# Patient Record
Sex: FEMALE | Race: BLACK OR AFRICAN AMERICAN | Employment: FULL TIME | URBAN - METROPOLITAN AREA
[De-identification: names, ages, dates, MRNs, and addresses within clinical notes are randomized per-mention and may not be internally consistent; named-entity substitution may affect disease eponyms.]

---

## 2017-02-02 ENCOUNTER — OFFICE VISIT (OUTPATIENT)
Dept: INTERNAL MEDICINE | Facility: CLINIC | Age: 27
End: 2017-02-02

## 2017-02-02 VITALS
WEIGHT: 265 LBS | SYSTOLIC BLOOD PRESSURE: 138 MMHG | HEART RATE: 72 BPM | OXYGEN SATURATION: 97 % | DIASTOLIC BLOOD PRESSURE: 87 MMHG | TEMPERATURE: 98.3 F | BODY MASS INDEX: 44.1 KG/M2 | RESPIRATION RATE: 16 BRPM

## 2017-02-02 DIAGNOSIS — Z30.432 ENCOUNTER FOR IUD REMOVAL: ICD-10-CM

## 2017-02-02 DIAGNOSIS — Z12.4 SCREENING FOR CERVICAL CANCER: ICD-10-CM

## 2017-02-02 DIAGNOSIS — Z76.89 ESTABLISHING CARE WITH NEW DOCTOR, ENCOUNTER FOR: Primary | ICD-10-CM

## 2017-02-02 DIAGNOSIS — Z76.89 ESTABLISHING CARE WITH NEW DOCTOR, ENCOUNTER FOR: ICD-10-CM

## 2017-02-02 DIAGNOSIS — Z11.3 SCREEN FOR STD (SEXUALLY TRANSMITTED DISEASE): ICD-10-CM

## 2017-02-02 LAB
HIV 1+2 AB+HIV1 P24 AG SERPL QL IA: NORMAL
MICRO REPORT STATUS: NORMAL
SPECIMEN SOURCE: NORMAL
T PALLIDUM IGG+IGM SER QL: NORMAL
WET PREP SPEC: NORMAL

## 2017-02-02 PROCEDURE — 86780 TREPONEMA PALLIDUM: CPT | Performed by: INTERNAL MEDICINE

## 2017-02-02 PROCEDURE — 87389 HIV-1 AG W/HIV-1&-2 AB AG IA: CPT | Performed by: INTERNAL MEDICINE

## 2017-02-02 ASSESSMENT — PAIN SCALES - GENERAL: PAINLEVEL: NO PAIN (0)

## 2017-02-02 NOTE — PATIENT INSTRUCTIONS
[facility] Center Medication Refill Request Information:  * Please contact your pharmacy regarding ANY request for medication refills.  ** [facility] Prescription Fax = [phone]  * Please allow 3 business days for routine medication refills.  * Please allow 5 business days for controlled substance medication refills.     [facility] Care Center Test Result notification information:  *You will be notified with in 7-10 days of your appointment day regarding the results of your test.  If you are on MyChart you will be notified as soon as the provider has reviewed the results and signed off on them.      NEXPLANON     You may have some pain at the site of the Nexplanon insertion. You can help relieve the discomfort with Tylenol (acetaminophen), Aspirin or Advil (ibuprofen). If your discomfort worsens or you notice redness spreading on the skin around the insertion site, please call the clinic.       Irregular bleeding is common with Nexplanon, especially in the first 6-12 months of use. After one year, approximately 20% of women who use Nexplanon will stop having periods completely. Some women have longer, heavier periods. Some women will have increased spotting between periods. You may find that your periods may be hard to predict.       The Nexplanon does not protect against sexually transmitted infections including the AIDS virus (HIV), warts (HPV), gonorrhea, Chlamydia, and herpes. Condoms should be used to decrease the risk sexually transmitted infections. If you think that you have been exposed to a sexually transmitted infection, please call the clinic.       If you had Nexplanon placed for birth control, it is effective immediately if it was inserted within five days after the start of your period. If you have Nexplanon inserted at any other time during your menstrual cycle, use another method of birth control, like condoms for at least 7 days.       The Nexplanon should be removed and/or replaced by a health care  provider after three years.     Preparing for Nexplanon Insertion       Prior to the Appointment::    Check with your insurance to ensure you have coverage for the Nexplanon angel    If you are NOT on birth control, abstain from unprotected intercourse (sex without condoms) for 7 days prior to the appointment.     Failure to do so will result in Nexplanon angel not being placed the day of the appointment.     If you are on birth control, continue your current form of birth control until 7 days after the Nexplanon angel is inserted.     You need to be taking this correctly.     If you are missing pills, you also need to abstain from unprotected intercourse for 7 days prior to the appointment.     Failure to do so will result in nexplanon not being placed the day of the appointment.       Day of the Appointment:     Please arrive to lab 30 minutes prior to the scheduled appointment for a urine pregnancy test.       After the Appointment:     You will need to keep your arm dry and clean for 24 hours.     IUD     1. Uterine cramping is common after IUD placement. You can help relieve the discomfort with heating pads, Tylenol (acetaminophen), Aspirin or Advil (ibuprofen). If your cramping becomes very painful, please call the clinic.     2. Irregular bleeding and spotting is normal for the first few months after the IUD is placed. In some cases, women may experience irregular bleeding or spotting for up to six months after the IUD is placed. This bleeding can be annoying at first but usually will become lighter with the Mirena IUD quickly. Call the clinic if your bleeding is excessive and not getting better.     3. Your period will likely be shorter and lighter with a Mirena IUD. Approximately 40% of women will stop having periods altogether with the Mirena IUD. Your period may be heavier and longer with the Paragard IUD.     4. IUDs do not protect against sexually transmitted infections including the AIDS virus (HIV), warts  (HPV), gonorrhea, Chlamydia, and herpes. Condoms should be used to decrease the risk sexually transmitted infections. If you think that you have been exposed to a sexually transmitted infection, please call the clinic.     5. If you had the IUD placed for birth control, the Paragard IUD is effective immediately. The Mirena IUD is effective immediately if it was inserted within seven days after the start of your period. If you have Mirena inserted at any other time during your menstrual cycle, use another method of birth control, like condoms for at least 7 days.     6. It is possible for the IUD to come out of the uterus. If it does slip out of place, it is most likely to happen in the first few months after being put in. To make sure your IUD is in place, you can feel for the IUD strings between periods. To check for strings, wash your hands. Then, sit or squat down. Place one finger into your vagina until you feel your cervix. It will feel hard and rubbery, like the end of your nose. The string ends should be coming through your cervix. Do not pull on the strings. If the strings feel much longer than before, if you feel the hard plastic part of the IUD, or if you cannot feel the strings at all, the IUD may have moved out of place. Please call the clinic and consider using a back up form of birth control until you are seen.     7. Keep your follow-up appointment for 4-6 weeks after the IUD has been placed.     8. Pregnancy is unlikely after IUD placement, but can happen. If you have early pregnancy symptoms like nausea and vomiting, breast tenderness, frequent urination or abdominal pain, you can take a pregnancy test. Please call the clinic if you have any concerns or if your pregnancy test is positive.     9. The IUD should only be removed by a healthcare provider.     The Mirena IUD should be removed and/or replaced after 5 years.     The Paragard IUD should be removed and/or replaced after 10 years.              Preparing for IUD Insertion       Prior to the Appointment::    Check with your insurance to ensure you have coverage for the IUD    If you are NOT on birth control, abstain from unprotected intercourse (sex without condoms) for 7 days prior to the appointment.     Failure to do so will result in IUD not being placed the day of the appointment.     If you are on birth control, continue your current form of birth control until 7 days after the IUD is inserted.     You need to be taking this correctly.     If you are missing pills, you also need to abstain from unprotected intercourse for 7 days prior to the appointment.     Failure to do so will result in IUD not being placed the day of the appointment.       Day of the Appointment:     Please arrive to lab 30 minutes prior to the scheduled appointment for a urine pregnancy test.     You may take 600 mg of ibuprofen 60 minutes prior to the procedure to help reduce cramping.     If you require medication to relax for the appointment, please let us know when you schedule the appointment. You will need a  for after the procedure.       After the Appointment:     You will not be able to place anything in the vagina for seven days    No intercourse (sexual activity)    No tampons    No douches

## 2017-02-02 NOTE — MR AVS SNAPSHOT
After Visit Summary   2/2/2017    Betty Tamayo    MRN: [id number]           Patient Information     Date Of Birth          1990        Visit Information        Provider Department      2/2/2017 1:00 PM Lorena Reed MD [facility]        Today's Diagnoses     Establishing care with new doctor, encounter for    -  1     Screen for STD (sexually transmitted disease)         Encounter for IUD removal         Screening for cervical cancer           Care Instructions    Primary Care Center Medication Refill Request Information:  * Please contact your pharmacy regarding ANY request for medication refills.  ** [facility] Prescription Fax = [phone]  * Please allow 3 business days for routine medication refills.  * Please allow 5 business days for controlled substance medication refills.     [facility] Center Test Result notification information:  *You will be notified with in 7-10 days of your appointment day regarding the results of your test.  If you are on MyChart you will be notified as soon as the provider has reviewed the results and signed off on them.      NEXPLANON     You may have some pain at the site of the Nexplanon insertion. You can help relieve the discomfort with Tylenol (acetaminophen), Aspirin or Advil (ibuprofen). If your discomfort worsens or you notice redness spreading on the skin around the insertion site, please call the clinic.       Irregular bleeding is common with Nexplanon, especially in the first 6-12 months of use. After one year, approximately 20% of women who use Nexplanon will stop having periods completely. Some women have longer, heavier periods. Some women will have increased spotting between periods. You may find that your periods may be hard to predict.       The Nexplanon does not protect against sexually transmitted infections including the AIDS virus (HIV), warts (HPV), gonorrhea, Chlamydia, and herpes. Condoms should be used  to decrease the risk sexually transmitted infections. If you think that you have been exposed to a sexually transmitted infection, please call the clinic.       If you had Nexplanon placed for birth control, it is effective immediately if it was inserted within five days after the start of your period. If you have Nexplanon inserted at any other time during your menstrual cycle, use another method of birth control, like condoms for at least 7 days.       The Nexplanon should be removed and/or replaced by a health care provider after three years.     Preparing for Nexplanon Insertion       Prior to the Appointment::    Check with your insurance to ensure you have coverage for the Nexplanon angel    If you are NOT on birth control, abstain from unprotected intercourse (sex without condoms) for 7 days prior to the appointment.     Failure to do so will result in Nexplanon angel not being placed the day of the appointment.     If you are on birth control, continue your current form of birth control until 7 days after the Nexplanon angel is inserted.     You need to be taking this correctly.     If you are missing pills, you also need to abstain from unprotected intercourse for 7 days prior to the appointment.     Failure to do so will result in nexplanon not being placed the day of the appointment.       Day of the Appointment:     Please arrive to lab 30 minutes prior to the scheduled appointment for a urine pregnancy test.       After the Appointment:     You will need to keep your arm dry and clean for 24 hours.     IUD     1. Uterine cramping is common after IUD placement. You can help relieve the discomfort with heating pads, Tylenol (acetaminophen), Aspirin or Advil (ibuprofen). If your cramping becomes very painful, please call the clinic.     2. Irregular bleeding and spotting is normal for the first few months after the IUD is placed. In some cases, women may experience irregular bleeding or spotting for up to six  months after the IUD is placed. This bleeding can be annoying at first but usually will become lighter with the Mirena IUD quickly. Call the clinic if your bleeding is excessive and not getting better.     3. Your period will likely be shorter and lighter with a Mirena IUD. Approximately 40% of women will stop having periods altogether with the Mirena IUD. Your period may be heavier and longer with the Paragard IUD.     4. IUDs do not protect against sexually transmitted infections including the AIDS virus (HIV), warts (HPV), gonorrhea, Chlamydia, and herpes. Condoms should be used to decrease the risk sexually transmitted infections. If you think that you have been exposed to a sexually transmitted infection, please call the clinic.     5. If you had the IUD placed for birth control, the Paragard IUD is effective immediately. The Mirena IUD is effective immediately if it was inserted within seven days after the start of your period. If you have Mirena inserted at any other time during your menstrual cycle, use another method of birth control, like condoms for at least 7 days.     6. It is possible for the IUD to come out of the uterus. If it does slip out of place, it is most likely to happen in the first few months after being put in. To make sure your IUD is in place, you can feel for the IUD strings between periods. To check for strings, wash your hands. Then, sit or squat down. Place one finger into your vagina until you feel your cervix. It will feel hard and rubbery, like the end of your nose. The string ends should be coming through your cervix. Do not pull on the strings. If the strings feel much longer than before, if you feel the hard plastic part of the IUD, or if you cannot feel the strings at all, the IUD may have moved out of place. Please call the clinic and consider using a back up form of birth control until you are seen.     7. Keep your follow-up appointment for 4-6 weeks after the IUD has been  placed.     8. Pregnancy is unlikely after IUD placement, but can happen. If you have early pregnancy symptoms like nausea and vomiting, breast tenderness, frequent urination or abdominal pain, you can take a pregnancy test. Please call the clinic if you have any concerns or if your pregnancy test is positive.     9. The IUD should only be removed by a healthcare provider.     The Mirena IUD should be removed and/or replaced after 5 years.     The Paragard IUD should be removed and/or replaced after 10 years.             Preparing for IUD Insertion       Prior to the Appointment::    Check with your insurance to ensure you have coverage for the IUD    If you are NOT on birth control, abstain from unprotected intercourse (sex without condoms) for 7 days prior to the appointment.     Failure to do so will result in IUD not being placed the day of the appointment.     If you are on birth control, continue your current form of birth control until 7 days after the IUD is inserted.     You need to be taking this correctly.     If you are missing pills, you also need to abstain from unprotected intercourse for 7 days prior to the appointment.     Failure to do so will result in IUD not being placed the day of the appointment.       Day of the Appointment:     Please arrive to lab 30 minutes prior to the scheduled appointment for a urine pregnancy test.     You may take 600 mg of ibuprofen 60 minutes prior to the procedure to help reduce cramping.     If you require medication to relax for the appointment, please let us know when you schedule the appointment. You will need a  for after the procedure.       After the Appointment:     You will not be able to place anything in the vagina for seven days    No intercourse (sexual activity)    No tampons    No douches                  Follow-ups after your visit        Your next 10 appointments already scheduled     Feb 02, 2017  1:30 PM   LAB with [facility] ([facility]  [facility])    [address]  [address] [place] [address] [phone]           Patient must bring picture ID.  Patient should be prepared to give a urine specimen  Please do not eat 10-12 hours before your appointment if you are coming in fasting for labs on lipids, cholesterol, or glucose (sugar).  Pregnant women should follow their Care Team instructions. Water with medications is okay. Do not drink coffee or other fluids.   If you have concerns about taking  your medications, please ask at office or if scheduling via [employer], send a message by clicking on Secure Messaging, Message Your Care Team.              Future tests that were ordered for you today     Open Future Orders        Priority Expected Expires Ordered    Wet prep Routine  2/3/2018 2/2/2017    HIV Antigen Antibody Combo Routine  2/2/2018 2/2/2017    Anti Treponema Routine  2/2/2018 2/2/2017            Who to contact     Please call your clinic at [phone] to:    Ask questions about your health    Make or cancel appointments    Discuss your medicines    Learn about your test results    Speak to your doctor   If you have compliments or concerns about an experience at your clinic, or if you wish to file a complaint, please contact [facility] Patient Relations at [phone] or email us at [email or web address]         Additional Information About Your Visit        PixSense Information     PixSense is an electronic gateway that provides easy, online access to your medical records. With PixSense, you can request a clinic appointment, read your test results, renew a prescription or communicate with your care team.     To sign up for PixSense visit the website at [email or web address]   You will be asked to enter the access code listed below, as well as some personal information. Please follow the directions to create your username and password.     Your access code is:  [email or web address]  Expires: 5/3/2017  1:22 PM     Your access code will  in 90 days. If you need help or a new code, please contact your [facility] or call [phone] for assistance.        Care EveryWhere ID     This is your Care EveryWhere ID. This could be used by other organizations to access your [facility] medical records  [id number]        Your Vitals Were     Pulse Temperature Respirations Pulse Oximetry Breastfeeding?       72 98.3  F (36.8  C) (Oral) 16 97% No        Blood Pressure from Last 3 Encounters:   17 138/87   16 127/90   16 128/83    Weight from Last 3 Encounters:   17 120.203 kg (265 lb)   04/28/15 113.399 kg (250 lb)   01/08/15 113.9 kg (251 lb 1.7 oz)              We Performed the Following     Chlamydia trachomatis PCR - Swab collection and order printing in clinic and sent together to lab     Neisseria gonorrhoeae PCR - Swab collection and order printing in clinic and sent together to lab     Pap imaged thin layer screen reflex to HPV if ASCUS - recommend age 25 - 29     Pap smear procedure (exam)     REMOVE INTRAUTERINE DEVICE          Today's Medication Changes          These changes are accurate as of: 17  1:24 PM.  If you have any questions, ask your nurse or doctor.               Stop taking these medicines if you haven't already. Please contact your care team if you have questions.     ALBUTEROL SULFATE IN   Stopped by:  Lorena Reed MD                    Primary Care Provider    Physician No Ref-Primary       No address on file        Thank you!     Thank you for choosing [facility]  for your care. Our goal is always to provide you with excellent care. Hearing back from our patients is one way we can continue to improve our services. Please take a few minutes to complete the written survey that you may receive in the mail after your visit with us. Thank you!             Your Updated  Medication List - Protect others around you: Learn how to safely use, store and throw away your medicines at www.disposemymeds.org.          This list is accurate as of: 2/2/17  1:24 PM.  Always use your most recent med list.                   Brand Name Dispense Instructions for use    levonorgestrel 20 MCG/24HR IUD    MIRENA     1 each by Intrauterine route once

## 2017-02-02 NOTE — NURSING NOTE
Chief Complaint   Patient presents with     IUD     Patient is here to have IUD removed     Denise Olmos CMA 12:55 PM on 2/2/2017.

## 2017-02-02 NOTE — Clinical Note
Patient:  Betty Tamayo  :   1990  MRN:     [id number]        Ms. Betty Tamayo  [address]  [place] [address]        2017    Dear Ms. Tamayo,    Thank you for choosing the [facility] for your healthcare needs.  We appreciate the opportunity to serve you.    The following are your recent test results.     Your test results fall within the expected range(s) or remain unchanged from previous results.  Please continue with current treatment plan.    Results for orders placed or performed in visit on 17   Wet prep   Result Value Ref Range    Specimen Description Vagina     Wet Prep       No Trichomonas seen  No clue cells seen  No yeast seen  No PMNs seen      Micro Report Status FINAL 2017        Please contact your provider if you have any questions or concerns.  We look forward to serving your needs in the future.      Sincerely,    Dr. Alba/ EVELINA

## 2017-02-02 NOTE — PROGRESS NOTES
S: Betty is here to establish care, have a physical, and have her IUD removed. She has had her Mirena in place for almost 8years now. Initially, she had lighter/shorter periods, however, in the last several months she has had heavier periods that are longer and more painful. She is not sure what else she'd like for contraception, and is considering the nexplanon angel or another Mirena. She does not want the pill as she cannot take it consistently.     Past Medical History   Diagnosis Date     Uncomplicated asthma      Right shoulder pain 12/9/14     Past Surgical History   Procedure Laterality Date     Hopkins teeth       Ent surgery       tonsillectomy 1995     Anesthesia out of or mri Right 1/8/2015     Procedure: ANESTHESIA OUT OF OR MRI;  Surgeon: Generic Anesthesia Provider;  Location: [facility] OR     Family History   Problem Relation Age of Onset     Hypertension No family hx of      DIABETES No family hx of      Social History     Social History     Marital Status: Single     Spouse Name: N/A     Number of Children: N/A     Years of Education: N/A     Occupational History     Not on file.     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   Gets some exercise, working on going to the gym more often.      ROS: 10 point ROS neg other than the symptoms noted above in the HPI.    PE:   /87 mmHg  Pulse 72  Temp(Src) 98.3  F (36.8  C) (Oral)  Resp 16  Wt 120.203 kg (265 lb)  SpO2 97%  Breastfeeding? No  General: pleasant female, in NAD  ENT: TMs with cerumen bilaterally, oropharynx clear  Neck: no LAD  Resp: lungs CAB. No wheezing  CV: Heart RRR, no MRG  Abd: Soft, NT, ND, Nl bowel  sounds  Ext: wwp, no edema  Skin: warm, dry, no rash  Gyn: External genitalia normal, cervix normal, no discharge. IUD strings visualized and removed. Pap taken.     A/P:   Betty was seen today for iud.    Diagnoses and all orders for this visit:    Establishing care with new doctor, encounter for  -     Advised increased exercise   Discussed alternative contraception options. Mirena vs ParaGuard vs Nexplanon. Offered to place one of these today but she would like to think about it first. Advised using condoms regularly with sexual activity.     Screen for STD (sexually transmitted disease)  -     Neisseria gonorrhoeae PCR - Swab collection and order printing in clinic and sent together to lab  -     Chlamydia trachomatis PCR - Swab collection and order printing in clinic and sent together to lab  -     HIV Antigen Antibody Combo; Future  -     Anti Treponema; Future  -     Wet prep; Future    Encounter for IUD removal  -     REMOVE INTRAUTERINE DEVICE    Screening for cervical cancer  -     Pap smear procedure (exam)  -     Pap imaged thin layer screen reflex to HPV if ASCUS - recommend age 25 - 29      Lorena Lindsay MD  02/02/2017

## 2017-02-02 NOTE — NURSING NOTE
Labs collected by RN from right antecubital and sent for processing, pt tolerated well. No other appts so no vitals taken.Ayde Espitia

## 2017-02-03 LAB
C TRACH DNA SPEC QL NAA+PROBE: NORMAL
N GONORRHOEA DNA SPEC QL NAA+PROBE: NORMAL
SPECIMEN SOURCE: NORMAL
SPECIMEN SOURCE: NORMAL

## 2017-02-06 LAB
COPATH REPORT: NORMAL
PAP: NORMAL

## 2017-02-07 ENCOUNTER — OFFICE VISIT (OUTPATIENT)
Dept: ENDOCRINOLOGY | Facility: CLINIC | Age: 27
End: 2017-02-07

## 2017-02-07 VITALS
DIASTOLIC BLOOD PRESSURE: 66 MMHG | HEART RATE: 75 BPM | SYSTOLIC BLOOD PRESSURE: 117 MMHG | WEIGHT: 269.5 LBS | BODY MASS INDEX: 44.9 KG/M2 | HEIGHT: 65 IN | OXYGEN SATURATION: 98 % | TEMPERATURE: 98.8 F

## 2017-02-07 DIAGNOSIS — E66.01 MORBID OBESITY DUE TO EXCESS CALORIES (H): Primary | ICD-10-CM

## 2017-02-07 RX ORDER — PHENTERMINE HYDROCHLORIDE 37.5 MG/1
0.5 TABLET ORAL EVERY MORNING
Qty: 15 TABLET | Refills: 2 | Status: SHIPPED | OUTPATIENT
Start: 2017-02-07 | End: 2017-02-07

## 2017-02-07 RX ORDER — PHENTERMINE HYDROCHLORIDE 37.5 MG/1
0.5 TABLET ORAL EVERY MORNING
Qty: 30 TABLET | Refills: 0 | Status: SHIPPED | OUTPATIENT
Start: 2017-02-07 | End: 2017-02-14

## 2017-02-07 ASSESSMENT — ENCOUNTER SYMPTOMS
BACK PAIN: 1
ARTHRALGIAS: 1
STIFFNESS: 0
MYALGIAS: 1

## 2017-02-07 ASSESSMENT — PAIN SCALES - GENERAL: PAINLEVEL: NO PAIN (0)
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
## 2017-02-07 NOTE — NURSING NOTE
"Chief Complaint   Patient presents with     Consult     new pt       Filed Vitals:    02/07/17 1040   BP: 117/66   Pulse: 75   Temp: 98.8  F (37.1  C)   Height: 5' 5\"   Weight: 269 lb 8 oz   SpO2: 98%       Body mass index is 44.85 kg/(m^2).      WOUND EVALUATION:                        Boogie Dos Santos MA    "
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
## 2017-02-07 NOTE — PATIENT INSTRUCTIONS
Check BP/HR in 1 week    Follow up with Dr. Woodson or nurseMaude in 2 months  Follow up with Dr. Woodson in 4 months      MEDICATION STARTED AT THIS APPOINTMENT    We are starting Phentermine. Take one tablet in the morning.  Call the nurse at [phone] if you have any questions or concerns. (Do not stop taking it if you don't think it's working. For some people it works without them knowing it.)    Phentermine is being prescribed because you identified hunger as one of the main causes for your extra weight.      Our patients on Phentermine find that they:    >feel less hunger    >find it easier to push the plate away   >have an easier time eating less    For some of our patients, these feelings are very real and immediate. For other patients, the feelings are less obvious. They don't feel much of a change but find they've lost weight. Like all weight loss medications, Phentermine  works best when you help it work. This means:  1. Having less tempting high calorie (fattening) food around the house or office. (For people with strong cravings this is very important.)   2. Staying away from situations or people that may trigger your cravings .   3. Eating out only one time or less each week.  4. Eating your meals at a table with the TV or computer off.    Side-effects. Phentermine is generally well tolerated. The main side-effects we see are feelings of racing pulse or rapid heart beat. Some people can get an elevated blood pressure. Because of this we may have you come back within a week or so of starting the medication for a blood pressure check.         In order to get refills of this or any medication we prescribe you must be seen in the medical weight mgmt clinic every 2-3 months. Please have your pharmacy fax a refill request to [phone].

## 2017-02-14 ENCOUNTER — CARE COORDINATION (OUTPATIENT)
Dept: ENDOCRINOLOGY | Facility: CLINIC | Age: 27
End: 2017-02-14

## 2017-02-14 DIAGNOSIS — E66.01 MORBID OBESITY DUE TO EXCESS CALORIES (H): ICD-10-CM

## 2017-02-14 RX ORDER — PHENTERMINE HYDROCHLORIDE 37.5 MG/1
0.5 TABLET ORAL EVERY MORNING
Qty: 30 TABLET | Refills: 0 | Status: SHIPPED | OUTPATIENT
Start: 2017-02-14 | End: 2017-09-05
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
## 2017-06-05 ENCOUNTER — HOSPITAL ENCOUNTER (EMERGENCY)
Facility: CLINIC | Age: 27
Discharge: HOME | End: 2017-06-05
Attending: EMERGENCY MEDICINE | Admitting: EMERGENCY MEDICINE
Payer: COMMERCIAL

## 2017-06-05 ENCOUNTER — APPOINTMENT (OUTPATIENT)
Dept: GENERAL RADIOLOGY | Facility: CLINIC | Age: 27
End: 2017-06-05
Attending: EMERGENCY MEDICINE
Payer: COMMERCIAL

## 2017-06-05 VITALS
SYSTOLIC BLOOD PRESSURE: 160 MMHG | HEART RATE: 84 BPM | RESPIRATION RATE: 18 BRPM | OXYGEN SATURATION: 98 % | TEMPERATURE: 98.4 F | DIASTOLIC BLOOD PRESSURE: 96 MMHG

## 2017-06-05 DIAGNOSIS — M25.562 PAIN IN BOTH KNEES, UNSPECIFIED CHRONICITY: ICD-10-CM

## 2017-06-05 DIAGNOSIS — M25.561 PAIN IN BOTH KNEES, UNSPECIFIED CHRONICITY: ICD-10-CM

## 2017-06-05 LAB
ANION GAP SERPL CALCULATED.3IONS-SCNC: 4 MMOL/L (ref 3–14)
BASOPHILS # BLD AUTO: 0 10E9/L (ref 0–0.2)
BASOPHILS NFR BLD AUTO: 0.3 %
BUN SERPL-MCNC: 13 MG/DL (ref 7–30)
CALCIUM SERPL-MCNC: 8.4 MG/DL (ref 8.5–10.1)
CHLORIDE SERPL-SCNC: 107 MMOL/L (ref 94–109)
CO2 SERPL-SCNC: 26 MMOL/L (ref 20–32)
CREAT SERPL-MCNC: 0.64 MG/DL (ref 0.52–1.04)
CRP SERPL-MCNC: 3.6 MG/L (ref 0–8)
DIFFERENTIAL METHOD BLD: ABNORMAL
EOSINOPHIL # BLD AUTO: 0.3 10E9/L (ref 0–0.7)
EOSINOPHIL NFR BLD AUTO: 2.6 %
ERYTHROCYTE [DISTWIDTH] IN BLOOD BY AUTOMATED COUNT: 15.5 % (ref 10–15)
ERYTHROCYTE [SEDIMENTATION RATE] IN BLOOD BY WESTERGREN METHOD: 7 MM/H (ref 0–20)
GFR SERPL CREATININE-BSD FRML MDRD: ABNORMAL ML/MIN/1.7M2
GLUCOSE SERPL-MCNC: 88 MG/DL (ref 70–99)
HCT VFR BLD AUTO: 34.2 % (ref 35–47)
HGB BLD-MCNC: 10.9 G/DL (ref 11.7–15.7)
IMM GRANULOCYTES # BLD: 0 10E9/L (ref 0–0.4)
IMM GRANULOCYTES NFR BLD: 0.2 %
LYMPHOCYTES # BLD AUTO: 3.5 10E9/L (ref 0.8–5.3)
LYMPHOCYTES NFR BLD AUTO: 30.1 %
MCH RBC QN AUTO: 26.2 PG (ref 26.5–33)
MCHC RBC AUTO-ENTMCNC: 31.9 G/DL (ref 31.5–36.5)
MCV RBC AUTO: 82 FL (ref 78–100)
MONOCYTES # BLD AUTO: 1.2 10E9/L (ref 0–1.3)
MONOCYTES NFR BLD AUTO: 10.5 %
NEUTROPHILS # BLD AUTO: 6.6 10E9/L (ref 1.6–8.3)
NEUTROPHILS NFR BLD AUTO: 56.3 %
NRBC # BLD AUTO: 0 10*3/UL
NRBC BLD AUTO-RTO: 0 /100
PLATELET # BLD AUTO: 316 10E9/L (ref 150–450)
POTASSIUM SERPL-SCNC: 4.1 MMOL/L (ref 3.4–5.3)
POTASSIUM SERPL-SCNC: 7 MMOL/L (ref 3.4–5.3)
RBC # BLD AUTO: 4.16 10E12/L (ref 3.8–5.2)
SODIUM SERPL-SCNC: 137 MMOL/L (ref 133–144)
URATE SERPL-MCNC: 4.3 MG/DL (ref 2.6–6)
WBC # BLD AUTO: 11.7 10E9/L (ref 4–11)

## 2017-06-05 PROCEDURE — 84132 ASSAY OF SERUM POTASSIUM: CPT | Performed by: EMERGENCY MEDICINE

## 2017-06-05 PROCEDURE — 85652 RBC SED RATE AUTOMATED: CPT | Performed by: EMERGENCY MEDICINE

## 2017-06-05 PROCEDURE — 86140 C-REACTIVE PROTEIN: CPT | Performed by: EMERGENCY MEDICINE

## 2017-06-05 PROCEDURE — 25000128 H RX IP 250 OP 636: Performed by: EMERGENCY MEDICINE

## 2017-06-05 PROCEDURE — 25000132 ZZH RX MED GY IP 250 OP 250 PS 637: Performed by: EMERGENCY MEDICINE

## 2017-06-05 PROCEDURE — 96372 THER/PROPH/DIAG INJ SC/IM: CPT | Performed by: EMERGENCY MEDICINE

## 2017-06-05 PROCEDURE — 99284 EMERGENCY DEPT VISIT MOD MDM: CPT | Mod: 25 | Performed by: EMERGENCY MEDICINE

## 2017-06-05 PROCEDURE — 84550 ASSAY OF BLOOD/URIC ACID: CPT | Performed by: EMERGENCY MEDICINE

## 2017-06-05 PROCEDURE — 73562 X-RAY EXAM OF KNEE 3: CPT | Mod: 50

## 2017-06-05 PROCEDURE — 80048 BASIC METABOLIC PNL TOTAL CA: CPT | Performed by: EMERGENCY MEDICINE

## 2017-06-05 PROCEDURE — 99284 EMERGENCY DEPT VISIT MOD MDM: CPT | Mod: Z6 | Performed by: EMERGENCY MEDICINE

## 2017-06-05 PROCEDURE — 85025 COMPLETE CBC W/AUTO DIFF WBC: CPT | Performed by: EMERGENCY MEDICINE

## 2017-06-05 RX ORDER — ACETAMINOPHEN 325 MG/1
975 TABLET ORAL ONCE
Status: COMPLETED | OUTPATIENT
Start: 2017-06-05 | End: 2017-06-05

## 2017-06-05 RX ORDER — KETOROLAC TROMETHAMINE 30 MG/ML
30 INJECTION, SOLUTION INTRAMUSCULAR; INTRAVENOUS ONCE
Status: COMPLETED | OUTPATIENT
Start: 2017-06-05 | End: 2017-06-05

## 2017-06-05 RX ADMIN — ACETAMINOPHEN 1000 MG: 500 TABLET, FILM COATED ORAL at 21:13

## 2017-06-05 RX ADMIN — KETOROLAC TROMETHAMINE 30 MG: 30 INJECTION, SOLUTION INTRAMUSCULAR at 21:13

## 2017-06-05 ASSESSMENT — ENCOUNTER SYMPTOMS
BACK PAIN: 0
FEVER: 0
CHILLS: 0
NECK PAIN: 0

## 2017-06-06 NOTE — ED NOTES
Pt presents to ED with bilateral knee pain that started approx 2 weeks a go that has gotten progressively worse. Pt states it feels like there is pressure on her knees and she is now unable to bend them. Pt also states her knees are tender to the touch and she has pain when trying to lay down in bed.

## 2017-06-06 NOTE — ED PROVIDER NOTES
History     Chief Complaint   Patient presents with     Knee Pain     HPI  Betty Tamayo is a 26 year old female with a history of asthma who presents for evaluation of knee pain.    Patient complains of atraumatic bilateral anterior knee pain for the past two weeks that has worsened over the past two days. Her pain worsens when she attempts to bend her knees. She denies a history of similar symptoms. She also reports when she sits for long periods of time and then attempts to get up she will have even worse symptoms. No fever or chills. No vaginal bleeding or vaginal discharge. No recent falls or trauma, no twisting mechanisms. No change in her activity level, no kneeling recently. She denies neck pain, back pain, or hip pain. No focal numbness, tingling or weakness.Other joints unaffected. No radicular symptoms. No history of gout or other inflammatory disease. She is currently on her menstrual period, denies STI risk or symptoms, denies any chance of pregnancy, and is currently denying pregnancy testing.  Her mother has had gout.    No other symptoms or complaints at this time. Please see ROS for further details.    I have reviewed the Medications, Allergies, Past Medical and Surgical History, and Social History in the [employer] system.  Past Medical History:   Diagnosis Date     Right shoulder pain 12/9/14     Uncomplicated asthma        Past Surgical History:   Procedure Laterality Date     ANESTHESIA OUT OF OR MRI Right 1/8/2015    Procedure: ANESTHESIA OUT OF OR MRI;  Surgeon: Generic Anesthesia Provider;  Location: [facility] OR     ENT SURGERY      tonsillectomy 1995     wisdom teeth         Family History   Problem Relation Age of Onset     Hypertension No family hx of      DIABETES No family hx of        Social History   Substance Use Topics     Smoking status: Current Every Day Smoker     Packs/day: 0.50     Types: Cigarettes     Smokeless tobacco: Never Used      Comment: nicorette gum     Alcohol use Yes       Comment: occasionally     Current Facility-Administered Medications   Medication     acetaminophen (TYLENOL) tablet 975 mg     ketorolac (TORADOL) injection 30 mg     Current Outpatient Prescriptions   Medication     phentermine (ADIPEX-P) 37.5 MG tablet     levonorgestrel (MIRENA) 20 MCG/24HR IUD      No Known Allergies    Review of Systems   Constitutional: Negative for chills and fever.   Gastrointestinal: Negative for nausea and vomiting.   Genitourinary: Negative for dysuria, vaginal bleeding and vaginal discharge.   Musculoskeletal: Negative for back pain and neck pain.        Positive for bilateral knee pain   Skin: Negative for color change and rash.   Allergic/Immunologic: Negative for immunocompromised state.   Neurological: Negative for weakness.        See HPI       Physical Exam   BP: (!) 160/92  Pulse: 93  Temp: 98.4  F (36.9  C)  Resp: 16  SpO2: 98 %  Physical Exam  CONSTITUTIONAL: Well-developed and well-nourished. Awake and alert. Non-toxic appearance. No acute distress.   HENT:   - Head: Normocephalic and atraumatic.   - Ears: Hearing and external ear grossly normal.   - Nose: Nose normal. No rhinorrhea. No epistaxis.   - Mouth/Throat: Oropharynx is clear and MMM  EYES: Conjunctivae and lids are normal. No scleral icterus.   NECK: Normal range of motion and phonation normal. Neck supple.  No tracheal deviation, no stridor. No edema or erythema noted.  CARDIOVASCULAR: Normal rate, regular rhythm and no appreciable abnormal heart sounds.  PULMONARY/CHEST: Effort normal. No accessory muscle usage or stridor. No respiratory distress.  No appreciable abnormal breath sounds.  ABDOMEN: Soft, non-distended. No tenderness. No rigidity, rebound or guarding.   MUSCULOSKELETAL: Extremities warm and seemingly well perfused. No edema or calf tenderness. Normal inspection, no effusion.No abnormal warmth or erythema. Patellas in appropriate position. Movement of patellas bilaterally causes some discomfort, but no  apprehension. ROM fully intact. Both knees stable w/o clear laxity. Unable to appreciate any palpable click. Normal ankle/hip exams. Neurovascularly intact throughout.   NEUROLOGIC: Awake, alert. Not disoriented. She displays no atrophy and no tremor.  Normal tone. No seizure activity. Coordination normal. GCS 15  SKIN: Skin is warm and dry. No rash noted. No diaphoresis. No pallor.   PSYCHIATRIC: Normal mood and affect. Speech and behavior normal. Thought processes linear. Cognition and memory are normal.     ED Course     ED Course   Comment By Time   Patient's repeat potassium is WNL. Maude Alvares MD 06/05 8794   .  Patient states for discharge at this point, however the patient has already left prior to being able to have any sure decision making discussions, and discuss outpatient plan, etc.  As we did not have the opportunity to review any of her results are talked about potential plans and she left the ED technically this would be elopement. Maude Alvares MD 06/05 3404     Procedures       8:30 PM  The patient was seen and examined by Dr. Alvares in Room [facility].       Assessments & Plan (with Medical Decision Making)   IMPRESSION: 26-year-old female, PMH notable for uncomplicated asthma, previous shoulder complaints/discomfort, presents for evaluation of a 2 week history of atraumatic bilateral knee discomfort as described further in the HPI/ROS.  Clinically, patient appears nontoxic, NAD.  Vitals WNL. Otherwise on examination, she has bilateral frontal knee pain with some discomfort with patellar movement but the patella seemed to be in appropriate position.  No obvious joint laxity, no abnormal warmth or erythema, ROS appears to be intact though somewhat uncomfortable for her again anteriorly.  DDX includes but not limited to general patella/femoral type pain, arthritis, less likely an acute inflammatory arthritis based on exam the mother has a history of gout.  Also discussed potential for  infectious causes of polyarthropathy such as gonorrhea, etc. but she has no  symptoms, no history for such, and declines testing.    PLAN: Basic labs, inflammatory markers and uric acid with baseline x-rays, conservative symptom management, will probably need further outpatient evaluation, consider nonemergent MR or US, etc.    RESULTS:  See ED Course section above for particular pertinent findings and comments  - Labs: WBC slightly elevated, labs otherwise WNL (recheck of K is normal, uric acid also normal)  - Imaging: Images and written preliminary reports reviewed by myself and revealed no acute findings, mild degenerative changes    INTERVENTIONS:   - PO Tylenol  - IM Toradol    DISCUSSIONS:  - I was unable to review any results or talk about dismissal/outpatient plan as pt had eloped, telling ED RN that had to leave. Did not stay to discuss instructions, strict return instructions, etc.    DISPOSITION/PLANNING:  - FINAL IMPRESSION: Bilateral atraumatic frontal knee pain  - DISPOSITION: D/C to home (Pt eloped before any of this could be discussed)  --- Follow-up: with PCP/Orthopedics  --- Recommendations: Conservative symptom management, strict return instructions      ______________________________________________________________________________    - I have reviewed the available nursing notes.      New Prescriptions    No medications on file       Final diagnoses:   None   IFelisha, am serving as a trained medical scribe to document services personally performed by Maude Alvares MD, based on the provider's statements to me.   Maude CONNOLLY MD, was physically present and have reviewed and verified the accuracy of this note documented by Felisha Machado.      6/5/2017   [facility], [place], EMERGENCY DEPARTMENT     Maude Alvares MD  06/08/17 0429

## 2017-06-08 ASSESSMENT — ENCOUNTER SYMPTOMS
WEAKNESS: 0
NAUSEA: 0
DYSURIA: 0
COLOR CHANGE: 0
VOMITING: 0

## 2017-07-12 ENCOUNTER — OFFICE VISIT (OUTPATIENT)
Dept: ORTHOPEDICS | Facility: CLINIC | Age: 27
End: 2017-07-12
Payer: COMMERCIAL

## 2017-07-12 VITALS
DIASTOLIC BLOOD PRESSURE: 85 MMHG | HEIGHT: 65 IN | BODY MASS INDEX: 44.82 KG/M2 | SYSTOLIC BLOOD PRESSURE: 127 MMHG | WEIGHT: 269 LBS

## 2017-07-12 DIAGNOSIS — M25.561 PATELLOFEMORAL ARTHRALGIA OF RIGHT KNEE: ICD-10-CM

## 2017-07-12 DIAGNOSIS — M25.561 ARTHRALGIA OF RIGHT LOWER LEG: Primary | ICD-10-CM

## 2017-07-12 PROCEDURE — 99203 OFFICE O/P NEW LOW 30 MIN: CPT | Performed by: FAMILY MEDICINE

## 2017-07-12 RX ORDER — HYDROCODONE BITARTRATE AND ACETAMINOPHEN 5; 325 MG/1; MG/1
1-2 TABLET ORAL EVERY 4 HOURS PRN
Qty: 30 TABLET | Refills: 0 | Status: SHIPPED | OUTPATIENT
Start: 2017-07-12 | End: 2017-09-05

## 2017-07-12 NOTE — NURSING NOTE
"Chief Complaint   Patient presents with     Knee Pain     Right anterior knee pain > 4 weeks       Initial /85  Ht 5' 5\" (1.651 m)  Wt 269 lb (122 kg)  BMI 44.76 kg/m2 Estimated body mass index is 44.76 kg/(m^2) as calculated from the following:    Height as of this encounter: 5' 5\" (1.651 m).    Weight as of this encounter: 269 lb (122 kg).  Medication Reconciliation: complete     Ruben Gonzalez ATC  "

## 2017-07-12 NOTE — PROGRESS NOTES
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    Injury: Gradual onset of right anterior knee pain over the last 4 - 6 weeks, worse over last 1 week.  Pain located over right deep anterior knee, nonradiating.  Additional Features:  Positive: swelling, grinding, weakness and antalgic gait.  Symptoms are better with Ibuprofen and Rest.  Symptoms are worse with: walking, going from sit to stand position, bending knee, stairs.  Other evaluation and/or treatments so far consists of: Ibuprofen, Rest and ER visit ([facility]).  Recent imaging completed: X-rays completed 17.  Prior History of related problems: None    Social History: works as clinic coordinator for [employer]    Review of Systems  Musculoskeletal: as above  Remainder of review of systems is negative including constitutional, CV, pulmonary, GI, Skin and Neurologic except as noted in HPI or medical history.    Past Medical History:   Diagnosis Date     Right shoulder pain 14     Uncomplicated asthma      Past Surgical History:   Procedure Laterality Date     ANESTHESIA OUT OF OR MRI Right 2015    Procedure: ANESTHESIA OUT OF OR MRI;  Surgeon: Generic Anesthesia Provider;  Location: [facility] OR     ENT SURGERY      tonsillectomy 1995     wisdom teeth         Objective  /85  Ht 5' 5\" (1.651 m)  Wt 269 lb (122 kg)  BMI 44.76 kg/m2    General: healthy, alert and in no distress    HEENT: no scleral icterus or conjunctival erythema   Skin: no suspicious lesions or rash. No jaundice.   CV: regular rhythm by palpation, 2+ distal pulses, no pedal edema    Resp: normal respiratory effort without conversational dyspnea   Psych: normal mood and affect    Gait: antalgic, appropriate coordination and balance   Neuro: normal light touch sensory exam " of the extremities. Motor strength as noted below     Right Knee exam    ROM:        Flexion 60 degrees       Extension -5 degrees       Range of motion limited by pain    Inspection:       no visible ecchymosis        effusion noted trace    Skin:       no visible deformities       well perfused       capillary refill brisk    Patellar Motion:        Lateral tilt noted in patella       Crepitus noted in the patellofemoral joint       + J Sign    Tender:        medial patellar border       lateral patellar border       lateral joint line       infrapatellar tendon    Non Tender:         remainder of knee area        along MCL        tibial tubercle       pes anserine bursa       either hamstring tendon    Special Tests:        neg (-) Lachman       neg (-) varus at 0 deg and 30 deg for laxity, + for mild pain       neg (-) valgus at 0 deg and 30 deg for laxity, + for mild pain       + pain with forced extension    Evaluation of ipsilateral kinetic chain       normal strength with hip extension and abduction      Radiology  Results for orders placed or performed during the hospital encounter of 06/05/17   XR Knee Bilateral 3 Views    Narrative    Exam: XR KNEE BILATERAL 3 VW, 6/5/2017 9:40 PM    Indication: Bilateral frontal knee pain, include sunrise/patellar view    Comparison: None available    Findings:   Nonweightbearing AP, lateral, and sunrise views of the knees were  obtained. No significant patellar tilt or subluxation. Normal  Insall-Salvati ratios bilaterally. Normal osseous alignment. No acute  fractures. Peaking of the tibial spines, right greater than left.  Marginal osteophytes along the lateral aspects of the patellae and  right lateral femoral condyle. No soft tissue abnormalities noted. No  knee joint effusion.      Impression    Impression:   Mild degenerative changes without acute fracture.     I have personally reviewed the examination and initial interpretation  and I agree with the  findings.    JESSENIA WHITE MD         Assessment:  1. Arthralgia of right lower leg    2. Patellofemoral arthralgia of right knee        Plan:  Discussed the assessment with the patient.  Follow up: will contact with MR results  Some signs of underlying PFS, but her abrupt worsening is worrisome  Limited exam today due to pain  Lower likelihood of fracture or internal derangement, but concern given significant pain  No brace to provide based on habitus, use crutches to avoid pain and protect area  Advised compression sleeve or PF brace if comfortable, ACE wraps provided  Use of short 1-2 wk course of NSAIDs reviewed, dosing and precautions reviewed if utilized  Stronger pain medication provided for breakthrough sx, precautions and appropriate use reviewed  RICE reviewed  XR images independently visualized and reviewed with patient today in clinic  Home handouts provided and supportive care reviewed  All questions were answered today  Contact us with additional questions or concerns  Signs and sx of concern reviewed      Kp Mai DO, COY  Primary Care Sports Medicine  [facility]             Disclaimer: This note consists of symbols derived from keyboarding, dictation and/or voice recognition software. As a result, there may be errors in the script that have gone undetected. Please consider this when interpreting information found in this chart.

## 2017-07-12 NOTE — MR AVS SNAPSHOT
"              After Visit Summary   7/12/2017    Betyt Tamayo    MRN: [id number]           Patient Information     Date Of Birth          1990        Visit Information        Provider Department      7/12/2017 4:20 PM Kp Mai DO [facility] [place]        Today's Diagnoses     Arthralgia of right lower leg    -  1    Patellofemoral arthralgia of right knee           Follow-ups after your visit        Who to contact     If you have questions or need follow up information about today's clinic visit or your schedule please contact [facility] [place] directly at [phone].  Normal or non-critical lab and imaging results will be communicated to you by [employer], letter or phone within 4 business days after the clinic has received the results. If you do not hear from us within 7 days, please contact the clinic through [employer] or phone. If you have a critical or abnormal lab result, we will notify you by phone as soon as possible.  Submit refill requests through [employer] or call your pharmacy and they will forward the refill request to us. Please allow 3 business days for your refill to be completed.          Additional Information About Your Visit        MyChart Information     [employer] gives you secure access to your electronic health record. If you see a primary care provider, you can also send messages to your care team and make appointments. If you have questions, please call your primary care clinic.  If you do not have a primary care provider, please call [phone] and they will assist you.        Care EveryWhere ID     This is your Care EveryWhere ID. This could be used by other organizations to access your [facility] medical records  [id number]        Your Vitals Were     Height BMI (Body Mass Index)                5' 5\" (1.651 m) 44.76 kg/m2           Blood Pressure from Last 3 Encounters:   07/12/17 127/85   06/05/17 (!) 160/96   02/07/17 " 117/66    Weight from Last 3 Encounters:   07/12/17 269 lb (122 kg)   02/07/17 269 lb 8 oz (122.2 kg)   02/02/17 265 lb (120.2 kg)                 Today's Medication Changes          These changes are accurate as of: 7/12/17 11:59 PM.  If you have any questions, ask your nurse or doctor.               Start taking these medicines.        Dose/Directions    HYDROcodone-acetaminophen 5-325 MG per tablet   Commonly known as:  NORCO   Used for:  Arthralgia of right lower leg, Patellofemoral arthralgia of right knee   Started by:  Kp Mai DO        Dose:  1-2 tablet   Take 1-2 tablets by mouth every 4 hours as needed for moderate to severe pain maximum 2-3 tablet(s) per day   Quantity:  30 tablet   Refills:  0       order for DME   Used for:  Patellofemoral arthralgia of right knee, Arthralgia of right lower leg   Started by:  Kp Mai DO        Crutches   Quantity:  1 Device   Refills:  0            Where to get your medicines      Some of these will need a paper prescription and others can be bought over the counter.  Ask your nurse if you have questions.     Bring a paper prescription for each of these medications     HYDROcodone-acetaminophen 5-325 MG per tablet    order for DME                Primary Care Provider Office Phone # Fax #    Lorena Lizy Lindsay [phone]       [address]  [facility] [address]        Equal Access to Services     SANDRA PEARCE AH: Hadii aad ku hadasho Sorobert, waaxda luqadaha, qaybta kaalmada adeegyada, deirdre bui. So [facility] [place] [phone].    ATENCIÓN: Si habla español, tiene a caldera disposición servicios gratuitos de asistencia lingüística. Llame al [phone].    We comply with applicable federal civil rights laws and [place] laws. We do not discriminate on the basis of race, color, national origin, age, disability sex, sexual orientation or gender identity.             Thank you!     Thank you for choosing [facility]  for your care. Our goal is always to provide you with excellent care. Hearing back from our patients is one way we can continue to improve our services. Please take a few minutes to complete the written survey that you may receive in the mail after your visit with us. Thank you!             Your Updated Medication List - Protect others around you: Learn how to safely use, store and throw away your medicines at www.disposemymeds.org.          This list is accurate as of: 7/12/17 11:59 PM.  Always use your most recent med list.                   Brand Name Dispense Instructions for use Diagnosis    HYDROcodone-acetaminophen 5-325 MG per tablet    NORCO    30 tablet    Take 1-2 tablets by mouth every 4 hours as needed for moderate to severe pain maximum 2-3 tablet(s) per day    Arthralgia of right lower leg, Patellofemoral arthralgia of right knee       levonorgestrel 20 MCG/24HR IUD    MIRENA     1 each by Intrauterine route once        order for DME     1 Device    Crutches    Patellofemoral arthralgia of right knee, Arthralgia of right lower leg       phentermine 37.5 MG tablet    ADIPEX-P    30 tablet    Take 0.5 tablets (18.75 mg) by mouth every morning    Morbid obesity due to excess calories (H)

## 2017-07-14 ENCOUNTER — TELEPHONE (OUTPATIENT)
Dept: ORTHOPEDICS | Facility: CLINIC | Age: 27
End: 2017-07-14

## 2017-07-14 NOTE — TELEPHONE ENCOUNTER
Spoke with Betty about her MRI.  Advised continued supportive care,  NSAIDs and RICE, offloading and avoiding deep flexion as needed.  I have sent a message to Dr Basurto about her case in terms of longer term planning for her pathology.  Hope to progress her to PT when she has improved from acute flare, but will follow closely.  I will call her for an update next week, hopefully having heard from ortho surg.

## 2017-07-19 NOTE — TELEPHONE ENCOUNTER
Spoke to patient discussed her current right knee pain.  Patient reports that her anterior knee pain continues to worsen.  She has not been using crutches to offload her knee as previously discussed - recommend that she try this.  She also notes currently taking 2 tabs of Norco daily for severe knee pain with minimal relief.  She would like to know what else she can do to help with pain.  Dr Mai please advise.    Ruben Gonzalez, ATC

## 2017-07-21 ENCOUNTER — OFFICE VISIT (OUTPATIENT)
Dept: ORTHOPEDICS | Facility: CLINIC | Age: 27
End: 2017-07-21
Payer: COMMERCIAL

## 2017-07-21 VITALS
BODY MASS INDEX: 44.82 KG/M2 | SYSTOLIC BLOOD PRESSURE: 125 MMHG | WEIGHT: 269 LBS | HEIGHT: 65 IN | DIASTOLIC BLOOD PRESSURE: 80 MMHG

## 2017-07-21 DIAGNOSIS — M25.561 PATELLOFEMORAL ARTHRALGIA OF RIGHT KNEE: ICD-10-CM

## 2017-07-21 DIAGNOSIS — M25.561 ARTHRALGIA OF RIGHT LOWER LEG: Primary | ICD-10-CM

## 2017-07-21 PROCEDURE — 20611 DRAIN/INJ JOINT/BURSA W/US: CPT | Mod: RT | Performed by: FAMILY MEDICINE

## 2017-07-21 NOTE — LETTER
[facility] [place]  [address]  [place] [address]  Phone: [phone]  Fax: [phone]    July 21, 2017        To Whom It May Concern:    Betty MIKE Tamayo was seen in my office today.  I recommend limited standing and walking at work for the next 2 weeks, with desk only work if needed for increased pain.  She may advance activity as tolerated, however.      Pritesh,               Kp Mai DO, CAQ  Primary Care Sports Medicine  [facility]

## 2017-07-21 NOTE — NURSING NOTE
"Chief Complaint   Patient presents with     Knee Pain     f/u right knee pain - US injection       Initial /80  Ht 5' 5\" (1.651 m)  Wt 269 lb (122 kg)  BMI 44.76 kg/m2 Estimated body mass index is 44.76 kg/(m^2) as calculated from the following:    Height as of this encounter: 5' 5\" (1.651 m).    Weight as of this encounter: 269 lb (122 kg).  Medication Reconciliation: complete     Ruben Gonzalez ATC  "

## 2017-07-21 NOTE — PROGRESS NOTES
Betty Tamayo  :  1990  DOS: 2017  MRN: [id number]    Sports Medicine Clinic Procedure    Ultrasound Guided Right Intra-Articular Knee Aspiration & Injection    Clinical History: Patient reports that she continues to have worsening right knee pain.  Presents today to pursue steroid injection as discussed in telephone encounter yesterday.    Diagnosis:   1. Arthralgia of right lower leg    2. Patellofemoral arthralgia of right knee      Technique: The risks of the procedure were explained to the patient.  A consent was signed for the intra-articular knee procedure.  The patient was evaluated with a [employer] ultrasound machine using a 12 MHz linear probe.     The Right knee was prepped and draped in a sterile manner.  Ultrasound identification of the patella, suprapatellar pouch, quadriceps tendon and femur in both long and short axis. The probe was placed in short axis to the Right femur.  A 2 inch 21 gauge needle was placed under ultrasound guidance into the superior knee joint.  12 ml of clear yellow colored fluid was aspirated.   A mixture of 2 ml's 1% lidocaine, 2ml's 0.5% marcaine, and 1 ml kenalog (40mg/ml) was injected without difficulty. The needle was removed and there was good hemostasis without complications.  There was ultrasound documentation of needle placement and injection.  Pre-procedural pain 8/10.  Post procedural pain 2/10.    Impression:  Successful Right intra-articular knee aspiration and injection.    Plan:  Follow up with phone call in 2 weeks to check on progress  Ongoing need for hip and knee stabilization and wt loss reviewed  KT taping to elevated and support knee laterally performed today and she can continue if helpful  The combination of the anesthetic effect and taping today gave her tremendous initial relief  Expectations and goals of CSI reviewed, do not wish to repeat given her age, discussed in detail  Consider ortho referral for ongoing issues  Often 2-3 days for  steroid effect, and can take up to two weeks for maximum effect  We discussed modified progressive pain-free activity as tolerated  Do not overuse in first two weeks if feeling better due to concern for vulnerability while steroid is working  Supportive care reviewed  All questions were answered today  Contact us with additional questions or concerns  Signs and sx of concern reviewed      Kp Mai DO, CAQ  Primary Care Sports Medicine  [facility]

## 2017-07-24 RX ORDER — TRIAMCINOLONE ACETONIDE 40 MG/ML
40 INJECTION, SUSPENSION INTRA-ARTICULAR; INTRAMUSCULAR ONCE
Qty: 1 ML | Refills: 0 | OUTPATIENT
Start: 2017-07-24 | End: 2017-07-24

## 2017-09-05 ENCOUNTER — OFFICE VISIT (OUTPATIENT)
Dept: ENDOCRINOLOGY | Facility: CLINIC | Age: 27
End: 2017-09-05

## 2017-09-05 VITALS
WEIGHT: 266.3 LBS | DIASTOLIC BLOOD PRESSURE: 73 MMHG | HEIGHT: 66 IN | TEMPERATURE: 99.8 F | HEART RATE: 61 BPM | BODY MASS INDEX: 42.8 KG/M2 | OXYGEN SATURATION: 98 % | SYSTOLIC BLOOD PRESSURE: 135 MMHG

## 2017-09-05 DIAGNOSIS — E66.01 MORBID OBESITY DUE TO EXCESS CALORIES (H): ICD-10-CM

## 2017-09-05 RX ORDER — PHENTERMINE HYDROCHLORIDE 37.5 MG/1
0.5 TABLET ORAL EVERY MORNING
Qty: 30 TABLET | Refills: 1 | Status: SHIPPED | OUTPATIENT
Start: 2017-09-05 | End: 2017-09-05

## 2017-09-05 RX ORDER — PHENTERMINE HYDROCHLORIDE 37.5 MG/1
0.5 TABLET ORAL EVERY MORNING
Qty: 30 TABLET | Refills: 1 | Status: SHIPPED | OUTPATIENT
Start: 2017-09-05 | End: 2018-03-27

## 2017-09-05 RX ORDER — TOPIRAMATE 25 MG/1
TABLET, FILM COATED ORAL
Qty: 180 TABLET | Refills: 2 | Status: SHIPPED | OUTPATIENT
Start: 2017-09-05 | End: 2019-11-26

## 2017-09-05 ASSESSMENT — PAIN SCALES - GENERAL: PAINLEVEL: NO PAIN (0)

## 2017-09-05 NOTE — PATIENT INSTRUCTIONS
Follow up with Tanika Kamara in 3 months    Follow up with Dr. Woodson in 6 months    See dietician: Denise Kessler [phone]    Maude Swartz RN care coordinator [phone]      MEDICATION STARTED AT THIS APPOINTMENT  We are starting topiramate at bedtime.  Start one tab, 25 mg, for a week. Go up to 50 mg (2 tabs) for the next week. At the third week, take   3 tabs (75 mg).  Stay at 3 tabs until you are seen again. Call the nurse at [phone] if you have any questions or concerns. (Do not stop taking it if you don't think it's working. For some people it works even though they do not feel much different.)    Topiramate (Topamax) is a medication that is used most often to treat migraine headaches or for seizures. It has also been found to help with weight loss. Although it's not currently FDA approved for weight loss, it has been used safely for a number of years to help people who are carrying extra weight.     Just how topiramate helps with weight loss has not been exactly determined. However it seems to work on areas of the brain to quiet down signals related to eating.      Topiramate may make you:    >feel less interest in eating in between meals   >think less about food and eating   >find it easier to push the plate away   >find giving up pop easier    >have an easier time eating less    For some of our patients, the pills work right away. They feel and think quite differently about food. Other patients don't feel much of a change but find in fact they have lost weight! Like all weight loss medications, topiramate works best when you help it work.  This means:    1) Have less tempting high calorie (fattening) food around the house or office    2) Have lower calorie food (fruits, vegetables,low fat meats and dairy) for snacks    3) Eat out only one time or less each week.   4) Eat your meals at a table with the TV or computer off.    Side-effects. Topiramate is generally well tolerated. The  main side-effects we see are:   Tingling in hands,feet, or face (usually not very troublesome)   Mental confusion and word finding trouble (about 10% of patients have this.)     Feeling sleepy or a bit dopey- this goes away very soon after starting.    One of the dangers of topiramate is the possibility of birth defects--if you get pregnant when you are on it, there is the risk that your baby will be born with a cleft lip or palate.  If you are on topiramate and of child bearing age, you need to be on a reliable form of birth control or refrain from sexual intercourse.     Please refer to the pharmacy insert for more information on side-effects. Since many pharmacists are not familiar with the use of topiramate in weight loss, calling the clinic will get you the most accurate information on the use of this medication for weight loss.     In order to get refills of this or any medication we prescribe you must be seen in the medical weight mgmt clinic every 2-3 months. Please have your pharmacy fax a refill request to [phone].

## 2017-09-05 NOTE — NURSING NOTE
"Chief Complaint   Patient presents with     RECHECK     f/u weight loss       Vitals:    09/05/17 0854   BP: 135/73   BP Location: Left arm   Patient Position: Chair   Cuff Size: Adult Large   Pulse: 61   Temp: 99.8  F (37.7  C)   SpO2: 98%   Weight: 266 lb 4.8 oz   Height: 5' 5.5\"       Body mass index is 43.64 kg/(m^2).    Boogie Dos Santos MA                          "

## 2017-09-05 NOTE — MR AVS SNAPSHOT
After Visit Summary   9/5/2017    Betty Tamayo    MRN: [id number]           Patient Information     Date Of Birth          1990        Visit Information        Provider Department      9/5/2017 8:45 AM Kandice Rosales MD [facility] Medical Weight Management        Today's Diagnoses     Morbid obesity due to excess calories (H)          Care Instructions    Follow up with Tanika Kamara in 3 months    Follow up with Dr. Woodson in 6 months    See dietician: Denise Kessler [phone]    Maude Swartz, NELSON care coordinator [phone]      MEDICATION STARTED AT THIS APPOINTMENT  We are starting topiramate at bedtime.  Start one tab, 25 mg, for a week. Go up to 50 mg (2 tabs) for the next week. At the third week, take   3 tabs (75 mg).  Stay at 3 tabs until you are seen again. Call the nurse at [phone] if you have any questions or concerns. (Do not stop taking it if you don't think it's working. For some people it works even though they do not feel much different.)    Topiramate (Topamax) is a medication that is used most often to treat migraine headaches or for seizures. It has also been found to help with weight loss. Although it's not currently FDA approved for weight loss, it has been used safely for a number of years to help people who are carrying extra weight.     Just how topiramate helps with weight loss has not been exactly determined. However it seems to work on areas of the brain to quiet down signals related to eating.      Topiramate may make you:    >feel less interest in eating in between meals   >think less about food and eating   >find it easier to push the plate away   >find giving up pop easier    >have an easier time eating less    For some of our patients, the pills work right away. They feel and think quite differently about food. Other patients don't feel much of a change but find in fact they have lost weight! Like all weight loss medications,  topiramate works best when you help it work.  This means:    1) Have less tempting high calorie (fattening) food around the house or office    2) Have lower calorie food (fruits, vegetables,low fat meats and dairy) for snacks    3) Eat out only one time or less each week.   4) Eat your meals at a table with the TV or computer off.    Side-effects. Topiramate is generally well tolerated. The main side-effects we see are:   Tingling in hands,feet, or face (usually not very troublesome)   Mental confusion and word finding trouble (about 10% of patients have this.)     Feeling sleepy or a bit dopey- this goes away very soon after starting.    One of the dangers of topiramate is the possibility of birth defects--if you get pregnant when you are on it, there is the risk that your baby will be born with a cleft lip or palate.  If you are on topiramate and of child bearing age, you need to be on a reliable form of birth control or refrain from sexual intercourse.     Please refer to the pharmacy insert for more information on side-effects. Since many pharmacists are not familiar with the use of topiramate in weight loss, calling the clinic will get you the most accurate information on the use of this medication for weight loss.     In order to get refills of this or any medication we prescribe you must be seen in the medical weight mgmt clinic every 2-3 months. Please have your pharmacy fax a refill request to [phone].                  Follow-ups after your visit        Additional Services     NUTRITION REFERRAL       Your provider has referred you to: [facility]: [facility] (on call location)  - [place] [phone]   [email or web address]    Please be aware that coverage of these services is subject to the terms and limitations of your health insurance plan.  Call member services at your health plan with any benefit or coverage questions.      Please bring the following with you  "to your appointment:    (1) This referral request  (2) Any documents given to you regarding this referral  (3) Any specific questions you have about diet and/or food choices                  Who to contact     Please call your clinic at [phone] to:    Ask questions about your health    Make or cancel appointments    Discuss your medicines    Learn about your test results    Speak to your doctor   If you have compliments or concerns about an experience at your clinic, or if you wish to file a complaint, please contact [facility] Patient Relations at [phone] or email us at [email or web address]         Additional Information About Your Visit        Zando Information     Zando gives you secure access to your electronic health record. If you see a primary care provider, you can also send messages to your care team and make appointments. If you have questions, please call your primary care clinic.  If you do not have a primary care provider, please call [phone] and they will assist you.      Zando is an electronic gateway that provides easy, online access to your medical records. With Zando, you can request a clinic appointment, read your test results, renew a prescription or communicate with your care team.     To access your existing account, please contact your [facility] Clinic or call [phone] for assistance.        Care EveryWhere ID     This is your Care EveryWhere ID. This could be used by other organizations to access your [facility] medical records  [id number]        Your Vitals Were     Pulse Temperature Height Pulse Oximetry BMI (Body Mass Index)       61 99.8  F (37.7  C) 1.664 m (5' 5.5\") 98% 43.64 kg/m2        Blood Pressure from Last 3 Encounters:   09/05/17 135/73   07/21/17 125/80   07/12/17 127/85    Weight from Last 3 Encounters:   09/05/17 120.8 kg (266 lb 4.8 oz)   07/21/17 122 kg (269 lb)   07/12/17 122 kg (269 " lb)              We Performed the Following     NUTRITION REFERRAL          Today's Medication Changes          These changes are accurate as of: 9/5/17 11:47 AM.  If you have any questions, ask your nurse or doctor.               Start taking these medicines.        Dose/Directions    phentermine 37.5 MG tablet   Commonly known as:  ADIPEX-P   Used for:  Morbid obesity due to excess calories (H)   Started by:  Kandice Rosales MD        Dose:  0.5 tablet   Take 0.5 tablets (18.75 mg) by mouth every morning   Quantity:  30 tablet   Refills:  1       topiramate 25 MG tablet   Commonly known as:  TOPAMAX   Used for:  Morbid obesity due to excess calories (H)   Started by:  Kandice Rosales MD        25 mg at bedtime for 1 week, 50 mg at bedtime for 1 week and 75 mg daily at bedtime thereafter   Quantity:  180 tablet   Refills:  2            Where to get your medicines      These medications were sent to [facility] [address] [facility] [address]  [address] [place] [address] [address] [id number]    Hours:  TRANSPLANT PHONE NUMBER [phone] Phone:  [phone]     topiramate 25 MG tablet         Some of these will need a paper prescription and others can be bought over the counter.  Ask your nurse if you have questions.     Bring a paper prescription for each of these medications     phentermine 37.5 MG tablet                Primary Care Provider Office Phone # Fax #    Lorena Lizy Lindsay [phone]       [address] [place] [address] 741  [facility] [address]        Equal Access to Services     ERIC PEARCE AH: Hadii lacy cha hadasho Sorachaelali, waaxda luqadaha, qaybta kaalmada adeegyada, deirdre bui. So [facility] [phone].    ATENCIÓN: Si habla español, tiene a caldera disposición servicios gratuitos de asistencia lingüística. Llame al [phone].    We comply with applicable federal civil rights laws and [place] laws.  We do not discriminate on the basis of race, color, national origin, age, disability sex, sexual orientation or gender identity.            Thank you!     Thank you for choosing [facility] MEDICAL WEIGHT MANAGEMENT  for your care. Our goal is always to provide you with excellent care. Hearing back from our patients is one way we can continue to improve our services. Please take a few minutes to complete the written survey that you may receive in the mail after your visit with us. Thank you!             Your Updated Medication List - Protect others around you: Learn how to safely use, store and throw away your medicines at www.disposemymeds.org.          This list is accurate as of: 9/5/17 11:47 AM.  Always use your most recent med list.                   Brand Name Dispense Instructions for use Diagnosis    phentermine 37.5 MG tablet    ADIPEX-P    30 tablet    Take 0.5 tablets (18.75 mg) by mouth every morning    Morbid obesity due to excess calories (H)       topiramate 25 MG tablet    TOPAMAX    180 tablet    25 mg at bedtime for 1 week, 50 mg at bedtime for 1 week and 75 mg daily at bedtime thereafter    Morbid obesity due to excess calories (H)

## 2017-09-05 NOTE — PROGRESS NOTES
"      Return Medical Weight Management Note     Betty Tamayo  MRN:  [id number]  :  1990  JIGNESH:  2017    Dear Anjali Lindsay, Lorena Medel,    I had the pleasure of seeing your patient Betty Tamayo.  She is a 27 year old female who I am continuing to see for treatment of obesity related to: BAKARI, asthma,  back pain anxiety, and depression      INTERVAL HISTORY:  Last visit 17. I started her on phentermine 18.75 mg in 2017. She took it for a while and ran out so she did not take it for a few months. She stated that she lost about 15 lbs while she was on phentermine but gained it back. She said that phentermine helped with decreasing hunger and increasing energy level. However, she continued to have craving and emotional related eating.    CURRENT WEIGHT:   266 lbs 4.8 oz    Wt Readings from Last 4 Encounters:   17 120.8 kg (266 lb 4.8 oz)   17 122 kg (269 lb)   17 122 kg (269 lb)   17 122.2 kg (269 lb 8 oz)       Height:  5' 5.5\"  Body Mass Index:  Body mass index is 43.64 kg/(m^2).  Vital signs:   /73 (BP Location: Left arm, Patient Position: Chair, Cuff Size: Adult Large)  Pulse 61  Temp 99.8  F (37.7  C)  Ht 1.664 m (5' 5.5\")  Wt 120.8 kg (266 lb 4.8 oz)  SpO2 98%  BMI 43.64 kg/m2  Estimated body mass index is 43.64 kg/(m^2) as calculated from the following:    Height as of this encounter: 1.664 m (5' 5.5\").    Weight as of this encounter: 120.8 kg (266 lb 4.8 oz).    Initial consult weight was 269 on 2017.  Weight change since last seen on 2017 is down 3 pounds.   Total loss is 3 pounds.    Diet and Activity Changes Since Last Visit Reviewed With Patient 2017   I have made the following changes to my diet since my last visit: eatting less calories   With regards to my diet, I am still struggling with: cravings   For breakfast, I typically eat: nothing   For lunch, I typically eat: sandwich chips   I have made the following changes to my " activity/exercise since my last visit: working out more       ROS    MEDICATIONS:   Current Outpatient Prescriptions   Medication Sig Dispense Refill     phentermine (ADIPEX-P) 37.5 MG tablet Take 0.5 tablets (18.75 mg) by mouth every morning 30 tablet 1     Weight Loss Medication History Reviewed With Patient 9/5/2017   Which weight loss medications are you currently taking on a regular basis?  Phentermine   Are you having any side effects from the weight loss medication that we have prescribed you? No       ASSESSMENT:   Betty Tamayo is a 27 year old female who I am continuing to see for treatment of obesity related to: BAKARI, asthma,  back pain anxiety, and depression    She has had emotional/stress eating and craving.  She took phentermine for a few months and lost 15 lbs. However, she gained weight back once she ran out of it.  She still felt craving while she was on phentermine  She had knee injury 2 months ago    PLAN:   - restart phentermine 18.75 mg daily  - add topiramate titration from 25 mg to 75 mg daily  - refer dietitian for meal planning  - encourage low calories diet  - encourage to continue with exercise    FOLLOW-UP:    3 months with Tanika Kamara  6 months with me.    Time: 15 min spent on evaluation, management, counseling, education, & motivational interviewing with greater than 50 % of the total time was spent on counseling and coordinating care    Sincerely,    Kandice Rosales MD

## 2017-09-05 NOTE — LETTER
"2017       RE: Betty Tamayo  [address]   [facility] [address]     Dear Colleague,    Thank you for referring your patient, Betty Tamayo, to the [facility] MEDICAL WEIGHT MANAGEMENT at [facility]. Please see a copy of my visit note below.          Return Medical Weight Management Note     Betty Tamayo  MRN:  [id number]  :  1990  JIGNESH:  2017    Dear Lorena Reed,    I had the pleasure of seeing your patient Betty Tamayo.  She is a 27 year old female who I am continuing to see for treatment of obesity related to: BAKARI, asthma,  back pain anxiety, and depression      INTERVAL HISTORY:  Last visit 17. I started her on phentermine 18.75 mg in 2017. She took it for a while and ran out so she did not take it for a few months. She stated that she lost about 15 lbs while she was on phentermine but gained it back. She said that phentermine helped with decreasing hunger and increasing energy level. However, she continued to have craving and emotional related eating.    CURRENT WEIGHT:   266 lbs 4.8 oz    Wt Readings from Last 4 Encounters:   17 120.8 kg (266 lb 4.8 oz)   17 122 kg (269 lb)   17 122 kg (269 lb)   17 122.2 kg (269 lb 8 oz)       Height:  5' 5.5\"  Body Mass Index:  Body mass index is 43.64 kg/(m^2).  Vital signs:   /73 (BP Location: Left arm, Patient Position: Chair, Cuff Size: Adult Large)  Pulse 61  Temp 99.8  F (37.7  C)  Ht 1.664 m (5' 5.5\")  Wt 120.8 kg (266 lb 4.8 oz)  SpO2 98%  BMI 43.64 kg/m2  Estimated body mass index is 43.64 kg/(m^2) as calculated from the following:    Height as of this encounter: 1.664 m (5' 5.5\").    Weight as of this encounter: 120.8 kg (266 lb 4.8 oz).    Initial consult weight was 269 on 2017.  Weight change since last seen on 2017 is down 3 pounds.   Total loss is 3 pounds.    Diet and Activity Changes Since Last Visit Reviewed With " Patient 9/5/2017   I have made the following changes to my diet since my last visit: eatting less calories   With regards to my diet, I am still struggling with: cravings   For breakfast, I typically eat: nothing   For lunch, I typically eat: sandwich chips   I have made the following changes to my activity/exercise since my last visit: working out more       ROS    MEDICATIONS:   Current Outpatient Prescriptions   Medication Sig Dispense Refill     phentermine (ADIPEX-P) 37.5 MG tablet Take 0.5 tablets (18.75 mg) by mouth every morning 30 tablet 1     Weight Loss Medication History Reviewed With Patient 9/5/2017   Which weight loss medications are you currently taking on a regular basis?  Phentermine   Are you having any side effects from the weight loss medication that we have prescribed you? No       ASSESSMENT:   Betty Tamayo is a 27 year old female who I am continuing to see for treatment of obesity related to: BAKARI, asthma,  back pain anxiety, and depression    She has had emotional/stress eating and craving.  She took phentermine for a few months and lost 15 lbs. However, she gained weight back once she ran out of it.  She still felt craving while she was on phentermine  She had knee injury 2 months ago    PLAN:   - restart phentermine 18.75 mg daily  - add topiramate titration from 25 mg to 75 mg daily  - refer dietitian for meal planning  - encourage low calories diet  - encourage to continue with exercise    FOLLOW-UP:    3 months with Tanika Kamara  6 months with me.    Time: 15 min spent on evaluation, management, counseling, education, & motivational interviewing with greater than 50 % of the total time was spent on counseling and coordinating care    Sincerely,    Kandice Rosales MD

## 2017-10-05 ENCOUNTER — TELEPHONE (OUTPATIENT)
Dept: OBGYN | Facility: CLINIC | Age: 27
End: 2017-10-05

## 2017-10-05 DIAGNOSIS — Z34.91 NORMAL PREGNANCY IN FIRST TRIMESTER: Primary | ICD-10-CM

## 2017-10-05 NOTE — TELEPHONE ENCOUNTER
Patient called and would like to establish prenatal care   Patient LMP 17  Patient   Patient complains of having nausea, vomiting and breat tenderness.  Patient is taking prenatal vitamins.

## 2017-10-12 ENCOUNTER — OFFICE VISIT (OUTPATIENT)
Dept: OBGYN | Facility: CLINIC | Age: 27
End: 2017-10-12
Attending: ADVANCED PRACTICE MIDWIFE
Payer: COMMERCIAL

## 2017-10-12 VITALS
DIASTOLIC BLOOD PRESSURE: 65 MMHG | SYSTOLIC BLOOD PRESSURE: 113 MMHG | HEIGHT: 66 IN | WEIGHT: 260.4 LBS | BODY MASS INDEX: 41.85 KG/M2

## 2017-10-12 DIAGNOSIS — O09.299 HISTORY OF PRE-ECLAMPSIA IN PRIOR PREGNANCY, CURRENTLY PREGNANT: ICD-10-CM

## 2017-10-12 DIAGNOSIS — O09.891 MEDICATION EXPOSURE DURING FIRST TRIMESTER OF PREGNANCY: ICD-10-CM

## 2017-10-12 DIAGNOSIS — O09.899 MATERNAL VARICELLA, NON-IMMUNE: ICD-10-CM

## 2017-10-12 DIAGNOSIS — Z28.39 MATERNAL VARICELLA, NON-IMMUNE: ICD-10-CM

## 2017-10-12 DIAGNOSIS — O09.91 ENCOUNTER FOR SUPERVISION OF HIGH RISK PREGNANCY IN FIRST TRIMESTER, ANTEPARTUM: Primary | ICD-10-CM

## 2017-10-12 DIAGNOSIS — Z36.89 ENCOUNTER FOR FETAL ANATOMIC SURVEY: ICD-10-CM

## 2017-10-12 DIAGNOSIS — O23.41 UTI IN PREGNANCY, FIRST TRIMESTER: ICD-10-CM

## 2017-10-12 DIAGNOSIS — E55.9 VITAMIN D DEFICIENCY: ICD-10-CM

## 2017-10-12 DIAGNOSIS — R11.0 NAUSEA: ICD-10-CM

## 2017-10-12 DIAGNOSIS — Z34.81 ENCOUNTER FOR SUPERVISION OF OTHER NORMAL PREGNANCY IN FIRST TRIMESTER: ICD-10-CM

## 2017-10-12 DIAGNOSIS — Z34.81 ENCOUNTER FOR SUPERVISION OF OTHER NORMAL PREGNANCY IN FIRST TRIMESTER: Primary | ICD-10-CM

## 2017-10-12 LAB
ABO + RH BLD: NORMAL
ABO + RH BLD: NORMAL
ALT SERPL W P-5'-P-CCNC: 49 U/L (ref 0–50)
AST SERPL W P-5'-P-CCNC: 19 U/L (ref 0–45)
BASOPHILS # BLD AUTO: 0 10E9/L (ref 0–0.2)
BASOPHILS NFR BLD AUTO: 0.2 %
BLD GP AB SCN SERPL QL: NORMAL
BLOOD BANK CMNT PATIENT-IMP: NORMAL
CREAT SERPL-MCNC: 0.6 MG/DL (ref 0.52–1.04)
CREAT UR-MCNC: 256 MG/DL
DIFFERENTIAL METHOD BLD: ABNORMAL
EOSINOPHIL # BLD AUTO: 0.2 10E9/L (ref 0–0.7)
EOSINOPHIL NFR BLD AUTO: 1.5 %
ERYTHROCYTE [DISTWIDTH] IN BLOOD BY AUTOMATED COUNT: 15.1 % (ref 10–15)
GFR SERPL CREATININE-BSD FRML MDRD: >90 ML/MIN/1.7M2
HBA1C MFR BLD: 5.7 % (ref 4.3–6)
HCT VFR BLD AUTO: 35.8 % (ref 35–47)
HGB BLD-MCNC: 11.3 G/DL (ref 11.7–15.7)
IMM GRANULOCYTES # BLD: 0 10E9/L (ref 0–0.4)
IMM GRANULOCYTES NFR BLD: 0.2 %
LYMPHOCYTES # BLD AUTO: 2.5 10E9/L (ref 0.8–5.3)
LYMPHOCYTES NFR BLD AUTO: 19.6 %
MCH RBC QN AUTO: 25.9 PG (ref 26.5–33)
MCHC RBC AUTO-ENTMCNC: 31.6 G/DL (ref 31.5–36.5)
MCV RBC AUTO: 82 FL (ref 78–100)
MONOCYTES # BLD AUTO: 1 10E9/L (ref 0–1.3)
MONOCYTES NFR BLD AUTO: 7.9 %
NEUTROPHILS # BLD AUTO: 9 10E9/L (ref 1.6–8.3)
NEUTROPHILS NFR BLD AUTO: 70.6 %
NRBC # BLD AUTO: 0 10*3/UL
NRBC BLD AUTO-RTO: 0 /100
PLATELET # BLD AUTO: 320 10E9/L (ref 150–450)
PROT UR-MCNC: 0.2 G/L
PROT/CREAT 24H UR: 0.08 G/G CR (ref 0–0.2)
RBC # BLD AUTO: 4.36 10E12/L (ref 3.8–5.2)
SPECIMEN EXP DATE BLD: NORMAL
URATE SERPL-MCNC: 2.6 MG/DL (ref 2.6–6)
WBC # BLD AUTO: 12.7 10E9/L (ref 4–11)

## 2017-10-12 PROCEDURE — 86762 RUBELLA ANTIBODY: CPT | Performed by: ADVANCED PRACTICE MIDWIFE

## 2017-10-12 PROCEDURE — 86780 TREPONEMA PALLIDUM: CPT | Performed by: ADVANCED PRACTICE MIDWIFE

## 2017-10-12 PROCEDURE — 86706 HEP B SURFACE ANTIBODY: CPT | Performed by: ADVANCED PRACTICE MIDWIFE

## 2017-10-12 PROCEDURE — 87086 URINE CULTURE/COLONY COUNT: CPT | Performed by: ADVANCED PRACTICE MIDWIFE

## 2017-10-12 PROCEDURE — 84156 ASSAY OF PROTEIN URINE: CPT | Performed by: ADVANCED PRACTICE MIDWIFE

## 2017-10-12 PROCEDURE — 84550 ASSAY OF BLOOD/URIC ACID: CPT | Performed by: ADVANCED PRACTICE MIDWIFE

## 2017-10-12 PROCEDURE — 85025 COMPLETE CBC W/AUTO DIFF WBC: CPT | Performed by: ADVANCED PRACTICE MIDWIFE

## 2017-10-12 PROCEDURE — 86900 BLOOD TYPING SEROLOGIC ABO: CPT | Performed by: ADVANCED PRACTICE MIDWIFE

## 2017-10-12 PROCEDURE — 86901 BLOOD TYPING SEROLOGIC RH(D): CPT | Performed by: ADVANCED PRACTICE MIDWIFE

## 2017-10-12 PROCEDURE — 86787 VARICELLA-ZOSTER ANTIBODY: CPT | Performed by: ADVANCED PRACTICE MIDWIFE

## 2017-10-12 PROCEDURE — 84460 ALANINE AMINO (ALT) (SGPT): CPT | Performed by: ADVANCED PRACTICE MIDWIFE

## 2017-10-12 PROCEDURE — 87389 HIV-1 AG W/HIV-1&-2 AB AG IA: CPT | Performed by: ADVANCED PRACTICE MIDWIFE

## 2017-10-12 PROCEDURE — 82306 VITAMIN D 25 HYDROXY: CPT | Performed by: ADVANCED PRACTICE MIDWIFE

## 2017-10-12 PROCEDURE — 84450 TRANSFERASE (AST) (SGOT): CPT | Performed by: ADVANCED PRACTICE MIDWIFE

## 2017-10-12 PROCEDURE — 76817 TRANSVAGINAL US OBSTETRIC: CPT | Mod: ZF

## 2017-10-12 PROCEDURE — 99212 OFFICE O/P EST SF 10 MIN: CPT | Mod: 25,ZF

## 2017-10-12 PROCEDURE — 82565 ASSAY OF CREATININE: CPT | Performed by: ADVANCED PRACTICE MIDWIFE

## 2017-10-12 PROCEDURE — 87340 HEPATITIS B SURFACE AG IA: CPT | Performed by: ADVANCED PRACTICE MIDWIFE

## 2017-10-12 PROCEDURE — 83036 HEMOGLOBIN GLYCOSYLATED A1C: CPT | Performed by: ADVANCED PRACTICE MIDWIFE

## 2017-10-12 PROCEDURE — 83021 HEMOGLOBIN CHROMOTOGRAPHY: CPT | Performed by: ADVANCED PRACTICE MIDWIFE

## 2017-10-12 PROCEDURE — 36415 COLL VENOUS BLD VENIPUNCTURE: CPT | Performed by: ADVANCED PRACTICE MIDWIFE

## 2017-10-12 PROCEDURE — 86850 RBC ANTIBODY SCREEN: CPT | Performed by: ADVANCED PRACTICE MIDWIFE

## 2017-10-12 RX ORDER — ONDANSETRON 4 MG/1
4-8 TABLET, ORALLY DISINTEGRATING ORAL
COMMUNITY
Start: 2017-09-25 | End: 2017-11-22

## 2017-10-12 RX ORDER — PYRIDOXINE HCL (VITAMIN B6) 25 MG
25 TABLET ORAL DAILY
Qty: 60 TABLET | Refills: 1 | Status: SHIPPED | OUTPATIENT
Start: 2017-10-12 | End: 2017-11-22

## 2017-10-12 RX ORDER — PRENATAL VIT/IRON FUM/FOLIC AC 27MG-0.8MG
1 TABLET ORAL DAILY
COMMUNITY
End: 2019-12-18

## 2017-10-12 ASSESSMENT — PAIN SCALES - GENERAL: PAINLEVEL: NO PAIN (0)

## 2017-10-12 NOTE — PROGRESS NOTES
27 year old female, ., with unsure LMP. Estimated Date of Delivery: Data Unavailable, Unknown  presents for confirmation of dates and assessment of viability. This study was done transvaginally.    Measurements     CRL = 17.9 mm = 8 2/7 weeks  EGA.   FREDERICK = 2018.     Fetal anatomy appears normal for gestational age.     Fetal/Fetal Cardiac Activity: Present.  FHR = 159bpm.     Implantation: Intrauterine.     Cervix = 4.3 cm      Maternal structures appear normal.    Impression: IUP at 8w2d gestation by today's ultrasound.    Recommend comprehensive scan at 18 to 20 weeks.    YING Salazar MD

## 2017-10-12 NOTE — PATIENT INSTRUCTIONS
"  Thank you for choosing [facility] for your obstetrical care.  We are an integrated health clinic with obstetricians, midwives, a psychologist, an acupuncturist, a nutritionist, a pharmacist, internal medicine and family practice all in one.  If you have questions about services offered please ask.      o Please keep all appointments with your provider.  You will help catch, prevent and treat problems early.  o Review your Expectant Family booklet and folder given to you at this intake visit.  It can answer many basic questions including:    Treatment for nausea and vomiting    Medications that are safe in pregnancy    Healthy diet and weight gain    Exercise and activity  o ASK questions!!  Please use \"Questions for my New OB visit\" form to write down any questions or concerns for your next visit.  o Eat a healthy diet.  Visit www.choosemyplate.gov and click on  Pregnancy and Breastfeeding  for information and tips  o Do not smoke.  Avoid other people's smoke, too.  We are happy to help with referrals to stop smoking programs.  o Do not drink alcohol.  o Try to avoid people who have colds or other infections.  Practice good hand washing.  o Consider registering for our Healthy Pregnancy Class here at [facility].  This class is offered every 3rd Wednesday from 2:30-4:30 p.m.  [place] at [phone] or online at [email or web address] or [email or web address]  o Consider registering for prenatal education classes through [place] at [facility].  You can view class schedules and register online at [email or web address] or call [phone]) for questions     For urgent concerns, call [facility] at [phone] to speak with a triage nurse during regular clinic hours (8:00 am - 4:30 pm).  This line is answered by our service 24 hours a day, after hours a provider will return your call.  "

## 2017-10-12 NOTE — LETTER
10/12/2017       RE: Betty Tamayo  [address]  [facility] [address]     Dear Colleague,    Thank you for referring your patient, Betty Tamayo, to the [facility] at [facility]. Please see a copy of my visit note below.      Subjective   27 year old female who presents to clinic for initiation of OB care.   at 8w2d with estimated date of delivery of May 22, 2018 based on today's dating ultrasound. LMP unknown.   Pregnancy is unplanned but coping well. Difference FOB than 1st baby in .     Symptoms since LMP include nausea and vomiting.  Patient has tried these relief measures: Zofran, diet modification, small frequent meals, increased rest and increased fluids. Nauseated every day, throws up once per week. Went to ED at [facility], given zofran. Doesn't like the taste.  Interested in trying vitamin b6 and unisom.      PERSONAL/SOCIAL HISTORY  Current boyfriend and FOB, Say. Thinking about living together.  Currently lives in [place], 12 minute drive to work.  Employment: Full time, works as patient  on [employer].  Additional items: Denies past or present domestic violence, sexual and psychological abuse.    OTHER:    1) History of IOL for pre-eclampsia with 1st pregnancy in .    2) 1st degree relative with diabetes, maternal pre-preg dmi >40    3) UTI diagnosed by [facility] after ed visit for n/v. Given keflex then switched to macrobid. Finished 7 day course. Currently asymptomatic    4) Medication exposure in 1st trimester, see below.    5) hx of asthma- denies complications    Objective  -VS: reviewed and within normal limits   -General appearance: no acute distress, patient is comfortable   NEUROLOGICAL/PSYCHIATRIC   - Oriented x3,   -Mood and affect: : normal   Genetic/Infection questionnaire completed, risks include: No hx of chicken pox.   Was taking topamax and phentermine for weight loss, prescription. Started in February   and then stopped. Restarted after appt on . Stopped taking when she found out she is pregnant.    Reviewed Up to Date information with patient re: meds taken at beginning of pregnancy. Cat D for topamax and cat x for phentermine. Pt not interested in terminating pregnancy. Consulted with Dr. Boogie who recommended no additional testing.   - Pt desires only level 2 ultrasound at this time, m referral placed. To check with her insurance re: 1st trimester screening.     Assessment/Plan   at 8w2d  by dating ultrasound, lmp unknown.  Encounter Diagnoses   Name Primary?     Encounter for supervision of other normal pregnancy in first trimester Yes     Nausea      History of pre-eclampsia in prior pregnancy, currently pregnant      Encounter for fetal anatomic survey      Orders Placed This Encounter   Procedures     25- OH-Vitamin D     CBC with Platelets Differential [OGK286]     Anti Treponema [DBT9775]     Rubella Antibody IgG Quantitative [NVJ9805]     Hepatitis B Surface Antigen [EFZ767]     HIV Antigen Antibody Combo [XVZ7783]     Hemoglobin A1c     HGB Eval Reflex to ELP or RBC Solubility     AST [SCI804]     ALT [FMU365]     Protein  random urine with Creat Ratio     Varicella Zoster Virus Antibody IgG     Hepatitis B Surface Antibody     Uric Acid [WVD244]     Creatinine     Creatinine urine calculation only     [facility] Referral [9046.022]     ABO/Rh Type and Screen [TMJ120]     Orders Placed This Encounter   Medications     Prenatal Vit-Fe Fumarate-FA (PRENATAL MULTIVITAMIN PLUS IRON) 27-0.8 MG TABS per tablet     Sig: Take 1 tablet by mouth daily     ondansetron (ZOFRAN-ODT) 4 MG ODT tab     Sig: Take 4-8 mg by mouth     pyridOXINE (VITAMIN B-6) 25 MG tablet     Sig: Take 1 tablet (25 mg) by mouth daily     Dispense:  60 tablet     Refill:  1     doxylamine (UNISOM) 25 MG TABS tablet     Sig: Take 1 tablet (25 mg) by mouth At Bedtime     Dispense:  40 each     Refill:  1      aspirin 81 MG tablet     Sig: Take 1 tablet (81 mg) by mouth daily     Dispense:  120 tablet     Refill:  0     - Oriented to Practice, types of care, and how to reach a provider. CNMIKE or MD. Pt undecided at this time.   - Patient received 1st trimester new OB education packet complete with aide of [employer] Expectant Family booklet including information on genetic screening test options.  - Patient desires level II ultrasound which was ordered.  - Patient was encouraged to start prenatal vitamins as tolerated.    - Patient instructed to schedule new OB exam with provider in 2 weeks.    - Pregnancy concerns to be addressed by provider at new OB exam include: Needs GC/CT at NOB, Needs early 1 hour glucose at NOB, discuss starting aspirin at 12-14 weeks, check if pt desires 1st trimester screening.    - OBI education reviewed.  - OBI labs ordered.  - Varicella (d/t no history), Hbg electrophoresis added to labs.  - HbgA1c added to labs d/t 1st degree relative with diabetes and maternal obesity.   - Discussed early 1 hour glucose; Pt agreeable to NOB appointment.  - Added baseline pre-e labs d/t history of pre-eclampsia.  - Recommended daily aspirin for pre-e prevention. Pt agrees with plan of care.  - Prescription placed for 81mg aspirin per day. To start at 12-14 weeks.  - Reviewed Up to Date information with patient re: meds taken at beginning of pregnancy. Cat D for topamax and cat x for phentermine. Pt not interested in terminating pregnancy. Consulted with Dr. Boogie who recommended no additional testing.   - Pt desires only level 2 ultrasound at this time, mfm referral placed. To check with her insurance re: 1st trimester screening.   - Needs GC/CT at NOB.  - Pap up to date 2/2/17, next due 2/2020.  - Prescription placed for vitamin B6 and unisom.    Pt to RTO for NOB visit in 2 weeks and prn if questions or concerns      Again, thank you for allowing me to participate in the care of your patient.       Sincerely,    Kiana Lujan CNM

## 2017-10-12 NOTE — MR AVS SNAPSHOT
After Visit Summary   10/12/2017    Betty Tamayo    MRN: [id number]           Patient Information     Date Of Birth          1990        Visit Information        Provider Department      10/12/2017 1:00 PM [facility]TRASOUND [facility]        Today's Diagnoses     Encounter for supervision of other normal pregnancy in first trimester    -  1       Follow-ups after your visit        Your next 10 appointments already scheduled     Oct 25, 2017  3:45 PM CDT   NEW OB with MAT NoM   [facility] ([facility])    [facility]  [address]  [address]  [facility] [id number] [phone]              Future tests that were ordered for you today     Open Future Orders        Priority Expected Expires Ordered    [facility] Comprehensive Single Routine  8/12/2018 10/12/2017            Who to contact     Please call your clinic at [phone] to:    Ask questions about your health    Make or cancel appointments    Discuss your medicines    Learn about your test results    Speak to your doctor   If you have compliments or concerns about an experience at your clinic, or if you wish to file a complaint, please contact [facility] Patient Relations at [phone] or email us at [email or web address]         Additional Information About Your Visit        MyChart Information     [employer] is an electronic gateway that provides easy, online access to your medical records. With [employer], you can request a clinic appointment, read your test results, renew a prescription or communicate with your care team.     To sign up for [email or web address] visit the website at [email or web address]   You will be asked to enter the access code listed below, as well as some personal information. Please follow the directions to create your username and password.     Your access code is: [email or web address]  Expires:  1/10/2018  3:05 PM     Your access code will  in 90 days. If you need help or a new code, please contact your [facility] or call [phone] for assistance.        Care EveryWhere ID     This is your Care EveryWhere ID. This could be used by other organizations to access your [facility] medical records  [id number]        Your Vitals Were     Last Period                   2017 (Approximate)            Blood Pressure from Last 3 Encounters:   10/12/17 113/65   17 135/73   17 125/80    Weight from Last 3 Encounters:   10/12/17 118.1 kg (260 lb 6.4 oz)   17 120.8 kg (266 lb 4.8 oz)   17 122 kg (269 lb)                 Today's Medication Changes          These changes are accurate as of: 10/12/17  3:05 PM.  If you have any questions, ask your nurse or doctor.               Start taking these medicines.        Dose/Directions    aspirin 81 MG tablet   Used for:  History of pre-eclampsia in prior pregnancy, currently pregnant   Started by:  Kiana Lujan CNM        Dose:  81 mg   Take 1 tablet (81 mg) by mouth daily   Quantity:  120 tablet   Refills:  0       doxylamine 25 MG Tabs tablet   Commonly known as:  UNISOM   Used for:  Nausea   Started by:  Kiana Lujan CNM        Dose:  25 mg   Take 1 tablet (25 mg) by mouth At Bedtime   Quantity:  40 each   Refills:  1       pyridOXINE 25 MG tablet   Commonly known as:  vitamin B-6   Used for:  Nausea   Started by:  Kiana Lujan CNM        Dose:  25 mg   Take 1 tablet (25 mg) by mouth daily   Quantity:  60 tablet   Refills:  1            Where to get your medicines      These medications were sent to [facility] [place], [place] - [address] [facility] [address] [place] [address] [address], [facility] [address]    Hours:  TRANSPLANT PHONE NUMBER [phone] Phone:  [phone]     aspirin 81 MG tablet    doxylamine 25 MG  Tabs tablet    pyridOXINE 25 MG tablet                Primary Care Provider Office Phone # Fax #    Lorena Lizy Anjali Lindsay [phone]       [address] [place] [address] [address] [place] [id number]        Equal Access to Services     SANDRA PEARCE : Hadii lacy cha sajio Sorachaelali, waaxda luqadaha, qaybta kaalmada adeegyada, deirdre ktin hayrandin cecy mireyaclau bui. So [facility] [phone].    ATENCIÓN: Si habla español, tiene a caldera disposición servicios gratuitos de asistencia lingüística. Llame al [phone].    We comply with applicable federal civil rights laws and [place] laws. We do not discriminate on the basis of race, color, national origin, age, disability, sex, sexual orientation, or gender identity.            Thank you!     Thank you for choosing [facility]  for your care. Our goal is always to provide you with excellent care. Hearing back from our patients is one way we can continue to improve our services. Please take a few minutes to complete the written survey that you may receive in the mail after your visit with us. Thank you!             Your Updated Medication List - Protect others around you: Learn how to safely use, store and throw away your medicines at www.disposemymeds.org.          This list is accurate as of: 10/12/17  3:05 PM.  Always use your most recent med list.                   Brand Name Dispense Instructions for use Diagnosis    aspirin 81 MG tablet     120 tablet    Take 1 tablet (81 mg) by mouth daily    History of pre-eclampsia in prior pregnancy, currently pregnant       doxylamine 25 MG Tabs tablet    UNISOM    40 each    Take 1 tablet (25 mg) by mouth At Bedtime    Nausea       ondansetron 4 MG ODT tab    ZOFRAN-ODT     Take 4-8 mg by mouth        phentermine 37.5 MG tablet    ADIPEX-P    30 tablet    Take 0.5 tablets (18.75 mg) by mouth every morning    Morbid obesity due to excess calories (H)       prenatal multivitamin plus iron 27-0.8  MG Tabs per tablet      Take 1 tablet by mouth daily        pyridOXINE 25 MG tablet    vitamin B-6    60 tablet    Take 1 tablet (25 mg) by mouth daily    Nausea       topiramate 25 MG tablet    TOPAMAX    180 tablet    25 mg at bedtime for 1 week, 50 mg at bedtime for 1 week and 75 mg daily at bedtime thereafter    Morbid obesity due to excess calories (H)

## 2017-10-12 NOTE — LETTER
10/12/2017       RE: Betty Tamayo  [address]  [facility] [address]     Dear Colleague,    Thank you for referring your patient, Betty Tamayo, [facility] Please see a copy of my visit note below.    27 year old female, ., with unsure LMP. Estimated Date of Delivery: Data Unavailable, Unknown  presents for confirmation of dates and assessment of viability. This study was done transvaginally.    Measurements     CRL = 17.9 mm = 8 2/7 weeks  EGA.   FREDERICK = 2018.     Fetal anatomy appears normal for gestational age.     Fetal/Fetal Cardiac Activity: Present.  FHR = 159bpm.     Implantation: Intrauterine.     Cervix = 4.3 cm      Maternal structures appear normal.    Impression: IUP at 8w2d gestation by today's ultrasound.    Recommend comprehensive scan at 18 to 20 weeks.    YING Salazar MD    Again, thank you for allowing me to participate in the care of your patient.      Sincerely,    [facility]

## 2017-10-12 NOTE — MR AVS SNAPSHOT
"              After Visit Summary   10/12/2017    Betty Tamayo    MRN: [id number]           Patient Information     Date Of Birth          1990        Visit Information        Provider Department      10/12/2017 1:30 PM Kiana Lujan CNM [facility]        Today's Diagnoses     Encounter for supervision of high risk pregnancy in first trimester, antepartum    -  1    Nausea        History of pre-eclampsia in prior pregnancy, currently pregnant        Encounter for fetal anatomic survey        Encounter for supervision of other normal pregnancy in first trimester        UTI in pregnancy, first trimester        Maternal varicella, non-immune        Vitamin D deficiency        Medication exposure during first trimester of pregnancy          Care Instructions      Thank you for choosing [facility] for your obstetrical care.  We are an integrated health clinic with obstetricians, midwives, a psychologist, an acupuncturist, a nutritionist, a pharmacist, internal medicine and family practice all in one.  If you have questions about services offered please ask.      o Please keep all appointments with your provider.  You will help catch, prevent and treat problems early.  o Review your Expectant Family booklet and folder given to you at this intake visit.  It can answer many basic questions including:    Treatment for nausea and vomiting    Medications that are safe in pregnancy    Healthy diet and weight gain    Exercise and activity  o ASK questions!!  Please use \"Questions for my New OB visit\" form to write down any questions or concerns for your next visit.  o Eat a healthy diet.  Visit www.choosemyplate.gov and click on  Pregnancy and Breastfeeding  for information and tips  o Do not smoke.  Avoid other people's smoke, too.  We are happy to help with referrals to stop smoking programs.  o Do not drink alcohol.  o Try to avoid people who have " colds or other infections.  Practice good hand washing.  o Consider registering for our Healthy Pregnancy Class here at [facility].  This class is offered every 3rd Wednesday from 2:30-4:30 p.m.  [place] at [phone] or online at [email or web address]  o Consider registering for prenatal education classes through [facility] at [facility].  You can view class schedules and register online at www.[employer] or call [phone] for questions     For urgent concerns, call [facility] at [phone] to speak with a triage nurse during regular clinic hours (8:00 am - 4:30 pm).  This line is answered by our service 24 hours a day, after hours a provider will return your call.          Follow-ups after your visit        Additional Services     Maternal Fetal Medicine Center Referral [9046.022]       Was taking Topamax and phentermine before pregnancy and into beginning of pregnancy      >> Patient may proceed with recommendations for further testing as directed by the Maternal Fetal Medicine Specialist >>    >> If requesting Fetal Echo: MFM will determine appropriate location for exam due to indication.    >> If requesting Lung Maturity Amnio:  If results indicate fetal lung maturity, induction or C/S is recommended within 36 hours.  Please schedule accordingly.     Dear Patient:   Please be aware that coverage of these services is subject to the terms and limitations of your health insurance plan.  Call member services at your health plan with any benefit or coverage questions.      Please bring the following to your appointment:    >>  Any x-rays, CTs or MRIs which have been performed.  Contact the facility where they were done to arrange for  prior to your scheduled appointment.  Any new CT, MRI or other procedures ordered by your specialist must be performed at a [facility] facility or coordinated by your clinic's referral office.  >>  List of  current medications   >>  This referral request   >>  Any documents/labs given to you for this referral                  Follow-up notes from your care team     Return in about 2 weeks (around 10/26/2017) for NOB.      Your next 10 appointments already scheduled     Oct 25, 2017  3:45 PM CDT   NEW OB with MAT No CNM   [facility] ([facility])    [facility] [address]  [address]  [facility] [id number] [phone]              Who to contact     Please call your clinic at [phone] to:    Ask questions about your health    Make or cancel appointments    Discuss your medicines    Learn about your test results    Speak to your doctor   If you have compliments or concerns about an experience at your clinic, or if you wish to file a complaint, please contact [facility] Patient Relations at [phone] or email us at [email or web address]         Additional Information About Your Visit        ViaView[employer]Novalux Information     [employer] is an electronic gateway that provides easy, online access to your medical records. With [employer], you can request a clinic appointment, read your test results, renew a prescription or communicate with your care team.     To sign up for [employer] visit the website at [email or web address]   You will be asked to enter the access code listed below, as well as some personal information. Please follow the directions to create your username and password.     Your access code is: [email or web address]  Expires: 1/10/2018  3:05 PM     Your access code will  in 90 days. If you need help or a new code, please contact your [facility] Clinic or call [phone] for assistance.        Care EveryWhere ID     This is your Care EveryWhere ID. This could be used by other organizations to access your [place] medical records  [id number]        Your Vitals Were     Height  "Last Period Breastfeeding? BMI (Body Mass Index)          1.664 m (5' 5.51\") 08/20/2017 (Approximate) No 42.66 kg/m2         Blood Pressure from Last 3 Encounters:   10/12/17 113/65   09/05/17 135/73   07/21/17 125/80    Weight from Last 3 Encounters:   10/12/17 118.1 kg (260 lb 6.4 oz)   09/05/17 120.8 kg (266 lb 4.8 oz)   07/21/17 122 kg (269 lb)              We Performed the Following     25- OH-Vitamin D     ABO/Rh Type and Screen [JOD905]     ALT [QHA517]     Anti Treponema [KWQ1672]     AST [HWH158]     CBC with Platelets Differential [DDJ232]     Creatinine urine calculation only     Creatinine     Hemoglobin A1c     Hepatitis B Surface Antibody     Hepatitis B Surface Antigen [PST566]     HGB Eval Reflex to ELP or RBC Solubility     HIV Antigen Antibody Combo [BQZ0112]     Maternal Fetal Medicine Center Referral [9046.022]     Protein  random urine with Creat Ratio     Rubella Antibody IgG Quantitative [XNA5280]     Uric Acid [NLH092]     Urine Culture Aerobic Bacterial [MDN971]     Varicella Zoster Virus Antibody IgG          Today's Medication Changes          These changes are accurate as of: 10/12/17 11:59 PM.  If you have any questions, ask your nurse or doctor.               Start taking these medicines.        Dose/Directions    aspirin 81 MG tablet   Used for:  History of pre-eclampsia in prior pregnancy, currently pregnant, Encounter for supervision of high risk pregnancy in first trimester, antepartum   Started by:  Kiana Lujan CNM        Dose:  81 mg   Take 1 tablet (81 mg) by mouth daily   Quantity:  120 tablet   Refills:  0       doxylamine 25 MG Tabs tablet   Commonly known as:  UNISOM   Used for:  Nausea   Started by:  Kiana Lujan CNM        Dose:  25 mg   Take 1 tablet (25 mg) by mouth At Bedtime   Quantity:  40 each   Refills:  1       pyridOXINE 25 MG tablet   Commonly known as:  vitamin B-6   Used for:  Nausea   Started by:  Kiana Lujan " KELSI Ceron        Dose:  25 mg   Take 1 tablet (25 mg) by mouth daily   Quantity:  60 tablet   Refills:  1            Where to get your medicines      These medications were sent to [place], [place] - [address]  [address], [facility] [address]    Hours:  TRANSPLANT PHONE NUMBER [phone] Phone:  [phone]     aspirin 81 MG tablet    doxylamine 25 MG Tabs tablet    pyridOXINE 25 MG tablet                Primary Care Provider Office Phone # Fax #    Lorena Lizy Lindsay [phone]       [address]  [facility] [address]        Equal Access to Services     SANDRA PEARCE : Hadii lacy cha hadasho Sorachaelali, waaxda luqadaha, qaybta kaalmada adeegyada, waxquique merazin haymela bui. So [facility] [phone].    ATENCIÓN: Si habla español, tiene a caldera disposición servicios gratuitos de asistencia lingüística. Llame al [phone].    We comply with applicable federal civil rights laws and [place] laws. We do not discriminate on the basis of race, color, national origin, age, disability, sex, sexual orientation, or gender identity.            Thank you!     Thank you for choosing [facility]  for your care. Our goal is always to provide you with excellent care. Hearing back from our patients is one way we can continue to improve our services. Please take a few minutes to complete the written survey that you may receive in the mail after your visit with us. Thank you!             Your Updated Medication List - Protect others around you: Learn how to safely use, store and throw away your medicines at www.disposemymeds.org.          This list is accurate as of: 10/12/17 11:59 PM.  Always use your most recent med list.                   Brand Name Dispense Instructions for use Diagnosis    aspirin 81 MG tablet     120 tablet    Take 1 tablet (81 mg) by mouth daily    History  of pre-eclampsia in prior pregnancy, currently pregnant, Encounter for supervision of high risk pregnancy in first trimester, antepartum       doxylamine 25 MG Tabs tablet    UNISOM    40 each    Take 1 tablet (25 mg) by mouth At Bedtime    Nausea       ondansetron 4 MG ODT tab    ZOFRAN-ODT     Take 4-8 mg by mouth        phentermine 37.5 MG tablet    ADIPEX-P    30 tablet    Take 0.5 tablets (18.75 mg) by mouth every morning    Morbid obesity due to excess calories (H)       prenatal multivitamin plus iron 27-0.8 MG Tabs per tablet      Take 1 tablet by mouth daily    Encounter for supervision of high risk pregnancy in first trimester, antepartum       pyridOXINE 25 MG tablet    vitamin B-6    60 tablet    Take 1 tablet (25 mg) by mouth daily    Nausea       topiramate 25 MG tablet    TOPAMAX    180 tablet    25 mg at bedtime for 1 week, 50 mg at bedtime for 1 week and 75 mg daily at bedtime thereafter    Morbid obesity due to excess calories (H)

## 2017-10-12 NOTE — PROGRESS NOTES
Subjective   27 year old female who presents to clinic for initiation of OB care.   at 8w2d with estimated date of delivery of May 22, 2018 based on today's dating ultrasound. LMP unknown.   Pregnancy is unplanned but coping well. Different FOB than 1st baby in .     Symptoms since LMP include nausea and vomiting.  Patient has tried these relief measures: Zofran, diet modification, small frequent meals, increased rest and increased fluids. Nauseated every day, throws up once per week. Went to ED at [facility], given zofran. Doesn't like the taste.  Interested in trying vitamin b6 and unisom.      PERSONAL/SOCIAL HISTORY  Current boyfriend and FOB, Say. Thinking about living together.  Currently lives in [place], 12 [place] [address] to work.  Employment: Full time, works as patient  on [employer].  Additional items: Denies past or present domestic violence, sexual and psychological abuse.    OTHER:    1) History of IOL for pre-eclampsia with 1st pregnancy in .    2) 1st degree relative with diabetes, maternal pre-preg dmi >40    3) UTI diagnosed by [facility] after ed visit for n/v. Given keflex then switched to macrobid. Finished 7 day course. Currently asymptomatic    4) Medication exposure in 1st trimester, see below.    5) hx of asthma- denies complications    Objective  -VS: reviewed and within normal limits   -General appearance: no acute distress, patient is comfortable   NEUROLOGICAL/PSYCHIATRIC   - Oriented x3,   -Mood and affect: : normal   Genetic/Infection questionnaire completed, risks include: No hx of chicken pox.   Was taking topamax and phentermine for weight loss, prescription. Started in 2017 and then stopped. Restarted after appt on . Stopped taking when she found out she is pregnant.    Reviewed Up to Date information with patient re: meds taken at beginning of pregnancy. Cat D for topamax and cat x for phentermine. Pt not interested in terminating pregnancy. Consulted  with Dr. Boogie who recommended no additional testing.   - Pt desires only level 2 ultrasound at this time, mfm referral placed. To check with her insurance re: 1st trimester screening.     Assessment/Plan   at 8w2d  by dating ultrasound, lmp unknown.  Encounter Diagnoses   Name Primary?     Encounter for supervision of other normal pregnancy in first trimester Yes     Nausea      History of pre-eclampsia in prior pregnancy, currently pregnant      Encounter for fetal anatomic survey      Orders Placed This Encounter   Procedures     25- OH-Vitamin D     CBC with Platelets Differential [FRJ180]     Anti Treponema [STH0524]     Rubella Antibody IgG Quantitative [FZH1308]     Hepatitis B Surface Antigen [TIH686]     HIV Antigen Antibody Combo [CLA3690]     Hemoglobin A1c     HGB Eval Reflex to ELP or RBC Solubility     AST [JWR824]     ALT [MKE632]     Protein  random urine with Creat Ratio     Varicella Zoster Virus Antibody IgG     Hepatitis B Surface Antibody     Uric Acid [KDN042]     Creatinine     Creatinine urine calculation only     Maternal Fetal Medicine Center Referral [9046.022]     ABO/Rh Type and Screen [VOK495]     Orders Placed This Encounter   Medications     Prenatal Vit-Fe Fumarate-FA (PRENATAL MULTIVITAMIN PLUS IRON) 27-0.8 MG TABS per tablet     Sig: Take 1 tablet by mouth daily     ondansetron (ZOFRAN-ODT) 4 MG ODT tab     Sig: Take 4-8 mg by mouth     pyridOXINE (VITAMIN B-6) 25 MG tablet     Sig: Take 1 tablet (25 mg) by mouth daily     Dispense:  60 tablet     Refill:  1     doxylamine (UNISOM) 25 MG TABS tablet     Sig: Take 1 tablet (25 mg) by mouth At Bedtime     Dispense:  40 each     Refill:  1     aspirin 81 MG tablet     Sig: Take 1 tablet (81 mg) by mouth daily     Dispense:  120 tablet     Refill:  0     - Oriented to Practice, types of care, and how to reach a provider. CNM or MD. Pt undecided at this time.   - Patient received 1st trimester new OB education packet complete  with aide of The Expectant Family booklet including information on genetic screening test options.  - Patient desires level II ultrasound which was ordered.  - Patient was encouraged to start prenatal vitamins as tolerated.    - Patient instructed to schedule new OB exam with provider in 2 weeks.    - Pregnancy concerns to be addressed by provider at new OB exam include: Needs GC/CT at NOB, Needs early 1 hour glucose at NOB, discuss starting aspirin at 12-14 weeks, check if pt desires 1st trimester screening.    - OBI education reviewed.  - OBI labs ordered.  - Varicella (d/t no history), Hbg electrophoresis added to labs.  - HbgA1c added to labs d/t 1st degree relative with diabetes and maternal obesity.   - Discussed early 1 hour glucose; Pt agreeable to NOB appointment.  - Added baseline pre-e labs d/t history of pre-eclampsia.  - Recommended daily aspirin for pre-e prevention. Pt agrees with plan of care.  - Prescription placed for 81mg aspirin per day. To start at 12-14 weeks.  - Reviewed Up to Date information with patient re: meds taken at beginning of pregnancy. Cat D for topamax and cat x for phentermine. Pt not interested in terminating pregnancy. Consulted with Dr. Boogie who recommended no additional testing.   - Pt desires only level 2 ultrasound at this time, m referral placed. To check with her insurance re: 1st trimester screening.   - Needs GC/CT at NOB.  - Pap up to date 2/2/17, next due 2/2020.  - Prescription placed for vitamin B6 and unisom.    Pt to RTO for NOB visit in 2 weeks and prn if questions or concerns    MAT Goldman CNM

## 2017-10-13 LAB
BACTERIA SPEC CULT: NORMAL
DEPRECATED CALCIDIOL+CALCIFEROL SERPL-MC: 13 UG/L (ref 20–75)
HBV SURFACE AB SERPL IA-ACNC: 103.87 M[IU]/ML
HBV SURFACE AG SERPL QL IA: NONREACTIVE
HIV 1+2 AB+HIV1 P24 AG SERPL QL IA: NONREACTIVE
Lab: NORMAL
RUBV IGG SERPL IA-ACNC: 29 IU/ML
SPECIMEN SOURCE: NORMAL
T PALLIDUM IGG+IGM SER QL: NEGATIVE
VZV IGG SER QL IA: 0.6 AI (ref 0–0.8)

## 2017-10-14 LAB
HGB A1 MFR BLD: 97.3 % (ref 95–97.9)
HGB A2 MFR BLD: 2.5 % (ref 2–3.5)
HGB C MFR BLD: 0 % (ref 0–0)
HGB E MFR BLD: 0 % (ref 0–0)
HGB F MFR BLD: 0.2 % (ref 0–2.1)
HGB FRACT BLD ELPH-IMP: NORMAL
HGB OTHER MFR BLD: 0 % (ref 0–0)
HGB S BLD QL SOLY: NORMAL
HGB S MFR BLD: 0 % (ref 0–0)
PATH INTERP BLD-IMP: NORMAL

## 2017-10-15 PROBLEM — O23.41 UTI IN PREGNANCY, FIRST TRIMESTER: Status: ACTIVE | Noted: 2017-10-15

## 2017-10-15 PROBLEM — E55.9 VITAMIN D DEFICIENCY: Status: ACTIVE | Noted: 2017-10-15

## 2017-10-15 PROBLEM — Z28.39 MATERNAL VARICELLA, NON-IMMUNE: Status: ACTIVE | Noted: 2017-10-15

## 2017-10-15 PROBLEM — O09.891 MEDICATION EXPOSURE DURING FIRST TRIMESTER OF PREGNANCY: Status: ACTIVE | Noted: 2017-10-15

## 2017-10-15 PROBLEM — O09.899 MATERNAL VARICELLA, NON-IMMUNE: Status: ACTIVE | Noted: 2017-10-15

## 2017-10-15 PROBLEM — R11.0 NAUSEA: Status: ACTIVE | Noted: 2017-10-15

## 2017-10-25 ENCOUNTER — OFFICE VISIT (OUTPATIENT)
Dept: OBGYN | Facility: CLINIC | Age: 27
End: 2017-10-25
Attending: ADVANCED PRACTICE MIDWIFE
Payer: COMMERCIAL

## 2017-10-25 VITALS
SYSTOLIC BLOOD PRESSURE: 127 MMHG | WEIGHT: 257.56 LBS | HEIGHT: 66 IN | DIASTOLIC BLOOD PRESSURE: 70 MMHG | BODY MASS INDEX: 41.39 KG/M2

## 2017-10-25 DIAGNOSIS — O09.899 MATERNAL VARICELLA, NON-IMMUNE: ICD-10-CM

## 2017-10-25 DIAGNOSIS — Z28.39 MATERNAL VARICELLA, NON-IMMUNE: ICD-10-CM

## 2017-10-25 DIAGNOSIS — O09.91 ENCOUNTER FOR SUPERVISION OF HIGH RISK PREGNANCY IN FIRST TRIMESTER, ANTEPARTUM: Primary | ICD-10-CM

## 2017-10-25 PROCEDURE — 87491 CHLMYD TRACH DNA AMP PROBE: CPT | Performed by: ADVANCED PRACTICE MIDWIFE

## 2017-10-25 PROCEDURE — 99211 OFF/OP EST MAY X REQ PHY/QHP: CPT | Mod: ZF

## 2017-10-25 PROCEDURE — 87591 N.GONORRHOEAE DNA AMP PROB: CPT | Performed by: ADVANCED PRACTICE MIDWIFE

## 2017-10-25 ASSESSMENT — PAIN SCALES - GENERAL: PAINLEVEL: NO PAIN (0)

## 2017-10-25 NOTE — MR AVS SNAPSHOT
After Visit Summary   10/25/2017    Betty Tamayo    MRN: [id number]           Patient Information     Date Of Birth          1990        Visit Information        Provider Department      10/25/2017 3:45 PM Lenny Allan APRN CNM [facility]        Today's Diagnoses     Encounter for supervision of high risk pregnancy in first trimester, undecided cnm or md    -  1    Maternal varicella, non-immune           Follow-ups after your visit        Follow-up notes from your care team     Return in about 4 weeks (around 11/22/2017).      Your next 10 appointments already scheduled     Nov 22, 2017  3:45 PM CST   RETURN OB with MAT Velasquez CNM   [facility] ([facility])    [facility] [address]  [address]  [facility] [address]   [phone]            Dec 22, 2017  8:00 AM CST   PRATIK US COMP with [id number]   [facility])    [address]  [facility] [id number] [phone]           Wear comfortable clothes and leave your valuables at home.            Dec 22, 2017  8:30 AM CST   Radiology MD with UR PRATIK CORBIN   [facility])    [address]  [facility] [address] [phone]           Please arrive at the time given for your first appointment. This visit is used internally to schedule the physician's time during your ultrasound.              Future tests that were ordered for you today     Open Future Orders        Priority Expected Expires Ordered    Glucose 1 Hour Routine  10/25/2018 10/25/2017            Who to contact     Please call your clinic at [phone] to:    Ask questions about your health    Make or cancel appointments    Discuss your medicines    Learn about your test results    Speak to your doctor  "  If you have compliments or concerns about an experience at your clinic, or if you wish to file a complaint, please contact [facility] Patient Relations at [phone] or email us at [email or web address]         Additional Information About Your Visit        Oyster.comhart Information     [email or web address] is an electronic gateway that provides easy, online access to your medical records. With [employer], you can request a clinic appointment, read your test results, renew a prescription or communicate with your care team.     To sign up for [employer] visit the website at [email or web address]   You will be asked to enter the access code listed below, as well as some personal information. Please follow the directions to create your username and password.     Your access code is: [email or web address]  Expires: 1/10/2018  3:05 PM     Your access code will  in 90 days. If you need help or a new code, please contact your [facility] or call [phone] for assistance.        Care EveryWhere ID     This is your Care EveryWhere ID. This could be used by other organizations to access your [place] medical records  [id number]        Your Vitals Were     Height Last Period BMI (Body Mass Index)             1.664 m (5' 5.51\") 2017 (Approximate) 42.19 kg/m2          Blood Pressure from Last 3 Encounters:   10/25/17 127/70   10/12/17 113/65   17 135/73    Weight from Last 3 Encounters:   10/25/17 116.8 kg (257 lb 9 oz)   10/12/17 118.1 kg (260 lb 6.4 oz)   17 120.8 kg (266 lb 4.8 oz)              We Performed the Following     Chlamydia by PCR     Gonorrhea PCR        Primary Care Provider Office Phone # Fax #    Lorena Lizy Lindsay [phone]       [address] [place] [address] [address] [place] [address]        Equal Access to Services     SANDRA PEARCE AH: tiny Forbes, deirdre chu " rob byerskavita resendiz'aan ah. So [facility] [phone].    ATENCIÓN: Si habla khushboo, tiene a caldera disposición servicios gratuitos de asistencia lingüística. Daren al [phone].    We comply with applicable federal civil rights laws and [place] laws. We do not discriminate on the basis of race, color, national origin, age, disability, sex, sexual orientation, or gender identity.            Thank you!     Thank you for choosing [facility]  for your care. Our goal is always to provide you with excellent care. Hearing back from our patients is one way we can continue to improve our services. Please take a few minutes to complete the written survey that you may receive in the mail after your visit with us. Thank you!             Your Updated Medication List - Protect others around you: Learn how to safely use, store and throw away your medicines at www.disposemymeds.org.          This list is accurate as of: 10/25/17  4:50 PM.  Always use your most recent med list.                   Brand Name Dispense Instructions for use Diagnosis    aspirin 81 MG tablet     120 tablet    Take 1 tablet (81 mg) by mouth daily    History of pre-eclampsia in prior pregnancy, currently pregnant, Encounter for supervision of high risk pregnancy in first trimester, antepartum       doxylamine 25 MG Tabs tablet    UNISOM    40 each    Take 1 tablet (25 mg) by mouth At Bedtime    Nausea       ondansetron 4 MG ODT tab    ZOFRAN-ODT     Take 4-8 mg by mouth        phentermine 37.5 MG tablet    ADIPEX-P    30 tablet    Take 0.5 tablets (18.75 mg) by mouth every morning    Morbid obesity due to excess calories (H)       prenatal multivitamin plus iron 27-0.8 MG Tabs per tablet      Take 1 tablet by mouth daily    Encounter for supervision of high risk pregnancy in first trimester, antepartum       pyridOXINE 25 MG tablet    vitamin B-6    60 tablet    Take 1 tablet (25 mg) by mouth daily    Nausea       topiramate 25  MG tablet    TOPAMAX    180 tablet    25 mg at bedtime for 1 week, 50 mg at bedtime for 1 week and 75 mg daily at bedtime thereafter    Morbid obesity due to excess calories (H)

## 2017-10-25 NOTE — LETTER
10/25/2017       RE: Betty Tamayo  [address]  [facility] [address]     Dear Colleague,    Thank you for referring your patient, Betty Tamayo, to the [facility] at [facility]. Please see a copy of my visit note below.    Subjective:    27 year old, female, , 10w1d,  who presents to the clinic today for a new ob visit.  Feels well. Has started PNV.  Estimated Date of Delivery: May 22, 2018   May 22, 2018 is calculated from Patient's last menstrual period was 2017 (approximate).  She has not had bleeding since her LMP.   She has had mild nausea. Weight loss has occurred, for a total of 8 pounds.   This was not a planned pregnancy.   FOB is involved.     OTHER CONCERNS: bilateral breast tenderness; has noticed improvement in nausea- did not try Vit B6 for relief.  Reviewed intake labs with patient and vit D supplementation.    ===========================================  ROS  PSYCHIATRIC:  Denies mood changes  PHQ9: Last PHQ-9 score on record= No Value exists for the : HP#PHQ9  Social History   Substance Use Topics     Smoking status: Former Smoker     Packs/day: 0.50     Types: Cigarettes     Quit date: 2017     Smokeless tobacco: Never Used     Alcohol use Yes      Comment: stopped in pregnancy     History   Drug Use No     History   Smoking Status     Former Smoker     Packs/day: 0.50     Types: Cigarettes     Quit date: 2017   Smokeless Tobacco     Never Used       Alcohol use Yes   Comment: stopped in pregnancy     Family History   Problem Relation Age of Onset     DIABETES Mother      CEREBROVASCULAR DISEASE Mother      Anxiety Disorder Mother      Depression Mother      CEREBROVASCULAR DISEASE Sister      DIABETES Maternal Grandmother      Hypertension Maternal Grandmother      ============================================  MEDICAL HISTORY   No Known Allergies    Current Outpatient Prescriptions:      Prenatal Vit-Fe  "Fumarate-FA (PRENATAL MULTIVITAMIN PLUS IRON) 27-0.8 MG TABS per tablet, Take 1 tablet by mouth daily, Disp: , Rfl:     Past Medical History:   Diagnosis Date     Knee cartilage, torn, left     both knees    had cortisone injection     Right shoulder pain 14     Uncomplicated asthma      Past Surgical History:   Procedure Laterality Date     ANESTHESIA OUT OF OR MRI Right 2015    Procedure: ANESTHESIA OUT OF OR MRI;  Surgeon: Generic Anesthesia Provider;  Location: [facility] OR     ENT SURGERY      tonsillectomy      wisdom teeth          Obstetric History       T1      L1     SAB0   TAB0   Ectopic0   Multiple0   Live Births1       # Outcome Date GA Lbr Eric/2nd Weight Sex Delivery Anes PTL Lv   2 Current            1 Term 10/17/09 38w1d  3.289 kg (7 lb 4 oz) F    LIVE BIRTH      Name: Gilberto      Complications: Preeclampsia/Hypertension        GYN History- Denies Abnormal Pap Smears; last pap in 2017 NILM                        Cervical procedures: none                        History of STI: none    I personally reviewed the past social/family/medical and surgical history on the date of service.   I reviewed lab work done at Intake visit with patient.    OBJECTIVE:   PHYSICAL EXAM:  /70  Ht 1.664 m (5' 5.51\")  Wt 71.6 kg (157 lb 14.4 oz)  LMP 2017 (Approximate)  BMI 25.87 kg/m2  BMI- Body mass index is 25.87 kg/(m^2)., GENERAL:  Pleasant pregnant female, alert, cooperative and well groomed.  SKIN:  Warm and dry, without lesions or rashes  HEAD: Symmetrical features.  MOUTH:  Buccal mucosa pink, moist without lesions.  Teeth in good repair.    NECK:  Thyroid without enlargement and nodules.  Lymph nodes not palpable.   LUNGS:  Clear to auscultation.  BREAST:    No dominant, fixed or suspicious masses are noted.  No skin or nipple changes or axillary nodes.   Nipples everted.      HEART:  RRR without murmur.  ABDOMEN: Soft without masses , tenderness or organomegaly.  No " CVA tenderness.  Uterus not palpable due to body habitus.  No scars noted. Fetal heart tones present.  MUSCULOSKELETAL:  Full range of motion  EXTREMITIES:  No edema. No significant varicosities.   PELVIC EXAM: Deferred  WET PREP:Not done  GC/CHLAMYDIA CULTURE OBTAINED:YES    ASSESSMENT:  Intrauterine pregnancy 10w1d size consistent with dates  Genetic Screening: Level 2 Ultrasound only  Encounter Diagnosis   Name Primary?     Encounter for supervision of high risk pregnancy in first trimester, undecided cnm or md Yes      PLAN:  Orders Placed This Encounter   Procedures     Glucose 1 Hour     - Reviewed CNM vs. MD care, pt desires CNM care.  - Reviewed use of triage nurse line and contacting the on-call provider after hours for an urgent need such as fever, vagina bleeding, bladder or vaginal infection, rupture of membranes,  or term labor.    - Reviewed best evidence for: weight gain for her weight and height for pregnancy:  RECOMMENDED WEIGHT GAIN: 15-25 lbs.. Healthy diet and foods to avoid; exercise and activity during pregnancy;avoiding exposure to toxoplasmosis; and maintenance of a generally healthy lifestyle.   - Discussed the harms, benefits, side effects and alternative therapies for current prescribed and OTC medications.  - All pt's questions discussed and answered.  Pt verbalized understanding of and agreement to plan of care.  - Discussed early glucose, pt unable to complete today in clinic due to time.  Is open to completing within this week at [facility] lab, future order placed.   - Encouraged to begin baby aspirin in 2-4 weeks  - Continue scheduled prenatal care and prn if questions or concerns, RTC in 4-6 weeks    Again, thank you for allowing me to participate in the care of your patient.      Sincerely,    MAT Bruner CNM

## 2017-10-25 NOTE — NURSING NOTE
Chief Complaint   Patient presents with     Prenatal Care   nob exam  10.1  Weeks  Some nausea was 166 prepregnancy- was 160 at last visit.

## 2017-10-25 NOTE — PROGRESS NOTES
Subjective:    27 year old, female, , 10w1d,  who presents to the clinic today for a new ob visit.  Feels well. Has started PNV.  Estimated Date of Delivery: May 22, 2018   May 22, 2018 is calculated from Patient's last menstrual period was 2017 (approximate).  She has not had bleeding since her LMP.   She has had mild nausea. Weight loss has occurred, for a total of 8 pounds.   This was not a planned pregnancy.   FOB is involved.     OTHER CONCERNS: bilateral breast tenderness; has noticed improvement in nausea- did not try Vit B6 for relief.  Reviewed intake labs with patient and vit D supplementation.    ===========================================  ROS  PSYCHIATRIC:  Denies mood changes  PHQ9: Last PHQ-9 score on record= No Value exists for the : HP#PHQ9  Social History   Substance Use Topics     Smoking status: Former Smoker     Packs/day: 0.50     Types: Cigarettes     Quit date: 2017     Smokeless tobacco: Never Used     Alcohol use Yes      Comment: stopped in pregnancy     History   Drug Use No     History   Smoking Status     Former Smoker     Packs/day: 0.50     Types: Cigarettes     Quit date: 2017   Smokeless Tobacco     Never Used       Alcohol use Yes   Comment: stopped in pregnancy     Family History   Problem Relation Age of Onset     DIABETES Mother      CEREBROVASCULAR DISEASE Mother      Anxiety Disorder Mother      Depression Mother      CEREBROVASCULAR DISEASE Sister      DIABETES Maternal Grandmother      Hypertension Maternal Grandmother      ============================================  MEDICAL HISTORY   No Known Allergies    Current Outpatient Prescriptions:      Prenatal Vit-Fe Fumarate-FA (PRENATAL MULTIVITAMIN PLUS IRON) 27-0.8 MG TABS per tablet, Take 1 tablet by mouth daily, Disp: , Rfl:     Past Medical History:   Diagnosis Date     Knee cartilage, torn, left     both knees    had cortisone injection     Right shoulder pain 14     Uncomplicated asthma   "    Past Surgical History:   Procedure Laterality Date     ANESTHESIA OUT OF OR MRI Right 2015    Procedure: ANESTHESIA OUT OF OR MRI;  Surgeon: Generic Anesthesia Provider;  Location: [facility] OR     ENT SURGERY      tonsillectomy      wisdom teeth          Obstetric History       T1      L1     SAB0   TAB0   Ectopic0   Multiple0   Live Births1       # Outcome Date GA Lbr Eric/2nd Weight Sex Delivery Anes PTL Lv   2 Current            1 Term 10/17/09 38w1d  3.289 kg (7 lb 4 oz) F    LIVE BIRTH      Name: Gilberto      Complications: Preeclampsia/Hypertension        GYN History- Denies Abnormal Pap Smears; last pap in 2017 NILM                        Cervical procedures: none                        History of STI: none    I personally reviewed the past social/family/medical and surgical history on the date of service.   I reviewed lab work done at Intake visit with patient.    OBJECTIVE:   PHYSICAL EXAM:  /70  Ht 1.664 m (5' 5.51\")  Wt 71.6 kg (157 lb 14.4 oz)  LMP 2017 (Approximate)  BMI 25.87 kg/m2  BMI- Body mass index is 25.87 kg/(m^2)., GENERAL:  Pleasant pregnant female, alert, cooperative and well groomed.  SKIN:  Warm and dry, without lesions or rashes  HEAD: Symmetrical features.  MOUTH:  Buccal mucosa pink, moist without lesions.  Teeth in good repair.    NECK:  Thyroid without enlargement and nodules.  Lymph nodes not palpable.   LUNGS:  Clear to auscultation.  BREAST:    No dominant, fixed or suspicious masses are noted.  No skin or nipple changes or axillary nodes.   Nipples everted.      HEART:  RRR without murmur.  ABDOMEN: Soft without masses , tenderness or organomegaly.  No CVA tenderness.  Uterus not palpable due to body habitus.  No scars noted. Fetal heart tones present.  MUSCULOSKELETAL:  Full range of motion  EXTREMITIES:  No edema. No significant varicosities.   PELVIC EXAM: Deferred  WET PREP:Not done  GC/CHLAMYDIA CULTURE " OBTAINED:YES    ASSESSMENT:  Intrauterine pregnancy 10w1d size consistent with dates  Genetic Screening: Level 2 Ultrasound only  Encounter Diagnosis   Name Primary?     Encounter for supervision of high risk pregnancy in first trimester, undecided cnm or md Yes      PLAN:  Orders Placed This Encounter   Procedures     Glucose 1 Hour     - Reviewed CNM vs. MD care, pt desires CNM care.  - Reviewed use of triage nurse line and contacting the on-call provider after hours for an urgent need such as fever, vagina bleeding, bladder or vaginal infection, rupture of membranes,  or term labor.    - Reviewed best evidence for: weight gain for her weight and height for pregnancy:  RECOMMENDED WEIGHT GAIN: 15-25 lbs.. Healthy diet and foods to avoid; exercise and activity during pregnancy;avoiding exposure to toxoplasmosis; and maintenance of a generally healthy lifestyle.   - Discussed the harms, benefits, side effects and alternative therapies for current prescribed and OTC medications.  - All pt's questions discussed and answered.  Pt verbalized understanding of and agreement to plan of care.  - Discussed early glucose, pt unable to complete today in clinic due to time.  Is open to completing within this week at [facility] lab, future order placed.   - Encouraged to begin baby aspirin in 2-4 weeks  - Continue scheduled prenatal care and prn if questions or concerns, RTC in 4-6 weeks

## 2017-10-26 DIAGNOSIS — O09.91 ENCOUNTER FOR SUPERVISION OF HIGH RISK PREGNANCY IN FIRST TRIMESTER, ANTEPARTUM: ICD-10-CM

## 2017-10-26 DIAGNOSIS — Z11.3 SCREEN FOR STD (SEXUALLY TRANSMITTED DISEASE): ICD-10-CM

## 2017-10-26 LAB
C TRACH DNA SPEC QL NAA+PROBE: NEGATIVE
GLUCOSE 1H P 50 G GLC PO SERPL-MCNC: 91 MG/DL (ref 60–129)
GLUCOSE BLDC GLUCOMTR-MCNC: 88 MG/DL (ref 70–99)
HIV 1+2 AB+HIV1 P24 AG SERPL QL IA: NONREACTIVE
N GONORRHOEA DNA SPEC QL NAA+PROBE: NEGATIVE
SPECIMEN SOURCE: NORMAL
SPECIMEN SOURCE: NORMAL
T PALLIDUM IGG+IGM SER QL: NEGATIVE

## 2017-10-27 ASSESSMENT — PATIENT HEALTH QUESTIONNAIRE - PHQ9
5. POOR APPETITE OR OVEREATING: NOT AT ALL
SUM OF ALL RESPONSES TO PHQ QUESTIONS 1-9: 2

## 2017-10-27 ASSESSMENT — ANXIETY QUESTIONNAIRES
1. FEELING NERVOUS, ANXIOUS, OR ON EDGE: NOT AT ALL
6. BECOMING EASILY ANNOYED OR IRRITABLE: NOT AT ALL
GAD7 TOTAL SCORE: 1
7. FEELING AFRAID AS IF SOMETHING AWFUL MIGHT HAPPEN: NOT AT ALL
5. BEING SO RESTLESS THAT IT IS HARD TO SIT STILL: NOT AT ALL
3. WORRYING TOO MUCH ABOUT DIFFERENT THINGS: SEVERAL DAYS
2. NOT BEING ABLE TO STOP OR CONTROL WORRYING: NOT AT ALL

## 2017-10-28 ASSESSMENT — ANXIETY QUESTIONNAIRES: GAD7 TOTAL SCORE: 1

## 2017-11-22 ENCOUNTER — OFFICE VISIT (OUTPATIENT)
Dept: OBGYN | Facility: CLINIC | Age: 27
End: 2017-11-22
Attending: ADVANCED PRACTICE MIDWIFE
Payer: COMMERCIAL

## 2017-11-22 VITALS
SYSTOLIC BLOOD PRESSURE: 122 MMHG | HEIGHT: 66 IN | DIASTOLIC BLOOD PRESSURE: 76 MMHG | BODY MASS INDEX: 40.69 KG/M2 | WEIGHT: 253.2 LBS

## 2017-11-22 DIAGNOSIS — O09.91 HRP (HIGH RISK PREGNANCY), FIRST TRIMESTER: Primary | ICD-10-CM

## 2017-11-22 DIAGNOSIS — Z23 NEED FOR IMMUNIZATION AGAINST INFLUENZA: ICD-10-CM

## 2017-11-22 PROCEDURE — G0008 ADMIN INFLUENZA VIRUS VAC: HCPCS

## 2017-11-22 PROCEDURE — 25000128 H RX IP 250 OP 636: Mod: ZF

## 2017-11-22 PROCEDURE — 90686 IIV4 VACC NO PRSV 0.5 ML IM: CPT | Mod: ZF

## 2017-11-22 ASSESSMENT — PAIN SCALES - GENERAL: PAINLEVEL: SEVERE PAIN (6)

## 2017-11-22 NOTE — LETTER
Date:November 29, 2017      Patient was self referred, no letter generated. Do not send.        [facility] Health Information

## 2017-11-22 NOTE — MR AVS SNAPSHOT
After Visit Summary   11/22/2017    Betty Tamayo    MRN: [id number]           Patient Information     Date Of Birth          1990        Visit Information        Provider Department      11/22/2017 3:45 PM Yasmin Alba APRN CNM [facility]        Today's Diagnoses     HRP (high risk pregnancy), first trimester    -  1    Need for immunization against influenza           Follow-ups after your visit        Follow-up notes from your care team     Return in about 4 weeks (around 12/20/2017) for MALDONADO.      Your next 10 appointments already scheduled     Dec 22, 2017  8:00 AM LISSY SALAS COMP with [id number]   [facility])    [address]  [facility] [address] [phone]           Wear comfortable clothes and leave your valuables at home.            Dec 22, 2017  8:30 AM LISSY   Radiology MD with UR PRATIK CORBIN   [facility])    [address]  [facility] [address] [phone]           Please arrive at the time given for your first appointment. This visit is used internally to schedule the physician's time during your ultrasound.              Who to contact     Please call your clinic at [phone] to:    Ask questions about your health    Make or cancel appointments    Discuss your medicines    Learn about your test results    Speak to your doctor   If you have compliments or concerns about an experience at your clinic, or if you wish to file a complaint, please contact [facility] Patient Relations at [phone] or email us at [email or web address]         Additional Information About Your Visit        MyChart Information     Launchr is an electronic gateway that provides easy, online access to your medical records. With Launchr, you can  "request a clinic appointment, read your test results, renew a prescription or communicate with your care team.     To sign up for [employer] visit the website at [email or web address]   You will be asked to enter the access code listed below, as well as some personal information. Please follow the directions to create your username and password.     Your access code is: [email or web address]  Expires: 1/10/2018  2:05 PM     Your access code will  in 90 days. If you need help or a new code, please contact your [facility] or call [phone] for assistance.        Care EveryWhere ID     This is your Care EveryWhere ID. This could be used by other organizations to access your [place] medical records  [id number]        Your Vitals Were     Height Last Period BMI (Body Mass Index)             1.664 m (5' 5.51\") 2017 (Approximate) 41.48 kg/m2          Blood Pressure from Last 3 Encounters:   17 122/76   10/25/17 127/70   10/12/17 113/65    Weight from Last 3 Encounters:   17 114.9 kg (253 lb 3.2 oz)   10/25/17 116.8 kg (257 lb 9 oz)   10/12/17 118.1 kg (260 lb 6.4 oz)              We Performed the Following     HC FLU VAC PRESRV FREE QUAD SPLIT VIR 3+YRS IM          Today's Medication Changes          These changes are accurate as of: 17  4:04 PM.  If you have any questions, ask your nurse or doctor.               Stop taking these medicines if you haven't already. Please contact your care team if you have questions.     doxylamine 25 MG Tabs tablet   Commonly known as:  UNISOM   Stopped by:  Yasmin Alba APRN CNM           ondansetron 4 MG ODT tab   Commonly known as:  ZOFRAN-ODT   Stopped by:  Yasmin Alba APRN CNM           pyridOXINE 25 MG tablet   Commonly known as:  vitamin B-6   Stopped by:  Yasmni Alba APRN CNM                    Primary Care Provider Office Phone # Fax #    Lorena Lindsay [phone]    "    [address]  [facility] [address]        Equal Access to Services     SANDRA PEARCE : Hadii aad ku hadleonelservando Sorobert, waaxda luqadaha, qaybta kaalmavarsha marion, deirdre coonjethroclau bui. So [facility] [phone].    ATENCIÓN: Si habla español, tiene a caldera disposición servicios gratuitos de asistencia lingüística. Ll[facility] [phone].    We comply with applicable federal civil rights laws and [place] laws. We do not discriminate on the basis of race, color, national origin, age, disability, sex, sexual orientation, or gender identity.            Thank you!     Thank you for choosing [facility]  for your care. Our goal is always to provide you with excellent care. Hearing back from our patients is one way we can continue to improve our services. Please take a few minutes to complete the written survey that you may receive in the mail after your visit with us. Thank you!             Your Updated Medication List - Protect others around you: Learn how to safely use, store and throw away your medicines at www.disposemymeds.org.          This list is accurate as of: 11/22/17  4:04 PM.  Always use your most recent med list.                   Brand Name Dispense Instructions for use Diagnosis    aspirin 81 MG tablet     120 tablet    Take 1 tablet (81 mg) by mouth daily    History of pre-eclampsia in prior pregnancy, currently pregnant, Encounter for supervision of high risk pregnancy in first trimester, antepartum       phentermine 37.5 MG tablet    ADIPEX-P    30 tablet    Take 0.5 tablets (18.75 mg) by mouth every morning    Morbid obesity due to excess calories (H)       prenatal multivitamin plus iron 27-0.8 MG Tabs per tablet      Take 1 tablet by mouth daily    Encounter for supervision of high risk pregnancy in first trimester, antepartum       topiramate 25 MG tablet    TOPAMAX    180 tablet    25 mg at bedtime for 1 week, 50 mg at bedtime for 1 week and 75 mg  daily at bedtime thereafter    Morbid obesity due to excess calories (H)       VITAMIN D (CHOLECALCIFEROL) PO      Take 2,000 Units by mouth daily    HRP (high risk pregnancy), first trimester, Need for immunization against influenza

## 2017-11-22 NOTE — LETTER
"2017       RE: Betty Tamayo  [address]  [facility] [address]     Dear Colleague,    Thank you for referring your patient, Betty Tamayo, to the [facility] at [facility]. Please see a copy of my visit note below.    Subjective:      27 year old  at 14w1d presents for a routine prenatal appointment.       Denies vaginal bleeding or leakage of fluid.  Denies contractions or cramping.    Has not started to feel fetal movement.       No HA, visual changes, RUQ or epigastric pain.   The patient presents with the following concerns: lower abdominal discomfort that she describes as pressure, worse with activity.  Resolves at rest.   Level II US is scheduled.       Objective:  Vitals:    17 1545   BP: 122/76   Weight: 114.9 kg (253 lb 3.2 oz)   Height: 1.664 m (5' 5.51\")     See OB flowsheet    Assessment/Plan     Encounter Diagnosis   Name Primary?     HRP (high risk pregnancy), first trimester Yes       Orders Placed This Encounter   Medications     VITAMIN D, CHOLECALCIFEROL, PO     Sig: Take 2,000 Units by mouth daily     -Discussed abdominal discomfort and common musculoskeletal discomforts.  Warning signs reviewed.  - Reviewed total weight loss, encouraged continued healthy diet and exercise.      - Reviewed why/how to contact provider.    Patient education/orders or handouts today:  PTL signs/symptoms and level 2 u/s scheduled   Return to clinic in 4 weeks and prn if questions or concerns.   MAT Velasquez CNM                Again, thank you for allowing me to participate in the care of your patient.      Sincerely,    MAT Velasquez CNM      "

## 2017-11-22 NOTE — PROGRESS NOTES
"Subjective:      27 year old  at 14w1d presents for a routine prenatal appointment.       Denies vaginal bleeding or leakage of fluid.  Denies contractions or cramping.    Has not started to feel fetal movement.       No HA, visual changes, RUQ or epigastric pain.   The patient presents with the following concerns: lower abdominal discomfort that she describes as pressure, worse with activity.  Resolves at rest.   Level II US is scheduled.       Objective:  Vitals:    17 1545   BP: 122/76   Weight: 114.9 kg (253 lb 3.2 oz)   Height: 1.664 m (5' 5.51\")     See OB flowsheet    Assessment/Plan     Encounter Diagnosis   Name Primary?     HRP (high risk pregnancy), first trimester Yes       Orders Placed This Encounter   Medications     VITAMIN D, CHOLECALCIFEROL, PO     Sig: Take 2,000 Units by mouth daily     -Discussed abdominal discomfort and common musculoskeletal discomforts.  Warning signs reviewed.  - Reviewed total weight loss, encouraged continued healthy diet and exercise.      - Reviewed why/how to contact provider.    Patient education/orders or handouts today:  PTL signs/symptoms and level 2 u/s scheduled   Return to clinic in 4 weeks and prn if questions or concerns.   MAT Velasquez CNM              "

## 2017-12-20 ENCOUNTER — PRE VISIT (OUTPATIENT)
Dept: MATERNAL FETAL MEDICINE | Facility: CLINIC | Age: 27
End: 2017-12-20

## 2018-01-08 ENCOUNTER — TELEPHONE (OUTPATIENT)
Dept: MATERNAL FETAL MEDICINE | Facility: CLINIC | Age: 28
End: 2018-01-08

## 2018-01-08 NOTE — TELEPHONE ENCOUNTER
Patient was scheduled for a L2 w/ MFM on 12/22/17.  Appointment was canceled and when called patient today to see if she wanted to reschedule, patient stated that she had a pregnancy loss.  Referring clinic notified.      Ariana Hoyt

## 2018-03-22 DIAGNOSIS — E66.01 MORBID OBESITY DUE TO EXCESS CALORIES (H): ICD-10-CM

## 2018-03-23 RX ORDER — PHENTERMINE HYDROCHLORIDE 37.5 MG/1
0.5 TABLET ORAL EVERY MORNING
Qty: 30 TABLET | Refills: 1 | OUTPATIENT
Start: 2018-03-23

## 2018-03-25 ENCOUNTER — OFFICE VISIT (OUTPATIENT)
Dept: ORTHOPEDICS | Facility: CLINIC | Age: 28
End: 2018-03-25
Payer: COMMERCIAL

## 2018-03-25 VITALS
DIASTOLIC BLOOD PRESSURE: 82 MMHG | SYSTOLIC BLOOD PRESSURE: 129 MMHG | BODY MASS INDEX: 41.99 KG/M2 | HEIGHT: 65 IN | HEART RATE: 85 BPM | WEIGHT: 252 LBS

## 2018-03-25 DIAGNOSIS — M22.42 CHONDROMALACIA OF LEFT PATELLA: ICD-10-CM

## 2018-03-25 DIAGNOSIS — M25.562 ACUTE PAIN OF LEFT KNEE: Primary | ICD-10-CM

## 2018-03-25 NOTE — MR AVS SNAPSHOT
After Visit Summary   3/25/2018    Betty Tamayo    MRN: [id number]           Patient Information     Date Of Birth          1990        Visit Information        Provider Department      3/25/2018 9:30 AM Cinthya Robertson MD [facility] and Orthopaedic Walk In Clinic        Today's Diagnoses     Acute pain of left knee    -  1    Chondromalacia of left patella           Follow-ups after your visit        Additional Services     PHYSICAL THERAPY REFERRAL (Internal)       Physical Therapy Referral: patella taping, PF training program, down 40 lbs                  Follow-up notes from your care team     Return in about 2 months (around 2018), or if symptoms worsen or fail to improve.      Your next 10 appointments already scheduled     May 25, 2018  2:45 PM CDT   (Arrive by 2:30 PM)   Return Walk In Ortho with Cinthya Robertson MD   [facility] ([facility])    [address] [place] [address] [id number] [phone]              Who to contact     Please call your clinic at [phone] to:    Ask questions about your health    Make or cancel appointments    Discuss your medicines    Learn about your test results    Speak to your doctor            Additional Information About Your Visit        MyChart Information     [employer] is an electronic gateway that provides easy, online access to your medical records. With [employer], you can request a clinic appointment, read your test results, renew a prescription or communicate with your care team.     To sign up for [employer] visit the website at [email or web address]   You will be asked to enter the access code listed below, as well as some personal information. Please follow the directions to create your username and password.     Your access code is: [email or web address]  Expires: 2018 10:23 AM     Your access code will  in 90 days. If you need help or a new code,  "please contact your [facility] or call [phone] for assistance.        Care EveryWhere ID     This is your Care EveryWhere ID. This could be used by other organizations to access your [facility] medical records  [id number]        Your Vitals Were     Pulse Height Last Period BMI (Body Mass Index)          85 1.654 m (5' 5.1\") 08/20/2017 (Approximate) 41.81 kg/m2         Blood Pressure from Last 3 Encounters:   03/25/18 129/82   11/22/17 122/76   10/25/17 127/70    Weight from Last 3 Encounters:   03/25/18 114.3 kg (252 lb)   11/22/17 114.9 kg (253 lb 3.2 oz)   10/25/17 116.8 kg (257 lb 9 oz)              We Performed the Following     PHYSICAL THERAPY REFERRAL (Internal)        Primary Care Provider Office Phone # Fax #    Lorena Lizy Lindsay [phone]       [address] [place] [address] [address] [place] [address]        Equal Access to Services     ERIC PEARCE : Hadii lacy ku hadasho Soomaali, waaxda luqadaha, qaybta kaalmada adeegyada, deirdre moses . So [facility] [phone].    ATENCIÓN: Si habla español, tiene a caldera disposición servicios gratuitos de asistencia lingüística. [facility] [phone].    We comply with applicable federal civil rights laws and [place] laws. We do not discriminate on the basis of race, color, national origin, age, disability, sex, sexual orientation, or gender identity.            Thank you!     Thank you for choosing [facility] SPORTS AND ORTHOPAEDIC WALK IN CLINIC  for your care. Our goal is always to provide you with excellent care. Hearing back from our patients is one way we can continue to improve our services. Please take a few minutes to complete the written survey that you may receive in the mail after your visit with us. Thank you!             Your Updated Medication List - Protect others around you: Learn how to safely use, store and throw away your medicines at www.disposemymeds.org.          This " list is accurate as of 3/25/18 10:23 AM.  Always use your most recent med list.                   Brand Name Dispense Instructions for use Diagnosis    aspirin 81 MG tablet     120 tablet    Take 1 tablet (81 mg) by mouth daily    History of pre-eclampsia in prior pregnancy, currently pregnant, Encounter for supervision of high risk pregnancy in first trimester, antepartum       phentermine 37.5 MG tablet    ADIPEX-P    30 tablet    Take 0.5 tablets (18.75 mg) by mouth every morning    Morbid obesity due to excess calories (H)       prenatal multivitamin plus iron 27-0.8 MG Tabs per tablet      Take 1 tablet by mouth daily    Encounter for supervision of high risk pregnancy in first trimester, antepartum       topiramate 25 MG tablet    TOPAMAX    180 tablet    25 mg at bedtime for 1 week, 50 mg at bedtime for 1 week and 75 mg daily at bedtime thereafter    Morbid obesity due to excess calories (H)       VITAMIN D (CHOLECALCIFEROL) PO      Take 2,000 Units by mouth daily    HRP (high risk pregnancy), first trimester, Need for immunization against influenza

## 2018-03-25 NOTE — PROGRESS NOTES
SUBJECTIVE: Betty Tamayo is a 27 year old female who reports having a lot of anterior knee pain.  Going on for about 1 month.  Last year it was the right knee.  Had it drained.  Knee caps are tilted and bad cartilage.  Now it's the left knee.  Last week it's really bad.  Hard to even bend it.  Squats, stairs.  Ice, ibuprofen.  Walking a lot, can't do much because of the knee.  1 week- knee hurts too much, can't go on incline.  Likes flat surface, uneven surface isn't good.    PAST MEDICAL, SOCIAL, SURGICAL AND FAMILY HISTORY: She  has a past medical history of Knee cartilage, torn, left; Right shoulder pain (12/9/14); and Uncomplicated asthma. She also has no past medical history of PONV (postoperative nausea and vomiting).  She  has a past surgical history that includes wisdom teeth; ENT surgery; and Anesthesia out of OR MRI (Right, 1/8/2015).  Her family history includes Anxiety Disorder in her mother; CEREBROVASCULAR DISEASE in her mother and sister; DIABETES in her maternal grandmother and mother; Depression in her mother; Hypertension in her maternal grandmother.  She reports that she quit smoking about 6 months ago. Her smoking use included Cigarettes. She smoked 0.50 packs per day. She has never used smokeless tobacco. She reports that she drinks alcohol. She reports that she does not use illicit drugs.      ALLERGIES: She has No Known Allergies.    CURRENT MEDICATIONS: She has a current medication list which includes the following prescription(s): cholecalciferol, prenatal multivitamin plus iron, aspirin, phentermine, and topiramate.     REVIEW OF SYSTEMS: 10 point review of systems is negative except as noted above.    EXAM:  /82  Pulse 85  LMP 08/20/2017 (Approximate)  CONSTITUTIONIAL: alert, mild distress, cooperative and obese  HEAD: Normocephalic. No masses, lesions, tenderness or abnormalities  ENT: ENT exam normal, no neck nodes or sinus tenderness  SKIN: no suspicious lesions or  rashes  GAIT: broad based and obese pattern  Stance: normal  NEUROLOGIC: Normal muscle tone and strength, reflexes normal, sensation grossly normal.  PSYCHIATRIC: affect normal/bright and mentation appears normal.    MUSCULOSKELETAL: left knee pain      Inspection:       AP/lateral alignment normal  Tender: lateral patellar facet, infrapatella      Non-tender: MCL, LCL, lateral joint line, medial joint line, IT band, posterior knee   Active Range of Motion: all normal, painful in deep flexion  Strength: quad  5-/5, Hamstrings  5-/5, Gastroc  5/5, Tibialis anterior  5/5, Peroneals  5/5 and core strength  5/5 hip abductors and other core muscles   Special tests: normal Valgus stress test, normal Varus, negative Lachman's test, negative Janelle's, no apprehension with lateral stress of the patella.        ASSESSMENT/PLAN:  Pt is a 26 yo obese AA female with PMHx of asthma presenting with left knee pain  1. Left knee PF- PT for strengthening and trial of taping, brace given  Ice, NSAIDs  2. Obesity- down 40 lbs, continue weight loss journey  RTC 8 weeks  X-RAY INTERPRETATION:   X-Ray of the Left Knee: 3-view, Adams, lateral, sunrise   ordered and interpreted in the office today was positive for Impression:   Mild degenerative changes without acute fracture.

## 2018-03-25 NOTE — PROGRESS NOTES
SPORTS & ORTHOPEDIC WALK-IN VISIT 3/25/2018    Primary Care Physician: Dr. Anjali Heredia  Seen in ED 6/2017 b/l knee pain. Seen  for R knee PFPS. CSI on 7/21/17 R knee  Reason for visit:     What part of your body is injured / painful?  left knee    What caused the injury /pain? No inciting event     How long ago did your injury occur or pain begin? About a year    What are your most bothersome symptoms? Pain    How would you characterize your symptom?  sharp    What makes your symptoms better? Rest, Ice and Ibuprofen    What makes your symptoms worse? Bending and Squatting    Have you been previously seen for this problem? Yes, ED    Medical History:    Any recent changes to your medical history? No    Any new medication prescribed since last visit? No    Have you had surgery on this body part before? No    Social History:    Occupation: [employer]    Handedness: Left    Exercise: Walking    Review of Systems:    Do you have fever, chills, weight loss? No    Do you have any vision problems? No    Do you have any chest pain or edema? No    Do you have any shortness of breath or wheezing?  No    Do you have stomach problems? No    Do you have any numbness or focal weakness? No    Do you have diabetes? No    Do you have problems with bleeding or clotting? No    Do you have an rashes or other skin lesions? No

## 2018-03-25 NOTE — LETTER
3/25/2018       RE: Betty Tamayo  [address]  [facility] [address]     Dear Colleague,    Thank you for referring your patient, Betty Tamayo, to the [facility] SPORTS AND ORTHOPAEDIC WALK IN CLINIC at [facility]. Please see a copy of my visit note below.          SPORTS & ORTHOPEDIC WALK-IN VISIT 3/25/2018    Primary Care Physician: Dr. Anjali Heredia  Seen in ED 6/2017 b/l knee pain. Seen  for R knee PFPS. CSI on 7/21/17 R knee  Reason for visit:     What part of your body is injured / painful?  left knee    What caused the injury /pain? No inciting event     How long ago did your injury occur or pain begin? About a year    What are your most bothersome symptoms? Pain    How would you characterize your symptom?  sharp    What makes your symptoms better? Rest, Ice and Ibuprofen    What makes your symptoms worse? Bending and Squatting    Have you been previously seen for this problem? Yes, ED    Medical History:    Any recent changes to your medical history? No    Any new medication prescribed since last visit? No    Have you had surgery on this body part before? No    Social History:    Occupation: [employer]    Handedness: Left    Exercise: Walking    Review of Systems:    Do you have fever, chills, weight loss? No    Do you have any vision problems? No    Do you have any chest pain or edema? No    Do you have any shortness of breath or wheezing?  No    Do you have stomach problems? No    Do you have any numbness or focal weakness? No    Do you have diabetes? No    Do you have problems with bleeding or clotting? No    Do you have an rashes or other skin lesions? No           SUBJECTIVE: Betty Tamayo is a 27 year old female who reports having a lot of anterior knee pain.  Going on for about 1 month.  Last year it was the right knee.  Had it drained.  Knee caps are tilted and bad cartilage.  Now it's the left knee.  Last week it's really bad.  Hard to  even bend it.  Squats, stairs.  Ice, ibuprofen.  Walking a lot, can't do much because of the knee.  1 week- knee hurts too much, can't go on incline.  Likes flat surface, uneven surface isn't good.    PAST MEDICAL, SOCIAL, SURGICAL AND FAMILY HISTORY: She  has a past medical history of Knee cartilage, torn, left; Right shoulder pain (12/9/14); and Uncomplicated asthma. She also has no past medical history of PONV (postoperative nausea and vomiting).  She  has a past surgical history that includes wisdom teeth; ENT surgery; and Anesthesia out of OR MRI (Right, 1/8/2015).  Her family history includes Anxiety Disorder in her mother; CEREBROVASCULAR DISEASE in her mother and sister; DIABETES in her maternal grandmother and mother; Depression in her mother; Hypertension in her maternal grandmother.  She reports that she quit smoking about 6 months ago. Her smoking use included Cigarettes. She smoked 0.50 packs per day. She has never used smokeless tobacco. She reports that she drinks alcohol. She reports that she does not use illicit drugs.      ALLERGIES: She has No Known Allergies.    CURRENT MEDICATIONS: She has a current medication list which includes the following prescription(s): cholecalciferol, prenatal multivitamin plus iron, aspirin, phentermine, and topiramate.     REVIEW OF SYSTEMS: 10 point review of systems is negative except as noted above.    EXAM:  /82  Pulse 85  LMP 08/20/2017 (Approximate)  CONSTITUTIONIAL: alert, mild distress, cooperative and obese  HEAD: Normocephalic. No masses, lesions, tenderness or abnormalities  ENT: ENT exam normal, no neck nodes or sinus tenderness  SKIN: no suspicious lesions or rashes  GAIT: broad based and obese pattern  Stance: normal  NEUROLOGIC: Normal muscle tone and strength, reflexes normal, sensation grossly normal.  PSYCHIATRIC: affect normal/bright and mentation appears normal.    MUSCULOSKELETAL: left knee pain      Inspection:       AP/lateral alignment  normal  Tender: lateral patellar facet, infrapatella      Non-tender: MCL, LCL, lateral joint line, medial joint line, IT band, posterior knee   Active Range of Motion: all normal, painful in deep flexion  Strength: quad  5-/5, Hamstrings  5-/5, Gastroc  5/5, Tibialis anterior  5/5, Peroneals  5/5 and core strength  5/5 hip abductors and other core muscles   Special tests: normal Valgus stress test, normal Varus, negative Lachman's test, negative Janelle's, no apprehension with lateral stress of the patella.        ASSESSMENT/PLAN:  Pt is a 28 yo obese AA female with PMHx of asthma presenting with left knee pain  1. Left knee PF- PT for strengthening and trial of taping, brace given  Ice, NSAIDs  2. Obesity- down 40 lbs, continue weight loss journey  RTC 8 weeks  X-RAY INTERPRETATION:   X-Ray of the Left Knee: 3-view, Adams, lateral, sunrise   ordered and interpreted in the office today was positive for Impression:   Mild degenerative changes without acute fracture.     Again, thank you for allowing me to participate in the care of your patient.      Sincerely,    Cinthya Robertson MD

## 2018-03-27 DIAGNOSIS — E66.01 MORBID OBESITY DUE TO EXCESS CALORIES (H): ICD-10-CM

## 2018-03-27 RX ORDER — PHENTERMINE HYDROCHLORIDE 37.5 MG/1
0.5 TABLET ORAL EVERY MORNING
Qty: 30 TABLET | Refills: 1 | COMMUNITY
Start: 2018-03-27 | End: 2019-12-18

## 2018-04-09 ENCOUNTER — THERAPY VISIT (OUTPATIENT)
Dept: PHYSICAL THERAPY | Facility: CLINIC | Age: 28
End: 2018-04-09
Payer: COMMERCIAL

## 2018-04-09 DIAGNOSIS — M25.562 LEFT KNEE PAIN: Primary | ICD-10-CM

## 2018-04-09 PROCEDURE — 97161 PT EVAL LOW COMPLEX 20 MIN: CPT | Mod: GP | Performed by: PHYSICAL THERAPIST

## 2018-04-09 PROCEDURE — 97530 THERAPEUTIC ACTIVITIES: CPT | Mod: GP | Performed by: PHYSICAL THERAPIST

## 2018-04-09 PROCEDURE — 97112 NEUROMUSCULAR REEDUCATION: CPT | Mod: GP | Performed by: PHYSICAL THERAPIST

## 2018-04-09 PROCEDURE — 97110 THERAPEUTIC EXERCISES: CPT | Mod: GP | Performed by: PHYSICAL THERAPIST

## 2018-04-09 NOTE — MR AVS SNAPSHOT
After Visit Summary   4/9/2018    Betty Tamayo    MRN: [id number]           Patient Information     Date Of Birth          1990        Visit Information        Provider Department      4/9/2018 2:40 PM Frnacisco Story PT  [facility]        Today's Diagnoses     Left knee pain    -  1       Follow-ups after your visit        Your next 10 appointments already scheduled     Apr 16, 2018  3:10 PM CDT   DINO Extremity with Raghav Morrissey PT   [facility] Physical Therapy [facility] ([facility])    [address] [place] [address] [id number] [phone]            Apr 23, 2018  3:10 PM CDT   DINO Extremity with Raghav Morrissey PT   [facility] ([facility])    [address] [place] [address] [id number] [phone]            May 25, 2018  2:45 PM CDT   (Arrive by 2:30 PM)   Return Walk In Ortho with Cinthya Robertson MD   [facility] Medicine ([facility])    [address] [place] [address] [id number] [phone]            Jun 26, 2018  2:00 PM CDT   (Arrive by 1:45 PM)   Return Visit with Kandice Rosales MD   [facility] Medical Weight Management ([facility])    [address] [place] [address] [id number] [phone]              Who to contact     If you have questions or need follow up information about today's clinic visit or your schedule please contact [facility] PHYSICAL THERAPY [facility] directly at [phone].  Normal or non-critical lab and imaging results will be communicated to you by MyChart, letter or phone within 4 business days after the clinic has received the results. If you do not hear from us within 7 days, please contact the clinic through MyChart or phone. If you have a critical or abnormal lab result, we will notify you by phone as soon as  "possible.  Submit refill requests through [employer]Fire or call your pharmacy and they will forward the refill request to us. Please allow 3 business days for your refill to be completed.          Additional Information About Your Visit        [employer] Information     eduFire lets you send messages to your doctor, view your test results, renew your prescriptions, schedule appointments and more. To sign up, go to [email or web address] . Click on \"Log in\" on the left side of the screen, which will take you to the Welcome page. Then click on \"Sign up Now\" on the right side of the page.     You will be asked to enter the access code listed below, as well as some personal information. Please follow the directions to create your username and password.     Your access code is: [email or web address]  Expires: 2018 10:23 AM     Your access code will  in 90 days. If you need help or a new code, please call your [facility] clinic or [phone].        Care EveryWhere ID     This is your Care EveryWhere ID. This could be used by other organizations to access your [facility] medical records  [id number]        Your Vitals Were     Last Period                   2017 (Approximate)            Blood Pressure from Last 3 Encounters:   18 129/82   17 122/76   10/25/17 127/70    Weight from Last 3 Encounters:   18 114.3 kg (252 lb)   17 114.9 kg (253 lb 3.2 oz)   10/25/17 116.8 kg (257 lb 9 oz)              We Performed the Following     Neuromuscular Re-Education     PT Eval, Low Complexity (40677)     Therapeutic Activities     Therapeutic Exercises        Primary Care Provider Office Phone # Fax #    Lorena Lizy Lindsay [phone]       [address] [place] [address] [address] [place] [address]        Equal Access to Services     SANDRA PEARCE : Frank Schumacher, waaxda luqadaha, qaybta kaalmada doni, deirdre bui. So wa " [phone].    ATENCIÓN: Si maloriela khushboo, tiene a caldera disposición servicios gratuitos de asistencia lingüística. Daren posada [phone].    We comply with applicable federal civil rights laws and [place] laws. We do not discriminate on the basis of race, color, national origin, age, disability, sex, sexual orientation, or gender identity.            Thank you!     Thank you for choosing [facility] PHYSICAL THERAPY [facility]  for your care. Our goal is always to provide you with excellent care. Hearing back from our patients is one way we can continue to improve our services. Please take a few minutes to complete the written survey that you may receive in the mail after your visit with us. Thank you!             Your Updated Medication List - Protect others around you: Learn how to safely use, store and throw away your medicines at www.disposemymeds.org.          This list is accurate as of 4/9/18 11:59 PM.  Always use your most recent med list.                   Brand Name Dispense Instructions for use Diagnosis    aspirin 81 MG tablet     120 tablet    Take 1 tablet (81 mg) by mouth daily    History of pre-eclampsia in prior pregnancy, currently pregnant, Encounter for supervision of high risk pregnancy in first trimester, antepartum       phentermine 37.5 MG tablet    ADIPEX-P    30 tablet    Take 0.5 tablets (18.75 mg) by mouth every morning    Morbid obesity due to excess calories (H)       prenatal multivitamin plus iron 27-0.8 MG Tabs per tablet      Take 1 tablet by mouth daily    Encounter for supervision of high risk pregnancy in first trimester, antepartum       topiramate 25 MG tablet    TOPAMAX    180 tablet    25 mg at bedtime for 1 week, 50 mg at bedtime for 1 week and 75 mg daily at bedtime thereafter    Morbid obesity due to excess calories (H)       VITAMIN D (CHOLECALCIFEROL) PO      Take 2,000 Units by mouth daily    HRP (high risk pregnancy), first trimester, Need for immunization against influenza

## 2018-04-16 ENCOUNTER — THERAPY VISIT (OUTPATIENT)
Dept: PHYSICAL THERAPY | Facility: CLINIC | Age: 28
End: 2018-04-16
Payer: COMMERCIAL

## 2018-04-16 ENCOUNTER — OFFICE VISIT (OUTPATIENT)
Dept: ORTHOPEDICS | Facility: CLINIC | Age: 28
End: 2018-04-16
Payer: COMMERCIAL

## 2018-04-16 VITALS — RESPIRATION RATE: 16 BRPM | WEIGHT: 252 LBS | HEIGHT: 65 IN | BODY MASS INDEX: 41.99 KG/M2

## 2018-04-16 DIAGNOSIS — M25.562 LEFT KNEE PAIN: ICD-10-CM

## 2018-04-16 DIAGNOSIS — M25.562 ACUTE PAIN OF LEFT KNEE: Primary | ICD-10-CM

## 2018-04-16 PROCEDURE — 97112 NEUROMUSCULAR REEDUCATION: CPT | Mod: GP | Performed by: PHYSICAL THERAPIST

## 2018-04-16 PROCEDURE — 97110 THERAPEUTIC EXERCISES: CPT | Mod: GP | Performed by: PHYSICAL THERAPIST

## 2018-04-16 RX ORDER — DICLOFENAC SODIUM 75 MG/1
75 TABLET, DELAYED RELEASE ORAL 2 TIMES DAILY
Qty: 28 TABLET | Refills: 1 | Status: SHIPPED | OUTPATIENT
Start: 2018-04-16 | End: 2019-11-26

## 2018-04-16 NOTE — PROGRESS NOTES
SPORTS & ORTHOPEDIC WALK-IN FOLLOW-UP VISIT 4/16/2018    PT, brace, tape,     Interval History:     Follow up reason: Left knee    Date of injury: Chronic    Date last seen: 3/25/18    Following Therapeutic Plan: Yes  - one PT appointment, has another one today at 3    Pain: Worsening    Function: Worsening    Interval History: Pt states she's gotten a lot worse. Now a 10/10. If she keeps her leg bent for more than 2 minutes it makes a popping noise when straightening, very painful.      Medical History:    Any recent changes to your medical history? No    Any new medication prescribed since last visit? No    Review of Systems:    Do you have fever, chills, weight loss? No    Do you have any vision problems? No    Do you have any chest pain or edema? No    Do you have any shortness of breath or wheezing?  No    Do you have stomach problems? No    Do you have any numbness or focal weakness? No    Do you have diabetes? No    Do you have problems with bleeding or clotting? No    Do you have an rashes or other skin lesions? No

## 2018-04-16 NOTE — PROGRESS NOTES
"[facility] Sports and Orthopedic Walk-in Clinic Note      Patient is a 27 year old female who presents to the office today for: follow up left knee pain.    Previously seen by Dr. Robertson on 3/25/18 for left knee pain. Has previously had similar symptoms in right knee that are improved but not resolved. Since that visit has done PT and tried bracing. Not helping. Pain worsening. Reports that patella feels \"unstable\". Pain ok when standing still but pain with any type of bending at the knee. nsaids help only slightly. No locking or catching.     Past Medical History, Current Medications, and Allergies are reviewed in the electronic medical record as appropriate.     ROS: Pertinent items are noted in HPI.  Constitutional: negative for fevers, chills and malaise  Cardiovascular: negative for dyspnea, fatigue, lower extremity edema  Integument/breast: negative for rash, skin lesion(s) and skin color change  Neurological: negative for paresthesia and weakness      EXAM:Resp 16  Ht 5' 5\" (1.651 m)  Wt 252 lb (114.3 kg)  LMP 08/20/2017 (Approximate)  BMI 41.93 kg/m2    General: Alert, pleasant, no distress  Left knee: warm, well perfused, SILT throughout     Inspection: mild effusion, no soft tissue swelling. No erythema, ecchymosis. Valgus knee bilaterally.      ROM:full PROM but pain with flexion, particularly beyond 90 degress     Strength:5/5 with flexion and extension     Palpation:pain with patellar compression. No medial or lateral joint line tenderness. No ttp of medial or lateral facet,      Special Tests: no pain with varus valgus stress, negative mcmurrays, lachmann, post drawer.       Radiographs: none new      Assessment: Patient is a 27 year old female with left knee pain and complaints of patellar instability. Suspect she has similar problems in her left knee as her right which demonstrated evidence of patellar maltracking and fissuring in the patellofemoral joint.     Recommendations:   Discussed " assessment with the patient in detail  Given her pain and lack of improvement with conservative treatment and continued complaints of instability plan to pursue further imaging with mri.   Consider follow up with Dr. Beach based on results.       Navdeep Valencia MD

## 2018-04-16 NOTE — MR AVS SNAPSHOT
After Visit Summary   4/16/2018    Betty Tamayo    MRN: [id number]           Patient Information     Date Of Birth          1990        Visit Information        Provider Department      4/16/2018 1:30 PM Navdeep Valencia MD [facility] Orthopa[facility]        Today's Diagnoses     Acute pain of left knee    -  1       Follow-ups after your visit        Your next 10 appointments already scheduled     Apr 23, 2018  3:10 PM CDT   DINO Extremity with Raghav Morrissey PT   [facility] Physical Therapy DINO ([facility])    [address] [place] [address] 5th [facility] [id number] [phone]            May 25, 2018  2:45 PM CDT   (Arrive by 2:30 PM)   Return Walk In Ortho with Cinthya Robertson MD   [facility] Sports Medicine ([facility])    [address] [place] [id number] [phone]            Jun 26, 2018  2:00 PM CDT   (Arrive by 1:45 PM)   Return Visit with Kandice Rosales MD   [facility] Medical Weight Management ([facility])    [address] [place] [id number] [phone]              Who to contact     Please call your clinic at [phone] to:    Ask questions about your health    Make or cancel appointments    Discuss your medicines    Learn about your test results    Speak to your doctor            Additional Information About Your Visit        MyChart Information     "[employer]"t is an electronic gateway that provides easy, online access to your medical records. With [facility], you can request a clinic appointment, read your test results, renew a prescription or communicate with your care team.     To sign up for "[employer]"t visit the website at [email or web address]   You will be asked to enter the access code listed below, as well as some personal information. Please follow the directions to create your username  "and password.     Your access code is: [email or web address]  Expires: 2018 10:23 AM     Your access code will  in 90 days. If you need help or a new code, please contact your [facility] or call [phone] for assistance.        Care EveryWhere ID     This is your Care EveryWhere ID. This could be used by other organizations to access your [facility] medical records  [id number]        Your Vitals Were     Respirations Height Last Period BMI (Body Mass Index)          16 5' 5\" (1.651 m) 2017 (Approximate) 41.93 kg/m2         Blood Pressure from Last 3 Encounters:   18 129/82   17 122/76   10/25/17 127/70    Weight from Last 3 Encounters:   18 252 lb (114.3 kg)   18 252 lb (114.3 kg)   17 253 lb 3.2 oz (114.9 kg)                 Today's Medication Changes          These changes are accurate as of 18 11:59 PM.  If you have any questions, ask your nurse or doctor.               Start taking these medicines.        Dose/Directions    diclofenac 75 MG EC tablet   Commonly known as:  VOLTAREN   Used for:  Acute pain of left knee   Started by:  Navdeep Valencia MD        Dose:  75 mg   Take 1 tablet (75 mg) by mouth 2 times daily for 14 days   Quantity:  28 tablet   Refills:  1            Where to get your medicines      These medications were sent to [facility] [address] [facility] [address]  [address] [facility] [address] [place] [address]    Hours:  TRANSPLANT PHONE NUMBER [phone] Phone:  [phone]     diclofenac 75 MG EC tablet                Primary Care Provider Office Phone # Fax #    Lorena Lindsay [phone]       [address] [place] [address] [address] [place] [id number]        Equal Access to Services     SANDRA PEARCE AH: Frank campbell Sorobert, waaxda luqadaha, qaybta kaalkar marion, deirdre bui. So [facility] " [phone].    ATENCIÓN: Si evelyn cuello, tiene a caldera disposición servicios gratuitos de asistencia lingüística. Daren posada [phone].    We comply with applicable federal civil rights laws and [place] laws. We do not discriminate on the basis of race, color, national origin, age, disability, sex, sexual orientation, or gender identity.            Thank you!     Thank you for choosing [facility]  for your care. Our goal is always to provide you with excellent care. Hearing back from our patients is one way we can continue to improve our services. Please take a few minutes to complete the written survey that you may receive in the mail after your visit with us. Thank you!             Your Updated Medication List - Protect others around you: Learn how to safely use, store and throw away your medicines at www.disposemymeds.org.          This list is accurate as of 4/16/18 11:59 PM.  Always use your most recent med list.                   Brand Name Dispense Instructions for use Diagnosis    aspirin 81 MG tablet     120 tablet    Take 1 tablet (81 mg) by mouth daily    History of pre-eclampsia in prior pregnancy, currently pregnant, Encounter for supervision of high risk pregnancy in first trimester, antepartum       diclofenac 75 MG EC tablet    VOLTAREN    28 tablet    Take 1 tablet (75 mg) by mouth 2 times daily for 14 days    Acute pain of left knee       phentermine 37.5 MG tablet    ADIPEX-P    30 tablet    Take 0.5 tablets (18.75 mg) by mouth every morning    Morbid obesity due to excess calories (H)       prenatal multivitamin plus iron 27-0.8 MG Tabs per tablet      Take 1 tablet by mouth daily    Encounter for supervision of high risk pregnancy in first trimester, antepartum       topiramate 25 MG tablet    TOPAMAX    180 tablet    25 mg at bedtime for 1 week, 50 mg at bedtime for 1 week and 75 mg daily at bedtime thereafter    Morbid obesity due to excess calories (H)        VITAMIN D (CHOLECALCIFEROL) PO      Take 2,000 Units by mouth daily    HRP (high risk pregnancy), first trimester, Need for immunization against influenza

## 2018-04-17 ENCOUNTER — RADIANT APPOINTMENT (OUTPATIENT)
Dept: MRI IMAGING | Facility: CLINIC | Age: 28
End: 2018-04-17
Attending: FAMILY MEDICINE
Payer: COMMERCIAL

## 2018-04-17 DIAGNOSIS — M25.562 ACUTE PAIN OF LEFT KNEE: ICD-10-CM

## 2018-04-23 ENCOUNTER — THERAPY VISIT (OUTPATIENT)
Dept: PHYSICAL THERAPY | Facility: CLINIC | Age: 28
End: 2018-04-23
Payer: COMMERCIAL

## 2018-04-23 DIAGNOSIS — M25.562 LEFT KNEE PAIN: ICD-10-CM

## 2018-04-23 PROCEDURE — 97110 THERAPEUTIC EXERCISES: CPT | Mod: GP | Performed by: PHYSICAL THERAPIST

## 2018-04-23 PROCEDURE — 97112 NEUROMUSCULAR REEDUCATION: CPT | Mod: GP | Performed by: PHYSICAL THERAPIST

## 2019-11-21 ENCOUNTER — ANCILLARY PROCEDURE (OUTPATIENT)
Dept: GENERAL RADIOLOGY | Facility: CLINIC | Age: 29
End: 2019-11-21
Attending: SPECIALIST
Payer: COMMERCIAL

## 2019-11-21 ENCOUNTER — OFFICE VISIT (OUTPATIENT)
Dept: ORTHOPEDICS | Facility: CLINIC | Age: 29
End: 2019-11-21
Payer: COMMERCIAL

## 2019-11-21 VITALS
HEIGHT: 65 IN | DIASTOLIC BLOOD PRESSURE: 76 MMHG | SYSTOLIC BLOOD PRESSURE: 134 MMHG | HEART RATE: 62 BPM | BODY MASS INDEX: 44.92 KG/M2 | WEIGHT: 269.6 LBS

## 2019-11-21 DIAGNOSIS — S19.9XXA NECK INJURY, INITIAL ENCOUNTER: ICD-10-CM

## 2019-11-21 DIAGNOSIS — M54.2 NECK PAIN: Primary | ICD-10-CM

## 2019-11-21 DIAGNOSIS — M54.6 PAIN IN THORACIC SPINE: ICD-10-CM

## 2019-11-21 DIAGNOSIS — M54.50 LOW BACK PAIN, UNSPECIFIED BACK PAIN LATERALITY, UNSPECIFIED CHRONICITY, UNSPECIFIED WHETHER SCIATICA PRESENT: ICD-10-CM

## 2019-11-21 DIAGNOSIS — S33.5XXA LUMBAR SPRAIN, INITIAL ENCOUNTER: ICD-10-CM

## 2019-11-21 DIAGNOSIS — S29.9XXA THORACIC INJURY, INITIAL ENCOUNTER: ICD-10-CM

## 2019-11-21 DIAGNOSIS — S13.9XXA CERVICAL SPRAIN, INITIAL ENCOUNTER: ICD-10-CM

## 2019-11-21 DIAGNOSIS — M54.2 NECK PAIN: ICD-10-CM

## 2019-11-21 ASSESSMENT — MIFFLIN-ST. JEOR: SCORE: 1940.84

## 2019-11-21 NOTE — PROGRESS NOTES
" Subjective:   Betty Tamayo is a 29 year old female who presents with midline neck and thoracic back pan. Denies any numbness, tingling, or weakness. Difficulty changing positions and finding comfortable sleep position last night.  No apparent muscle weakness.    Background:   Date of injury: 11/20/2019- slipped and fell into the corner of the wall hitting the back of her neck, mid back and upper lumbar spine.  No prior hx of similar injury  Duration of symptoms: <1 day  Mechanism of Injury: Acute  Intensity: 8.5/10  Aggravating factors: Cervical rotation, holding neck up, walking, sitting  Relieving Factors: Ibuprofen and tyelnol  Prior Evaluation: None    PAST MEDICAL, SOCIAL, SURGICAL AND FAMILY HISTORY:  reviewed  ALLERGIES: She has No Known Allergies.    CURRENT MEDICATIONS: She has a current medication list which includes the following prescription(s): aspirin, diclofenac, phentermine, prenatal multivitamin w/iron, topiramate, and cholecalciferol.        Exam:   /76 (BP Location: Right arm, Patient Position: Sitting, Cuff Size: Adult Large)   Pulse 62   Ht 1.638 m (5' 4.5\")   Wt 122.3 kg (269 lb 9.6 oz)   BMI 45.56 kg/m             CONSTITUTIONAL: healthy, alert and moderate distress  HEAD: Normocephalic. No masses, lesions, tenderness or abnormalities  GAIT: somewhat guarded because of trunk pain  PSYCHIATRIC: mentation appears normal.    MUSCULOSKELETAL:   NECK: flexion to 30 degrees, tentative and limited by pain.  Neck extension 60 degrees with pain at end range, with extension more comfortable than flexion.  Palpable tenderness midline mid cervical region.  THORACIC: Local tenderness to palpation approx T4, T6, and T12.   Also, tender to percussion at these sites.  Mild paravertebral tenderness to palpation.   LUMBAR: Tender to palpation at L2.  Trunk flexion to 60 degrees with thoracic and lumbar pain.  Recovery from flexion is more painful.  Extension to 10 degrees with pain throughout.  " (-) SLR bilaterally  SHOULDERS: ROM of shoulders painful in mid back.  Resisted shoulder flexion, abduction, and ER all painful but strength 4/5.  Resisted IR 5/5 and pain free.  NEURO: Strength intact in all dermatomes of upper and lower extremities.       Assessment/Plan:   1.  Cervical spine sprain, strain, contusion due to direct trauma  2.  Thoracic spine contusion due to direct trauma  3.  Lumbar strain/sprain due to direct trauma    PLAN: Analgesics as necessary for pain.  Gentle ROM.  Ice for pain control.  Monitor sx with f/u as necessary.  Consider PT in near future  Activities as tolerated    X-RAY INTERPRETATION:   2-views of cervical, thoracic, lumbar spine - loss of cervical lordosis, no obvious posterior spinous or lateral spinous fracture.  Vertebra appear normal.  Thoracic kyphosis

## 2019-11-21 NOTE — LETTER
"  11/21/2019    RE: Betty Tamayo  [address]  [facility] [address]        Subjective:   Betty Tamayo is a 29 year old female who presents with midline neck and thoracic back pan. Denies any numbness, tingling, or weakness. Difficulty changing positions and finding comfortable sleep position last night.  No apparent muscle weakness.    Background:   Date of injury: 11/20/2019- slipped and fell into the corner of the wall hitting the back of her neck, mid back and upper lumbar spine.  No prior hx of similar injury  Duration of symptoms: <1 day  Mechanism of Injury: Acute  Intensity: 8.5/10  Aggravating factors: Cervical rotation, holding neck up, walking, sitting  Relieving Factors: Ibuprofen and tyelnol  Prior Evaluation: None    PAST MEDICAL, SOCIAL, SURGICAL AND FAMILY HISTORY:  reviewed  ALLERGIES: She has No Known Allergies.    CURRENT MEDICATIONS: She has a current medication list which includes the following prescription(s): aspirin, diclofenac, phentermine, prenatal multivitamin w/iron, topiramate, and cholecalciferol.        Exam:   /76 (BP Location: Right arm, Patient Position: Sitting, Cuff Size: Adult Large)   Pulse 62   Ht 1.638 m (5' 4.5\")   Wt 122.3 kg (269 lb 9.6 oz)   BMI 45.56 kg/m              CONSTITUTIONAL: healthy, alert and moderate distress  HEAD: Normocephalic. No masses, lesions, tenderness or abnormalities  GAIT: somewhat guarded because of trunk pain  PSYCHIATRIC: mentation appears normal.    MUSCULOSKELETAL:   NECK: flexion to 30 degrees, tentative and limited by pain.  Neck extension 60 degrees with pain at end range, with extension more comfortable than flexion.  Palpable tenderness midline mid cervical region.  THORACIC: Local tenderness to palpation approx T4, T6, and T12.   Also, tender to percussion at these sites.  Mild paravertebral tenderness to palpation.   LUMBAR: Tender to palpation at L2.  Trunk flexion to 60 degrees with thoracic and lumbar " pain.  Recovery from flexion is more painful.  Extension to 10 degrees with pain throughout.  (-) SLR bilaterally  SHOULDERS: ROM of shoulders painful in mid back.  Resisted shoulder flexion, abduction, and ER all painful but strength 4/5.  Resisted IR 5/5 and pain free.  NEURO: Strength intact in all dermatomes of upper and lower extremities.       Assessment/Plan:   1.  Cervical spine sprain, strain, contusion due to direct trauma  2.  Thoracic spine contusion due to direct trauma  3.  Lumbar strain/sprain due to direct trauma    PLAN: Analgesics as necessary for pain.  Gentle ROM.  Ice for pain control.  Monitor sx with f/u as necessary.  Consider PT in near future  Activities as tolerated    X-RAY INTERPRETATION:   2-views of cervical, thoracic, lumbar spine - loss of cervical lordosis, no obvious posterior spinous or lateral spinous fracture.  Vertebra appear normal.  Thoracic kyphosis    Raghav Hoyt MD

## 2019-11-25 ENCOUNTER — HOSPITAL ENCOUNTER (EMERGENCY)
Facility: CLINIC | Age: 29
Discharge: HOME | End: 2019-11-25
Attending: EMERGENCY MEDICINE | Admitting: EMERGENCY MEDICINE
Payer: COMMERCIAL

## 2019-11-25 ENCOUNTER — APPOINTMENT (OUTPATIENT)
Dept: MRI IMAGING | Facility: CLINIC | Age: 29
End: 2019-11-25
Attending: EMERGENCY MEDICINE
Payer: COMMERCIAL

## 2019-11-25 ENCOUNTER — APPOINTMENT (OUTPATIENT)
Dept: CT IMAGING | Facility: CLINIC | Age: 29
End: 2019-11-25
Attending: EMERGENCY MEDICINE
Payer: COMMERCIAL

## 2019-11-25 VITALS
OXYGEN SATURATION: 100 % | DIASTOLIC BLOOD PRESSURE: 92 MMHG | HEART RATE: 76 BPM | SYSTOLIC BLOOD PRESSURE: 151 MMHG | BODY MASS INDEX: 43.09 KG/M2 | TEMPERATURE: 98.8 F | RESPIRATION RATE: 16 BRPM | WEIGHT: 268.1 LBS | HEIGHT: 66 IN

## 2019-11-25 DIAGNOSIS — M54.9 UPPER BACK PAIN: ICD-10-CM

## 2019-11-25 DIAGNOSIS — M54.2 NECK PAIN: ICD-10-CM

## 2019-11-25 LAB
HCG UR QL: NEGATIVE
INTERNAL QC OK POCT: YES

## 2019-11-25 PROCEDURE — 81025 URINE PREGNANCY TEST: CPT | Performed by: EMERGENCY MEDICINE

## 2019-11-25 PROCEDURE — 99285 EMERGENCY DEPT VISIT HI MDM: CPT | Mod: Z6 | Performed by: EMERGENCY MEDICINE

## 2019-11-25 PROCEDURE — 25000132 ZZH RX MED GY IP 250 OP 250 PS 637: Performed by: EMERGENCY MEDICINE

## 2019-11-25 PROCEDURE — 99285 EMERGENCY DEPT VISIT HI MDM: CPT | Mod: 25 | Performed by: EMERGENCY MEDICINE

## 2019-11-25 PROCEDURE — 70450 CT HEAD/BRAIN W/O DYE: CPT

## 2019-11-25 PROCEDURE — 72141 MRI NECK SPINE W/O DYE: CPT

## 2019-11-25 RX ORDER — IBUPROFEN 600 MG/1
600 TABLET, FILM COATED ORAL EVERY 8 HOURS PRN
Qty: 20 TABLET | Refills: 0 | Status: SHIPPED | OUTPATIENT
Start: 2019-11-25 | End: 2019-11-26

## 2019-11-25 RX ORDER — CYCLOBENZAPRINE HCL 10 MG
10 TABLET ORAL 3 TIMES DAILY PRN
Qty: 12 TABLET | Refills: 0 | Status: SHIPPED | OUTPATIENT
Start: 2019-11-25 | End: 2019-12-03

## 2019-11-25 RX ORDER — LORAZEPAM 0.5 MG/1
1 TABLET ORAL ONCE
Status: COMPLETED | OUTPATIENT
Start: 2019-11-25 | End: 2019-11-25

## 2019-11-25 RX ADMIN — LORAZEPAM 1 MG: 0.5 TABLET ORAL at 10:43

## 2019-11-25 ASSESSMENT — ENCOUNTER SYMPTOMS
NECK PAIN: 1
BACK PAIN: 1
FATIGUE: 1

## 2019-11-25 ASSESSMENT — MIFFLIN-ST. JEOR: SCORE: 1957.84

## 2019-11-25 NOTE — ED TRIAGE NOTES
Triage Assessment & Note:    There were no vitals taken for this visit.    Patient presents with: PT c/o increased back and neck pain post fall last week, Friday. Pt denies numbness tingling or weakness.     Home Treatments/Remedies: Ice, ibuprofen    Febrile / Afebrile? Afebrile     Duration of C/o: 4 days   Byron Bernal RN  November 25, 2019

## 2019-11-25 NOTE — ED AVS SNAPSHOT
[facility], [place], Emergency Department  [address] [place] [id number]  Phone:  [phone]                                    Betty Tamayo   MRN: [id number]    Department:  [facility], Emergency Department   Date of Visit:  11/25/2019           After Visit Summary Signature Page    I have received my discharge instructions, and my questions have been answered. I have discussed any challenges I see with this plan with the nurse or doctor.    ..........................................................................................................................................  Patient/Patient Representative Signature      ..........................................................................................................................................  Patient Representative Print Name and Relationship to Patient    ..................................................               ................................................  Date                                   Time    ..........................................................................................................................................  Reviewed by Signature/Title    ...................................................              ..............................................  Date                                               Time          22EPIC Rev 08/18

## 2019-11-25 NOTE — LETTER
November 25, 2019      To Whom It May Concern:      Betty Tamayo was seen in our Emergency Department today, 11/25/19.  I expect her condition to improve over the next *** days.  She may return to work/school when improved.    Sincerely,        Raghav Hdez MD

## 2019-11-25 NOTE — DISCHARGE INSTRUCTIONS
Your imaging studies were not concerning, it is going to take more time for you to heal from the injury.  Use extra strength ibuprofen and Flexeril.  Follow-up with your doctor

## 2019-11-25 NOTE — LETTER
November 25, 2019      To Whom It May Concern:      Betty Tamayo was seen in our Emergency Department today, 11/25/19.    She may return to work 11/26    Sincerely,        Raghav Hdez MD

## 2019-11-25 NOTE — ED PROVIDER NOTES
History     Chief Complaint   Patient presents with     Back Pain     Neck Pain     HPI  Betty Tamayo is a 29 year old female with a history of asthma, who presents to the Emergency Department for evaluation of traumatic neck and back pain. Per chart review, patient initially presented to the Sports Medicine clinic on 11/21/19 for midline neck and thoracic back pain following a mechanical fall the day prior, when she slipped and fell into the corner of a wall, hitting the back of her neck and back. She had an x-ray imaging of the lumbar, thoracic and cervical spines that did not show any evidence of fracture (please see impression below). She was instructed to continue analgesics.     Here, patient complains of worsening pain since her recent evaluation. She states that the neck pain has now progressed into her posterior head as well as her anterior neck which is associated with odynophagia. She continues to have the midline thoracic and lumbar back pain. Additionally, patient reports that over the past few days she has been more lethargic and unable to perform her daily activities. She has not found any relief with ibuprofen, Tylenol and ice therapy.       XR LUMBAR SPINE (11/21/2019) IMPRESSION:  1. No radiographic evidence for acute osseous abnormality.  2. Mild retrolisthesis of L5 on S1.    I have reviewed the Medications, Allergies, Past Medical and Surgical History, and Social History in the [facility].    Past Medical History:   Diagnosis Date     Knee cartilage, torn, left     both knees    had cortisone injection     Right shoulder pain 12/9/14     Uncomplicated asthma        Past Surgical History:   Procedure Laterality Date     ANESTHESIA OUT OF OR MRI Right 1/8/2015    Procedure: ANESTHESIA OUT OF OR MRI;  Surgeon: Generic Anesthesia Provider;  Location: [facility] OR     ENT SURGERY      tonsillectomy 1995     wisdom teeth         Family History   Problem Relation Age of Onset     Diabetes Mother       "Cerebrovascular Disease Mother      Anxiety Disorder Mother      Depression Mother      Cerebrovascular Disease Sister      Diabetes Maternal Grandmother      Hypertension Maternal Grandmother        Social History     Tobacco Use     Smoking status: Former Smoker     Packs/day: 0.50     Types: Cigarettes     Last attempt to quit: 2017     Years since quittin.2     Smokeless tobacco: Never Used   Substance Use Topics     Alcohol use: Yes     Comment: stopped in pregnancy     No current facility-administered medications for this encounter.      Current Outpatient Medications   Medication     cyclobenzaprine (FLEXERIL) 10 MG tablet     aspirin 81 MG tablet     phentermine (ADIPEX-P) 37.5 MG tablet     predniSONE (DELTASONE) 50 MG tablet     Prenatal Vit-Fe Fumarate-FA (PRENATAL MULTIVITAMIN PLUS IRON) 27-0.8 MG TABS per tablet     VITAMIN D, CHOLECALCIFEROL, PO      No Known Allergies    Review of Systems   Constitutional: Positive for fatigue.   Musculoskeletal: Positive for back pain and neck pain (posterior neck into the head and anterior neck).   All other systems reviewed and are negative.      Physical Exam   BP: 125/76  Pulse: 76  Heart Rate: 87  Temp: 99  F (37.2  C)  Resp: 18  Height: 167.6 cm (5' 6\")  Weight: 121.6 kg (268 lb 1.6 oz)  SpO2: 98 %      Physical Exam  Vitals signs and nursing note reviewed.   Constitutional:       General: She is not in acute distress.     Appearance: Normal appearance. She is well-developed.   HENT:      Head: Normocephalic and atraumatic.   Eyes:      General: No scleral icterus.     Conjunctiva/sclera: Conjunctivae normal.      Pupils: Pupils are equal, round, and reactive to light.   Neck:      Musculoskeletal: Normal range of motion and neck supple.   Cardiovascular:      Rate and Rhythm: Normal rate and regular rhythm.      Heart sounds: Normal heart sounds.   Pulmonary:      Effort: Pulmonary effort is normal. No respiratory distress.      Breath sounds: Normal " breath sounds. No wheezing.   Neurological:      Mental Status: She is alert and oriented to person, place, and time.      Cranial Nerves: No cranial nerve deficit.   Psychiatric:         Behavior: Behavior normal.         ED Course        Procedures    Medications   LORazepam (ATIVAN) tablet 1 mg (1 mg Oral Given 11/25/19 1043)     MR Cervical Spine w/o Contrast   Final Result   Impression:    1. No acute injury within the cervical spine is identified.   2. Borderline spinal canal narrowing with superimposed cervical   degenerative disease, most prominent at C5-6 with mild spinal canal   narrowing.      I have personally reviewed the examination and initial interpretation   and I agree with the findings.      JD MARCOS MD      CT Head w/o Contrast   Final Result   Impression: No acute intracranial pathology.      I have personally reviewed the examination and initial interpretation   and I agree with the findings.      JACINDA KIM MD               Labs Ordered and Resulted from Time of ED Arrival Up to the Time of Departure from the ED   HCG QUAL URINE POCT - Normal            Assessments & Plan (with Medical Decision Making)   Patient with minor trauma 2 days ago and normal x-rays of the cervical spine presents complaining of headache and low back pain.  Proceeded to CT of the head that was normal and MRI of the cervical spine that shows borderline spinal canal narrowing at C 5-6.  At this point conservative management with NSAIDs and muscle relaxant and primary clinic follow-up as indicated    I have reviewed the nursing notes.    I have reviewed the findings, diagnosis, plan and need for follow up with the patient.    Discharge Medication List as of 11/25/2019 12:40 PM      START taking these medications    Details   cyclobenzaprine (FLEXERIL) 10 MG tablet Take 1 tablet (10 mg) by mouth 3 times daily as needed for muscle spasms, Disp-12 tablet, R-0, Local Print      ibuprofen (ADVIL/MOTRIN) 600 MG  tablet Take 1 tablet (600 mg) by mouth every 8 hours as needed for moderate pain, Disp-20 tablet, R-0, Local Print             Final diagnoses:   Neck pain   Upper back pain     I, Adelina Nagy, am serving as a trained medical scribe to document services personally performed by Raghav Hdez MD, based on the provider's statements to me.   IRaghav MD, was physically present and have reviewed and verified the accuracy of this note documented by Adelina Nagy.    11/25/2019   [facility] [place], EMERGENCY DEPARTMENT     Raghav Hdez MD  11/27/19 0749

## 2019-11-26 ENCOUNTER — OFFICE VISIT (OUTPATIENT)
Dept: ORTHOPEDICS | Facility: CLINIC | Age: 29
End: 2019-11-26
Payer: COMMERCIAL

## 2019-11-26 VITALS — BODY MASS INDEX: 43.07 KG/M2 | WEIGHT: 268 LBS | HEIGHT: 66 IN

## 2019-11-26 DIAGNOSIS — S16.1XXD ACUTE STRAIN OF NECK MUSCLE, SUBSEQUENT ENCOUNTER: ICD-10-CM

## 2019-11-26 DIAGNOSIS — F07.81 POST CONCUSSIVE SYNDROME: Primary | ICD-10-CM

## 2019-11-26 DIAGNOSIS — S20.229D CONTUSION OF BACK, UNSPECIFIED LATERALITY, SUBSEQUENT ENCOUNTER: ICD-10-CM

## 2019-11-26 RX ORDER — PREDNISONE 50 MG/1
50 TABLET ORAL DAILY
Qty: 5 TABLET | Refills: 0 | Status: SHIPPED | OUTPATIENT
Start: 2019-11-26 | End: 2019-12-03

## 2019-11-26 ASSESSMENT — PAIN SCALES - GENERAL: PAINLEVEL: EXTREME PAIN (8)

## 2019-11-26 ASSESSMENT — MIFFLIN-ST. JEOR: SCORE: 1957.39

## 2019-11-26 NOTE — LETTER
MEDICAL NOTE  2019     Seen today: yes    Patient:  Betty Tamayo  :   1990  MRN:     [id number]  Physician: JALIL WOLFF    Betty Tamayo is under my care for her neck/back contusion and post-concussive syndrome. Please excuse her from work 19 - 19.  She may return to work on Date: Monday Dec 2nd with the limitation to work only a 1/2 shift (4 hours). She will also be attending physical therapy.  She will see me back on Tuesday 12/3/19 to determine whether she is able to return to full duty.      The next clinic appointment is scheduled for (date/time) 12/3/19.    Patient limitations:  See above    Please contact me with any questions or concerns.          Jalil Wolff MD  2019  8:35 AM

## 2019-11-26 NOTE — PROGRESS NOTES
HPI:   Neck pain:   Duration: 6 days  Injury/ Inciting activity? Fell backwards into a corner of a wall. Any cervical movement is aggravating.  Location: Upper back/neck  Radiation: Into base of head- experiencing headaches  Numbness/ Tingling? No  Weakness? No   Imaging? Xrays- 11/21/2019, CT and MRI 11/25/2019  Treatment? Ibuprofen, flexeril, heat, and ice     Neck:   ROM: flexion -full; extension -full; lateral rotation- R - full/ L - full ; side bend - R - full/ L -full.   Tenderness: Bony - Yes/No; Paraspinal: Yes/No; Muscular: Yes/No; Scapula: Yes/No   Sensation: intact in bilateral upper extremities   Strength: Biceps- 5/5; Triceps- 5/5; wrist flexion 5/5; Extension 5/5; finger abduction- 5/5.   Neuro/ Maneuvers: Negative Spurling bilaterally. Negative Diez's bilaterally. DTR's 2+.

## 2019-11-26 NOTE — LETTER
11/26/2019      RE: Betty MCKEON Roni  [address] [place] [address]       HPI:   Neck pain:   Duration: 6 days  Injury/ Inciting activity? Fell backwards into a corner of a wall. Any cervical movement is aggravating.  Location: Upper back/neck  Radiation: Into base of head- experiencing headaches  Numbness/ Tingling? No  Weakness? No   Imaging? Xrays- 11/21/2019, CT and MRI 11/25/2019  Treatment? Ibuprofen, flexeril, heat, and ice     Neck:   ROM: flexion -full; extension -full; lateral rotation- R - full/ L - full ; side bend - R - full/ L -full.   Tenderness: Bony - Yes/No; Paraspinal: Yes/No; Muscular: Yes/No; Scapula: Yes/No   Sensation: intact in bilateral upper extremities   Strength: Biceps- 5/5; Triceps- 5/5; wrist flexion 5/5; Extension 5/5; finger abduction- 5/5.   Neuro/ Maneuvers: Negative Spurling bilaterally. Negative Diez's bilaterally. DTR's 2+.           Pt is a 29 year old female here today for:     Neck pain:   Duration: 6 days  Injury/ Inciting activity? Fell backwards into a corner of a wall at home. Hit the back of her head on the way down and landed flat on her back. Able to get up after a few minutes. No LOC. Went to sports med and saw Dr Hoyt next day. Treated w/ analgesics. Had plain films of spine which were normal. Pain got worse so she went to the ED on 11/25/19. Had Head CT and c-spine MRI done. Treated w/ flexeril and motrin. Any cervical movement is aggravating. No improvement. Only took 1 flexeril and stopped due to sedation. Has been having trouble staying awake -> inc fatigue and somnolent;  No previous injuries. No radiation down arms. No numbness/tingling.   Location: Upper back/neck  Radiation: Into base of head- experiencing headaches  Numbness/ Tingling? No  Weakness? No   Imaging? Xrays- 11/21/2019, CT and MRI 11/25/2019  Treatment? Ibuprofen, flexeril, heat, and ice     Concussion HPI:     SCAT 5 - History   # of Symptoms : 21   Symptom Severity Score: 73    Worst Items: HA, sensitivity to light and noise, drowsiness      CT head - 11/25/19  Findings:    No intracranial hemorrhage, mass effect, or midline shift. The  ventricles are proportionate to the cerebral sulci. The gray to white  matter differentiation of the cerebral hemispheres is preserved. The  basal cisterns are patent.   Near complete opacification of right sphenoid sinus. The remaining  visualized paranasal sinuses are clear. The mastoid air cells are  clear.                                                                 Impression: No acute intracranial pathology.    MRI C-spine  Findings: Images are moderately degraded by the motion artifact,  particularly evaluation of the spinal cord is limited.  The cervical vertebrae are normally aligned. Straightening of the  normal cervical lordosis, likely positional.  Borderline spinal canal  narrowing with superimposed multilevel mild degenerative cervical  disease. There is no disc height narrowing at any level.  There is  normal signal within and normal contour of the cervical spinal cord.   The findings on a level by level basis are as follows:     C2-3: Mild disc bulge. Mild spinal canal narrowing. No significant  neuroforaminal narrowing.   C3-4: Mild disc bulge. Mild spinal canal narrowing. No significant  neuroforaminal narrowing.   C4-5: Mild disc bulge. Mild right neural foraminal narrowing. Left  neural foramen is patent. Mild spinal canal narrowing.   C5-6:  Posterior disc bulge effaces the ventral thecal sac and  slightly flattens the ventral spinal cord. Mild spinal canal  narrowing. No significant neural foraminal stenosis.    C6-7:  No spinal canal or neural foraminal  stenosis.   C7-T1:  No spinal canal or neural foraminal stenosis.   No abnormality of the paraspinous soft tissues.                                                             Impression:   1. No acute injury within the cervical spine is identified.  2. Borderline spinal canal  narrowing with superimposed cervical  degenerative disease, most prominent at C5-6 with mild spinal canal  narrowing.    Past Medical History:   Diagnosis Date     Knee cartilage, torn, left     both knees    had cortisone injection     Right shoulder pain 14     Uncomplicated asthma       Past Surgical History:   Procedure Laterality Date     ANESTHESIA OUT OF OR MRI Right 2015    Procedure: ANESTHESIA OUT OF OR MRI;  Surgeon: Generic Anesthesia Provider;  Location: [facility] OR     ENT SURGERY      tonsillectomy 1995     wisdom teeth        Current Outpatient Medications   Medication Sig Dispense Refill     cyclobenzaprine (FLEXERIL) 10 MG tablet Take 1 tablet (10 mg) by mouth 3 times daily as needed for muscle spasms 12 tablet 0     phentermine (ADIPEX-P) 37.5 MG tablet Take 0.5 tablets (18.75 mg) by mouth every morning 30 tablet 1     Prenatal Vit-Fe Fumarate-FA (PRENATAL MULTIVITAMIN PLUS IRON) 27-0.8 MG TABS per tablet Take 1 tablet by mouth daily       VITAMIN D, CHOLECALCIFEROL, PO Take 2,000 Units by mouth daily       aspirin 81 MG tablet Take 1 tablet (81 mg) by mouth daily (Patient not taking: Reported on 2019) 120 tablet 0     New meds: Lamictal and Prozac      No Known Allergies   Social History     Tobacco Use     Smoking status: Former Smoker     Packs/day: 0.50     Types: Cigarettes     Last attempt to quit: 2017     Years since quittin.2     Smokeless tobacco: Never Used   Substance Use Topics     Alcohol use: Yes     Comment: stopped in pregnancy     Drug use: No      Family History   Problem Relation Age of Onset     Diabetes Mother      Cerebrovascular Disease Mother      Anxiety Disorder Mother      Depression Mother      Cerebrovascular Disease Sister      Diabetes Maternal Grandmother      Hypertension Maternal Grandmother       ROS:   Gen- no fevers/chills   Rheum - no morning stiffness   Derm - no rash/ redness   Neuro - no numbness, no tingling   Remainder of ROS negative.  "    Exam:   Ht 1.676 m (5' 6\")   Wt 121.6 kg (268 lb)   LMP 11/12/2019   BMI 43.26 kg/m        Neck:   ROM: limited in all planes; lateral rotation worse than side bend    Tenderness: Bony - Yes - mid thoracic to cervical; Paraspinal: Yes - bilateral; Muscular: Yes- upper back; Scapula: No   Sensation: intact in bilateral upper extremities   Strength: Biceps- 5/5; Triceps- 5/5; wrist flexion 5/5; Extension 5/5; finger abduction- 5/5.   Neuro/ Maneuvers: Negative Spurling bilaterally. Negative Diez's bilaterally. DTR's 2+.         (F07.81) Post concussive syndrome  (primary encounter diagnosis)  Comment:   Plan: significant symptoms, likely contributing to her other symptoms; will keep out of work til next week and have her return at 1/2 duty; discussed what a concussion is and how she can manage symptoms and focus on brain rest; will f/u in 1 week;     (S16.1XXD) Acute strain of neck muscle, subsequent encounter  Comment: exam consistent w/ contusion/ sprain; explained that this will likely take several weeks to resolve; meds adjusted; schedule PT  Plan: predniSONE (DELTASONE) 50 MG tablet, PHYSICAL         THERAPY REFERRAL (Internal)          (S28.254D) Contusion of back, unspecified laterality, subsequent encounter  Comment: see above  Plan: predniSONE (DELTASONE) 50 MG tablet, PHYSICAL         THERAPY REFERRAL (Internal)            Gavin Montgomery MD  November 26, 2019  8:32 AM          "

## 2019-11-26 NOTE — PROGRESS NOTES
Pt is a 29 year old female here today for:     Neck pain:   Duration: 6 days  Injury/ Inciting activity? Fell backwards into a corner of a wall at home. Hit the back of her head on the way down and landed flat on her back. Able to get up after a few minutes. No LOC. Went to sports med and saw Dr Hoyt next day. Treated w/ analgesics. Had plain films of spine which were normal. Pain got worse so she went to the ED on 11/25/19. Had Head CT and c-spine MRI done. Treated w/ flexeril and motrin. Any cervical movement is aggravating. No improvement. Only took 1 flexeril and stopped due to sedation. Has been having trouble staying awake -> inc fatigue and somnolent;  No previous injuries. No radiation down arms. No numbness/tingling.   Location: Upper back/neck  Radiation: Into base of head- experiencing headaches  Numbness/ Tingling? No  Weakness? No   Imaging? Xrays- 11/21/2019, CT and MRI 11/25/2019  Treatment? Ibuprofen, flexeril, heat, and ice     Concussion HPI:     SCAT 5 - History   # of Symptoms : 21   Symptom Severity Score: 73   Worst Items: HA, sensitivity to light and noise, drowsiness      CT head - 11/25/19  Findings:    No intracranial hemorrhage, mass effect, or midline shift. The  ventricles are proportionate to the cerebral sulci. The gray to white  matter differentiation of the cerebral hemispheres is preserved. The  basal cisterns are patent.   Near complete opacification of right sphenoid sinus. The remaining  visualized paranasal sinuses are clear. The mastoid air cells are  clear.                                                                 Impression: No acute intracranial pathology.    MRI C-spine  Findings: Images are moderately degraded by the motion artifact,  particularly evaluation of the spinal cord is limited.  The cervical vertebrae are normally aligned. Straightening of the  normal cervical lordosis, likely positional.  Borderline spinal canal  narrowing with superimposed multilevel  mild degenerative cervical  disease. There is no disc height narrowing at any level.  There is  normal signal within and normal contour of the cervical spinal cord.   The findings on a level by level basis are as follows:     C2-3: Mild disc bulge. Mild spinal canal narrowing. No significant  neuroforaminal narrowing.   C3-4: Mild disc bulge. Mild spinal canal narrowing. No significant  neuroforaminal narrowing.   C4-5: Mild disc bulge. Mild right neural foraminal narrowing. Left  neural foramen is patent. Mild spinal canal narrowing.   C5-6:  Posterior disc bulge effaces the ventral thecal sac and  slightly flattens the ventral spinal cord. Mild spinal canal  narrowing. No significant neural foraminal stenosis.    C6-7:  No spinal canal or neural foraminal  stenosis.   C7-T1:  No spinal canal or neural foraminal stenosis.   No abnormality of the paraspinous soft tissues.                                                             Impression:   1. No acute injury within the cervical spine is identified.  2. Borderline spinal canal narrowing with superimposed cervical  degenerative disease, most prominent at C5-6 with mild spinal canal  narrowing.    Past Medical History:   Diagnosis Date     Knee cartilage, torn, left     both knees    had cortisone injection     Right shoulder pain 12/9/14     Uncomplicated asthma       Past Surgical History:   Procedure Laterality Date     ANESTHESIA OUT OF OR MRI Right 1/8/2015    Procedure: ANESTHESIA OUT OF OR MRI;  Surgeon: Generic Anesthesia Provider;  Location: [facility] OR     ENT SURGERY      tonsillectomy 1995     wisdom teeth        Current Outpatient Medications   Medication Sig Dispense Refill     cyclobenzaprine (FLEXERIL) 10 MG tablet Take 1 tablet (10 mg) by mouth 3 times daily as needed for muscle spasms 12 tablet 0     phentermine (ADIPEX-P) 37.5 MG tablet Take 0.5 tablets (18.75 mg) by mouth every morning 30 tablet 1     Prenatal Vit-Fe Fumarate-FA (PRENATAL  "MULTIVITAMIN PLUS IRON) 27-0.8 MG TABS per tablet Take 1 tablet by mouth daily       VITAMIN D, CHOLECALCIFEROL, PO Take 2,000 Units by mouth daily       aspirin 81 MG tablet Take 1 tablet (81 mg) by mouth daily (Patient not taking: Reported on 2019) 120 tablet 0     New meds: Lamictal and Prozac      No Known Allergies   Social History     Tobacco Use     Smoking status: Former Smoker     Packs/day: 0.50     Types: Cigarettes     Last attempt to quit: 2017     Years since quittin.2     Smokeless tobacco: Never Used   Substance Use Topics     Alcohol use: Yes     Comment: stopped in pregnancy     Drug use: No      Family History   Problem Relation Age of Onset     Diabetes Mother      Cerebrovascular Disease Mother      Anxiety Disorder Mother      Depression Mother      Cerebrovascular Disease Sister      Diabetes Maternal Grandmother      Hypertension Maternal Grandmother       ROS:   Gen- no fevers/chills   Rheum - no morning stiffness   Derm - no rash/ redness   Neuro - no numbness, no tingling   Remainder of ROS negative.     Exam:   Ht 1.676 m (5' 6\")   Wt 121.6 kg (268 lb)   LMP 2019   BMI 43.26 kg/m       Neck:   ROM: limited in all planes; lateral rotation worse than side bend    Tenderness: Bony - Yes - mid thoracic to cervical; Paraspinal: Yes - bilateral; Muscular: Yes- upper back; Scapula: No   Sensation: intact in bilateral upper extremities   Strength: Biceps- 5/5; Triceps- 5/5; wrist flexion 5/5; Extension 5/5; finger abduction- 5/5.   Neuro/ Maneuvers: Negative Spurling bilaterally. Negative Diez's bilaterally. DTR's 2+.         (F07.81) Post concussive syndrome  (primary encounter diagnosis)  Comment:   Plan: significant symptoms, likely contributing to her other symptoms; will keep out of work til next week and have her return at 1/2 duty; discussed what a concussion is and how she can manage symptoms and focus on brain rest; will f/u in 1 week;     (S16.1XXD) Acute " strain of neck muscle, subsequent encounter  Comment: exam consistent w/ contusion/ sprain; explained that this will likely take several weeks to resolve; meds adjusted; schedule PT  Plan: predniSONE (DELTASONE) 50 MG tablet, PHYSICAL         THERAPY REFERRAL (Internal)          (S20.229D) Contusion of back, unspecified laterality, subsequent encounter  Comment: see above  Plan: predniSONE (DELTASONE) 50 MG tablet, PHYSICAL         THERAPY REFERRAL (Internal)            Gavin Montgomery MD  November 26, 2019  8:32 AM

## 2019-11-26 NOTE — LETTER
11/26/2019      RE: Betty MCKEON [place]  [address] [place] [address]         HPI:   Neck pain:   Duration: 6 days  Injury/ Inciting activity? Fell backwards into a corner of a wall. Any cervical movement is aggravating.  Location: Upper back/neck  Radiation: Into base of head- experiencing headaches  Numbness/ Tingling? No  Weakness? No   Imaging? Xrays- 11/21/2019, CT and MRI 11/25/2019  Treatment? Ibuprofen, flexeril, heat, and ice     Neck:   ROM: flexion -full; extension -full; lateral rotation- R - full/ L - full ; side bend - R - full/ L -full.   Tenderness: Bony - Yes/No; Paraspinal: Yes/No; Muscular: Yes/No; Scapula: Yes/No   Sensation: intact in bilateral upper extremities   Strength: Biceps- 5/5; Triceps- 5/5; wrist flexion 5/5; Extension 5/5; finger abduction- 5/5.   Neuro/ Maneuvers: Negative Spurling bilaterally. Negative Diez's bilaterally. DTR's 2+.           Pt is a 29 year old female here today for:     Neck pain:   Duration: 6 days  Injury/ Inciting activity? Fell backwards into a corner of a wall at home. Hit the back of her head on the way down and landed flat on her back. Able to get up after a few minutes. No LOC. Went to sports med and saw Dr Hoyt next day. Treated w/ analgesics. Had plain films of spine which were normal. Pain got worse so she went to the ED on 11/25/19. Had Head CT and c-spine MRI done. Treated w/ flexeril and motrin. Any cervical movement is aggravating. No improvement. Only took 1 flexeril and stopped due to sedation. Has been having trouble staying awake -> inc fatigue and somnolent;  No previous injuries. No radiation down arms. No numbness/tingling.   Location: Upper back/neck  Radiation: Into base of head- experiencing headaches  Numbness/ Tingling? No  Weakness? No   Imaging? Xrays- 11/21/2019, CT and MRI 11/25/2019  Treatment? Ibuprofen, flexeril, heat, and ice     Concussion HPI:     SCAT 5 - History   # of Symptoms : 21   Symptom Severity Score: 73    Worst Items: HA, sensitivity to light and noise, drowsiness      CT head - 11/25/19  Findings:    No intracranial hemorrhage, mass effect, or midline shift. The  ventricles are proportionate to the cerebral sulci. The gray to white  matter differentiation of the cerebral hemispheres is preserved. The  basal cisterns are patent.   Near complete opacification of right sphenoid sinus. The remaining  visualized paranasal sinuses are clear. The mastoid air cells are  clear.                                                                 Impression: No acute intracranial pathology.    MRI C-spine  Findings: Images are moderately degraded by the motion artifact,  particularly evaluation of the spinal cord is limited.  The cervical vertebrae are normally aligned. Straightening of the  normal cervical lordosis, likely positional.  Borderline spinal canal  narrowing with superimposed multilevel mild degenerative cervical  disease. There is no disc height narrowing at any level.  There is  normal signal within and normal contour of the cervical spinal cord.   The findings on a level by level basis are as follows:     C2-3: Mild disc bulge. Mild spinal canal narrowing. No significant  neuroforaminal narrowing.   C3-4: Mild disc bulge. Mild spinal canal narrowing. No significant  neuroforaminal narrowing.   C4-5: Mild disc bulge. Mild right neural foraminal narrowing. Left  neural foramen is patent. Mild spinal canal narrowing.   C5-6:  Posterior disc bulge effaces the ventral thecal sac and  slightly flattens the ventral spinal cord. Mild spinal canal  narrowing. No significant neural foraminal stenosis.    C6-7:  No spinal canal or neural foraminal  stenosis.   C7-T1:  No spinal canal or neural foraminal stenosis.   No abnormality of the paraspinous soft tissues.                                                             Impression:   1. No acute injury within the cervical spine is identified.  2. Borderline spinal canal  narrowing with superimposed cervical  degenerative disease, most prominent at C5-6 with mild spinal canal  narrowing.    Past Medical History:   Diagnosis Date     Knee cartilage, torn, left     both knees    had cortisone injection     Right shoulder pain 14     Uncomplicated asthma       Past Surgical History:   Procedure Laterality Date     ANESTHESIA OUT OF OR MRI Right 2015    Procedure: ANESTHESIA OUT OF OR MRI;  Surgeon: Generic Anesthesia Provider;  Location: [facility] OR     ENT SURGERY      tonsillectomy 1995     wisdom teeth        Current Outpatient Medications   Medication Sig Dispense Refill     cyclobenzaprine (FLEXERIL) 10 MG tablet Take 1 tablet (10 mg) by mouth 3 times daily as needed for muscle spasms 12 tablet 0     phentermine (ADIPEX-P) 37.5 MG tablet Take 0.5 tablets (18.75 mg) by mouth every morning 30 tablet 1     Prenatal Vit-Fe Fumarate-FA (PRENATAL MULTIVITAMIN PLUS IRON) 27-0.8 MG TABS per tablet Take 1 tablet by mouth daily       VITAMIN D, CHOLECALCIFEROL, PO Take 2,000 Units by mouth daily       aspirin 81 MG tablet Take 1 tablet (81 mg) by mouth daily (Patient not taking: Reported on 2019) 120 tablet 0     New meds: Lamictal and Prozac      No Known Allergies   Social History     Tobacco Use     Smoking status: Former Smoker     Packs/day: 0.50     Types: Cigarettes     Last attempt to quit: 2017     Years since quittin.2     Smokeless tobacco: Never Used   Substance Use Topics     Alcohol use: Yes     Comment: stopped in pregnancy     Drug use: No      Family History   Problem Relation Age of Onset     Diabetes Mother      Cerebrovascular Disease Mother      Anxiety Disorder Mother      Depression Mother      Cerebrovascular Disease Sister      Diabetes Maternal Grandmother      Hypertension Maternal Grandmother       ROS:   Gen- no fevers/chills   Rheum - no morning stiffness   Derm - no rash/ redness   Neuro - no numbness, no tingling   Remainder of ROS negative.  "    Exam:   Ht 1.676 m (5' 6\")   Wt 121.6 kg (268 lb)   LMP 11/12/2019   BMI 43.26 kg/m        Neck:   ROM: limited in all planes; lateral rotation worse than side bend    Tenderness: Bony - Yes - mid thoracic to cervical; Paraspinal: Yes - bilateral; Muscular: Yes- upper back; Scapula: No   Sensation: intact in bilateral upper extremities   Strength: Biceps- 5/5; Triceps- 5/5; wrist flexion 5/5; Extension 5/5; finger abduction- 5/5.   Neuro/ Maneuvers: Negative Spurling bilaterally. Negative Diez's bilaterally. DTR's 2+.         (F07.81) Post concussive syndrome  (primary encounter diagnosis)  Comment:   Plan: significant symptoms, likely contributing to her other symptoms; will keep out of work til next week and have her return at 1/2 duty; discussed what a concussion is and how she can manage symptoms and focus on brain rest; will f/u in 1 week;     (S16.1XXD) Acute strain of neck muscle, subsequent encounter  Comment: exam consistent w/ contusion/ sprain; explained that this will likely take several weeks to resolve; meds adjusted; schedule PT  Plan: predniSONE (DELTASONE) 50 MG tablet, PHYSICAL         THERAPY REFERRAL (Internal)          (S23.244D) Contusion of back, unspecified laterality, subsequent encounter  Comment: see above  Plan: predniSONE (DELTASONE) 50 MG tablet, PHYSICAL         THERAPY REFERRAL (Internal)            Gavin Montgomery MD  November 26, 2019  8:32 AM      Gavin Montgomery MD    "

## 2019-12-02 NOTE — PROGRESS NOTES
"Pt is a 29 year old female last seen on 11/26/19 here for follow up of:     Concussion/ post-concussive syndrome - improving    Neck strain/ upper back contusion  - treated w/ pred and PT  Minimal improvement w/ prednisone  Did not take flexeril as she has been very tired  Needs to schedule PT for neck    Worked a 1/2 day yesterday and found herself nodding off     SCAT 5 - History - TODAY  # of Symptoms : 9  Symptom Severity Score: 29  Worst Items: fatigue, drowsiness - both 5's     SCAT 5 - History -11/26/19  # of Symptoms : 21   Symptom Severity Score: 73   Worst Items: HA, sensitivity to light and noise, drowsiness      Past Medical History:   Diagnosis Date     Knee cartilage, torn, left     both knees    had cortisone injection     Right shoulder pain 12/9/14     Uncomplicated asthma       Current Outpatient Medications   Medication Sig Dispense Refill     naproxen (NAPROSYN) 500 MG tablet Take 1 tablet (500 mg) by mouth 2 times daily as needed for moderate pain or headaches (take with food) 30 tablet 3     phentermine (ADIPEX-P) 37.5 MG tablet Take 0.5 tablets (18.75 mg) by mouth every morning 30 tablet 1     Prenatal Vit-Fe Fumarate-FA (PRENATAL MULTIVITAMIN PLUS IRON) 27-0.8 MG TABS per tablet Take 1 tablet by mouth daily       VITAMIN D, CHOLECALCIFEROL, PO Take 2,000 Units by mouth daily       aspirin 81 MG tablet Take 1 tablet (81 mg) by mouth daily (Patient not taking: Reported on 11/21/2019) 120 tablet 0      No Known Allergies   ROS:   Gen- no fevers/chills   Rheum - no morning stiffness   Derm - no rash/ redness   Neuro - see HPI   Remainder of ROS negative.     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

## 2019-12-03 ENCOUNTER — OFFICE VISIT (OUTPATIENT)
Dept: ORTHOPEDICS | Facility: CLINIC | Age: 29
End: 2019-12-03
Payer: COMMERCIAL

## 2019-12-03 VITALS — BODY MASS INDEX: 43.07 KG/M2 | WEIGHT: 268 LBS | HEIGHT: 66 IN

## 2019-12-03 DIAGNOSIS — F07.81 POST CONCUSSIVE SYNDROME: Primary | ICD-10-CM

## 2019-12-03 DIAGNOSIS — S16.1XXD ACUTE STRAIN OF NECK MUSCLE, SUBSEQUENT ENCOUNTER: ICD-10-CM

## 2019-12-03 RX ORDER — NAPROXEN 500 MG/1
500 TABLET ORAL 2 TIMES DAILY PRN
Qty: 30 TABLET | Refills: 3 | Status: SHIPPED | OUTPATIENT
Start: 2019-12-03 | End: 2019-12-18

## 2019-12-03 ASSESSMENT — MIFFLIN-ST. JEOR: SCORE: 1957.39

## 2019-12-03 NOTE — LETTER
MEDICAL NOTE  December 3, 2019                                                    Seen today: yes     Patient:  Betty Tamayo  :     1990  MRN:     [id number]  Physician: JALIL WOLFF     Betty Tamayo is under my care for her neck/back contusion and post-concussive syndrome. She is improving but still symptomatic. Please allow her to work only a 1/2 shift (4 hours) for one more week. She will also be attending physical therapy.  She will have a follow-up on Tuesday 12/10/19 to determine whether she is able to return to full duty.        The next clinic appointment is scheduled for (date/time) 12/10/19.     Patient limitations:  See above     Please contact me with any questions or concerns.          Jalil Wolff MD  December 3, 2019  8:08 AM

## 2019-12-03 NOTE — LETTER
"  12/3/2019      RE: Betty MCKEON Iola  [address] [place] [address]       Pt is a 29 year old female last seen on 11/26/19 here for follow up of:     Concussion/ post-concussive syndrome - improving    Neck strain/ upper back contusion  - treated w/ pred and PT  Minimal improvement w/ prednisone  Did not take flexeril as she has been very tired  Needs to schedule PT for neck    Worked a 1/2 day yesterday and found herself nodding off     SCAT 5 - History - TODAY  # of Symptoms :  9  Symptom Severity Score:  29  Worst Items:  fatigue, drowsiness - both 5's     SCAT 5 - History -11/26/19  # of Symptoms : 21   Symptom Severity Score: 73   Worst Items: HA, sensitivity to light and noise, drowsiness      Past Medical History:   Diagnosis Date     Knee cartilage, torn, left     both knees    had cortisone injection     Right shoulder pain 12/9/14     Uncomplicated asthma       Current Outpatient Medications   Medication Sig Dispense Refill     naproxen (NAPROSYN) 500 MG tablet Take 1 tablet (500 mg) by mouth 2 times daily as needed for moderate pain or headaches (take with food) 30 tablet 3     phentermine (ADIPEX-P) 37.5 MG tablet Take 0.5 tablets (18.75 mg) by mouth every morning 30 tablet 1     Prenatal Vit-Fe Fumarate-FA (PRENATAL MULTIVITAMIN PLUS IRON) 27-0.8 MG TABS per tablet Take 1 tablet by mouth daily       VITAMIN D, CHOLECALCIFEROL, PO Take 2,000 Units by mouth daily       aspirin 81 MG tablet Take 1 tablet (81 mg) by mouth daily (Patient not taking: Reported on 11/21/2019) 120 tablet 0      No Known Allergies   ROS:   Gen- no fevers/chills   Rheum - no morning stiffness   Derm - no rash/ redness   Neuro - see HPI   Remainder of ROS negative.     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

## 2019-12-10 ENCOUNTER — OFFICE VISIT (OUTPATIENT)
Dept: ORTHOPEDICS | Facility: CLINIC | Age: 29
End: 2019-12-10
Payer: COMMERCIAL

## 2019-12-10 DIAGNOSIS — S16.1XXD ACUTE STRAIN OF NECK MUSCLE, SUBSEQUENT ENCOUNTER: Primary | ICD-10-CM

## 2019-12-10 NOTE — LETTER
12/10/2019    Betty Tamayo  [address] [place] [address]  [phone] (home)     :     1990      To Whom it May Concern:    This patient was seen in followup in clinic today.  She may return to work at this time  without restriction.      Sincerely,        Bridger Cardoso MD

## 2019-12-10 NOTE — LETTER
12/10/2019      RE: Betty Tamayo  [address] [place] [address]       SUBJECTIVE:  Betty Tamayo is a 29 year old female who is seen in follow-up for  evaluation of a possible concussion that occurred 3 weeks ago.   Mechanism of injury: Hit her head after a fall  Immediate Symptoms:  headache, dizziness and neck pain  Symptoms at this visit:   Headache: 0   Nausea: 0   Balance Problems: 0   Dizziness: 0   Fatigue: 3   Sensitivity to Light :2   Sensitivity to noise: 2   Irritability: 0   Feeling Mentally Foggy: 0   Difficulty Concentrating: 3   Sleep: Frequent Napping    Past pertinent history: Migraines: no     Depression: Yes:      Anxiety: Yes:      Learning disability: no     ADHD: no     Past History of concussions: No      Patient's past medical, surgical, social and family histories reviewed:  reviewed on the up to date problem list in the chart      REVIEW OF SYSTEMS:  CONSTITUTIONAL:NEGATIVE for fever, chills, change in weight  INTEGUMENTARY/SKIN: NEGATIVE for worrisome rashes, moles or lesions  MUSCULOSKELETAL:See HPI above  NEURO: NEGATIVE for weakness, dizziness or paresthesias      [facility] 11/12/2019         EXAM:  GENERAL APPEARANCE: healthy, alert and no distress   SKIN: no suspicious lesions or rashes  NEURO: Normal strength and tone, mentation intact and speech normal  PSYCH:  mentation appears normal and affect normal/bright    HEENT:    Tympanic Membranes:Pearly  External Ear Canal:Normal  Oropharynx:Atraumatic  Reflexes: Normal  NECK:  supple, non-tender, FULL ROM    Neurologic:  Cranial Nerves 2-12:  intact  KENRICK:Yes  EOMI:Yes  Nystagmus: No    Painful Eye movements: No  Convergence Testing: Normal (</= 6 cm)  Visual Field Testing: normal  Neuro vestibular testing: Head Still eyes move side to side: no nystagmus, no headache, no dizziness and no nausea  Head still eyes move up and down: no nystagmus, no headache, no dizziness and no nausea  Eyes fixed head moves side to side: no  nystagmus, no headache, no dizziness and no nausea  Eyes fixed, head moves up and down: no nystagmus, no headache, no dizziness and no nausea        ASSESSMENT/PLAN    :  Upper back and neck discomfort status post fall.  She has residual neck and upper back discomfort and is tender broadly over the muscles of the upper back.  She has normal range of the motion of the cervical spine and has normal strength bilaterally in the upper extremities at shoulder elbow forearm and hand.  She feels that overall she is improving and she is asking to return to work at this time without restriction.  She believes she can tolerate her work without issue.  She starts physical therapy this week for her neck and upper back.  A work note was written to return to work without restriction.  Should look for improvement over the next 4 weeks.    Bridger Cardoso MD

## 2019-12-10 NOTE — PROGRESS NOTES
SUBJECTIVE:  Betty Tamayo is a 29 year old female who is seen in follow-up for  evaluation of a possible concussion that occurred 3 weeks ago.   Mechanism of injury: Hit her head after a fall  Immediate Symptoms:  headache, dizziness and neck pain  Symptoms at this visit:   Headache: 0   Nausea: 0   Balance Problems: 0   Dizziness: 0   Fatigue: 3   Sensitivity to Light :2   Sensitivity to noise: 2   Irritability: 0   Feeling Mentally Foggy: 0   Difficulty Concentrating: 3   Sleep: Frequent Napping    Past pertinent history: Migraines: no     Depression: Yes:      Anxiety: Yes:      Learning disability: no     ADHD: no     Past History of concussions: No      Patient's past medical, surgical, social and family histories reviewed:  reviewed on the up to date problem list in the chart      REVIEW OF SYSTEMS:  CONSTITUTIONAL:NEGATIVE for fever, chills, change in weight  INTEGUMENTARY/SKIN: NEGATIVE for worrisome rashes, moles or lesions  MUSCULOSKELETAL:See HPI above  NEURO: NEGATIVE for weakness, dizziness or paresthesias      [facility] 11/12/2019         EXAM:  GENERAL APPEARANCE: healthy, alert and no distress   SKIN: no suspicious lesions or rashes  NEURO: Normal strength and tone, mentation intact and speech normal  PSYCH:  mentation appears normal and affect normal/bright    HEENT:    Tympanic Membranes:Pearly  External Ear Canal:Normal  Oropharynx:Atraumatic  Reflexes: Normal  NECK:  supple, non-tender, FULL ROM    Neurologic:  Cranial Nerves 2-12:  intact  KENRICK:Yes  EOMI:Yes  Nystagmus: No    Painful Eye movements: No  Convergence Testing: Normal (</= 6 cm)  Visual Field Testing: normal  Neuro vestibular testing: Head Still eyes move side to side: no nystagmus, no headache, no dizziness and no nausea  Head still eyes move up and down: no nystagmus, no headache, no dizziness and no nausea  Eyes fixed head moves side to side: no nystagmus, no headache, no dizziness and no nausea  Eyes fixed, head moves up and down:  no nystagmus, no headache, no dizziness and no nausea        ASSESSMENT/PLAN    :  Upper back and neck discomfort status post fall.  She has residual neck and upper back discomfort and is tender broadly over the muscles of the upper back.  She has normal range of the motion of the cervical spine and has normal strength bilaterally in the upper extremities at shoulder elbow forearm and hand.  She feels that overall she is improving and she is asking to return to work at this time without restriction.  She believes she can tolerate her work without issue.  She starts physical therapy this week for her neck and upper back.  A work note was written to return to work without restriction.  Should look for improvement over the next 4 weeks.

## 2019-12-13 ENCOUNTER — THERAPY VISIT (OUTPATIENT)
Dept: PHYSICAL THERAPY | Facility: CLINIC | Age: 29
End: 2019-12-13
Attending: FAMILY MEDICINE
Payer: COMMERCIAL

## 2019-12-13 DIAGNOSIS — S16.1XXD ACUTE STRAIN OF NECK MUSCLE, SUBSEQUENT ENCOUNTER: ICD-10-CM

## 2019-12-13 DIAGNOSIS — S20.229D CONTUSION OF BACK, UNSPECIFIED LATERALITY, SUBSEQUENT ENCOUNTER: ICD-10-CM

## 2019-12-13 PROCEDURE — 97161 PT EVAL LOW COMPLEX 20 MIN: CPT | Mod: GP | Performed by: PHYSICAL THERAPIST

## 2019-12-13 PROCEDURE — 97110 THERAPEUTIC EXERCISES: CPT | Mod: GP | Performed by: PHYSICAL THERAPIST

## 2019-12-13 PROCEDURE — 97140 MANUAL THERAPY 1/> REGIONS: CPT | Mod: GP | Performed by: PHYSICAL THERAPIST

## 2019-12-13 NOTE — PROGRESS NOTES
[facility] Initial Evaluation  Subjective:  [facility]                  [facility]  Physical Therapy Initial Examination/Evaluation  December 13, 2019    Betty Tamayo is a 29 year old  female referred to physical therapy by Dr. Montgomery for treatment of neck pain with Precautions/Restrictions/MD instructions none    KEY PT FINDINGS:  1.  Poor posture  2.  Significant hypertonicity of B UT  3.  Headache    Subjective:  Referring MD visit date: 12/3/19  DOI/onset: one month ago  Mechanism of injury: Fell in kitchen and hit back of neck.  DOS none  Previous treatment: none  Imaging: xray, CT scan, MRI  Chief Complaint:   Headaches every other day.  B neck pain - holding head in one position.  Pain into upper back as well.    Pain: best 5 /10, worst 7/10 B neck, upper back Described as: tight Alleviated by: none Frequency: constant Progression of symptoms since initial onset: staying the same Time of day when pain is worse: night time  Sleeping: difficulty sleeping  Social history:  none    Occupation: Medical records for [employer]  Job duties:  Sitting, computer work    Current HEP/exercise regimen: none  Patient's goals are decrease pain, reduce headaches    Pertinent PMH: none   General Health Reported by Patient: good  Return to MD:  PRN       Objective:  Standing Alignment:    Cervical/Thoracic:  Forward head  Shoulder/UE:  Rounded shoulders                                  Cervical/Thoracic Evaluation    AROM:  AROM Cervical:    Flexion:            75% +  Extension:       100%  Rotation:         Left: 75%     Right: 100%  Side Bend:      Left: 75%     Right:  100%      Headaches: cervical  Cervical Myotomes:          C5 (Deltoid):  Left: 5    Right: 5  C6 (Biceps):  Left: 5    Right: 5  C7 (Triceps):  Left: 5    Right: 5  C8 (Thumb Ext): Left: 5    Right: 5  T1 (Intrinsics): Left: 5    Right: 5        Cervical Palpation:    Tenderness present at  Left:    Upper Trap; Levator; Erector Spinae and Suboccipitals  Tenderness present at Right:    Upper Trap; Levator; Erector Spinae and Suboccipitals      Spinal Segmental Conclusions:    Level:  ERS right at C4 and C5                                                General     ROS    Assessment/Plan:    Patient is a 29 year old female with cervical complaints.    Patient has the following significant findings with corresponding treatment plan.                Diagnosis 1:  Neck pain, headache    Pain -  hot/cold therapy, US, electric stimulation, manual therapy, splint/taping/bracing/orthotics, self management, education and home program  Decreased ROM/flexibility - manual therapy and therapeutic exercise  Decreased joint mobility - manual therapy and therapeutic exercise  Decreased strength - therapeutic exercise and therapeutic activities  Decreased proprioception - neuro re-education and therapeutic activities  Impaired muscle performance - neuro re-education  Decreased function - therapeutic activities  Impaired posture - neuro re-education    Therapy Evaluation Codes:   1) History comprised of:   Personal factors that impact the plan of care:      None.    Comorbidity factors that impact the plan of care are:      None.     Medications impacting care: None.  2) Examination of Body Systems comprised of:   Body structures and functions that impact the plan of care:      Cervical spine.   Activity limitations that impact the plan of care are:      Sitting and Sleeping.  3) Clinical presentation characteristics are:   Stable/Uncomplicated.  4) Decision-Making    Low complexity using standardized patient assessment instrument and/or measureable assessment of functional outcome.  Cumulative Therapy Evaluation is: Low complexity.    Previous and current functional limitations:  (See Goal Flow Sheet for this information)    Short term and Long term goals: (See Goal Flow Sheet for this information)     Communication  ability:  Patient appears to be able to clearly communicate and understand verbal and written communication and follow directions correctly.  Treatment Explanation - The following has been discussed with the patient:   RX ordered/plan of care  Anticipated outcomes  Possible risks and side effects  This patient would benefit from PT intervention to resume normal activities.   Rehab potential is good.    Frequency:  1 X week, once daily  Duration:  for 6 weeks  Discharge Plan:  Achieve all LTG.  Independent in home treatment program.  Reach maximal therapeutic benefit.    Please refer to the daily flowsheet for treatment today, total treatment time and time spent performing 1:1 timed codes.

## 2019-12-16 ENCOUNTER — OFFICE VISIT (OUTPATIENT)
Dept: FAMILY MEDICINE | Facility: CLINIC | Age: 29
End: 2019-12-16
Payer: COMMERCIAL

## 2019-12-16 ENCOUNTER — ANCILLARY PROCEDURE (OUTPATIENT)
Dept: GENERAL RADIOLOGY | Facility: CLINIC | Age: 29
End: 2019-12-16
Attending: FAMILY MEDICINE
Payer: COMMERCIAL

## 2019-12-16 VITALS
WEIGHT: 271.1 LBS | OXYGEN SATURATION: 97 % | BODY MASS INDEX: 43.76 KG/M2 | HEART RATE: 69 BPM | DIASTOLIC BLOOD PRESSURE: 81 MMHG | SYSTOLIC BLOOD PRESSURE: 127 MMHG

## 2019-12-16 DIAGNOSIS — M79.644 PAIN OF FINGER OF RIGHT HAND: ICD-10-CM

## 2019-12-16 DIAGNOSIS — G47.33 OSA (OBSTRUCTIVE SLEEP APNEA): Primary | ICD-10-CM

## 2019-12-16 DIAGNOSIS — R53.82 CHRONIC FATIGUE: ICD-10-CM

## 2019-12-16 LAB
ALBUMIN SERPL-MCNC: 3.4 G/DL (ref 3.4–5)
ALP SERPL-CCNC: 89 U/L (ref 40–150)
ALT SERPL W P-5'-P-CCNC: 29 U/L (ref 0–50)
ANION GAP SERPL CALCULATED.3IONS-SCNC: 4 MMOL/L (ref 3–14)
AST SERPL W P-5'-P-CCNC: 17 U/L (ref 0–45)
BASOPHILS # BLD AUTO: 0.1 10E9/L (ref 0–0.2)
BASOPHILS NFR BLD AUTO: 0.5 %
BILIRUB SERPL-MCNC: 0.3 MG/DL (ref 0.2–1.3)
BUN SERPL-MCNC: 9 MG/DL (ref 7–30)
CALCIUM SERPL-MCNC: 8.5 MG/DL (ref 8.5–10.1)
CHLORIDE SERPL-SCNC: 106 MMOL/L (ref 94–109)
CO2 SERPL-SCNC: 26 MMOL/L (ref 20–32)
CREAT SERPL-MCNC: 0.71 MG/DL (ref 0.52–1.04)
CRP SERPL-MCNC: 4.7 MG/L (ref 0–8)
DIFFERENTIAL METHOD BLD: ABNORMAL
EOSINOPHIL # BLD AUTO: 0.2 10E9/L (ref 0–0.7)
EOSINOPHIL NFR BLD AUTO: 2.4 %
ERYTHROCYTE [DISTWIDTH] IN BLOOD BY AUTOMATED COUNT: 17.2 % (ref 10–15)
GFR SERPL CREATININE-BSD FRML MDRD: >90 ML/MIN/{1.73_M2}
GLUCOSE SERPL-MCNC: 108 MG/DL (ref 70–99)
HCT VFR BLD AUTO: 37.9 % (ref 35–47)
HGB BLD-MCNC: 11.5 G/DL (ref 11.7–15.7)
IMM GRANULOCYTES # BLD: 0 10E9/L (ref 0–0.4)
IMM GRANULOCYTES NFR BLD: 0.4 %
LYMPHOCYTES # BLD AUTO: 2.5 10E9/L (ref 0.8–5.3)
LYMPHOCYTES NFR BLD AUTO: 24.8 %
MCH RBC QN AUTO: 24.7 PG (ref 26.5–33)
MCHC RBC AUTO-ENTMCNC: 30.3 G/DL (ref 31.5–36.5)
MCV RBC AUTO: 81 FL (ref 78–100)
MONOCYTES # BLD AUTO: 0.9 10E9/L (ref 0–1.3)
MONOCYTES NFR BLD AUTO: 9 %
NEUTROPHILS # BLD AUTO: 6.4 10E9/L (ref 1.6–8.3)
NEUTROPHILS NFR BLD AUTO: 62.9 %
NRBC # BLD AUTO: 0 10*3/UL
NRBC BLD AUTO-RTO: 0 /100
PLATELET # BLD AUTO: 409 10E9/L (ref 150–450)
POTASSIUM SERPL-SCNC: 3.7 MMOL/L (ref 3.4–5.3)
PROT SERPL-MCNC: 7.4 G/DL (ref 6.8–8.8)
RBC # BLD AUTO: 4.66 10E12/L (ref 3.8–5.2)
SODIUM SERPL-SCNC: 136 MMOL/L (ref 133–144)
TSH SERPL DL<=0.005 MIU/L-ACNC: 1.24 MU/L (ref 0.4–4)
WBC # BLD AUTO: 10.2 10E9/L (ref 4–11)

## 2019-12-16 ASSESSMENT — PAIN SCALES - GENERAL: PAINLEVEL: SEVERE PAIN (7)

## 2019-12-16 NOTE — PROGRESS NOTES
[facility]  Primary Care Center   Kapil Corbett MD  12/16/2019      Chief Complaint:   Sleep Apnea   Finger Pain    History of Present Illness:   Betty Tamayo is a 29 year old female with a history of uncomplicated asthma and vitamin D deficiency who presents for evaluation of sleep apnea and finger pain. She was previously seeing Dr. Alba for primary care, needs to establish care with new primary.    Sleep apnea:  She reports she has worsening sleep apnea which she has had since she was younger. She has had episodes of choking in her sleep and recently even vomited (a large amount) in her sleep, after which she woke up gasping. She often wakes up gasping and sometimes when she wakes up, she feels like she cannot catch her breath. She endorses significant daytime tiredness and has a very hard time staying awake during the day. She has not had evaluation of this as an adult before, but it was under better control a few years ago after some weight loss. It worsened again with her last pregnancy (baby boy born in February). She denies history of gestational diabetes.    Asthma:  She feels her asthma is well-controlled and only notices issues with it when moving between environments with drastic temperature change. She has not needed to use any inhalers in years.    Finger pain:  She hurt her third finger of her right hand this summer and has had pain since then. In the past week the pain has been worsening and her finger has been swollen. She has never seen anyone about this. She denies any other joint pains. It is now interfering with her daily activities.    Other concerns discussed:  1. She has already received her flu shot this season. She is not sure whether she is up to date on tetanus booster but may have had this with recent pregnancies.     Review of Systems:   Pertinent items are noted in HPI or as in patient entered ROS below, remainder of complete ROS is negative.     Active Medications:       phentermine (ADIPEX-P) 37.5 MG tablet, Take 0.5 tablets (18.75 mg) by mouth every morning, Disp: 30 tablet, Rfl: 1     Prenatal Vit-Fe Fumarate-FA (PRENATAL MULTIVITAMIN PLUS IRON) 27-0.8 MG TABS per tablet, Take 1 tablet by mouth daily, Disp: , Rfl:      VITAMIN D, CHOLECALCIFEROL, PO, Take 2,000 Units by mouth daily, Disp: , Rfl:       Allergies:   Patient has no known allergies.      Past Medical History:  Knee cartilage torn, left  Uncomplicated asthma  Pre-eclampsia in pregnancy  UTI in pregnancy  Vitamin D deficiency  Maternal varicella, non-immune     Past Surgical History:  Anesthesia out out OR MRI, right - 01/08/2015  Tonsillectomy - 1995  [place] teeth extraction    Family History:   Diabetes - mother, maternal grandmother   Cerebrovascular disease - mother, sister  Anxiety - mother  Depression - mother  Hypertension - maternal grandmother       Social History:   Betty is engaged with two children (girl Gilberto 10/2009, boy Paco 02/2019), a former smoker (0.5 ppd, quit 2017), and does consume alcohol. She works here at the [employer] [place].     Physical Exam:   /81 (BP Location: Right arm, Patient Position: Sitting, Cuff Size: Adult Large)   Pulse 69   Wt 123 kg (271 lb 1.6 oz)   LMP 12/02/2019 (Approximate)   SpO2 97%   Breastfeeding No   BMI 43.76 kg/m     GENERAL APPEARANCE: healthy, alert and no distress. Obese, BMI 43.  EYES: Eyes grossly normal to inspection, PERRL and conjunctivae and sclerae normal  HENT: ear canals and TM's normal, mouth without ulcers or lesions, oropharynx clear and oral mucous membranes moist  NECK: no adenopathy, no asymmetry, masses, or scars and thyroid normal to palpation  RESP: lungs clear to auscultation - no rales, rhonchi or wheezes  CV: regular rate and rhythm, normal S1 S2, no S3 or S4, no murmur, click or rub  ABDOMEN: Obese, soft, nontender, no hepatosplenomegaly, no masses and bowel sounds normal  MS: At right 3rd PIP joint, slight deviation of finger and  swelling.  Gait is age appropriate without ataxia  SKIN: no suspicious lesions or rashes  NEURO: Normal strength and tone, sensory exam grossly normal, mentation intact and speech normal  PSYCH: mentation appears normal and appears concerned for obstructive sleep apnea  ACT: 22, she indicates asthma well-controlled.      Assessment and Plan:  Betty Tamayo is a 29 year old female with a history of uncomplicated asthma and vitamin D deficiency who presents for evaluation of sleep apnea and finger pain.     BAKARI (obstructive sleep apnea)  She reports severe sleep apnea. Referred her to sleep apnea. Encouraged her to look at her diet to find ways to reduce her caloric intake and work on weight loss. Also encouraged her to work on getting regular exercise. Advised against taking sleep aids.   - SLEEP EVALUATION & MANAGEMENT REFERRAL - ADULT -[facility] [phone] (Age 15 and up)    Pain of finger of right hand  She reports finger pain for a few months after an injury, worsening in the last week and now interfering with her hand functioning. Possible fracture, will obtain X-ray and refer to orthopedics. Will also check CRP inflammation, but she denies other joint pain.   - XR Hand Right G/E 3 Views  - CRP inflammation  - ORTHOPEDICS ADULT REFERRAL    Chronic fatigue  Uncontrolled sleep apnea likely contributing to her fatigue but will check for other possible causes.  - CBC with platelets differential  - TSH with free T4 reflex  - Comprehensive metabolic panel    Uncomplicated asthma  ACT is 22. She indicates she does not need any inhalers.    Follow-up: one year or PRN       Scribe Disclosure:  Kathleen CONNOLLY, am serving as a scribe to document services personally performed by Kapil Corbett MD at this visit, based upon the provider's statements to me. All documentation has been reviewed by the aforementioned provider prior to being entered into the official medical record.      Portions of this medical record were completed by a scribe. UPON MY REVIEW AND AUTHENTICATION BY ELECTRONIC SIGNATURE, this confirms (a) I performed the applicable clinical services, and (b) the record is accurate.     Kapil Corbett MD

## 2019-12-16 NOTE — PATIENT INSTRUCTIONS
[facility] Medication Refill Request Information:  * Please contact your pharmacy regarding ANY request for medication refills.  ** [facility] Prescription Fax = [phone]  * Please allow 3 business days for routine medication refills.  * Please allow 5 business days for controlled substance medication refills.     [facility] Test Result notification information:  *You will be notified with in 7-10 days of your appointment day regarding the results of your test.  If you are on MyChart you will be notified as soon as the provider has reviewed the results and signed off on them.    [facility]: [phone]

## 2019-12-16 NOTE — NURSING NOTE
Chief Complaint   Patient presents with     Sleep Apnea     Pt would like to discuss her sleep apnea, has never been seen for it as an adult, had it when she was younger. Reports it is getting worse at night recently      Viri Noe EMT at 9:14 AM sign on 12/16/2019

## 2019-12-17 ENCOUNTER — MYC MEDICAL ADVICE (OUTPATIENT)
Dept: FAMILY MEDICINE | Facility: CLINIC | Age: 29
End: 2019-12-17

## 2019-12-17 ASSESSMENT — ASTHMA QUESTIONNAIRES: ACT_TOTALSCORE: 22

## 2019-12-17 NOTE — TELEPHONE ENCOUNTER
Voice message was left at sleep center scheduling [phone] to call RN back about helping patient be re-scheduled for a sooner appt for sleep study.    Sammie Juan RN on 12/17/2019 at 4:53 PM

## 2019-12-18 ENCOUNTER — OFFICE VISIT (OUTPATIENT)
Dept: SLEEP MEDICINE | Facility: CLINIC | Age: 29
End: 2019-12-18
Attending: FAMILY MEDICINE
Payer: COMMERCIAL

## 2019-12-18 VITALS
OXYGEN SATURATION: 96 % | HEART RATE: 77 BPM | DIASTOLIC BLOOD PRESSURE: 81 MMHG | SYSTOLIC BLOOD PRESSURE: 130 MMHG | HEIGHT: 66 IN | BODY MASS INDEX: 43.23 KG/M2 | WEIGHT: 269 LBS | RESPIRATION RATE: 18 BRPM

## 2019-12-18 DIAGNOSIS — G47.50 PARASOMNIA, UNSPECIFIED TYPE: Primary | ICD-10-CM

## 2019-12-18 DIAGNOSIS — R06.83 SNORING: ICD-10-CM

## 2019-12-18 DIAGNOSIS — E66.813 CLASS 3 SEVERE OBESITY DUE TO EXCESS CALORIES WITH BODY MASS INDEX (BMI) OF 40.0 TO 44.9 IN ADULT, UNSPECIFIED WHETHER SERIOUS COMORBIDITY PRESENT (H): ICD-10-CM

## 2019-12-18 DIAGNOSIS — R11.11 VOMITING WITHOUT NAUSEA, INTRACTABILITY OF VOMITING NOT SPECIFIED, UNSPECIFIED VOMITING TYPE: ICD-10-CM

## 2019-12-18 DIAGNOSIS — G47.33 OSA (OBSTRUCTIVE SLEEP APNEA): ICD-10-CM

## 2019-12-18 DIAGNOSIS — E66.01 CLASS 3 SEVERE OBESITY DUE TO EXCESS CALORIES WITH BODY MASS INDEX (BMI) OF 40.0 TO 44.9 IN ADULT, UNSPECIFIED WHETHER SERIOUS COMORBIDITY PRESENT (H): ICD-10-CM

## 2019-12-18 DIAGNOSIS — G47.10 HYPERSOMNIA: ICD-10-CM

## 2019-12-18 PROCEDURE — 99244 OFF/OP CNSLTJ NEW/EST MOD 40: CPT | Mod: GC | Performed by: STUDENT IN AN ORGANIZED HEALTH CARE EDUCATION/TRAINING PROGRAM

## 2019-12-18 RX ORDER — ZOLPIDEM TARTRATE 5 MG/1
TABLET ORAL
Qty: 1 TABLET | Refills: 0 | Status: SHIPPED | OUTPATIENT
Start: 2019-12-18 | End: 2020-03-30

## 2019-12-18 RX ORDER — BUPROPION HYDROCHLORIDE 150 MG/1
TABLET ORAL
Refills: 2 | COMMUNITY
Start: 2019-12-12 | End: 2020-02-18

## 2019-12-18 RX ORDER — LAMOTRIGINE 25 MG/1
TABLET ORAL
Refills: 1 | COMMUNITY
Start: 2019-12-07 | End: 2020-02-18

## 2019-12-18 ASSESSMENT — MIFFLIN-ST. JEOR: SCORE: 1961.93

## 2019-12-18 NOTE — PATIENT INSTRUCTIONS
"MY TREATMENT INFORMATION FOR SLEEP APNEA-  Betty Tamayo    DOCTOR : Ryne Ritter MD  SLEEP CENTER : [facility]     MY CONTACT NUMBER:  [phone] ([place])    Am I having a sleep study at a sleep center?  Make sure you have an appointment for the study before you leave!    Am I having a home sleep study?  Watch this video:  [email or web address]?v=[email or web address]  Please verify your insurance coverage with your insurance carrier    Frequently asked questions:  1. What is Obstructive Sleep Apnea (BAKARI)? BAKARI is the most common type of sleep apnea. Apnea means, \"without breath.\"  Apnea is most often caused by narrowing or collapse of the upper airway as muscles relax during sleep.   Almost everyone has occasional apneas. Most people with sleep apnea have had brief interruptions at night frequently for many years.  The severity of sleep apnea is related to how frequent and severe the events are.   2. What are the consequences of BAKARI? Symptoms include: feeling sleepy during the day, snoring loudly, gasping or stopping of breathing, trouble sleeping, and occasionally morning headaches or heartburn at night.  Sleepiness can be serious and even increase the risk of falling asleep while driving. Other health consequences may include development of high blood pressure and other cardiovascular disease in persons who are susceptible. Untreated BAKARI  can contribute to heart disease, stroke and diabetes.   3. What are the treatment options? In most situations, sleep apnea is a lifelong disease that must be managed with daily therapy. Medications are not effective for sleep apnea and surgery is generally not considered until other therapies have been tried. Your treatment is your choice . Continuous Positive Airway (CPAP) works right away and is the therapy that is effective in nearly everyone. An oral device to hold your jaw forward is usually the next most reliable option. " Other options include postioning devices (to keep you off your back), weight loss, and surgery including a tongue pacing device. There is more detail about some of these options below.    Important tips for using CPAP and similar devices   Know your equipment:  CPAP is continuous positive airway pressure that prevents obstructive sleep apnea by keeping the throat from collapsing while you are sleeping. In most cases, the device is  smart  and can slowly self-adjusts if your throat collapses and keeps a record every day of how well you are treated-this information is available to you and your care team.  BPAP is bilevel positive airway pressure that keeps your throat open and also assists each breath with a pressure boost to maintain adequate breathing.  Special kinds of BPAP are used in patients who have inadequate breathing from lung or heart disease. In most cases, the device is  smart  and can slowly self-adjusts to assist breathing. Like CPAP, the device keeps a record of how well you are treated.  Your mask is your connection to the device. You get to choose what feels most comfortable and the staff will help to make sure if fits. Here: are some examples of the different masks that are available:       Key points to remember on your journey with sleep apnea:  1. Sleep study.  PAP devices often need to be adjusted during a sleep study to show that they are effective and adjusted right.  2. Good tips to remember: Try wearing just the mask during a quiet time during the day so your body adapts to wearing it. A humidifier is recommended for comfort in most cases to prevent drying of your nose and throat. Allergy medication from your provider may help you if you are having nasal congestion.  3. Getting settled-in. It takes more than one night for most of us to get used to wearing a mask. Try wearing just the mask during a quiet time during the day so your body adapts to wearing it. A humidifier is recommended for  comfort in most cases. Our team will work with you carefully on the first day and will be in contact within 4 days and again at 2 and 4 weeks for advice and remote device adjustments. Your therapy is evaluated by the device each day.   4. Use it every night. The more you are able to sleep naturally for 7-8 hours, the more likely you will have good sleep and to prevent health risks or symptoms from sleep apnea. Even if you use it 4 hours it helps. Occasionally all of us are unable to use a medical therapy, in sleep apnea, it is not dangerous to miss one night.   5. Communicate. Call our skilled team on the number provided on the first day if your visit for problems that make it difficult to wear the device. Over 2 out of 3 patients can learn to wear the device long-term with help from our team. Remember to call our team or your sleep providers if you are unable to wear the device as we may have other solutions for those who cannot adapt to mask CPAP therapy. It is recommended that you sleep your sleep provider within the first 3 months and yearly after that if you are not having problems.   6. Use it for your health. We encourage use of CPAP masks during daytime quiet periods to allow your face and brain to adapt to the sensation of CPAP so that it will be a more natural sensation to awaken to at night or during naps. This can be very useful during the first few weeks or months of adapting to CPAP though it does not help medically to wear CPAP during wakefulness and  should not be used as a strategy just to meet guidelines.  7. Take care of your equipment. Make sure you clean your mask and tubing using directions every day and that your filter and mask are replaced as recommended or if they are not working.     BESIDES CPAP, WHAT OTHER THERAPIES ARE THERE?    Positioning Device  Positioning devices are generally used when sleep apnea is mild and only occurs on your back.This example shows a pillow that straps around  the waist. It may be appropriate for those whose sleep study shows milder sleep apnea that occurs primarily when lying flat on one's back. Preliminary studies have shown benefit but effectiveness at home may need to be verified by a home sleep test. These devices are generally not covered by medical insurance.  Examples of devices that maintain sleeping on the back to prevent snoring and mild sleep apnea.    Belt type body positioner  Http://[employer]/    Electronic reminder  Http://nightshifttherapy.com/  Http://www.[employer].au/      Oral Appliance  What is oral appliance therapy?  An oral appliance device fits on your teeth at night like a retainer used after having braces. The device is made by a specialized dentist and requires several visits over 1-2 months before a manufactured device is made to fit your teeth and is adjusted to prevent your sleep apnea. Once an oral device is working properly, snoring should be improved. A home sleep test may be recommended at that time if to determine whether the sleep apnea is adequately treated.       Some things to remember:  -Oral devices are often, but not always, covered by your medical insurance. Be sure to check with your insurance provider.   -If you are referred for oral therapy, you will be given a list of specialized dentists to consider or you may choose to visit the Web site of the American Academy of Dental Sleep Medicine  -Oral devices are less likely to work if you have severe sleep apnea or are extremely overweight.     More detailed information  An oral appliance is a small acrylic device that fits over the upper and lower teeth  (similar to a retainer or a mouth guard). This device slightly moves jaw forward, which moves the base of the tongue forward, opens the airway, improves breathing for effective treat snoring and obstructive sleep apnea in perhaps 7 out of 10 people .  The best working devices are custom-made by a dental device   after a mold is made of the teeth 1, 2, 3.  When is an oral appliance indicated?  Oral appliance therapy is recommended as a first-line treatment for patients with primary snoring, mild sleep apnea, and for patients with moderate sleep apnea who prefer appliance therapy to use of CPAP4, 5. Severity of sleep apnea is determined by sleep testing and is based on the number of respiratory events per hour of sleep.   How successful is oral appliance therapy?  The success rate of oral appliance therapy in patients with mild sleep apnea is 75-80% while in patients with moderate sleep apnea it is 50-70%. The chance of success in patients with severe sleep apnea is 40-50%. The research also shows that oral appliances have a beneficial effect on the cardiovascular health of BAKARI patients at the same magnitude as CPAP therapy7.  Oral appliances should be a second-line treatment in cases of severe sleep apnea, but if not completely successful then a combination therapy utilizing CPAP plus oral appliance therapy may be effective. Oral appliances tend to be effective in a broad range of patients although studies show that the patients who have the highest success are females, younger patients, those with milder disease, and less severe obesity. 3, 6.   Finding a dentist that practices dental sleep medicine  Specific training is available through the American Academy of Dental Sleep Medicine for dentists interested in working in the field of sleep. To find a dentist who is educated in the field of sleep and the use of oral appliances, near you, visit the Web site of the American Academy of Dental Sleep Medicine.  References  1. Meet et al. Objectively measured vs self-reported compliance during oral appliance therapy for sleep-disordered breathing. Chest 2013; 144(5): 6327-8791.  2. Eric et al. Objective measurement of compliance during oral appliance therapy for sleep-disordered breathing. Thorax 2013; 68(1):  91-96.  3. Rosa et al. Mandibular advancement devices in 620 men and women with BAKARI and snoring: tolerability and predictors of treatment success. Chest 2004; 125: 4504-1974.  4. Shavon et al. Oral appliances for snoring and BAKARI: a review. Sleep 2006; 29: 244-262.  5. Arnold et al. Oral appliance treatment for BAKARI: an update. J Clin Sleep Med 2014; 10(2): 215-227.  6. Tay et al. Predictors of OSAH treatment outcome. J Dent Res 2007; 86: 9815-9411.    Weight Loss:    Weight loss is a long-term strategy that may improve sleep apnea in some patients.    Weight management is a personal decision and the decision should be based on your interest and the potential benefits.  If you are interested in exploring weight loss strategies, the following discussion covers the impact on weight loss on sleep apnea and the approaches that may be successful.    Being overweight does not necessarily mean you will have health consequences.  Those who have BMI over 35 or over 27 with existing medical conditions carries greater risk.   Weight loss decreases severity of sleep apnea in most people with obesity. For those with mild obesity who have developed snoring with weight gain, even 15-30 pound weight loss can improve and occasionally eliminate sleep apnea.  Structured and life-long dietary and health habits are necessary to lose weight and keep healthier weight levels.     Though there may be significant health benefits from weight loss, long-term weight loss is very difficult to achieve- studies show success with dietary management in less than 10% of people. In addition, substantial weight loss may require years of dietary control and may be difficult if patients have severe obesity. In these cases, surgical management may be considered.  Finally, older individuals who have tolerated obesity without health complications may be less likely to benefit from weight loss strategies.      Your BMI is Body mass index is  43.42 kg/m .  Weight management is a personal decision.  If you are interested in exploring weight loss strategies, the following discussion covers the approaches that may be successful. Body mass index (BMI) is one way to tell whether you are at a healthy weight, overweight, or obese. It measures your weight in relation to your height.  A BMI of 18.5 to 24.9 is in the healthy range. A person with a BMI of 25 to 29.9 is considered overweight, and someone with a BMI of 30 or greater is considered obese. More than two-thirds of American adults are considered overweight or obese.  Being overweight or obese increases the risk for further weight gain. Excess weight may lead to heart disease and diabetes.  Creating and following plans for healthy eating and physical activity may help you improve your health.  Weight control is part of healthy lifestyle and includes exercise, emotional health, and healthy eating habits. Careful eating habits lifelong are the mainstay of weight control. Though there are significant health benefits from weight loss, long-term weight loss with diet alone may be very difficult to achieve- studies show long-term success with dietary management in less than 10% of people. Attaining a healthy weight may be especially difficult to achieve in those with severe obesity. In some cases, medications, devices and surgical management might be considered.  What can you do?  If you are overweight or obese and are interested in methods for weight loss, you should discuss this with your provider.     Consider reducing daily calorie intake by 500 calories.     Keep a food journal.     Avoiding skipping meals, consider cutting portions instead.    Diet combined with exercise helps maintain muscle while optimizing fat loss. Strength training is particularly important for building and maintaining muscle mass. Exercise helps reduce stress, increase energy, and improves fitness. Increasing exercise without diet  control, however, may not burn enough calories to loose weight.       Start walking three days a week 10-20 minutes at a time    Work towards walking thirty minutes five days a week     Eventually, increase the speed of your walking for 1-2 minutes at time    In addition, we recommend that you review healthy lifestyles and methods for weight loss available through the National Institutes of Health patient information sites:  http://win.niddk.nih.gov/publications/index.htm    And look into health and wellness programs that may be available through your health insurance provider, employer, local community center, or jens club.      Surgery:    Surgery for obstructive sleep apnea is considered generally only when other therapies fail to work. Surgery may be discussed with you if you are having a difficult time tolerating CPAP and or when there is an abnormal structure that requires surgical correction.  Nose and throat surgeries often enlarge the airway to prevent collapse.  Most of these surgeries create pain for 1-2 weeks and up to half of the most common surgeries are not effective throughout life.  You should carefully discuss the benefits and drawbacks to surgery with your sleep provider and surgeon to determine if it is the best solution for you.   More information  Surgery for BAKARI is directed at areas that are responsible for narrowing or complete obstruction of the airway during sleep.  There are a wide range of procedures available to enlarge and/or stabilize the airway to prevent blockage of breathing in the three major areas where it can occur: the palate, tongue, and nasal regions.  Successful surgical treatment depends on the accurate identification of the factors responsible for obstructive sleep apnea in each person.  A personalized approach is required because there is no single treatment that works well for everyone.  Because of anatomic variation, consultation with an examination by a sleep surgeon  is a critical first step in determining what surgical options are best for each patient.  In some cases, examination during sedation may be recommended in order to guide the selection of procedures.  Patients will be counseled about risks and benefits as well as the typical recovery course after surgery. Surgery is typically not a cure for a person s BAKARI.  However, surgery will often significantly improve one s BAKARI severity (termed  success rate ).  Even in the absence of a cure, surgery will decrease the cardiovascular risk associated with OSA7; improve overall quality of life8 (sleepiness, functionality, sleep quality, etc).  Palate Procedures:  Patients with BAKARI often have narrowing of their airway in the region of their tonsils and uvula.  The goals of palate procedures are to widen the airway in this region as well as to help the tissues resist collapse.  Modern palate procedure techniques focus on tissue conservation and soft tissue rearrangement, rather than tissue removal.  Often the uvula is preserved in this procedure. Residual sleep apnea is common in patient after pharyngoplasty with an average reduction in sleep apnea events of 33%2.      Tongue Procedures:  ExamWhile patients are awake, the muscles that surround the throat are active and keep this region open for breathing. These muscles relax during sleep, allowing the tongue and other structures to collapse and block breathing.  There are several different tongue procedures available.  Selection of a tongue base procedure depends on characteristics seen on physical exam.  Generally, procedures are aimed at removing bulky tissues in this area or preventing the back of the tongue from falling back during sleep.  Success rates for tongue surgery range from 50-62%3.    Hypoglossal Nerve Stimulation:  Hypoglossal nerve stimulation has recently received approval from the [place] Food and Drug Administration for the treatment of obstructive sleep  apnea.  This is based on research showing that the system was safe and effective in treating sleep apnea6.  Results showed that the median AHI score decreased 68%, from 29.3 to 9.0. This therapy uses an implant system that senses breathing patterns and delivers mild stimulation to airway muscles, which keeps the airway open during sleep.  The system consists of three fully implanted components: a small generator (similar in size to a pacemaker), a breathing sensor, and a stimulation lead.  Using a small handheld remote, a patient turns the therapy on before bed and off upon awakening.    Candidates for this device must be greater than 22 years of age, have moderate to severe BAKARI (AHI between 20-65), BMI less than 32, have tried CPAP/oral appliance without success, and have appropriate upper airway anatomy (determined by a sleep endoscopy performed by Dr. Cleaning).    Hypoglossal Nerve Stimulation Pathway:    The sleep surgeon s office will work with the patient through the insurance prior-authorization process (including communications and appeals).    Nasal Procedures:  Nasal obstruction can interfere with nasal breathing during the day and night.  Studies have shown that relief of nasal obstruction can improve the ability of some patients to tolerate positive airway pressure therapy for obstructive sleep apnea1.  Treatment options include medications such as nasal saline, topical corticosteroid and antihistamine sprays, and oral medications such as antihistamines or decongestants. Non-surgical treatments can include external nasal dilators for selected patients. If these are not successful by themselves, surgery can improve the nasal airway either alone or in combination with these other options.  Combination Procedures:  Combination of surgical procedures and other treatments may be recommended, particularly if patients have more than one area of narrowing or persistent positional disease.  The success rate of  combination surgery ranges from 66-80%2,3.    References  1. Petros SMITH. The Role of the Nose in Snoring and Obstructive Sleep Apnoea: An Update.  Eur Arch Otorhinolaryngol. 2011; 268: 1365-73.  2.  Xuan SM; Fatuma JA; Millie JR; Pallanch JF; Wing MB; Portia SG; Viv JIANG. Surgical modifications of the upper airway for obstructive sleep apnea in adults: a systematic review and meta-analysis. SLEEP 2010;33(10):5930-7717. Drew JENKINS. Hypopharyngeal surgery in obstructive sleep apnea: an evidence-based medicine review.  Arch Otolaryngol Head Neck Surg. 2006 Feb;132(2):206-13.  3. Obie YH1, Meche Y, Leonid PRINCE. The efficacy of anatomically based multilevel surgery for obstructive sleep apnea. Otolaryngol Head Neck Surg. 2003 Oct;129(4):327-35.  4. Drew JENKINS, Goldberg A. Hypopharyngeal Surgery in Obstructive Sleep Apnea: An Evidence-Based Medicine Review. Arch Otolaryngol Head Neck Surg. 2006 Feb;132(2):206-13.  5. Strollo PJ et al. Upper-Airway Stimulation for Obstructive Sleep Apnea.  N Engl J Med. 2014 Jan 9;370(2):139-49.  6. Genevieve Y et al. Increased Incidence of Cardiovascular Disease in Middle-aged Men with Obstructive Sleep Apnea. Am J Respir Crit Care Med; 2002 166: 159-165  7. Gr EM et al. Studying Life Effects and Effectiveness of Palatopharyngoplasty (SLEEP) study: Subjective Outcomes of Isolated Uvulopalatopharyngoplasty. Otolaryngol Head Neck Surg. 2011; 144: 623-631.      Your BMI is Body mass index is 43.42 kg/m .  Weight management is a personal decision.  If you are interested in exploring weight loss strategies, the following discussion covers the approaches that may be successful. Body mass index (BMI) is one way to tell whether you are at a healthy weight, overweight, or obese. It measures your weight in relation to your height.  A BMI of 18.5 to 24.9 is in the healthy range. A person with a BMI of 25 to 29.9 is considered overweight, and someone with a BMI of 30 or greater is considered  obese. More than two-thirds of American adults are considered overweight or obese.  Being overweight or obese increases the risk for further weight gain. Excess weight may lead to heart disease and diabetes.  Creating and following plans for healthy eating and physical activity may help you improve your health.  Weight control is part of healthy lifestyle and includes exercise, emotional health, and healthy eating habits. Careful eating habits lifelong are the mainstay of weight control. Though there are significant health benefits from weight loss, long-term weight loss with diet alone may be very difficult to achieve- studies show long-term success with dietary management in less than 10% of people. Attaining a healthy weight may be especially difficult to achieve in those with severe obesity. In some cases, medications, devices and surgical management might be considered.  What can you do?  If you are overweight or obese and are interested in methods for weight loss, you should discuss this with your provider.     Consider reducing daily calorie intake by 500 calories.     Keep a food journal.     Avoiding skipping meals, consider cutting portions instead.    Diet combined with exercise helps maintain muscle while optimizing fat loss. Strength training is particularly important for building and maintaining muscle mass. Exercise helps reduce stress, increase energy, and improves fitness. Increasing exercise without diet control, however, may not burn enough calories to loose weight.       Start walking three days a week 10-20 minutes at a time    Work towards walking thirty minutes five days a week     Eventually, increase the speed of your walking for 1-2 minutes at time    In addition, we recommend that you review healthy lifestyles and methods for weight loss available through the National Institutes of Health patient information sites:  http://win.niddk.nih.gov/publications/index.htm    And look into health  and wellness programs that may be available through your health insurance provider, employer, local community center, or jens club.    Weight management plan: Patient was referred to their PCP to discuss a diet and exercise plan.

## 2019-12-18 NOTE — PROGRESS NOTES
Sleep Consultation Note:  Date on this visit: 12/18/2019    Betty Tamayo is sent by Kapil Corbett for a sleep consultation regarding choking/gasping during sleep    Primary Physician: Kapil Corbett     Impression/Plan:    Snoring  Observed sleep apnea  Choking/gasping with sleep  Vomiting during sleep  Obesity  Excessive day time sleepiness    Patient is being evaluated for BAKARI.  STOP BANG score is 4. Patient likely has sleep apnea based on clinical history of snoring, choking/gasping, excessive day time sleepiness, non-restorative sleep, FH of BAKARI, obesity.     Recommend in-lab sleep study as she is intermediate risk for BAKARI, has COLLEEN score of 24, and describes behavior which may be consistent with dream enactment behavior versus NREM parasomnia versus pseudo-RBD from untreated sleep apnea.   If insurance will not cover an in-lab study, home study testing will be performed. Patient has agreed to this plan.  I have recommended an urgent study to be done as soon as possible given her significant day time sleepiness and the distress the gasping/choking is causing her.    Despite BMI>40 low suspicion for hypoventilation given oxygen saturation of 96% and recent bicarbonate of 26 in the setting of premenopausal woman.     Diagnosis and treatment for BAKARI have been discussed. Complications of untreated BAKARI have also been discussed. At this time main method of treatment, at least initially, recommended is CPAP. Discussed the role of weight loss in BAKARI as well.     Chronic insomnia  Her difficulties seem to primarily revolve around difficulty with sleep maintenance in the setting of these choking/gasping/vomiting episodes and they are provoking anxiety regarding being asleep.   Discussed stimulus control and relaxation exercises that can be done when having difficulty getting back to sleep. My hope is that by treating the BAKARI, the night time awakenings will resolve and thus the difficulty with sleep  maintenance will also resolve. If this continues to be a problem, we will discuss insomnia further and consider referral to cognitive behavioral therapy for insomnia.    If sleep apnea is not present, would recommend evaluation by gastroenterology for vomiting at night to assess for gastroesophageal reflux.     Encourage weight loss, healthy diet, and exercise.    Patient was strongly advised to avoid driving, operating any heavy machinery or other hazardous situations while drowsy or sleepy.  Patient was counseled on the importance of driving while alert, to pull over if drowsy, or nap before getting into the vehicle if sleepy.      Plan to follow up results of sleep test via Mychart.     CC: Kapil Corbett    Seen and examined with Dr. Kathy Ritter MD   Sleep medicine fellow    Attending: Patient seen and examined and personally counseled. Chart reviewed. This note reflects our mutual assessment and plan.  Claudia Chavez MD   Medicine  Sleep Medicine    [facility]   Floor 1, [address]   [address] [place] [address]. [place] [address]   Appointments: [phone]  ________________________________________________________    HPI  CC:  Choking during sleep.    Betty Tamayo is a 29 year old with PMH BAKARI, obesity, depression who reports snoring, choking and gasping during sleep. This has been going on for years, but past few months has been much worse regarding choking and gasping.  She has had a previous sleep study when she was around 12.    [facility] at 12 years of age. Had adenotonsillectomy previous to that time. Had a sleep test and was provided CPAP. Used CPAP a few times back then, does not recall much about it, has not used as an adult. Has not had a repeat test since then.     Always been a loud snorer. Wakes up choking/gasping for air, vomiting once per  week while sleeping. She will wake up and have vomited, does not wake up during the vomiting. This has been happening past few months, not before. Denies reflux or heartburn. Does not have issues with choking, food getting stuck in throat, or heart burn during the day. Most days has headaches in the morning. The choking, vomiting, and gasping are very disturbing to her and made her fearful of going to sleep.   Has extreme day time sleepiness.     Has not noted dozing with driving. As a passenger in the car falls asleep easily. At work can doze off in front of the computer. This has been affecting her job, this has been going on for past few months in particular. Will doze off in front of the television, watching a movie.     Betty naps frequently on the weekends, does not usually have time to nap during the week. Falls asleep in front of television, during a movie, etc. Naps usually 45 minutes, does not feel refreshed.    Patient's sleepiness scale result is 19  16-24 Severe Excessive Daytime Sleepiness    COLLEEN score is 24 (moderate 15-21)    She has gained weight, particularly during and after pregnancy (Feb 2019). Estimates has gained about 60 pounds in the last.     Sleep Schedule/Sleep Complaints  She does not report difficulty with falling asleep. Betty goes to bed at 10 PM, and it usually takes 30 minutes to fall asleep. Before bed she puts the kids to sleep, then lies down, will read or be on the phone.    Betty does not report restlessness feelings in the legs.  She does complain of spontaneous leg movements/jerks in the middle of the night.    She does have a regular bed partner.  Patient sleeps on her side and stomach.  She does not use a sleep aid.     She does complain of difficulty with staying asleep.  She wakes up at least 2 times throughout the night, generally from coughing, choking, gasping. Feels short of breath after she wakes up, hard to catch breath. Then will be up for 30 minutes to an hour.  Thinking about the event and nervous that she will stop breathing in her sleep. Particularly over past few months.    Over the night will be up for several hours, primarily due to nervousness of going back to sleep.    She wakes up at 6 AM, with an alarm, which is difficult. Starting to sleep through alarms.    On non-work days, She falls asleep around 11 PM and gets up 8 AM.  Needs 8 hours of sleep.     Patient is neither a morning or night person.    She would naturally to go to sleep at 11:00 PM and wake up at 8:00 AM.    Sleep Behaviors  She denies any cataplexy, sleep paralysis, sleep hallucinations.    She talks in her sleep and may say weird things.    She will sometimes kick and push her boyfriend during sleep, generally just random. Moves around the bed a lot. Seems random, not intentional per what her boyfriend tells her.  She has fallen out of bed while sleeping a few times in her life, last time was a few months ago. Estimates first time was a year ago, hit her head on the ground at that time. Generally happening later in the night. Seems random, not related to sleep deprivation or stress.  No issues with constipation, anosmia. No FH of parkinson    Social History  Lives with boyfriend and 3 kids, 10 months, 10, and 6.  currently works as a clinical .  Work schedule is as follows:  7a-330p.  She does not do shift work currently or in the past.   She does drink caffeine, 3-4 drinks/day. Last caffeine intake is not within 6 hours of bed time.  She rarely drinks alcohol.    Patient is a never smoker.   Denies any secondhand exposure.  Patient does not use drugs.  No future surgeries planned.    Mood  On lamictal for past 2 months, working well to take the edge off make her less irritable. Started wellbutrin a week ago. Seeing a therapist. Feels mood is better now. No SI/HI.    Allergies:    No Known Allergies    Medications:    Current Outpatient Medications   Medication Sig Dispense  Refill     buPROPion (WELLBUTRIN XL) 150 MG 24 hr tablet TK 1 T PO QD  2     lamoTRIgine (LAMICTAL) 25 MG tablet TK 2 TS PO QD  1     zolpidem (AMBIEN) 5 MG tablet Take tablet by mouth 15 minutes prior to sleep, for Sleep Study 1 tablet 0       Problem List:  Patient Active Problem List    Diagnosis Date Noted     Left knee pain 04/15/2018     Priority: Medium     Encounter for supervision of high risk pregnancy, , [facility] CNM 10/15/2017     Priority: Medium     10/12/17: OBI   HbgA1C d/t 1st degree relative with diabetes and obesity _wnl___, Needs early 1 hour glucose at [facility]   Varicella [non-immune], Hbg electrophoresis [normal]   Baseline pre-e labs ordered, aspirin daily at 12-14 weeks   Needs GC/CT at [facility] ___   Pap up to date 17 NIL  10/25/17: Pt unable to complete early GCT at [facility], discussed placing at future order and can complete outside of visit  10/26/17: Early GCT 91       History of pre-eclampsia in prior pregnancy, currently pregnant 10/15/2017     Priority: Medium     10/12/17: Hx of pre-eclampsia   Baseline pre-e labs ordered    Aspirin ordered, to start 81mg daily at 12-14 weeks       Nausea 10/15/2017     Priority: Medium     Has zofran    10/12/17: Prescription placed for unisom and vit b6       UTI in pregnancy, first trimester 10/15/2017     Priority: Medium     Treated by [facility], initially Keflex, changed to Macrobid x 7 days    10/12/17: UC negative       Vitamin D deficiency 10/15/2017     Priority: Medium     10/12/17: vit d= 13, discuss supplementation at next visit       Maternal varicella, non-immune 10/15/2017     Priority: Medium     10/12/17: Non immune  Offer vaccine postpartum  10/25/17: Reviewed with pt       Medication exposure during first trimester of pregnancy 10/15/2017     Priority: Medium     10/12/17:  Was taking topamax and phentermine for weight loss, prescription. Started in 2017 and then stopped. Restarted after appt on . Stopped taking when she  found out she is pregnant.    Reviewed Up to Date information with patient re: meds taken at beginning of pregnancy.   C  at D for topamax and cat x for phentermine.     Pt not interested in terminating pregnancy. Consulted with Dr. Boogie who recommended no additional testing.   - Pt desires only level 2 ultrasound at this time, mfm referral placed. To check with her insurance re: 1st trimester screening.        Uncomplicated asthma      Priority: Medium        Past Medical/Surgical History:  Past Medical History:   Diagnosis Date     Knee cartilage, torn, left     both knees    had cortisone injection     Right shoulder pain 14     Uncomplicated asthma      Past Surgical History:   Procedure Laterality Date     ANESTHESIA OUT OF OR MRI Right 2015    Procedure: ANESTHESIA OUT OF OR MRI;  Surgeon: Generic Anesthesia Provider;  Location: [facility] OR     ENT SURGERY      tonsillectomy 1995     wisdom teeth         Social History:  Social History     Socioeconomic History     Marital status: Single     Spouse name: Say Cardoso     Number of children: Not on file     Years of education: Not on file     Highest education level: Not on file   Occupational History     Occupation: patient conceirge      Comment: [employer]   Social Needs     Financial resource strain: Not on file     Food insecurity:     Worry: Not on file     Inability: Not on file     Transportation needs:     Medical: Not on file     Non-medical: Not on file   Tobacco Use     Smoking status: Former Smoker     Packs/day: 0.50     Types: Cigarettes     Last attempt to quit: 2017     Years since quittin.2     Smokeless tobacco: Never Used   Substance and Sexual Activity     Alcohol use: Yes     Comment: stopped in pregnancy     Drug use: No     Sexual activity: Yes     Partners: Male   Lifestyle     Physical activity:     Days per week: Not on file     Minutes per session: Not on file     Stress: Not on file   Relationships     Social connections:      Talks on phone: Not on file     Gets together: Not on file     Attends Evangelical service: Not on file     Active member of club or organization: Not on file     Attends meetings of clubs or organizations: Not on file     Relationship status: Not on file     Intimate partner violence:     Fear of current or ex partner: Not on file     Emotionally abused: Not on file     Physically abused: Not on file     Forced sexual activity: Not on file   Other Topics Concern     Parent/sibling w/ CABG, MI or angioplasty before 65F 55M? Not Asked   Social History Narrative     Medical records collect for sports medicine  since 2015 Pasquale Beaver,  Son Efren 2/13/19 and daughter Gilberto 10/2009         Family History:  Family History   Problem Relation Age of Onset     Diabetes Mother      Cerebrovascular Disease Mother      Anxiety Disorder Mother      Depression Mother      Cerebrovascular Disease Sister      Diabetes Maternal Grandmother      Hypertension Maternal Grandmother      Sleep Apnea Brother      Parkinsonism No family hx of      Review of Systems:  A complete review of systems reviewed by me is negative with the exeption of what has been mentioned in the history of present illness.  CONSTITUTIONAL: Positive for weight gain. NEGATIVE for fever, chills, sweats or night sweats, drug allergies.  EYES: NEGATIVE for changes in vision, blind spots, double vision.  ENT: Positive for ear pain. NEGATIVE for sore throat, sinus pain, post-nasal drip, runny nose, bloody nose  CARDIAC: NEGATIVE for fast heartbeats or fluttering in chest, chest pain or pressure, breathlessness when lying flat, swollen legs or swollen feet.  NEUROLOGIC: Positive for headaches. NEGATIVE for weakness or numbness in the arms or legs.  DERMATOLOGIC: NEGATIVE for rashes, new moles or change in mole(s)  PULMONARY: Positive for SOB with activity, dry cough, Negative for productive cough, coughing up blood, wheezing or whistling when breathing.   "  GASTROINTESTINAL: Positive for loose watery stools, blood in stools. NEGATIVE for nausea or vomitting, fat or grease in stools, constipation, abdominal pain  GENITOURINARY: NEGATIVE for pain during urination, blood in urine, urinating more frequently than usual  MUSCULOSKELETAL: Positive for muscle pain, bone or joint pain, swollen joints.  ENDOCRINE: NEGATIVE for increased thirst or urination, diabetes.  LYMPHATIC: NEGATIVE for swollen lymph nodes, lumps or bumps in the breasts or nipple discharge.  PSYCHE: Positive for depression, anxiety    Physical Examination:  Vitals: /81   Pulse 77   Resp 18   Ht 1.676 m (5' 6\")   Wt 122 kg (269 lb)   LMP 12/02/2019 (Approximate)   SpO2 96%   BMI 43.42 kg/m    BMI= Body mass index is 43.42 kg/m .    Opp Total Score 12/18/2019   Total score - Opp 19     General: No apparent distress, appropriately groomed  Head: Normocephalic, atraumatic  Eyes: no icterus, PERRL  Nose: Nares patent. No exudate/erythema. No septal deviation noted.  Mouth: op pink and moist, teeth: Normal, tongue: Mildly enlarged  Orophraynx: Opening is normal, uvula: normal   Mallampati Class: II   Tonsillar Stage: 0  surgically removed.  Neck: Supple, Circumference: 38 cm  Cardiac: Regular rate and rhythm  Chest: Symmetric air movement, lungs clear to auscultation bilaterally  GI: Abdomen soft, non-tender, non-distended  Musculoskeletal: No edema noted  Skin: Warm, dry, intact  Psych: Mood pleasant, affect congruent  Neuro:  Mental status: Awake, alert, attentive, oriented.  Cranial nerves: Grossly intact, no focal deficits appreciated.  Motor: Tone within normal limit    Previous Studies: Not available      "

## 2019-12-18 NOTE — TELEPHONE ENCOUNTER
Call was received from sleep center, and they have a cancellation for consultation this morning.  Sleep center staff will call patient to see if she can come in today to start sleep study process.    Sammie Juan RN on 12/18/2019 at 8:06 AM

## 2019-12-20 ENCOUNTER — THERAPY VISIT (OUTPATIENT)
Dept: PHYSICAL THERAPY | Facility: CLINIC | Age: 29
End: 2019-12-20
Payer: COMMERCIAL

## 2019-12-20 DIAGNOSIS — S16.1XXD ACUTE STRAIN OF NECK MUSCLE, SUBSEQUENT ENCOUNTER: Primary | ICD-10-CM

## 2019-12-20 PROBLEM — G47.10 HYPERSOMNIA: Status: ACTIVE | Noted: 2019-12-20

## 2019-12-20 PROBLEM — E66.01 CLASS 3 SEVERE OBESITY DUE TO EXCESS CALORIES WITH BODY MASS INDEX (BMI) OF 40.0 TO 44.9 IN ADULT, UNSPECIFIED WHETHER SERIOUS COMORBIDITY PRESENT (H): Status: ACTIVE | Noted: 2019-12-20

## 2019-12-20 PROBLEM — E66.813 CLASS 3 SEVERE OBESITY DUE TO EXCESS CALORIES WITH BODY MASS INDEX (BMI) OF 40.0 TO 44.9 IN ADULT, UNSPECIFIED WHETHER SERIOUS COMORBIDITY PRESENT (H): Status: ACTIVE | Noted: 2019-12-20

## 2019-12-20 PROBLEM — R06.83 SNORING: Status: ACTIVE | Noted: 2019-12-20

## 2019-12-20 PROCEDURE — 97530 THERAPEUTIC ACTIVITIES: CPT | Mod: GP | Performed by: PHYSICAL THERAPIST

## 2019-12-20 PROCEDURE — 97140 MANUAL THERAPY 1/> REGIONS: CPT | Mod: GP | Performed by: PHYSICAL THERAPIST

## 2019-12-20 PROCEDURE — 97110 THERAPEUTIC EXERCISES: CPT | Mod: GP | Performed by: PHYSICAL THERAPIST

## 2019-12-23 ENCOUNTER — THERAPY VISIT (OUTPATIENT)
Dept: SLEEP MEDICINE | Facility: CLINIC | Age: 29
End: 2019-12-23
Payer: COMMERCIAL

## 2019-12-23 DIAGNOSIS — G47.33 OSA (OBSTRUCTIVE SLEEP APNEA): ICD-10-CM

## 2019-12-23 DIAGNOSIS — G47.50 PARASOMNIA, UNSPECIFIED TYPE: ICD-10-CM

## 2019-12-23 PROCEDURE — 95811 POLYSOM 6/>YRS CPAP 4/> PARM: CPT | Performed by: INTERNAL MEDICINE

## 2019-12-24 NOTE — NURSING NOTE
Completed a split night PSG per provider order.    Preliminary .  A final therapeutic PAP pressure was achieved.    Supine REM was seen on therapeutic pressure.    Patient reports feeling refreshed in AM.

## 2019-12-24 NOTE — PATIENT INSTRUCTIONS
[place] [facility]    1. Your sleep study will be reviewed by a sleep physician within the next few days.     2. Please follow up in the sleep clinic as scheduled, or, make an appointment with your sleep provider to be seen within two weeks to discuss the results of the sleep study.    3. If you have any questions or problems with your treatment plan, please contact your sleep clinic provider at [phone] to further manage your condition.    4. Please review your attached medication list, and, at your follow-up appointment advise your sleep clinic provider about any changes.    5. Go to [email or web address] for more information about your sleep problems.    ABHINAV Mao  December 24, 2019

## 2019-12-26 ENCOUNTER — TELEPHONE (OUTPATIENT)
Dept: SLEEP MEDICINE | Facility: CLINIC | Age: 29
End: 2019-12-26

## 2019-12-26 NOTE — TELEPHONE ENCOUNTER
Pt was called and informed of Urgent Set Up.  Pt states that she has already spoke with Dr. Blackwell and will contact [facility] to schedule.  Pt would like to keep appointment with Dr. Chavez on 1/29 as she is hoping to be set up by then and have follow up after 1 month of usage(which is ok to do).    Per Note from [facility] appointment is on hold due to interp.  Interp was printed out by Dr. Blackwell and faxed to [facility].    ETIENNE Yang

## 2019-12-27 LAB — SLPCOMP: NORMAL

## 2019-12-27 NOTE — PROCEDURES
" SLEEP STUDY INTERPRETATION  SPLIT NIGHT STUDY      Patient: JEISON PEREZ  YOB: 1990  Study Date: 12/23/2019  MRN: [id number]  Referring Provider: Claudia Chavez MD  Ordering Provider: Claudia Chavez MD    Indications for Polysomnography: The patient is a 29 y old female who is 5' 6\" and weighs 269.0 lbs. Her BMI is 43.8, [place] sleepiness scale 19.0 and neck circumference is 38.0 cm. Relevant medical history includes prior sleep apnea and tonsillectomy in childhood with difficulty maintaining sleep and morning headaches recently. A diagnostic polysomnogram was performed to evaluate for current severity of sleep apnea.  After 136.0 minutes of sleep time the patient exhibited sufficient respiratory events qualifying her for a CPAP trial which was then initiated.    Polysomnogram Data: A full night polysomnogram recorded the standard physiologic parameters including EEG, EOG, EMG, ECG, nasal and oral airflow. Respiratory parameters of chest and abdominal movements were recorded with respiratory inductance plethysmography. Oxygen saturation was recorded by pulse oximetry.  Hypopnea scoring rule used: 1B 4%    Diagnostic PSG  Sleep Architecture: Severe sleep disruption attributable to respiratory events with reduction in stages N3 and REM.  The total recording time of the polysomnogram was 150.3 minutes. The total sleep time was 136.0 minutes. Sleep latency was decreased at 8.3 minutes with the use of 5 mg zolpidem. REM latency was 97.0 minutes. Arousal index was increased at 113.8 arousals per hour. Sleep efficiency was normal at 90.5%. Wake after sleep onset was 3.5 minutes. The patient spent 14.0% of total sleep time in Stage N1, 75.4% in Stage N2, 0.4% in Stage N3, and 10.3% in REM. Time in REM supine was 14.0 minutes.    Respiration: Severe obstructive sleep apnea with hypoxemia; cannot exclude hypoventilation.    Events ? The polysomnogram revealed a presence of 12 obstructive, - central, " and 1 mixed apneas resulting in an apnea index of 5.7 events per hour. There were 255 obstructive hypopneas and - central hypopneas resulting in an obstructive hypopnea index of 112.5 and central hypopnea index of - events per hour. The combined apnea/hypopnea index was 118.2 events per hour (central apnea/hypopnea index was - events per hour).  The REM AHI was 107.1 events per hour. The supine AHI was 108.8 events per hour. The RERA index was 4.9 events per hour. The RDI was 123.1 events per hour.    Snoring - was reported as loud.    Respiratory rate and pattern - was notable for tachypnea 20-22/min resolved on CPAP.    Sustained Sleep Associated Hypoventilation - Transcutaneous carbon dioxide monitoring was not used and hypoventilation cannot be excluded.    Sleep Associated Hypoxemia - (Greater than 5 minutes O2 sat at or below 88%) was present. Baseline oxygen saturation was 86.6%. Lowest oxygen saturation was 56.1%. Time spent less than or equal to 88% was 83.3 minutes. Time spent less than or equal to 89% was 89.0 minutes.     Treatment PSG  Sleep Architecture: Incomplete improvement in sleep architecture and continuity on effective CPAP.  At 12:17:06 AM the patient was placed on PAP treatment and was titrated at pressures ranging from CPAP 5 cmH2O up to CPAP 13 cmH2O. The total recording time of the treatment portion of the study was 343.9 minutes. The total sleep time was 307.0 minutes. During the treatment portion of the study the sleep latency was 4.0 minutes. REM latency was 42.5 minutes. Arousal index was improved at 19-34 arousals per hour. Sleep efficiency was normal at 89.3%. Wake after sleep onset was 32.0 minutes. The patient spent 13.0% of total sleep time in Stage N1, 35.5% in Stage N2, 8.8% in Stage N3, and 42.7% in REM. Time in REM supine was 131.0 minutes.     Respiration: Improvement in respiratory events and resolution of tachypnea and hypoxemia at most pressures explored with some  persistent periods of airflow limitation.    The final pressure was CPAP 9 cmH2O with an AHI of 9 events per hour. Time in REM supine on final pressure was 49 minutes.     This titration was considered  good (residual AHI < 10 events per hour or 50% decrease if baseline AHI > 15 events per hour, and including REM?supine sleep at final pressure).    Movement Activity: No abnormal sleep movements.    Periodic Limb Movements  o During the diagnostic portion of the study, there were - PLMs recorded. The PLM index was - movements per hour. The PLM Arousal Index was - per hour.  o During the treatment portion of the study, there were - PLMs recorded. The PLM index was - movements per hour. The PLM Arousal Index was - per hour.    REM EMG Activity - Excessive transient/sustained muscle activity was not present.    Nocturnal Behavior - Abnormal sleep related behaviors were not noted.    Bruxism - None apparent.    Cardiac Summary: Sinus tachycardia during sleep [>90/min] prior to CPAP treatment.  During the diagnostic portion of the study, the average pulse rate was 86.8 bpm. The minimum pulse rate was 45.4 bpm while the maximum pulse rate was 128.0 bpm.    During the treatment portion of the study, the average pulse rate was 78.5 bpm. The minimum pulse rate was 59.3 bpm while the maximum pulse rate was 129.2 bpm.       Assessment:     Severe obstructive sleep apnea with sleep disruption, tachypnea, tachycardia and hypoxemia improved on CPAP.    Correction of hypoxemia makes it likely that there was not persistence of clinically-important hypoventilation.    Recommendations:    After discussion with the patient, orders placed for autotitration CPAP 8-20 with full face mask given oral breathing during test and followup by sleep therapy management for optimization of interface and pressure settings.     Suggest optimizing sleep schedule and avoiding sleep deprivation; patient should not drive if drowsy.    Weight  management.    Pharmacologic therapy should be used for management of restless legs syndrome only if present and clinically indicated and not based on the presence of periodic limb movements alone.    Diagnostic Codes:   Obstructive Sleep Apnea G47.33  Sleep Hypoxemia/Hypoventilation G47.36   Repetitive Intrusions Into Sleep F51.8      12/23/2019 [facility] Sleep Study (269.0 lbs) - .2, .1, Supine .8, REM .1, Low O2% 56.1%, Time Spent ?88% 83.3, Time Spent ?89% 89.0. Treatment was titrated to a pressure of CPAP 13 with an AHI 10.7. Time spent in REM supine at this pressure was 20.5 minutes.     _____________________________________   Electronically Signed By: Saturnino Blackwell MD 12:32 12/26/19         Range(%) Time in range (min)   0.0 - 88.0 83.3   0.0 - 89.0 89.0       Stage Min(mm Hg) Max(mm Hg)   Wake - -   NREM(1+2+3) - -   REM - -         Range(mmHg) Time in range (min)   - -   - -

## 2019-12-27 NOTE — PROGRESS NOTES
Telephone call-    Patient informed of results of sleep study and wishes to initiate CPAP for severe obstructive sleep apnea and hypoxemia.  Orders placed for auto titration CPAP and follow-up oximetry with follow-up with Dr. Chavez within 6 weeks.    Bobby Blackwell MD

## 2019-12-31 ENCOUNTER — OFFICE VISIT (OUTPATIENT)
Dept: ORTHOPEDICS | Facility: CLINIC | Age: 29
End: 2019-12-31
Attending: FAMILY MEDICINE
Payer: COMMERCIAL

## 2019-12-31 VITALS — WEIGHT: 269 LBS | HEIGHT: 66 IN | BODY MASS INDEX: 43.23 KG/M2

## 2019-12-31 DIAGNOSIS — M79.644 PAIN OF FINGER OF RIGHT HAND: ICD-10-CM

## 2019-12-31 ASSESSMENT — PAIN SCALES - GENERAL: PAINLEVEL: NO PAIN (0)

## 2019-12-31 ASSESSMENT — MIFFLIN-ST. JEOR: SCORE: 1961.93

## 2019-12-31 NOTE — PROGRESS NOTES
"Sports Medicine Clinic Visit    PCP: Kapil Corbett MIKE Tamayo is a 29 year old LHD female who is seen  in consultation at the request of Dr. Corbett presenting with right middle finger pain.     Injury: 2019- finger got stuck in a stairs railing     Location of Pain: right middle proximal finger   Duration of Pain: 6 month(s)  Rating of Pain: Achy 5/10  Pain is better with: Nothing  Pain is worse with: Bending  Additional Features: Swelling   Treatment so far consists of: Ice, heat and Ibuprofen  Prior History of related problems: None    Ht 1.676 m (5' 6\")   Wt 122 kg (269 lb)   LMP 2019 (Approximate)   BMI 43.42 kg/m           PMH:  Past Medical History:   Diagnosis Date     Knee cartilage, torn, left     both knees    had cortisone injection     Right shoulder pain 14     Uncomplicated asthma        Active problem list:  Patient Active Problem List   Diagnosis     Encounter for supervision of high risk pregnancy, , S CN     History of pre-eclampsia in prior pregnancy, currently pregnant     Nausea     UTI in pregnancy, first trimester     Vitamin D deficiency     Maternal varicella, non-immune     Medication exposure during first trimester of pregnancy     Uncomplicated asthma     Left knee pain     Snoring     Hypersomnia     Class 3 severe obesity due to excess calories with body mass index (BMI) of 40.0 to 44.9 in adult, unspecified whether serious comorbidity present (H)       FH:  Family History   Problem Relation Age of Onset     Diabetes Mother      Cerebrovascular Disease Mother      Anxiety Disorder Mother      Depression Mother      Cerebrovascular Disease Sister      Diabetes Maternal Grandmother      Hypertension Maternal Grandmother      Sleep Apnea Brother      Parkinsonism No family hx of        SH:  Social History     Socioeconomic History     Marital status: Single     Spouse name: Say Cardoso     Number of children: Not on file     Years of " education: Not on file     Highest education level: Not on file   Occupational History     Occupation: patient conceirge      Comment: [employer]   Social Needs     Financial resource strain: Not on file     Food insecurity:     Worry: Not on file     Inability: Not on file     Transportation needs:     Medical: Not on file     Non-medical: Not on file   Tobacco Use     Smoking status: Former Smoker     Packs/day: 0.50     Types: Cigarettes     Last attempt to quit: 2017     Years since quittin.3     Smokeless tobacco: Never Used   Substance and Sexual Activity     Alcohol use: Yes     Comment: stopped in pregnancy     Drug use: No     Sexual activity: Yes     Partners: Male   Lifestyle     Physical activity:     Days per week: Not on file     Minutes per session: Not on file     Stress: Not on file   Relationships     Social connections:     Talks on phone: Not on file     Gets together: Not on file     Attends Worship service: Not on file     Active member of club or organization: Not on file     Attends meetings of clubs or organizations: Not on file     Relationship status: Not on file     Intimate partner violence:     Fear of current or ex partner: Not on file     Emotionally abused: Not on file     Physically abused: Not on file     Forced sexual activity: Not on file   Other Topics Concern     Parent/sibling w/ CABG, MI or angioplasty before 65F 55M? Not Asked   Social History Narrative     Medical records collect for sports medicine  since  Pasquale Beaver,  Son Efern 19 and daughter Gilberto 10/2009           MEDS:  See EMR, reviewed  ALL:  See EMR, reviewed    REVIEW OF SYSTEMS:  CONSTITUTIONAL:NEGATIVE for fever, chills, change in weight  INTEGUMENTARY/SKIN: NEGATIVE for worrisome rashes, moles or lesions  EYES: NEGATIVE for vision changes or irritation  ENT/MOUTH: NEGATIVE for ear, mouth and throat problems  RESP:NEGATIVE for significant cough or SOB  BREAST: NEGATIVE for masses, tenderness  or discharge  CV: NEGATIVE for chest pain, palpitations or peripheral edema  GI: NEGATIVE for nausea, abdominal pain, heartburn, or change in bowel habits  :NEGATIVE for frequency, dysuria, or hematuria  :NEGATIVE for frequency, dysuria, or hematuria  NEURO: NEGATIVE for weakness, dizziness or paresthesias  ENDOCRINE: NEGATIVE for temperature intolerance, skin/hair changes  HEME/ALLERGY/IMMUNE: NEGATIVE for bleeding problems  PSYCHIATRIC: NEGATIVE for changes in mood or affect      3 views right hand radiographs 12/16/2019 2:46 PM     History: Pain of finger of right hand     Comparison: None available.     Findings:     PA, oblique and lateral view(s) of the right hand were obtained.      No acute osseous abnormality.  No erosion.     No substantial degenerative change.     Soft tissue is unremarkable.                                                                      Impression:  1. No acute osseous abnormality.  2. No substantial degenerative change.     BRANT KIM        Subjective: This 29-year-old female caught her hand in a stair railing 6 months ago and ended up with a swollen  Third finger PIP.  6 months later she still notices some residual swelling.  The function seems to be normal to her.    Objective at her baseline she has a tendency to have hyperlaxity at her joints.  At her baseline her fingers in both hands will have a tendency to hyperextend and also she has a tendency to have ulnar deviation of both of her third fingers.  This is at her baseline.  She does have some slight residual swelling at the PIP joint of the third finger of the right hand.  She is nontender over the volar plate  at the PIP and I cannot find any laxity at the collateral ligaments compared to her nonaffected finger.  She has full strength to flexion and extension independently at the DIP, PIP and MCP with no lag or weakness noted.  She can make a full fist.  There is no boutonniere deformity or mallet finger.  The  overlying skin is normal and capillary refill is normal.  Sensation is intact.    Previous x-ray shows no signs of a healed fracture.  The joint is intact without degenerative change    Assessment sprain with residual swelling of the finger    Plan: We talked about the option of seeing hand therapy for some compressive wraps to help with swelling.  Swelling is minimal.  We talked about hand therapy for some exercises for the finger.  She declines this for now.  She was taught some exercises she can do with a softball, rolled up towel, a rubber band at home.  She will notify me if she wants to proceed with hand therapy.  Follow-up as needed.    This note was created with the use of Dragon software and unintentional spelling or errors may have occurred.

## 2019-12-31 NOTE — LETTER
"  2019      RE: Betty Tamayo  [address] [place] [address]       Sports Medicine Clinic Visit    PCP: Kapil Corbett MIKE Tamayo is a 29 year old LHD female who is seen  in consultation at the request of Dr. Corbett presenting with right middle finger pain.     Injury: 2019- finger got stuck in a stairs railing     Location of Pain: right middle proximal finger   Duration of Pain: 6 month(s)  Rating of Pain: Achy 5/10  Pain is better with: Nothing  Pain is worse with: Bending  Additional Features: Swelling   Treatment so far consists of: Ice, heat and Ibuprofen  Prior History of related problems: None    Ht 1.676 m (5' 6\")   Wt 122 kg (269 lb)   LMP 2019 (Approximate)   BMI 43.42 kg/m            PMH:  Past Medical History:   Diagnosis Date     Knee cartilage, torn, left     both knees    had cortisone injection     Right shoulder pain 14     Uncomplicated asthma        Active problem list:  Patient Active Problem List   Diagnosis     Encounter for supervision of high risk pregnancy, , S CN     History of pre-eclampsia in prior pregnancy, currently pregnant     Nausea     UTI in pregnancy, first trimester     Vitamin D deficiency     Maternal varicella, non-immune     Medication exposure during first trimester of pregnancy     Uncomplicated asthma     Left knee pain     Snoring     Hypersomnia     Class 3 severe obesity due to excess calories with body mass index (BMI) of 40.0 to 44.9 in adult, unspecified whether serious comorbidity present (H)       FH:  Family History   Problem Relation Age of Onset     Diabetes Mother      Cerebrovascular Disease Mother      Anxiety Disorder Mother      Depression Mother      Cerebrovascular Disease Sister      Diabetes Maternal Grandmother      Hypertension Maternal Grandmother      Sleep Apnea Brother      Parkinsonism No family hx of        SH:  Social History     Socioeconomic History     Marital status: Single    "  Spouse name: Say Cardoso     Number of children: Not on file     Years of education: Not on file     Highest education level: Not on file   Occupational History     Occupation: patient conceirge      Comment: [employer]   Social Needs     Financial resource strain: Not on file     Food insecurity:     Worry: Not on file     Inability: Not on file     Transportation needs:     Medical: Not on file     Non-medical: Not on file   Tobacco Use     Smoking status: Former Smoker     Packs/day: 0.50     Types: Cigarettes     Last attempt to quit: 2017     Years since quittin.3     Smokeless tobacco: Never Used   Substance and Sexual Activity     Alcohol use: Yes     Comment: stopped in pregnancy     Drug use: No     Sexual activity: Yes     Partners: Male   Lifestyle     Physical activity:     Days per week: Not on file     Minutes per session: Not on file     Stress: Not on file   Relationships     Social connections:     Talks on phone: Not on file     Gets together: Not on file     Attends Orthodoxy service: Not on file     Active member of club or organization: Not on file     Attends meetings of clubs or organizations: Not on file     Relationship status: Not on file     Intimate partner violence:     Fear of current or ex partner: Not on file     Emotionally abused: Not on file     Physically abused: Not on file     Forced sexual activity: Not on file   Other Topics Concern     Parent/sibling w/ CABG, MI or angioplasty before 65F 55M? Not Asked   Social History Narrative     Medical records collect for sports medicine  since  Pasquale Beaver,  Son Efren 19 and daughter Gilberto 10/2009           MEDS:  See EMR, reviewed  ALL:  See EMR, reviewed    REVIEW OF SYSTEMS:  CONSTITUTIONAL:NEGATIVE for fever, chills, change in weight  INTEGUMENTARY/SKIN: NEGATIVE for worrisome rashes, moles or lesions  EYES: NEGATIVE for vision changes or irritation  ENT/MOUTH: NEGATIVE for ear, mouth and throat  problems  RESP:NEGATIVE for significant cough or SOB  BREAST: NEGATIVE for masses, tenderness or discharge  CV: NEGATIVE for chest pain, palpitations or peripheral edema  GI: NEGATIVE for nausea, abdominal pain, heartburn, or change in bowel habits  :NEGATIVE for frequency, dysuria, or hematuria  :NEGATIVE for frequency, dysuria, or hematuria  NEURO: NEGATIVE for weakness, dizziness or paresthesias  ENDOCRINE: NEGATIVE for temperature intolerance, skin/hair changes  HEME/ALLERGY/IMMUNE: NEGATIVE for bleeding problems  PSYCHIATRIC: NEGATIVE for changes in mood or affect      3 views right hand radiographs 12/16/2019 2:46 PM     History: Pain of finger of right hand     Comparison: None available.     Findings:     PA, oblique and lateral view(s) of the right hand were obtained.      No acute osseous abnormality.  No erosion.     No substantial degenerative change.     Soft tissue is unremarkable.                                                                      Impression:  1. No acute osseous abnormality.  2. No substantial degenerative change.     BRANT KIM        Subjective: This 29-year-old female caught her hand in a stair railing 6 months ago and ended up with a swollen  Third finger PIP.  6 months later she still notices some residual swelling.  The function seems to be normal to her.    Objective at her baseline she has a tendency to have hyperlaxity at her joints.  At her baseline her fingers in both hands will have a tendency to hyperextend and also she has a tendency to have ulnar deviation of both of her third fingers.  This is at her baseline.  She does have some slight residual swelling at the PIP joint of the third finger of the right hand.  She is nontender over the volar plate  at the PIP and I cannot find any laxity at the collateral ligaments compared to her nonaffected finger.  She has full strength to flexion and extension independently at the DIP, PIP and MCP with no lag or  weakness noted.  She can make a full fist.  There is no boutonniere deformity or mallet finger.  The overlying skin is normal and capillary refill is normal.  Sensation is intact.    Previous x-ray shows no signs of a healed fracture.  The joint is intact without degenerative change    Assessment sprain with residual swelling of the finger    Plan: We talked about the option of seeing hand therapy for some compressive wraps to help with swelling.  Swelling is minimal.  We talked about hand therapy for some exercises for the finger.  She declines this for now.  She was taught some exercises she can do with a softball, rolled up towel, a rubber band at home.  She will notify me if she wants to proceed with hand therapy.  Follow-up as needed.    This note was created with the use of Dragon software and unintentional spelling or errors may have occurred.        Bridegr Cardoso MD

## 2020-01-23 ENCOUNTER — ANCILLARY PROCEDURE (OUTPATIENT)
Dept: ULTRASOUND IMAGING | Facility: CLINIC | Age: 30
End: 2020-01-23
Attending: INTERNAL MEDICINE
Payer: COMMERCIAL

## 2020-01-23 ENCOUNTER — OFFICE VISIT (OUTPATIENT)
Dept: INTERNAL MEDICINE | Facility: CLINIC | Age: 30
End: 2020-01-23
Payer: COMMERCIAL

## 2020-01-23 DIAGNOSIS — M79.621 PAIN OF RIGHT UPPER ARM: ICD-10-CM

## 2020-01-23 DIAGNOSIS — M79.621 PAIN OF RIGHT UPPER ARM: Primary | ICD-10-CM

## 2020-01-23 ASSESSMENT — PAIN SCALES - GENERAL: PAINLEVEL: SEVERE PAIN (7)

## 2020-01-23 NOTE — NURSING NOTE
Chief Complaint   Patient presents with     Musculoskeletal Problem     pt. has pain in her right armpit that radiates down into her arm        Mere Romero, EMT

## 2020-01-23 NOTE — PROGRESS NOTES
History of Present Illness:    Ms. Tamayo is a 29 year old female with a history of asthma and BAKARI who presents today with R arm pain. Yesterday around 1200 hrs while sitting at her desk at work, Betty experienced acute onset pain in her R axilla. Her pain gradually started radiating down toward her inner, R elbow and has been constantly present over the last 24 hours. She rates it as a 7/10 in severity. Her pain is exacerbated by touching the area, and she feels her right upper arm is slightly swollen compared to her left. Guarding her R arm or leaving it alone helps the pain. She has been taking four tabs of tylenol or ibuprofen without significant relief. She has never had prior problems with this arm and has not experienced recent trauma, twisting movements, or done any heavy lifting. She had one episode of numbness in her fingers yesterday, which lasted about an hour, and resolved on its own. She is not experiencing neck pain, shoulder pain, shortness of breath, breast changes, numbness, tingling, or weakness. She has been recently healthy without recent fevers, skin changes, injections, changes in medications, or noted abscesses. She relates her pain to having a prior painful and infiltrated IV but has not recently been hospitalized or had an IV in place. She is left handed. She has a Mirena IUD in place and has not recently traveled.     A full 10-pt Review of Systems was performed, verified and is negative except as documented in the HPI.  All health questionnaires were reviewed, verified and relevant information documented above.      Past Medical History:  Past Medical History:   Diagnosis Date     Knee cartilage, torn, left     both knees    had cortisone injection     Right shoulder pain 12/9/14     Uncomplicated asthma        Active Meds:  Current Outpatient Medications   Medication     buPROPion (WELLBUTRIN XL) 150 MG 24 hr tablet     lamoTRIgine (LAMICTAL) 25 MG tablet     zolpidem (AMBIEN) 5 MG  tablet     No current facility-administered medications for this visit.         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   Cardiovascular: Regular rate and rhythm, no murmurs, rubs or gallops, peripheral pulses full/symmetric  Respiratory: Good air movement bilaterally, lungs clear, no wheezes/rales/rhonchi  GI: Abdomen soft, nondistended, nontender  Musculoskeletal: No edema, normal muscle tone, normal gait. Normal ROM of neck and upper extremities. 5/5 strength in bilateral upper extremities. Sensation intact bilaterally. Able to fully flex and extend bilateral upper extremities. Able to internally and externally rotate both arms. Normal shoulder abduction test. Negative empty can test.    Neurologic: Alert and oriented, sensation to light touch intact  Skin: Tenderness to palpation of the R axilla.  No skin changes of the right upper extremity or axilla including redness, wounds, or areas of fluctuance or induration. Tenderness and small area of swelling about 1 inch proximal to the R antecubital fossa. No rashes/lesions  Psychiatric: Normal mentation, affect and mood.    Assessment and Plan:    29 year old female with a history of asthma and BAKARI who presents with acute-onset and gradually worsening R axilla and upper extremity pain for the past 24 hours.     # Pain of R axilla and upper arm  24  hours of acute-onset and gradually worsening pain in her R axilla and upper arm. Based on her tenderness on exam and possible swelling of the R upper extremity, there is concern for a superficial thrombophlebitis that we will evaluate with R upper extremity US. There is no evidence to suggest cervical radiculopathy, rotator cuff involvement, thoracic outlet syndrome, or other musculoskeletal etiology of these symptoms. There are no fevers or other signs/symptoms of abscess or soft tissue infection. She has no significant risk factors for a DVT or other clot. We have discussed that we would like to discuss next steps after she returns from imaging this afternoon.  - R upper extremity US        Return to clinic: after US to discuss results     Marthadenise Sanchez, DARRELL Hernández was seen and evaluated with Dr. Alarcon.    Attending Addendum:  The medical student acted as scribe.  The patient was seen and examined with medical student.  The history and physical were independently verified by myself.  The above documentation represents our joint assessment and plan.  Randi Alarcon MD  Internal Medicine    US was unremarkable. Unclear etiology for pain. Perhaps very minor superficial phlebitis given possible cord near antecubital fossa. Advised ice, rest, ibuprofen. Advised to notify us if symptoms worsen or do not improve in next 48 hours and would consider CT or MR.  Randi Alarcon MD  Internal Medicine

## 2020-01-24 ENCOUNTER — TRANSFERRED RECORDS (OUTPATIENT)
Dept: HEALTH INFORMATION MANAGEMENT | Facility: CLINIC | Age: 30
End: 2020-01-24

## 2020-01-25 ENCOUNTER — TRANSFERRED RECORDS (OUTPATIENT)
Dept: HEALTH INFORMATION MANAGEMENT | Facility: CLINIC | Age: 30
End: 2020-01-25

## 2020-01-30 ENCOUNTER — DOCUMENTATION ONLY (OUTPATIENT)
Dept: SLEEP MEDICINE | Facility: CLINIC | Age: 30
End: 2020-01-30

## 2020-01-30 NOTE — PROGRESS NOTES
30 DAY STM VISIT    Diagnostic AHI: 118.2 PSG    Message left for patient to return call     Assessment: Pt not meeting objective benchmarks for leak and compliance     Action plan: waiting for patient to return call.   Patient has not scheduled a follow up visit with Dr. Chavez    Device type: Auto-CPAP  PAP settings: CPAP min 8 cm  H20     CPAP max 20 cm  H20       Mask type:  Full Face Mask  Objective measures: 14 day rolling measures      Compliance  21 %      Leak  68.1 lpm  last  upload      AHI 3.8   last  upload           Objective measure goal  Compliance   Goal >70%  Leak   Goal < 24 lpm  AHI  Goal < 5  Usage  Goal >240      Total time spent on accessing, reviewing and interpreting remote patient PAP therapy data:   10 minutes      Total time spent with direct patient communication :   0 minutes    Total time spent on remote patient monitoring analysis for last 30 days:   10 min

## 2020-02-18 ENCOUNTER — OFFICE VISIT (OUTPATIENT)
Dept: INTERNAL MEDICINE | Facility: CLINIC | Age: 30
End: 2020-02-18
Payer: COMMERCIAL

## 2020-02-18 VITALS
BODY MASS INDEX: 44.71 KG/M2 | WEIGHT: 277 LBS | HEART RATE: 88 BPM | OXYGEN SATURATION: 99 % | SYSTOLIC BLOOD PRESSURE: 138 MMHG | DIASTOLIC BLOOD PRESSURE: 105 MMHG

## 2020-02-18 DIAGNOSIS — F33.2 SEVERE EPISODE OF RECURRENT MAJOR DEPRESSIVE DISORDER, WITHOUT PSYCHOTIC FEATURES (H): Primary | ICD-10-CM

## 2020-02-18 RX ORDER — BUPROPION HYDROCHLORIDE 150 MG/1
TABLET ORAL
Qty: 30 TABLET | Refills: 2 | Status: SHIPPED | OUTPATIENT
Start: 2020-02-18 | End: 2020-03-30

## 2020-02-18 RX ORDER — LAMOTRIGINE 25 MG/1
TABLET ORAL
Qty: 77 TABLET | Refills: 1 | Status: SHIPPED | OUTPATIENT
Start: 2020-02-18 | End: 2020-03-30 | Stop reason: DRUGHIGH

## 2020-02-18 RX ORDER — LORAZEPAM 0.5 MG/1
TABLET ORAL
COMMUNITY
Start: 2019-11-14 | End: 2020-03-30

## 2020-02-18 ASSESSMENT — ANXIETY QUESTIONNAIRES
5. BEING SO RESTLESS THAT IT IS HARD TO SIT STILL: NEARLY EVERY DAY
IF YOU CHECKED OFF ANY PROBLEMS ON THIS QUESTIONNAIRE, HOW DIFFICULT HAVE THESE PROBLEMS MADE IT FOR YOU TO DO YOUR WORK, TAKE CARE OF THINGS AT HOME, OR GET ALONG WITH OTHER PEOPLE: EXTREMELY DIFFICULT
7. FEELING AFRAID AS IF SOMETHING AWFUL MIGHT HAPPEN: MORE THAN HALF THE DAYS
1. FEELING NERVOUS, ANXIOUS, OR ON EDGE: NEARLY EVERY DAY
6. BECOMING EASILY ANNOYED OR IRRITABLE: NEARLY EVERY DAY
GAD7 TOTAL SCORE: 20
2. NOT BEING ABLE TO STOP OR CONTROL WORRYING: NEARLY EVERY DAY
3. WORRYING TOO MUCH ABOUT DIFFERENT THINGS: NEARLY EVERY DAY

## 2020-02-18 ASSESSMENT — PATIENT HEALTH QUESTIONNAIRE - PHQ9
SUM OF ALL RESPONSES TO PHQ QUESTIONS 1-9: 22
5. POOR APPETITE OR OVEREATING: NEARLY EVERY DAY

## 2020-02-18 ASSESSMENT — PAIN SCALES - GENERAL: PAINLEVEL: NO PAIN (0)

## 2020-02-18 NOTE — NURSING NOTE
Chief Complaint   Patient presents with     Depression     Pt is here to discuss depression      Viri Noe EMT at 9:09 AM sign on 2/18/2020

## 2020-02-18 NOTE — PROGRESS NOTES
"  PRIMARY CARE CENTER         HPI:       Betty Tamayo is a 29 year old female who presents to clinic for: Depression (Pt is here to discuss depression )    Betty reports that she has been extremely depressed over the past week and \"unable to control her body,\" which she describes as uncontrollable crying and body shaking.  She had a change in her health insurance recently and says that because of this her previous therapist at [facility] is no longer covered and she is unable to pay out of pocket to see them.  She was prescribed her Lamictal and Wellbutrin through a physician at this therapy center and thus has not been able to get these medications filled.      Patient describes recent depression symptoms such as altered sleep and appetite, loss of interest in her hobbies, uncontrollable crying, fatigue.  She denies suicidal ideation or homicidal ideation.  She knows that in the past she has had verbal outbursts against her family and friends when she is depressed and she is worried that she will do this again.  She lives at home with her fiancee and 3 school age children.  She feels safe at home.  She is able to confide in her fiancee regarding her mental health issues, however, he does not have much experience with mental health problems and she does not think he understands the extent of what she goes through.      She reports several recent stressors that have contributed to her her current state including the death of her brother in a car accident 3 months ago and a recent psychiatry intake where she was diagnosed with ADHD and paranoid personality disorder.  She notes that she does not have a diagnosis of Bipolar, but was prescribed Lamictal as a mood stabilizer.  She did feel like the Lamictal and Wellbutrin were effective in controlling her symptoms of depression.          PMHx:  Past Medical History:   Diagnosis Date     Knee cartilage, torn, left     both knees    had cortisone injection     " Right shoulder pain 12/9/14     Uncomplicated asthma        PSHx:  Past Surgical History:   Procedure Laterality Date     ANESTHESIA OUT OF OR MRI Right 1/8/2015    Procedure: ANESTHESIA OUT OF OR MRI;  Surgeon: Generic Anesthesia Provider;  Location: [facility] OR     ENT SURGERY      tonsillectomy 1995     wisdom teeth         FamHx:  Family History   Problem Relation Age of Onset     Diabetes Mother      Cerebrovascular Disease Mother      Anxiety Disorder Mother      Depression Mother      Cerebrovascular Disease Sister      Diabetes Maternal Grandmother      Hypertension Maternal Grandmother      Sleep Apnea Brother      Parkinsonism No family hx of        Allergies:   No Known Allergies    Meds:    Current Outpatient Medications:      buPROPion (WELLBUTRIN XL) 150 MG 24 hr tablet, TK 1 T PO QD, Disp: 30 tablet, Rfl: 2     lamoTRIgine (LAMICTAL) 25 MG tablet, Take 1 tablet (25 mg) by mouth daily for 7 days, THEN 2 tablets (50 mg) daily for 7 days, THEN 4 tablets (100 mg) daily for 14 days., Disp: 77 tablet, Rfl: 1     LORazepam (ATIVAN) 0.5 MG tablet, TK 1 T PO BID PRN IN TIMES OF EXTREME ANXIETY, Disp: , Rfl:      zolpidem (AMBIEN) 5 MG tablet, Take tablet by mouth 15 minutes prior to sleep, for Sleep Study (Patient not taking: Reported on 1/23/2020), Disp: 1 tablet, Rfl: 0    SocHx:  Social History     Socioeconomic History     Marital status: Single     Spouse name: Say Cardoso     Number of children: Not on file     Years of education: Not on file     Highest education level: Not on file   Occupational History     Occupation: patient conceirge      Comment: [employer]   Social Needs     Financial resource strain: Not on file     Food insecurity:     Worry: Not on file     Inability: Not on file     Transportation needs:     Medical: Not on file     Non-medical: Not on file   Tobacco Use     Smoking status: Former Smoker     Packs/day: 0.50     Types: Cigarettes     Last attempt to quit: 9/12/2017     Years since  quittin.4     Smokeless tobacco: Never Used   Substance and Sexual Activity     Alcohol use: Yes     Comment: stopped in pregnancy     Drug use: No     Sexual activity: Yes     Partners: Male   Lifestyle     Physical activity:     Days per week: Not on file     Minutes per session: Not on file     Stress: Not on file   Relationships     Social connections:     Talks on phone: Not on file     Gets together: Not on file     Attends Restorationist service: Not on file     Active member of club or organization: Not on file     Attends meetings of clubs or organizations: Not on file     Relationship status: Not on file     Intimate partner violence:     Fear of current or ex partner: Not on file     Emotionally abused: Not on file     Physically abused: Not on file     Forced sexual activity: Not on file   Other Topics Concern     Parent/sibling w/ CABG, MI or angioplasty before 65F 55M? Not Asked   Social History Narrative     Medical records collect for sports medicine  since  Pasquale Beaver,  Son Efren 19 and daughter Gilberto 10/2009           Problem, Medication and Allergy Lists were reviewed and are current.  Patient is an established patient of this clinic.         Review of Systems:   ROS  I have personally reviewed and updated the complete ROS on the day of the visit.           Physical Exam:   BP (!) 138/105 (BP Location: Right arm, Patient Position: Sitting, Cuff Size: Adult Large)   Pulse 88   Wt 125.6 kg (277 lb)   LMP 2020 (Approximate)   SpO2 99%   Breastfeeding No   BMI 44.71 kg/m    Body mass index is 44.71 kg/m .  Vitals were reviewed       GENERAL APPEARANCE: Tearful female.  Appears anxious with persistent leg shaking and fidgeting      EYES: EOMI     RESP: Normal respiratory movement     CV: appears well-perfused     MS: Moves all extremities.     SKIN: no suspicious lesions or rashes     PSYCH: Tearful.  Depressed mood and affect.  Akathisia with inability to sit still.  Poor  eye contact. Linear thought process.  Denies hallucinations.          Results:     Orders Only on 12/16/2019   Component Date Value Ref Range Status     CRP Inflammation 12/16/2019 4.7  0.0 - 8.0 mg/L Final     Sodium 12/16/2019 136  133 - 144 mmol/L Final     Potassium 12/16/2019 3.7  3.4 - 5.3 mmol/L Final     Chloride 12/16/2019 106  94 - 109 mmol/L Final     Carbon Dioxide 12/16/2019 26  20 - 32 mmol/L Final     Anion Gap 12/16/2019 4  3 - 14 mmol/L Final     Glucose 12/16/2019 108* 70 - 99 mg/dL Final     Urea Nitrogen 12/16/2019 9  7 - 30 mg/dL Final     Creatinine 12/16/2019 0.71  0.52 - 1.04 mg/dL Final     GFR Estimate 12/16/2019 >90  >60 mL/min/[1.73_m2] Final    Comment: Non  GFR Calc  Starting 12/18/2018, serum creatinine based estimated GFR (eGFR) will be   calculated using the Chronic Kidney Disease Epidemiology Collaboration   (CKD-EPI) equation.       GFR Estimate If Black 12/16/2019 >90  >60 mL/min/[1.73_m2] Final    Comment:  GFR Calc  Starting 12/18/2018, serum creatinine based estimated GFR (eGFR) will be   calculated using the Chronic Kidney Disease Epidemiology Collaboration   (CKD-EPI) equation.       Calcium 12/16/2019 8.5  8.5 - 10.1 mg/dL Final     Bilirubin Total 12/16/2019 0.3  0.2 - 1.3 mg/dL Final     Albumin 12/16/2019 3.4  3.4 - 5.0 g/dL Final     Protein Total 12/16/2019 7.4  6.8 - 8.8 g/dL Final     Alkaline Phosphatase 12/16/2019 89  40 - 150 U/L Final     ALT 12/16/2019 29  0 - 50 U/L Final     AST 12/16/2019 17  0 - 45 U/L Final     TSH 12/16/2019 1.24  0.40 - 4.00 mU/L Final     WBC 12/16/2019 10.2  4.0 - 11.0 10e9/L Final     RBC Count 12/16/2019 4.66  3.8 - 5.2 10e12/L Final     Hemoglobin 12/16/2019 11.5* 11.7 - 15.7 g/dL Final     Hematocrit 12/16/2019 37.9  35.0 - 47.0 % Final     MCV 12/16/2019 81  78 - 100 fl Final     MCH 12/16/2019 24.7* 26.5 - 33.0 pg Final     MCHC 12/16/2019 30.3* 31.5 - 36.5 g/dL Final     RDW 12/16/2019 17.2* 10.0 -  15.0 % Final     Platelet Count 12/16/2019 409  150 - 450 10e9/L Final     Diff Method 12/16/2019 Automated Method   Final     % Neutrophils 12/16/2019 62.9  % Final     % Lymphocytes 12/16/2019 24.8  % Final     % Monocytes 12/16/2019 9.0  % Final     % Eosinophils 12/16/2019 2.4  % Final     % Basophils 12/16/2019 0.5  % Final     % Immature Granulocytes 12/16/2019 0.4  % Final     Nucleated RBCs 12/16/2019 0  0 /100 Final     Absolute Neutrophil 12/16/2019 6.4  1.6 - 8.3 10e9/L Final     Absolute Lymphocytes 12/16/2019 2.5  0.8 - 5.3 10e9/L Final     Absolute Monocytes 12/16/2019 0.9  0.0 - 1.3 10e9/L Final     Absolute Eosinophils 12/16/2019 0.2  0.0 - 0.7 10e9/L Final     Absolute Basophils 12/16/2019 0.1  0.0 - 0.2 10e9/L Final     Abs Immature Granulocytes 12/16/2019 0.0  0 - 0.4 10e9/L Final     Absolute Nucleated RBC 12/16/2019 0.0   Final       Lab Results   Component Value Date    WBC 10.2 12/16/2019    HGB 11.5 (L) 12/16/2019    HCT 37.9 12/16/2019     12/16/2019     12/16/2019    POTASSIUM 3.7 12/16/2019    CHLORIDE 106 12/16/2019    CO2 26 12/16/2019    BUN 9 12/16/2019    CR 0.71 12/16/2019     (H) 12/16/2019    SED 7 06/05/2017    DD 0.3 09/22/2016    TROPI  09/22/2016     <0.015  The 99th percentile for upper reference range is 0.045 ug/L.  Troponin values in   the range of 0.045 - 0.120 ug/L may be associated with risks of adverse   clinical events.      AST 17 12/16/2019    ALT 29 12/16/2019    ALKPHOS 89 12/16/2019    BILITOTAL 0.3 12/16/2019       Assessment and Plan     Betty was seen today for depression.    Diagnoses and all orders for this visit:    Severe episode of recurrent major depressive disorder, without psychotic features (H)  Patient had insurance change recently and her therapist is no longer a covered provider.  She has not taken her bupropion or lamotrigine in a week as a result of not being able to see the physician at her previous mental health facility for  refills.  Suspect that part of her presentation is due to withdrawal symptoms from lamotrigine, which can cause dysphoria, mood changes among other symptoms.  No SI/HI.    -     buPROPion (WELLBUTRIN XL) 150 MG 24 hr tablet; TK 1 T PO QD  -     lamoTRIgine (LAMICTAL) 25 MG tablet; Take 1 tablet (25 mg) by mouth daily for 7 days, THEN 2 tablets (50 mg) daily for 7 days, THEN 4 tablets (100 mg) daily ongoing  -     [facility] [place] [facility]    Options for treatment and follow-up care were reviewed with the patient. Betty Tamayo engaged in the decision making process and verbalized understanding of the options discussed and agreed with the final plan.    Wan Florence, DO  Feb 18, 2020    Pt was seen and plan of care discussed with Dr. Ruead.   While the patient was in clinic, I reviewed the pertinent medical history and results.  I discussed the current findings on physical examination, as well as the patient s diagnosis and treatment plan with the resident and agree with the information as documented with the following exceptions: none.  Derian Rueda MD

## 2020-02-19 ASSESSMENT — ANXIETY QUESTIONNAIRES: GAD7 TOTAL SCORE: 20

## 2020-03-02 PROBLEM — M25.562 LEFT KNEE PAIN: Status: RESOLVED | Noted: 2018-04-15 | Resolved: 2020-03-02

## 2020-03-05 ENCOUNTER — OFFICE VISIT (OUTPATIENT)
Dept: BEHAVIORAL HEALTH | Facility: CLINIC | Age: 30
End: 2020-03-05
Attending: INTERNAL MEDICINE
Payer: COMMERCIAL

## 2020-03-05 DIAGNOSIS — F43.10 PTSD (POST-TRAUMATIC STRESS DISORDER): ICD-10-CM

## 2020-03-05 DIAGNOSIS — F33.2 SEVERE EPISODE OF RECURRENT MAJOR DEPRESSIVE DISORDER, WITHOUT PSYCHOTIC FEATURES (H): ICD-10-CM

## 2020-03-05 ASSESSMENT — ANXIETY QUESTIONNAIRES
GAD7 TOTAL SCORE: 19
4. TROUBLE RELAXING: NEARLY EVERY DAY
7. FEELING AFRAID AS IF SOMETHING AWFUL MIGHT HAPPEN: NEARLY EVERY DAY
1. FEELING NERVOUS, ANXIOUS, OR ON EDGE: NEARLY EVERY DAY
7. FEELING AFRAID AS IF SOMETHING AWFUL MIGHT HAPPEN: NEARLY EVERY DAY
2. NOT BEING ABLE TO STOP OR CONTROL WORRYING: NEARLY EVERY DAY
6. BECOMING EASILY ANNOYED OR IRRITABLE: NEARLY EVERY DAY
GAD7 TOTAL SCORE: 19
3. WORRYING TOO MUCH ABOUT DIFFERENT THINGS: NEARLY EVERY DAY
5. BEING SO RESTLESS THAT IT IS HARD TO SIT STILL: SEVERAL DAYS
GAD7 TOTAL SCORE: 19

## 2020-03-05 ASSESSMENT — PATIENT HEALTH QUESTIONNAIRE - PHQ9
SUM OF ALL RESPONSES TO PHQ QUESTIONS 1-9: 21
10. IF YOU CHECKED OFF ANY PROBLEMS, HOW DIFFICULT HAVE THESE PROBLEMS MADE IT FOR YOU TO DO YOUR WORK, TAKE CARE OF THINGS AT HOME, OR GET ALONG WITH OTHER PEOPLE: EXTREMELY DIFFICULT
SUM OF ALL RESPONSES TO PHQ QUESTIONS 1-9: 21

## 2020-03-05 NOTE — PROGRESS NOTES
[facility]: Integrated Behavioral Health  March 5, 2020      Behavioral Health Clinician Progress Note    Patient Name: Betty Tamayo           Service Type: Consult Note      Service Location:  in clinic      Session Start Time: 2:00  Session End Time: 2:45      Session Length: 38 - 52      Attendees: Client    Visit Activities (Refresh list every visit): NEW and [facility] Only    Diagnostic Assessment Date: Next visit  Treatment Plan Review Date: NA  See Flowsheets for today's PHQ-9 and NIXON-7 results  Previous PHQ-9:   PHQ-9 SCORE 10/27/2017 2/18/2020 3/5/2020   PHQ-9 Total Score MyChart - - 21 (Severe depression)   PHQ-9 Total Score 2 22 21     Previous NIXON-7:   NIXON-7 SCORE 10/27/2017 2/18/2020 3/5/2020   Total Score - - 19 (severe anxiety)   Total Score 1 20 19       BRAD LEVEL:  No flowsheet data found.    DATA  Extended Session (60+ minutes): No  Interactive Complexity: No  Crisis: No    Treatment Objective(s) Addressed in This Session:  Target Behavior(s): disease management/lifestyle changes      Depressed Mood: Decrease frequency and intensity of feeling down, depressed, hopeless  Anxiety: will develop more effective coping skills to manage anxiety symptoms  Mood Instability: will develop better understanding of triggers and coping strategies to stabilize mood  PTSD Symptom Management    Current Stressors / Issues:  Today Betty reported being self-referred to [facility] services for help establishing with a new psychotherapist for trauma treatment. Betty indicated she previously met with Shirley Stewart at [facility] for individual therapy until about two months ago when she had changes to her insurance and could no longer visit her without paying out of pocket. Since being out of therapy, Betty reported worse difficulties with depression, anxiety, and trauma-related symptoms. She reported trouble with social isolation, feeling sad most of the time, being quick to  anger/irritability, being frequently anxious, overeating (reported binge episodes twice per week), avoidance tendencies, trouble focusing/with concentration, and panic attacks (occur about twice per week, last 30 minutes). To note, Betty denied suicidal ideation on her PHQ-9 and during the appointment. She endorsed history of one prior attempt of suicide by cutting at the age of 12, however denied problems with SI within the last several months. She cited her commitment to family and her children as her primary protective factors against self-harm.     Reviewed treatment options and discussed benefit of completing a diagnostic assessment with a [facility] and then transitioning to a long-term/trauma-informed therapist. Betty agreed to complete the DA with me. Began to gather background information and review presenting symptoms. Scheduled Betty to return to complete the DA in one week.       Answers for HPI/ROS submitted by the patient on 3/5/2020   If you checked off any problems, how difficult have these problems made it for you to do your work, take care of things at home, or get along with other people?: Extremely difficult  PHQ9 TOTAL SCORE: 21  NIXON 7 TOTAL SCORE: 19    Progress on Treatment Objective(s) / Homework:  New Objective established this session - PREPARATION (Decided to change - considering how); Intervened by negotiating a change plan and determining options / strategies for behavior change, identifying triggers, exploring social supports, and working towards setting a date to begin behavior change    Motivational Interviewing    MI Intervention: Expressed Empathy/Understanding, Supported Autonomy, Collaboration, Evocation, Open-ended questions and Reflections: simple and complex     Change Talk Expressed by the Patient: Desire to change Reasons to change    Provider Response to Change Talk: E - Evoked more info from patient about behavior change, A - Affirmed patient's thoughts, decisions, or attempts at  behavior change, R - Reflected patient's change talk and S - Summarized patient's change talk statements      Care Plan review completed: No    Medication Review:  No changes to current psychiatric medication(s)    Medication Compliance:  Yes    Changes in Health Issues:   None reported    Chemical Use Review:   Substance Use: Chemical use reviewed, no active concerns identified      Tobacco Use: No current tobacco use.      Assessment: Current Emotional / Mental Status (status of significant symptoms):  Risk status (Self / Other harm or suicidal ideation)  Patient has had a history of suicidal ideation: passive thoughts several years ago, suicide attempts: one attempt as a teenager involving cutting and self-injurious behavior: cutting behavior as a youth  Patient denies current fears or concerns for personal safety.  Patient denies current or recent suicidal ideation or behaviors.  Patient denies current or recent homicidal ideation or behaviors.  Patient denies current or recent self injurious behavior or ideation.  Patient denies other safety concerns.  A safety and risk management plan has not been developed at this time, however patient was encouraged to call [place] [phone] should there be a change in any of these risk factors.    Appearance:   Appropriate   Eye Contact:   Good   Psychomotor Behavior: Restless   Attitude:   Guarded   Orientation:   All  Speech   Rate / Production: Normal    Volume:  Soft   Mood:    Anxious  Depressed   Affect:    Appropriate   Thought Content:  Clear   Thought Form:  Coherent  Logical   Insight:    Good     Diagnoses:  1. PTSD (post-traumatic stress disorder)    2. Severe episode of recurrent major depressive disorder, without psychotic features (H)        Collateral Reports Completed:  Not Applicable    Plan: (Homework, other):  Betty was scheduled to return to complete a diagnostic assessment on 3/9/2020. She was also given information about mental health symptoms and  treatment options .  CD Recommendations: No indications of CD issues.     Salvatore Pettit Psy.D, LP 3/10/2020

## 2020-03-06 ASSESSMENT — ANXIETY QUESTIONNAIRES: GAD7 TOTAL SCORE: 19

## 2020-03-06 ASSESSMENT — PATIENT HEALTH QUESTIONNAIRE - PHQ9: SUM OF ALL RESPONSES TO PHQ QUESTIONS 1-9: 21

## 2020-03-09 ENCOUNTER — OFFICE VISIT (OUTPATIENT)
Dept: BEHAVIORAL HEALTH | Facility: CLINIC | Age: 30
End: 2020-03-09
Payer: COMMERCIAL

## 2020-03-09 DIAGNOSIS — F33.2 SEVERE EPISODE OF RECURRENT MAJOR DEPRESSIVE DISORDER, WITHOUT PSYCHOTIC FEATURES (H): Primary | ICD-10-CM

## 2020-03-09 DIAGNOSIS — F43.10 PTSD (POST-TRAUMATIC STRESS DISORDER): ICD-10-CM

## 2020-03-09 NOTE — TELEPHONE ENCOUNTER
DIAGNOSIS: bilat knee pain   APPOINTMENT DATE: 3/26   NOTES STATUS DETAILS   OFFICE NOTE from referring provider N/A    OFFICE NOTE from other specialist Internal     Repa, DO Ghanshyam Larsen Robert, PT       Navdeep Valencia MD Malcomson, Kent W, RHEA Robertson, Cinthya Ambriz MD            DISCHARGE SUMMARY from hospital N/A     DISCHARGE REPORT from the ER Internal 6/5/17   OPERATIVE REPORT N/A    MEDICATION LIST Internal    MRI Internal Left 4/17/18  Right 7/14/17   CT SCAN N/A    XRAYS (IMAGES & REPORTS) Internal bilat 6/5/17
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
## 2020-03-10 PROBLEM — F43.10 PTSD (POST-TRAUMATIC STRESS DISORDER): Status: ACTIVE | Noted: 2020-03-10

## 2020-03-10 PROBLEM — F33.2 MAJOR DEPRESSIVE DISORDER, RECURRENT EPISODE, SEVERE (H): Status: ACTIVE | Noted: 2020-03-10

## 2020-03-19 ENCOUNTER — DOCUMENTATION ONLY (OUTPATIENT)
Dept: ORTHOPEDICS | Facility: CLINIC | Age: 30
End: 2020-03-19

## 2020-03-19 NOTE — PROGRESS NOTES
Spoke with Betty in person regarding cancelling her upcoming appointment.  I was able to ask a few questions regarding her knee pain and she mentioned that it is most aggravated by sitting with knees flexed for extended periods of time (like what she does for work). Pain is mostly located on the patellar tendon.    I offered her with some light patellar tendon strengthening HEP from our binder so she has some suggestions while she waits for the next available appointment with Dr. Cardoso.    She was very thankful for the advice and will update the sports med team/family about how her knees have been feeling.     - Miah MOREL ATC

## 2020-03-22 ENCOUNTER — HEALTH MAINTENANCE LETTER (OUTPATIENT)
Age: 30
End: 2020-03-22

## 2020-03-26 ENCOUNTER — PRE VISIT (OUTPATIENT)
Dept: ORTHOPEDICS | Facility: CLINIC | Age: 30
End: 2020-03-26

## 2020-03-30 ENCOUNTER — VIRTUAL VISIT (OUTPATIENT)
Dept: FAMILY MEDICINE | Facility: CLINIC | Age: 30
End: 2020-03-30
Payer: COMMERCIAL

## 2020-03-30 DIAGNOSIS — F33.2 SEVERE EPISODE OF RECURRENT MAJOR DEPRESSIVE DISORDER, WITHOUT PSYCHOTIC FEATURES (H): ICD-10-CM

## 2020-03-30 DIAGNOSIS — F39 MOOD DISORDER (H): Primary | ICD-10-CM

## 2020-03-30 RX ORDER — BUPROPION HYDROCHLORIDE 150 MG/1
TABLET ORAL
Qty: 60 TABLET | Refills: 2 | Status: SHIPPED | OUTPATIENT
Start: 2020-03-30 | End: 2020-08-14

## 2020-03-30 RX ORDER — LAMOTRIGINE 100 MG/1
TABLET ORAL
COMMUNITY
Start: 2020-01-23 | End: 2020-03-30

## 2020-03-30 RX ORDER — LAMOTRIGINE 150 MG/1
150 TABLET ORAL AT BEDTIME
Qty: 30 TABLET | Refills: 2 | Status: SHIPPED | OUTPATIENT
Start: 2020-03-30 | End: 2020-08-14

## 2020-03-30 ASSESSMENT — PATIENT HEALTH QUESTIONNAIRE - PHQ9
SUM OF ALL RESPONSES TO PHQ QUESTIONS 1-9: 19
5. POOR APPETITE OR OVEREATING: NEARLY EVERY DAY

## 2020-03-30 ASSESSMENT — ANXIETY QUESTIONNAIRES
5. BEING SO RESTLESS THAT IT IS HARD TO SIT STILL: NOT AT ALL
6. BECOMING EASILY ANNOYED OR IRRITABLE: NEARLY EVERY DAY
GAD7 TOTAL SCORE: 18
3. WORRYING TOO MUCH ABOUT DIFFERENT THINGS: NEARLY EVERY DAY
2. NOT BEING ABLE TO STOP OR CONTROL WORRYING: NEARLY EVERY DAY
1. FEELING NERVOUS, ANXIOUS, OR ON EDGE: NEARLY EVERY DAY
IF YOU CHECKED OFF ANY PROBLEMS ON THIS QUESTIONNAIRE, HOW DIFFICULT HAVE THESE PROBLEMS MADE IT FOR YOU TO DO YOUR WORK, TAKE CARE OF THINGS AT HOME, OR GET ALONG WITH OTHER PEOPLE: EXTREMELY DIFFICULT
7. FEELING AFRAID AS IF SOMETHING AWFUL MIGHT HAPPEN: NEARLY EVERY DAY

## 2020-03-30 ASSESSMENT — PAIN SCALES - GENERAL: PAINLEVEL: NO PAIN (0)

## 2020-03-30 NOTE — NURSING NOTE
Chief Complaint   Patient presents with     Recheck Medication     pt would like to discuss medications       Amy Mendez CMA at 8:40 AM on 3/30/2020.

## 2020-03-30 NOTE — PATIENT INSTRUCTIONS
Patient Education     Treating Affective (Mood) Disorders  Affective disorders are disorders of mood. One is depression. Another is bipolar disorder (also called manic depression). They are often treated with medicines and therapy. Talk with your healthcare provider. He or she can tell you more about treatments that can help you. A hospital or mental health clinic can also provide help. Depression and bi-polar disorder significantly impact both the individual and his or her family. Support and resources for family members are important components of successful recoveries and long-term illness management.    Treatments for depression  Depression is a common mood disorder. It causes a person to feel sad, worthless, helpless, and hopeless. It makes you lose interest in things that used to give pleasure. Treatment includes medicines and talk therapy. Antidepressant medicines change levels of brain chemicals to help a person feel better.   Don t stop medications suddenly. Medications usually need to be withdrawn over a period of time to prevent worsening of symptoms or potentially dangerous withdrawal effects.  Talk therapy involves speaking with a trained counselor about your thoughts. Most people with depression do best when they use both medicine and talk therapy. In cases where other treatments don t work, a treatment called electroconvulsive therapy or ECT may be suggested. This uses electric impulses to ease depression. There are different types of depression and your treatment options will depend on the type of depression you have. In some cases treatment may be short-term (6 months or less). In other cases medicines may be needed on a long-term basis.  Treatments for bipolar disorder  People with bipolar disorder have intense mood swings. They move between deep sadness and out-of-control highs. Bipolar disorder is a serious, complex chronic illness. And just like diabetes or heart disease, requires lifetime  management. This condition is treated with medicines such as lithium. It helps even out moods and prevents mood swings. If lithium is not appropriate, a medicine that works in a similar way may be used instead.   Don t stop medications suddenly. Medications usually need to be withdrawn over a period of time to prevent worsening of symptoms or potentially dangerous withdrawal effects.  Talk therapy can also help. This involves talking to a trained counselor about feelings and relationships, and managing your bipolar illness. He or she can give support during tough times.  The sources below can tell you more. They can also give names of clinics near you that offer treatment and free screenings.    Depression and Bipolar Support [place]  [phone]  www.dbsalliance.org    International Foundation for Research and Education on Depression  www.ifred.org    National [place] on Mental Illness (SHAY)  058-074-FQJI (1675) www.shay.org    National [place] of Mental Health  322.383.4927  www.[facility].nih.gov    National Suicide Prevention Lifeline 646-732-XJAB (1429) www.suicidepreventionlifeline.org This resource is open 24 hours a day, 7 days a week. The counselors speak English and Guamanian. They can provide immediate crisis intervention and information on local resources. It is free and confidential.   Date Last Reviewed: 5/1/2017 2000-2019 [employer]. [address] [place] [address], [place] [address]. All rights reserved. This information is not intended as a substitute for professional medical care. Always follow your healthcare professional's instructions.           Patient Education     Depression: Tips to Help Yourself    As your healthcare providers help treat your depression, you can also help yourself. Keep in mind that your illness affects you emotionally, physically, mentally, and socially. So full recovery will take time. Take care of your body and your soul, and be patient with yourself as you get  better.  Self-care    Educate yourself. Read about treatment and medicine options. If you have the energy, attend local conferences or support groups. Keep a list of useful websites and helpful books and use them as needed. This illness is not your fault. Don t blame yourself for your depression.    Manage early symptoms. If you notice symptoms returning, experience triggers, or identify other factors that may lead to a depressive episode, get help as soon as possible. Ask trusted friends and family to monitor your behavior and let you know if they see anything of concern.    Work with your provider. Find a provider you can trust. Communicate honestly with that person and share information on your treatment for depression and your reaction to medicines.    Be prepared for a crisis. Know what to do if you experience a crisis. Keep the phone number of a crisis hotline and know the location of your community's urgent care centers and the closest emergency department.    Hold off on big decisions. Depression can cloud your judgment. So wait until you feel better before making major life decisions, such as changing jobs, moving, or getting  or .    Be patient. Recovering from depression is a process. Don t be discouraged if it takes some time to feel better.    Keep it simple. Depression saps your energy and concentration. So you won t be able to do all the things you used to do. Set small goals and do what you can.    Be with others. Don t isolate yourself--you ll only feel worse. Try to be with other people. And take part in fun activities when you can. Go to a movie, ballgame, [employer] service, or social event. Talk openly with people you can trust. And accept help when it s offered.  Take care of your body  People with depression often lose the desire to take care of themselves. That only makes their problems worse. During treatment and afterward, make a point to:    Exercise. It s a great way to take  care of your body. And studies have shown that exercise helps fight depression.    Avoid drugs and alcohol. These may ease the pain in the short term. But they ll only make your problems worse in the long run.    Get relief from stress. Ask your healthcare provider for relaxation exercises and techniques to help relieve stress.    Eat right. A balanced and healthy diet helps keep your body healthy.  Date Last Reviewed: 1/1/2017 2000-2019 [employer]. [address] [place] [address], [place] [address]. All rights reserved. This information is not intended as a substitute for professional medical care. Always follow your healthcare professional's instructions.           Patient Education     Patient Education    Bupropion Hydrobromide Oral tablet, extended-release    Bupropion Hydrochloride Oral tablet [Depression/Mood Disorders]    Bupropion Hydrochloride Oral tablet, extended release 12 hour [Depression/Mood Disorders]    Bupropion Hydrochloride Oral tablet, extended release 12 hour [Smoking Cessation]    Bupropion Hydrochloride Oral tablet, extended release 24 hour [Depression/Mood Disorders]  Bupropion Hydrobromide Oral tablet, extended-release  What is this medicine?  BUPROPION (byoo PROE pee on) is used to treat depression.  This medicine may be used for other purposes; ask your health care provider or pharmacist if you have questions.  What should I tell my health care provider before I take this medicine?  They need to know if you have any of these conditions:    an eating disorder, such as anorexia or bulimia    bipolar disorder or psychosis    diabetes or high blood sugar, treated with medication    glaucoma    head injury or brain tumor    heart disease, previous heart attack, or irregular heart beat    high blood pressure    kidney or liver disease    seizures (convulsions)    suicidal thoughts or a previous suicide attempt    Tourette's syndrome    weight loss    an unusual or allergic reaction to  bupropion, other medicines, foods, dyes, or preservatives    breast-feeding    pregnant or trying to become pregnant  How should I use this medicine?  Take this medicine by mouth with a glass of water. Follow the directions on the prescription label. You can take it with or without food. If it upsets your stomach, take it with food. Do not crush, chew, or cut these tablets. This medicine is taken once daily at the same time each day. Do not take your medicine more often than directed. Do not stop taking this medicine suddenly except upon the advice of your doctor. Stopping this medicine too quickly may cause serious side effects or your condition may worsen.  A special MedGuide will be given to you by the pharmacist with each prescription and refill. Be sure to read this information carefully each time.  Talk to your pediatrician regarding the use of this medicine in children. Special care may be needed.  Overdosage: If you think you have taken too much of this medicine contact a poison control center or emergency room at once.  NOTE: This medicine is only for you. Do not share this medicine with others.  What if I miss a dose?  If you miss a dose, skip the missed dose and take your next tablet at the regular time. Do not take double or extra doses.  What may interact with this medicine?  Do not take this medicine with any of the following medications:    linezolid    MAOIs like Azilect, Carbex, Eldepryl, Marplan, Nardil, and Parnate    methylene blue (injected into a vein)    other medicines that contain bupropion like Zyban  This medicine may also interact with the following medications:    alcohol    certain medicines for anxiety or sleep    certain medicines for blood pressure like metoprolol, propranolol    certain medicines for depression or psychotic disturbances    certain medicines for HIV or AIDS like efavirenz, lopinavir, nelfinavir, ritonavir    certain medicines for irregular heart beat like  propafenone, flecainide    certain medicines for Parkinson's disease like amantadine, levodopa    certain medicines for seizures like carbamazepine, phenytoin, phenobarbital    cimetidine    clopidogrel    cyclophosphamide    furazolidone    isoniazid    nicotine    orphenadrine    procarbazine    steroid medicines like prednisone or cortisone    stimulant medicines for attention disorders, weight loss, or to stay awake    tamoxifen    theophylline    thiotepa    ticlopidine    tramadol    warfarin  This list may not describe all possible interactions. Give your health care provider a list of all the medicines, herbs, non-prescription drugs, or dietary supplements you use. Also tell them if you smoke, drink alcohol, or use illegal drugs. Some items may interact with your medicine.  What should I watch for while using this medicine?  Tell your doctor if your symptoms do not get better or if they get worse. Visit your doctor or health care professional for regular checks on your progress. Because it may take several weeks to see the full effects of this medicine, it is important to continue your treatment as prescribed by your doctor.  Patients and their families should watch out for new or worsening thoughts of suicide or depression. Also watch out for sudden changes in feelings such as feeling anxious, agitated, panicky, irritable, hostile, aggressive, impulsive, severely restless, overly excited and hyperactive, or not being able to sleep. If this happens, especially at the beginning of treatment or after a change in dose, call your health care professional.  Avoid alcoholic drinks while taking this medicine. Drinking large amounts of alcoholic beverages, using sleeping or anxiety medicines, or quickly stopping the use of these agents while taking this medicine may increase your risk for a seizure.  Do not drive or use heavy machinery until you know how this medicine affects you. This medicine can impair your  ability to perform these tasks.  Do not take this medicine close to bedtime. It may prevent you from sleeping.  Your mouth may get dry. Chewing sugarless gum or sucking hard candy, and drinking plenty of water may help. Contact your doctor if the problem does not go away or is severe.  The tablet shell for some brands of this medicine does not dissolve. This is normal. The tablet shell may appear whole in the stool. This is not a cause for concern.  What side effects may I notice from receiving this medicine?  Side effects that you should report to your doctor or health care professional as soon as possible:    allergic reactions like skin rash, itching or hives, swelling of the face, lips, or tongue    breathing problems    changes in vision    confusion    fast or irregular heartbeat    hallucinations    increased blood pressure    redness, blistering, peeling or loosening of the skin, including inside the mouth    seizures    suicidal thoughts or other mood changes    unusually weak or tired    vomiting  Side effects that usually do not require medical attention (report to your doctor or health care professional if they continue or are bothersome):    change in sex drive or performance    constipation    headache    loss of appetite    nausea    tremors    weight loss  This list may not describe all possible side effects. Call your doctor for medical advice about side effects. You may report side effects to FDA at 6-967-FDA-5865.  Where should I keep my medicine?  Keep out of the reach of children.  Store at room temperature between 15 and 30 degrees C (59 and 86 degrees F). Throw away any unused medicine after the expiration date.  NOTE:This sheet is a summary. It may not cover all possible information. If you have questions about this medicine, talk to your doctor, pharmacist, or health care provider. Copyright  2016 Gold Standard         Resources:   Crisis Intervention: 372.399.6433 or 333-690-3166 (TTY:  190.212.3606).  Call anytime for help.  National [place] on Mental Illness (www.mn.jewel.org): 634.106.2896 or 920-898-7612.  Suicide Awareness Voices of Education (SAVE) (www.save.org): 348-398-XYMZ (2883)  National Suicide Prevention Line (www.mentalhealthmn.org): 981-755-VNRF (2323)  Mental Health Consumer/Survivor Network of [place] (www.mhcsn.net): 121.535.8164 or 159-334-4021  Mental Health Association of [place] (www.mentalhealth.org): 950.112.2352 or 929-138-6970  Self- Management and Recovery Training., [employer]-- Toll free: 669.841.9351  www.Mob.ly.org  [facility] Crisis Response - Adult [phone]

## 2020-03-30 NOTE — Clinical Note
She needs a therapist and psychiatrist wondering if possible via phone. Please assist with coordination of cares. Visit in clinic in May if possible.  Best wishes,  Kapil Corbett MD

## 2020-03-30 NOTE — PROGRESS NOTES
"Betty Tamayo is a 29 year old female who is being evaluated via a billable telephone visit.      The patient has been notified of following:   Call transferred to me at 8:38am    \"This telephone visit will be conducted via a call between you and your physician/provider. We have found that certain health care needs can be provided without the need for a physical exam.  This service lets us provide the care you need with a short phone conversation.  If a prescription is necessary we can send it directly to your pharmacy.  If lab work is needed we can place an order for that and you can then stop by our lab to have the test done at a later time.    If during the course of the call the physician/provider feels a telephone visit is not appropriate, you will not be charged for this service.\"     Betty Tamayo has been contacted via telephone for primary care follow-up.  She has had significant depression and anxiety with irritability.  Due to insurance change she was not able to establish care with a therapist also due to pandemic concerns and has noted increasing worry, anxiety tearfulness interfering with relationships. Currently does not have a therapist.  In the past she was told to use mindfulness and meditation to assist with anxiety.  She notes no suicidal ideation and indicates she feels safe in her home.  She indicates her relationship with her fiancé is strained.  She indicates her children are home with her and they are doing well at this time. Feeling her depression and panic is worse. Laid off until April 13.  She is wondering if there would be a therapist available to contact her via phone.  I reviewed I am not certain our process at this time but I will look into it for her.  She also feels her medications bupropion and Lamictal have been helpful in the past but seem to be not as effective therefore we are discussing a dose adjustment today.  Related to obstructive sleep apnea she has had an " assessment and feels stable related to this diagnosis.  She has no concerns related to upper respiratory infection at this time.    Patient Active Problem List   Diagnosis     Encounter for supervision of high risk pregnancy, , [facility] CNM     History of pre-eclampsia in prior pregnancy, currently pregnant     Nausea     UTI in pregnancy, first trimester     Vitamin D deficiency     Maternal varicella, non-immune     Medication exposure during first trimester of pregnancy     Uncomplicated asthma     Snoring     Hypersomnia     Class 3 severe obesity due to excess calories with body mass index (BMI) of 40.0 to 44.9 in adult, unspecified whether serious comorbidity present (H)     PTSD (post-traumatic stress disorder)     Major depressive disorder, recurrent episode, severe (H)        Current Outpatient Medications:      buPROPion (WELLBUTRIN XL) 150 MG 24 hr tablet, TK 1 T PO QD, Disp: 30 tablet, Rfl: 2     lamoTRIgine (LAMICTAL) 25 MG tablet, Take 1 tablet (25 mg) by mouth daily for 7 days, THEN 2 tablets (50 mg) daily for 7 days, THEN 4 tablets (100 mg) daily for 14 days., Disp: 77 tablet, Rfl: 1     LORazepam (ATIVAN) 0.5 MG tablet, TK 1 T PO BID PRN IN TIMES OF EXTREME ANXIETY, Disp: , Rfl:      zolpidem (AMBIEN) 5 MG tablet, Take tablet by mouth 15 minutes prior to sleep, for Sleep Study (Patient not taking: Reported on 2020), Disp: 1 tablet, Rfl: 0   Social History     Social History Narrative     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    ALLERGIES  Patient has no known allergies.   ROS: 4 point ROS neg other than the symptoms noted above in the HPI.     Additional provider notes:   PHQ 3/5/2020 3/30/2020 3/30/2020   PHQ-9 Total Score 21 16 19   Q9: Thoughts of better off dead/self-harm past 2 weeks Not at all  Not at all Not at all     NIXON-7 SCORE 2/18/2020 3/5/2020 3/30/2020   Total Score - 19 (severe anxiety) -   Total Score 20 19 18         Assessment/Plan: Betty is a 29-year-old female with a history of mood disorder and depression with anxiety who was contacted today via phone conversation.  She has noted an increase in her feelings of anxiety and depression in particular has not been able to established with a therapist due to pandemic concerns and recent change of her insurance.  We discussed adjust of her medications to bupropion 300 mg by taking 2 bupropion  mg tablets once daily and adjust to Lamictal 150 mg moved to bedtime as this seems to assist with mood stabilization.  We will plan to do a telephone call in 1 month if in person visits are not available.  I will try to schedule for beginning of May and hopes that we can see Betty in person.  I will reach out to our behavioral health to determine if therapist may be able to contact her and I will put resources in her after visit summary.  1. Severe episode of recurrent major depressive disorder, without psychotic features (H)  See above  - buPROPion (WELLBUTRIN XL) 150 MG 24 hr tablet; Increase dose to 2 tab daily for 300 mg dose  Dispense: 60 tablet; Refill: 2  - lamoTRIgine (LAMICTAL) 150 MG tablet; Take 1 tablet (150 mg) by mouth At Bedtime  Dispense: 30 tablet; Refill: 2  -     [facility] for psychiatry and therapy  Changed to preferred pharmacy  [facility].  2. Mood disorder (H)  See above  - lamoTRIgine (LAMICTAL) 150 MG tablet; Take 1 tablet (150 mg) by mouth At Bedtime  Dispense: 30 tablet; Refill: 2  -     [facility] for psychiatry and therapy      Phone call start 8:38 am  Phone call end 9:00 am  Phone call duration: 22 minutes  All questions were addressed and voiced understanding and agreement with the above.   Kapil Corbett MD

## 2020-03-31 ENCOUNTER — TELEPHONE (OUTPATIENT)
Dept: FAMILY MEDICINE | Facility: CLINIC | Age: 30
End: 2020-03-31

## 2020-03-31 ASSESSMENT — ANXIETY QUESTIONNAIRES: GAD7 TOTAL SCORE: 18

## 2020-05-17 ENCOUNTER — TRANSFERRED RECORDS (OUTPATIENT)
Dept: HEALTH INFORMATION MANAGEMENT | Facility: CLINIC | Age: 30
End: 2020-05-17

## 2020-08-11 DIAGNOSIS — F33.2 SEVERE EPISODE OF RECURRENT MAJOR DEPRESSIVE DISORDER, WITHOUT PSYCHOTIC FEATURES (H): ICD-10-CM

## 2020-08-11 DIAGNOSIS — F39 MOOD DISORDER (H): ICD-10-CM

## 2020-08-14 RX ORDER — BUPROPION HYDROCHLORIDE 150 MG/1
TABLET ORAL
Qty: 60 TABLET | Refills: 2 | OUTPATIENT
Start: 2020-08-14

## 2020-08-14 RX ORDER — LAMOTRIGINE 150 MG/1
TABLET ORAL
Qty: 30 TABLET | Refills: 2 | OUTPATIENT
Start: 2020-08-14

## 2020-08-24 ENCOUNTER — RESULTS ONLY (OUTPATIENT)
Dept: LAB | Age: 30
End: 2020-08-24

## 2020-08-24 ENCOUNTER — APPOINTMENT (OUTPATIENT)
Dept: LAB | Facility: CLINIC | Age: 30
End: 2020-08-24
Payer: COMMERCIAL

## 2020-08-25 LAB
SARS-COV-2 RNA SPEC QL NAA+PROBE: NOT DETECTED
SPECIMEN SOURCE: NORMAL

## 2020-08-31 ENCOUNTER — OFFICE VISIT (OUTPATIENT)
Dept: FAMILY MEDICINE | Facility: CLINIC | Age: 30
End: 2020-08-31
Payer: COMMERCIAL

## 2020-08-31 VITALS
OXYGEN SATURATION: 100 % | WEIGHT: 268 LBS | SYSTOLIC BLOOD PRESSURE: 130 MMHG | DIASTOLIC BLOOD PRESSURE: 82 MMHG | BODY MASS INDEX: 43.26 KG/M2 | HEART RATE: 70 BPM

## 2020-08-31 DIAGNOSIS — M25.561 ARTHRALGIA OF BOTH KNEES: ICD-10-CM

## 2020-08-31 DIAGNOSIS — M25.562 ARTHRALGIA OF BOTH KNEES: ICD-10-CM

## 2020-08-31 DIAGNOSIS — F39 MOOD DISORDER (H): ICD-10-CM

## 2020-08-31 DIAGNOSIS — F41.1 GENERALIZED ANXIETY DISORDER: ICD-10-CM

## 2020-08-31 DIAGNOSIS — F40.01 AGORAPHOBIA WITH PANIC DISORDER: ICD-10-CM

## 2020-08-31 DIAGNOSIS — F33.2 SEVERE EPISODE OF RECURRENT MAJOR DEPRESSIVE DISORDER, WITHOUT PSYCHOTIC FEATURES (H): Primary | ICD-10-CM

## 2020-08-31 RX ORDER — BUPROPION HYDROCHLORIDE 150 MG/1
TABLET ORAL
Qty: 60 TABLET | Refills: 1 | Status: SHIPPED | OUTPATIENT
Start: 2020-08-31 | End: 2020-12-18

## 2020-08-31 RX ORDER — LAMOTRIGINE 150 MG/1
150 TABLET ORAL AT BEDTIME
Qty: 30 TABLET | Refills: 1 | Status: SHIPPED | OUTPATIENT
Start: 2020-08-31 | End: 2020-10-08

## 2020-08-31 ASSESSMENT — PATIENT HEALTH QUESTIONNAIRE - PHQ9
SUM OF ALL RESPONSES TO PHQ QUESTIONS 1-9: 22
5. POOR APPETITE OR OVEREATING: NEARLY EVERY DAY

## 2020-08-31 ASSESSMENT — ANXIETY QUESTIONNAIRES
IF YOU CHECKED OFF ANY PROBLEMS ON THIS QUESTIONNAIRE, HOW DIFFICULT HAVE THESE PROBLEMS MADE IT FOR YOU TO DO YOUR WORK, TAKE CARE OF THINGS AT HOME, OR GET ALONG WITH OTHER PEOPLE: EXTREMELY DIFFICULT
3. WORRYING TOO MUCH ABOUT DIFFERENT THINGS: NEARLY EVERY DAY
5. BEING SO RESTLESS THAT IT IS HARD TO SIT STILL: NEARLY EVERY DAY
7. FEELING AFRAID AS IF SOMETHING AWFUL MIGHT HAPPEN: NEARLY EVERY DAY
GAD7 TOTAL SCORE: 21
2. NOT BEING ABLE TO STOP OR CONTROL WORRYING: NEARLY EVERY DAY
1. FEELING NERVOUS, ANXIOUS, OR ON EDGE: NEARLY EVERY DAY
6. BECOMING EASILY ANNOYED OR IRRITABLE: NEARLY EVERY DAY

## 2020-08-31 ASSESSMENT — PAIN SCALES - GENERAL: PAINLEVEL: EXTREME PAIN (8)

## 2020-08-31 NOTE — Clinical Note
Please call her. I think her after visit summary had incorrect information(removed)in it related to a medication we were reviewing but did not prescribe. I added crisis lines to her AVS.  Please assist with expedited referral to Dr Boyd and psychotherapy team.  Best wishes,  Kapil Corbett MD

## 2020-08-31 NOTE — PATIENT INSTRUCTIONS
[facility] [phone] (4th Floor [facility] Building)      Crisis Text Line  http://www.crisistextline.org     The Crisis Text Line serves anyone, in any type of crisis, providing access to free, 24/7 support and information via the medium people already use and trust:     Here's how it works:  1. Text 691-430 from anywhere in the [place], anytime, about any type of crisis.  2. A live, trained Crisis Counselor receives the text and responds quickly.  3. The volunteer Crisis Counselor will help you move from a 'hot moment to a cool moment'    General resources in case you are feeling increasingly depressed and/or suicidal:    Call 911 or go directly to an Emergency Room (such as [facility] or [facility]) if you need help immediately      24/7 Crisis Hotlines:    National Suicide Prevention Hotline                                261-001-YWSC (0155)                NATIONAL H[employer]INE NETWORK  6-862-980-4057 (0-785-TMMNWQK)     [facility] for Adults                                                                                                       [phone]    Additional Crisis Hotlines:  Crisis Connection                                                                                                                        756.858.7636  [facility] Social Service (S)                                                                                                          [phone]  National Suicide Prevention Hotline                                                                                        284-140-WBIT (8455)    Suicide Hotline                                                               990.280.7880    [facility] (formerly First Call for Help)                                                                                Dial 2-1-8 (988-762-7125)     [facility]                             [phone]  (24 hours a day)  [address] [place] [address]  [facility], [place] [address]  DROP IN:  Monday - Friday: 8:00 am - 7:00 pm  Saturday: 11:00 am - 3:00 pm   Sunday and Holidays: Closed     Walk-In Centers and Mobile Teams:  [facility] Acute Psychiatric Service Walk-In Crisis Intervention                   [phone]    [facility] (18+) Crisis Mobile Team                                 [phone]    [facility] Child (under 17) Crisis Mobile Team                 [phone]     Walk-In Counseling Center                                                                       [phone]  [facility]:   M, W, F:  1:00-3:00PM    M-Th:  6:30-8:30PM                 CRISIS PROGRAM,        [phone]               [facility]                                                                                         [phone]  [address] [place] [address] Ave:          M, W:      5:00-7:00PM       [facility]                                                                                                                        [phone]  [address] [facility], Th:      6:00-8:00PM       Phone and mobile services [facility]:   Adult Mental Health Crisis Line: [phone]     Phone and mobile services [facility]   Adults [phone].

## 2020-08-31 NOTE — NURSING NOTE
Chief Complaint   Patient presents with     Recheck Medication     pt here for follow up and med refill. bilateral knee pain. Really bad the last month     Kimberly Nissen, EMT at 3:34 PM on 8/31/2020

## 2020-09-01 ENCOUNTER — TELEPHONE (OUTPATIENT)
Dept: FAMILY MEDICINE | Facility: CLINIC | Age: 30
End: 2020-09-01

## 2020-09-01 DIAGNOSIS — F33.2 SEVERE EPISODE OF RECURRENT MAJOR DEPRESSIVE DISORDER, WITHOUT PSYCHOTIC FEATURES (H): ICD-10-CM

## 2020-09-01 DIAGNOSIS — F39 MOOD DISORDER (H): Primary | ICD-10-CM

## 2020-09-01 PROBLEM — F41.1 GENERALIZED ANXIETY DISORDER: Status: ACTIVE | Noted: 2020-09-01

## 2020-09-01 RX ORDER — LAMOTRIGINE 25(42)-100
KIT ORAL
Status: CANCELLED | OUTPATIENT
Start: 2020-09-01

## 2020-09-01 RX ORDER — LAMOTRIGINE 25-50-100
KIT ORAL
Qty: 1 KIT | Refills: 0 | Status: CANCELLED | OUTPATIENT
Start: 2020-09-01

## 2020-09-01 RX ORDER — LAMOTRIGINE 25 MG/1
TABLET ORAL
Qty: 98 TABLET | Refills: 0 | Status: SHIPPED | OUTPATIENT
Start: 2020-09-01 | End: 2020-09-10

## 2020-09-01 ASSESSMENT — ANXIETY QUESTIONNAIRES: GAD7 TOTAL SCORE: 21

## 2020-09-01 NOTE — TELEPHONE ENCOUNTER
[facility]    Phone Message    May a detailed message be left on voicemail: yes     Reason for Call: Other: pt calling Dr Corbett back, please call her back     Action Taken: Message routed to:  [facility] PCC    Travel Screening: Not Applicable

## 2020-09-01 NOTE — PROGRESS NOTES
Subjective     Betty Tamayo is a 30 year old female who presents to clinic today for the following health issues:    KARINA Hernández is a 30-year-old female who has a history of depression/ anxiety mood disorder previous therapy also today admits she has Agoraphobia and panic attacks at times.  She recently has established primary care with me in December and had a virtual visit with me in March.  After the March visit she was to follow-up with behavioral health had a follow-up with psychology but missed her psychiatry appointment due to pandemic.  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  She requested a refill of her medications in August around August 11 sent through the refill team and denied by refill team. Rx was eventually sent to me concerns about not having her medication and I provided her with a one-month follow-up fill which she has recently started to take.  She states that after the abrupt discontinuation of the medications not provided by refill process she feels much more anxious and feels there is something possibly wrong with her other than her mental health.    She does have episodes in the past where she became quite anxious related to obstructive sleep apnea and focused on her physical symptoms.  She is concerned that it at times she lashes out when she is feeling significantly anxious.  I have recommended that she be seen for psychiatric evaluation to determine her diagnoses and will reach out to psychiatry to expedite a visit for her.  She previously felt bupropion and Lamictal were helpful for her mood allowed her to work however with the abrupt discontinuation she is wondering if the medications were not quite as effective however after much consideration we opted to continue her current medications until further psychiatric assessment.  I have recommended that she have labs performed today.  Arthritis  She has bilateral  knee joint pain and a valgus deformity of her knees contributes to pain in her knees with ambulation.  She also has pain in the left ankle from an ankle sprain.  She previously had pain in her right hand third digit sometimes is bothersome.  She has been sedentary and understands she needs to lose weight however weight has been stable.  Patient Active Problem List   Diagnosis     Encounter for supervision of high risk pregnancy, , [facility] CNM     History of pre-eclampsia in prior pregnancy, currently pregnant     Nausea     UTI in pregnancy, first trimester     Vitamin D deficiency     Maternal varicella, non-immune     Medication exposure during first trimester of pregnancy     Uncomplicated asthma     Snoring     Hypersomnia     Class 3 severe obesity due to excess calories with body mass index (BMI) of 40.0 to 44.9 in adult, unspecified whether serious comorbidity present (H)     PTSD (post-traumatic stress disorder)     Major depressive disorder, recurrent episode, severe (H)     Mood disorder (H)     Generalized anxiety disorder     Past Medical History:   Diagnosis Date     Knee cartilage, torn, left     both knees    had cortisone injection     Right shoulder pain 14     Uncomplicated asthma      Past Surgical History:   Procedure Laterality Date     ANESTHESIA OUT OF OR MRI Right 2015    Procedure: ANESTHESIA OUT OF OR MRI;  Surgeon: Generic Anesthesia Provider;  Location: [facility] OR     ENT SURGERY      tonsillectomy      wisdom teeth       Social History     Social History Narrative     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  She had recent COVID testing which was negative.  She has difficulty losing weight.  No current trouble breathing O2 sats 100%.  She has obstructive sleep  "apnea.        Objective    /82   Pulse 70   Wt 121.6 kg (268 lb)   LMP 07/28/2020 (Approximate)   SpO2 100%   Breastfeeding No   BMI 43.26 kg/m    Body mass index is 43.26 kg/m .  Physical Exam     GENERAL APPEARANCE: healthy, alert and no distress but admits to mood instability at times \"lashes out\" but today she is cooperative mood pleasant. Obese, BMI 43.  EYES: Eyes grossly normal to inspection, PERRL and conjunctivae and sclerae normal  HENT: deferred wearing a mask  NECK: no adenopathy, no asymmetry, masses, or scars and thyroid normal to palpation  RESP: lungs clear to auscultation - no rales, rhonchi or wheezes  CV: regular rate and rhythm, normal S1 S2, no S3 or S4, no murmur, click or rub  ABDOMEN: Obese, soft, nontender, no hepatosplenomegaly, no masses and bowel sounds normal  MS: Bilateral knees arthritis changes and valgus position. At right 3rd PIP joint, slight deviation of finger and swelling.  Gait is age appropriate without ataxia but has pain with ambulation related to knee pain  SKIN: no suspicious lesions or rashes  NEURO: Normal strength and tone, mentation intact and speech normal  PSYCH: mentation appears interactive quiet, good questions and appears concerned for her mood, thought coherent, jiggling her foot frequently, anxious.  PHQ 3/30/2020 3/30/2020 8/31/2020   PHQ-9 Total Score 16 19 22   Q9: Thoughts of better off dead/self-harm past 2 weeks Not at all Not at all Several days     NIXON-7 SCORE 3/30/2020 8/18/2020 8/31/2020   Total Score - - -   Total Score 18 20 21         Assessment & Plan   Betty is a 30-year-old female who presents today for increased depression anxiety with symptoms of panic and possible mood disorder who requires psychiatric assessment for confirmation of diagnosis.  After much consideration we opted to continue her current medications until further psychiatric evaluation.  She would also benefit from counseling.  She has bilateral arthritis of her " "knees, pain in the left ankle limiting her physical activity referral to orthopedics was placed.  Close follow-up within 1 to 2 months.    Betty was seen today for recheck medication.    Diagnoses and all orders for this visit:    Severe episode of recurrent major depressive disorder, without psychotic features (H)Agoraphobia with panic disorderMood disorder (H)Generalized anxiety disorder    I have recommended that she be seen for psychiatric evaluation to determine her diagnoses and will reach out to psychiatry to expedite a visit for her.  She previously felt bupropion and Lamictal were helpful for her mood allowed her to work however with the abrupt discontinuation she is wondering if the medications were not quite as effective however after much consideration we opted to continue her current medications until further psychiatric assessment.    -     [facility]  -     buPROPion (WELLBUTRIN XL) 150 MG 24 hr tablet; 2 tab daily  -     lamoTRIgine (LAMICTAL) 150 MG tablet; Take 1 tablet (150 mg) by mouth At Bedtime  -     TSH with free T4 reflex; Future  -     Ferritin; Future        Arthralgia of both knees  -     CBC with platelets differential; Future  -     Comprehensive metabolic panel; Future  -     Cancel: Anti Nuclear Lili IgG by IFA with Reflex  -     CRP inflammation; Future  -     Orthopedic & Spine  Referral; Future  -     Anti Nuclear Lili IgG by IFA with Reflex; Future         BMI:   Estimated body mass index is 43.26 kg/m  as calculated from the following:    Height as of 12/31/19: 1.676 m (5' 6\").    Weight as of this encounter: 121.6 kg (268 lb).   Weight management plan: to be addressed after mood stabilization    September 1, 2020  AVS was updated today  Sammie Juan RN Santilli, Jamie D T, MD               Hi Dr. Corbett,   Sent FYI, I called patient and let her know the updated AVS would be sent to her home address.  I verified correct address, and reviewed the " new updated AVS with her.  I let her know to disregard previous AVS about med not prescribed, and pointed out crisis line resources, and that she will receive a call from Behavioral Health about appt with Dr. Boyd.     Thanks,   Sammie          Return in about 6 weeks (around 10/12/2020).    Kapil Corbett MD  [facility] PRIMARY CARE [facility]

## 2020-09-01 NOTE — TELEPHONE ENCOUNTER
Jordan Boyd MD Santilli, MD Darien Dewitt, I reviewed your note and agree that the meds should be restarted, but lamotrigine needs to be re-titrated if more than one day is missed. This is due to elevated SJS risk and patient needs to be notified to contact the clinic if more than 1 day is missed for a re-titration.   Retitration goes:   25mg 2 weeks   50mg 2 weeks   100mg 2 weeks   Then if needed up to 200mg. (or 150mg in her case)      September 1, 2020  [phone] (home)   I tried to call Betty Tamayo. Need to determine if she missed doses of Lamotrigine. If she did I need to send a new prescription for lamotrigine to ease up on the dose.  Best wishes,  Kapil Corbett MD  September 1, 2020   I was able to reach her to review the restart of lamotrigine. I will send 25 mg tabs to her pharmacy.  Betty was seen today for medication question.    Diagnoses and all orders for this visit:    Mood disorder (H)  -     lamoTRIgine (LAMICTAL) 25 MG tablet; 1 daily (25mg) 2 weeks then 2 daily (50 mg) 2 weeks then 4 daily (100 mg) 2 weeks then resume your 150 mg dose    Severe episode of recurrent major depressive disorder, without psychotic features (H)    She voiced understanding. Also reviewed Psychiatry assessment next week. She indicated she was called. I reviewed new AVS available for her.  Kapil Corbett MD

## 2020-09-01 NOTE — TELEPHONE ENCOUNTER
Patient was reached by phone.  She relayed she did miss two weeks of Lamotrigine, and this happened one week before the appt with Dr. Corbett on 8/31/20.  She has only taken one dose since 8/31/20, and she took one-half of the 150 mg dose.    Sammie Juan RN on 9/1/2020 at 3:03 PM

## 2020-09-10 ENCOUNTER — VIRTUAL VISIT (OUTPATIENT)
Dept: BEHAVIORAL HEALTH | Facility: CLINIC | Age: 30
End: 2020-09-10
Payer: COMMERCIAL

## 2020-09-10 DIAGNOSIS — F43.10 PTSD (POST-TRAUMATIC STRESS DISORDER): Primary | ICD-10-CM

## 2020-09-10 RX ORDER — PRAZOSIN HYDROCHLORIDE 1 MG/1
CAPSULE ORAL
Qty: 120 CAPSULE | Refills: 0 | Status: SHIPPED | OUTPATIENT
Start: 2020-09-10 | End: 2020-10-08 | Stop reason: SINTOL

## 2020-09-10 ASSESSMENT — PATIENT HEALTH QUESTIONNAIRE - PHQ9
10. IF YOU CHECKED OFF ANY PROBLEMS, HOW DIFFICULT HAVE THESE PROBLEMS MADE IT FOR YOU TO DO YOUR WORK, TAKE CARE OF THINGS AT HOME, OR GET ALONG WITH OTHER PEOPLE: EXTREMELY DIFFICULT
SUM OF ALL RESPONSES TO PHQ QUESTIONS 1-9: 23
SUM OF ALL RESPONSES TO PHQ QUESTIONS 1-9: 23

## 2020-09-10 ASSESSMENT — ANXIETY QUESTIONNAIRES
GAD7 TOTAL SCORE: 21
GAD7 TOTAL SCORE: 21
2. NOT BEING ABLE TO STOP OR CONTROL WORRYING: NEARLY EVERY DAY
7. FEELING AFRAID AS IF SOMETHING AWFUL MIGHT HAPPEN: NEARLY EVERY DAY
7. FEELING AFRAID AS IF SOMETHING AWFUL MIGHT HAPPEN: NEARLY EVERY DAY
1. FEELING NERVOUS, ANXIOUS, OR ON EDGE: NEARLY EVERY DAY
GAD7 TOTAL SCORE: 21
4. TROUBLE RELAXING: NEARLY EVERY DAY
6. BECOMING EASILY ANNOYED OR IRRITABLE: NEARLY EVERY DAY
5. BEING SO RESTLESS THAT IT IS HARD TO SIT STILL: NEARLY EVERY DAY
3. WORRYING TOO MUCH ABOUT DIFFERENT THINGS: NEARLY EVERY DAY

## 2020-09-10 NOTE — NURSING NOTE
"Chief Complaint   Patient presents with     Consult     MDD     Would like out of today's visit:    Evaluate and get more clarity about diagnosis and to control emotions better.    Karyn Forrester LPN     VIDEO VISIT  Betty Tamayo is a 30 year old patient who is being evaluated via a billable video visit.      The patient has been notified of following:   \"This video visit will be conducted via a call between you and your physician/provider. We have found that certain health care needs can be provided without the need for an in-person physical exam. This service lets us provide the care you need with a video conversation. If a prescription is necessary we can send it directly to your pharmacy. If lab work is needed we can place an order for that and you can then stop by our lab to have the test done at a later time. Insurers are generally covering virtual visits as they would in-office visits so billing should not be different than normal.  If for some reason you do get billed incorrectly, you should contact the billing office to correct it and that number is in the AVS .    Video Conference to be completed via:  Esther    Patient has given verbal consent for video visit?:  Yes    Patient would prefer that any video invitations be sent by: Text to cell phone: [phone]      How would patient like to obtain AVS?:  [employer]    AVS SmartPhrase [PsychAVS] has been placed in 'Patient Instructions':  Yes    "
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
## 2020-09-10 NOTE — PATIENT INSTRUCTIONS
Scheduling: If you have any concerns about today's visit or wish to schedule another appointment please call our office during normal business hours [phone] (8-5:00 M-F)    Contact Us: Please call [phone] during business hours (8-5:00 M-F).  If after clinic hours, or on the weekend, please call  [phone].    Financial Assistance [phone]  [facility] [phone]  [place] [facility]illing [phone]  Medical Records [phone]    MENTAL HEALTH CRISIS NUMBERS:  [facility]:  [facility] - [phone]  Crisis Residence [facility] - [phone]  Walk-In Counseling [facility] [phone]  COPE 24/7 [facility] Team - [[phone]]    [facility]:  [facility] - [phone]  Walk-in counseling [facility] - [phone]  Walk-in counseling [facility] - [phone]  Crisis Residence [facility] - [phone]  Urgent [facility] Adult Mental Health:   --Drop-in, 24/7 crisis line, and [facility] Mobile Team [[phone]]    24/7 CRISIS TEXT LINE: www.crisistextline.org OR text 833-430 anywhere, anytime, any crisis    Poison Control Center - [phone]  [facility] - [phone]; or [facility] - [phone]    If you have a medical emergency please call 911 or go to the nearest ER.

## 2020-09-11 ASSESSMENT — PATIENT HEALTH QUESTIONNAIRE - PHQ9: SUM OF ALL RESPONSES TO PHQ QUESTIONS 1-9: 23

## 2020-09-11 ASSESSMENT — ANXIETY QUESTIONNAIRES: GAD7 TOTAL SCORE: 21

## 2020-10-08 ENCOUNTER — VIRTUAL VISIT (OUTPATIENT)
Dept: BEHAVIORAL HEALTH | Facility: CLINIC | Age: 30
End: 2020-10-08
Payer: COMMERCIAL

## 2020-10-08 DIAGNOSIS — F43.10 PTSD (POST-TRAUMATIC STRESS DISORDER): Primary | ICD-10-CM

## 2020-10-08 DIAGNOSIS — F33.2 SEVERE EPISODE OF RECURRENT MAJOR DEPRESSIVE DISORDER, WITHOUT PSYCHOTIC FEATURES (H): ICD-10-CM

## 2020-10-08 DIAGNOSIS — R45.4 IRRITABILITY AND ANGER: ICD-10-CM

## 2020-10-08 PROCEDURE — 99214 OFFICE O/P EST MOD 30 MIN: CPT | Mod: 95 | Performed by: PSYCHIATRY & NEUROLOGY

## 2020-10-08 RX ORDER — LAMOTRIGINE 200 MG/1
200 TABLET ORAL AT BEDTIME
Qty: 30 TABLET | Refills: 1 | Status: SHIPPED | OUTPATIENT
Start: 2020-10-08 | End: 2020-11-23

## 2020-10-08 RX ORDER — GUANFACINE 1 MG/1
TABLET ORAL
Qty: 60 TABLET | Refills: 0 | Status: SHIPPED | OUTPATIENT
Start: 2020-10-08 | End: 2020-11-12 | Stop reason: ALTCHOICE
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
## 2020-10-08 NOTE — Clinical Note
Tim Chase, You saw this patient a few months back. Would you be willing to see her again for brief treatment while we figure out trauma therapy for her? I'm talking with the psychiatry clinic about this right now, but even if she is a fit for their program, the wait list is a few months.

## 2020-10-08 NOTE — NURSING NOTE
Betty was having difficulty logging into my chart. Since we were running behind Dr. Boyd just sent her a link and rooming was not completed.

## 2020-10-19 ENCOUNTER — OFFICE VISIT (OUTPATIENT)
Dept: FAMILY MEDICINE | Facility: CLINIC | Age: 30
End: 2020-10-19
Payer: COMMERCIAL

## 2020-10-19 VITALS
HEART RATE: 90 BPM | BODY MASS INDEX: 40.84 KG/M2 | DIASTOLIC BLOOD PRESSURE: 82 MMHG | WEIGHT: 253 LBS | SYSTOLIC BLOOD PRESSURE: 136 MMHG | OXYGEN SATURATION: 98 %

## 2020-10-19 DIAGNOSIS — Z23 NEED FOR VACCINATION: ICD-10-CM

## 2020-10-19 DIAGNOSIS — F33.2 SEVERE EPISODE OF RECURRENT MAJOR DEPRESSIVE DISORDER, WITHOUT PSYCHOTIC FEATURES (H): ICD-10-CM

## 2020-10-19 DIAGNOSIS — F41.0 PANIC ATTACK AS REACTION TO STRESS: ICD-10-CM

## 2020-10-19 DIAGNOSIS — F43.0 PANIC ATTACK AS REACTION TO STRESS: ICD-10-CM

## 2020-10-19 DIAGNOSIS — F41.1 GENERALIZED ANXIETY DISORDER: Primary | ICD-10-CM

## 2020-10-19 PROCEDURE — 90471 IMMUNIZATION ADMIN: CPT | Performed by: FAMILY MEDICINE

## 2020-10-19 PROCEDURE — 90686 IIV4 VACC NO PRSV 0.5 ML IM: CPT | Performed by: FAMILY MEDICINE

## 2020-10-19 PROCEDURE — 99214 OFFICE O/P EST MOD 30 MIN: CPT | Mod: 25 | Performed by: FAMILY MEDICINE

## 2020-10-19 ASSESSMENT — ANXIETY QUESTIONNAIRES
3. WORRYING TOO MUCH ABOUT DIFFERENT THINGS: NEARLY EVERY DAY
7. FEELING AFRAID AS IF SOMETHING AWFUL MIGHT HAPPEN: NEARLY EVERY DAY
6. BECOMING EASILY ANNOYED OR IRRITABLE: NEARLY EVERY DAY
GAD7 TOTAL SCORE: 20
5. BEING SO RESTLESS THAT IT IS HARD TO SIT STILL: MORE THAN HALF THE DAYS
1. FEELING NERVOUS, ANXIOUS, OR ON EDGE: NEARLY EVERY DAY
2. NOT BEING ABLE TO STOP OR CONTROL WORRYING: NEARLY EVERY DAY
IF YOU CHECKED OFF ANY PROBLEMS ON THIS QUESTIONNAIRE, HOW DIFFICULT HAVE THESE PROBLEMS MADE IT FOR YOU TO DO YOUR WORK, TAKE CARE OF THINGS AT HOME, OR GET ALONG WITH OTHER PEOPLE: EXTREMELY DIFFICULT

## 2020-10-19 ASSESSMENT — PATIENT HEALTH QUESTIONNAIRE - PHQ9
5. POOR APPETITE OR OVEREATING: NEARLY EVERY DAY
SUM OF ALL RESPONSES TO PHQ QUESTIONS 1-9: 23

## 2020-10-19 NOTE — LETTER
Betty Tamayo  [address] [place] [address]  : 1990  MRN:  [id number]      2020      Due to medical condition Betty Tamayo requires leave from work 2020 through 2020.  Kapil Corbett MD                       NEUROLOGY CONSULTATION NOTE       DATE OF CONSULTATION: 3/9/2017    CONSULTED BY: Princess Ibrahima MD    Reason for Consult  I have been asked to see the patient in neurological consultation to render advice and opinion regarding stroke    Subjective  Denies any vertigo or lightheadedness today    HISTORY OF PRESENT ILLNESS  Pooja Pimentel is a 48 y.o. female who presents to the hospital because of vertigo. The patient states that the dizziness started on Saturday and it can happen multiple times a day and each episode lasts for close to 2 minutes. She describes it as a room spinning sensation. It is exacerbated by movement and she does also have decreased hearing in the right ear along with tinnitus. She did have MRI of the brain in the hospital and it was negative for a stroke. No new focal neurological deficits. She does have a history of stroke in the past.  Risk factors to include hypertension, hyperlipidemia and diabetes. ROS  A ten system review of constitutional, cardiovascular, respiratory, musculoskeletal, endocrine, skin, SHEENT, genitourinary, psychiatric and neurologic systems was obtained and is unremarkable except as stated in HPI     PHYSICAL EXAM  EXAMINATION:   Patient Vitals for the past 24 hrs:   Temp Pulse Resp BP SpO2   03/09/17 0803 97.5 °F (36.4 °C) 67 20 136/84 95 %   03/09/17 0552 98.3 °F (36.8 °C) 61 16 137/66 97 %   03/08/17 2357 98.2 °F (36.8 °C) 66 16 142/63 98 %   03/08/17 2041 98.2 °F (36.8 °C) 67 18 150/70 97 %   03/08/17 1532 98.2 °F (36.8 °C) 74 16 157/72 99 %   03/08/17 1227 98.7 °F (37.1 °C) 73 16 156/66 98 %        General:   General appearance: Pt is in no acute distress   Distal pulses are preserved    Neurological Examination:   Mental Status:  AAO x3. Speech is slow. Follows commands, has normal fund of knowledge, attention, short term recall, comprehension and insight. Cranial Nerves: Visual fields are full. PERRL, Extraocular movements are full. Facial sensation intact V1- V3. Facial movement intact, symmetric. Hearing intact to conversation. Palate elevates symmetrically. Shoulder shrug symmetric. Tongue midline. Motor: Strength is 5/5 on the left side and 0 out of 5 on the right. Normal tone. No atrophy. Sensation: Normal to light touch    Coordination/Cerebellar: Intact to finger-nose-finger on the left. Cannot perform on the right    Gait: Deferred    Skin: No significant bruising or lacerations. LAB DATA REVIEWED:    Results for orders placed or performed during the hospital encounter of 03/06/17   URINALYSIS W/ REFLEX CULTURE   Result Value Ref Range    Color YELLOW/STRAW      Appearance CLOUDY (A) CLEAR      Specific gravity 1.018 1.003 - 1.030      pH (UA) 7.0 5.0 - 8.0      Protein NEGATIVE  NEG mg/dL    Glucose NEGATIVE  NEG mg/dL    Ketone NEGATIVE  NEG mg/dL    Bilirubin NEGATIVE  NEG      Blood NEGATIVE  NEG      Urobilinogen 0.2 0.2 - 1.0 EU/dL    Nitrites NEGATIVE  NEG      Leukocyte Esterase SMALL (A) NEG      WBC 0-4 0 - 4 /hpf    RBC 0-5 0 - 5 /hpf    Epithelial cells FEW FEW /lpf    Bacteria NEGATIVE  NEG /hpf    UA:UC IF INDICATED CULTURE NOT INDICATED BY UA RESULT CNI      Hyaline cast 0-2 0 - 5 /lpf   CBC WITH AUTOMATED DIFF   Result Value Ref Range    WBC 5.8 3.6 - 11.0 K/uL    RBC 4.15 3.80 - 5.20 M/uL    HGB 11.0 (L) 11.5 - 16.0 g/dL    HCT 35.6 35.0 - 47.0 %    MCV 85.8 80.0 - 99.0 FL    MCH 26.5 26.0 - 34.0 PG    MCHC 30.9 30.0 - 36.5 g/dL    RDW 14.0 11.5 - 14.5 %    PLATELET 646 442 - 283 K/uL    NEUTROPHILS 46 32 - 75 %    LYMPHOCYTES 43 12 - 49 %    MONOCYTES 7 5 - 13 %    EOSINOPHILS 4 0 - 7 %    BASOPHILS 0 0 - 1 %    ABS. NEUTROPHILS 2.7 1.8 - 8.0 K/UL    ABS. LYMPHOCYTES 2.5 0.8 - 3.5 K/UL    ABS. MONOCYTES 0.4 0.0 - 1.0 K/UL    ABS. EOSINOPHILS 0.2 0.0 - 0.4 K/UL    ABS.  BASOPHILS 0.0 0.0 - 0.1 K/UL   METABOLIC PANEL, COMPREHENSIVE   Result Value Ref Range    Sodium 144 136 - 145 mmol/L    Potassium 4.0 3.5 - 5.1 mmol/L    Chloride 105 97 - 108 mmol/L    CO2 27 21 - 32 mmol/L    Anion gap 12 5 - 15 mmol/L    Glucose 90 65 - 100 mg/dL    BUN 17 6 - 20 MG/DL    Creatinine 1.01 0.55 - 1.02 MG/DL    BUN/Creatinine ratio 17 12 - 20      GFR est AA >60 >60 ml/min/1.73m2    GFR est non-AA 57 (L) >60 ml/min/1.73m2    Calcium 8.9 8.5 - 10.1 MG/DL    Bilirubin, total 0.3 0.2 - 1.0 MG/DL    ALT (SGPT) 34 12 - 78 U/L    AST (SGOT) 28 15 - 37 U/L    Alk. phosphatase 81 45 - 117 U/L    Protein, total 8.0 6.4 - 8.2 g/dL    Albumin 3.3 (L) 3.5 - 5.0 g/dL    Globulin 4.7 (H) 2.0 - 4.0 g/dL    A-G Ratio 0.7 (L) 1.1 - 2.2     CK W/ REFLX CKMB   Result Value Ref Range    CK 64 26 - 192 U/L   TROPONIN I   Result Value Ref Range    Troponin-I, Qt. <0.04 <0.05 ng/mL   MAGNESIUM   Result Value Ref Range    Magnesium 1.6 1.6 - 2.4 mg/dL   LIPASE   Result Value Ref Range    Lipase 98 73 - 393 U/L   LIPID PANEL   Result Value Ref Range    LIPID PROFILE          Cholesterol, total 183 <200 MG/DL    Triglyceride 474 (H) <150 MG/DL    HDL Cholesterol 39 MG/DL    LDL, calculated Not calculated due to elevated triglyceride level 0 - 100 MG/DL    VLDL, calculated  MG/DL     Calculation not valid with this patient's other Lipid values.     CHOL/HDL Ratio 4.7 0 - 5.0     HEMOGLOBIN A1C WITH EAG   Result Value Ref Range    Hemoglobin A1c 6.5 (H) 4.2 - 6.3 %    Est. average glucose 140 mg/dL   SED RATE (ESR)   Result Value Ref Range    Sed rate, automated 65 (H) 0 - 30 mm/hr   CRP, HIGH SENSITIVITY   Result Value Ref Range    CRP, High sensitivity 4.1 mg/L   T3, FREE   Result Value Ref Range    Free Triiodothyronine 2.1 (L) 2.2 - 4.0 pg/mL   T4, FREE   Result Value Ref Range    T4, Free 1.0 0.8 - 1.5 NG/DL   CBC WITH AUTOMATED DIFF   Result Value Ref Range    WBC 6.0 3.6 - 11.0 K/uL    RBC 3.96 3.80 - 5.20 M/uL    HGB 10.9 (L) 11.5 - 16.0 g/dL    HCT 34.2 (L) 35.0 - 47.0 %    MCV 86.4 80.0 - 99.0 FL    MCH 27.5 26.0 - 34.0 PG    MCHC 31.9 30.0 - 36.5 g/dL RDW 13.9 11.5 - 14.5 %    PLATELET 888 454 - 543 K/uL    NEUTROPHILS 36 32 - 75 %    LYMPHOCYTES 53 (H) 12 - 49 %    MONOCYTES 7 5 - 13 %    EOSINOPHILS 4 0 - 7 %    BASOPHILS 0 0 - 1 %    ABS. NEUTROPHILS 2.2 1.8 - 8.0 K/UL    ABS. LYMPHOCYTES 3.2 0.8 - 3.5 K/UL    ABS. MONOCYTES 0.4 0.0 - 1.0 K/UL    ABS. EOSINOPHILS 0.2 0.0 - 0.4 K/UL    ABS. BASOPHILS 0.0 0.0 - 0.1 K/UL   MAGNESIUM   Result Value Ref Range    Magnesium 1.7 1.6 - 2.4 mg/dL   METABOLIC PANEL, COMPREHENSIVE   Result Value Ref Range    Sodium 141 136 - 145 mmol/L    Potassium 3.7 3.5 - 5.1 mmol/L    Chloride 106 97 - 108 mmol/L    CO2 24 21 - 32 mmol/L    Anion gap 11 5 - 15 mmol/L    Glucose 111 (H) 65 - 100 mg/dL    BUN 12 6 - 20 MG/DL    Creatinine 0.89 0.55 - 1.02 MG/DL    BUN/Creatinine ratio 13 12 - 20      GFR est AA >60 >60 ml/min/1.73m2    GFR est non-AA >60 >60 ml/min/1.73m2    Calcium 8.5 8.5 - 10.1 MG/DL    Bilirubin, total 0.5 0.2 - 1.0 MG/DL    ALT (SGPT) 33 12 - 78 U/L    AST (SGOT) 30 15 - 37 U/L    Alk. phosphatase 76 45 - 117 U/L    Protein, total 7.8 6.4 - 8.2 g/dL    Albumin 3.3 (L) 3.5 - 5.0 g/dL    Globulin 4.5 (H) 2.0 - 4.0 g/dL    A-G Ratio 0.7 (L) 1.1 - 2.2     TSH 3RD GENERATION   Result Value Ref Range    TSH 1.16 0.36 - 3.74 uIU/mL   LDL, DIRECT   Result Value Ref Range    LDL,Direct 90 0 - 100 mg/dl   CBC WITH AUTOMATED DIFF   Result Value Ref Range    WBC 5.5 3.6 - 11.0 K/uL    RBC 4.25 3.80 - 5.20 M/uL    HGB 11.8 11.5 - 16.0 g/dL    HCT 36.5 35.0 - 47.0 %    MCV 85.9 80.0 - 99.0 FL    MCH 27.8 26.0 - 34.0 PG    MCHC 32.3 30.0 - 36.5 g/dL    RDW 13.9 11.5 - 14.5 %    PLATELET 986 486 - 494 K/uL    NEUTROPHILS 37 32 - 75 %    LYMPHOCYTES 51 (H) 12 - 49 %    MONOCYTES 8 5 - 13 %    EOSINOPHILS 4 0 - 7 %    BASOPHILS 0 0 - 1 %    ABS. NEUTROPHILS 2.0 1.8 - 8.0 K/UL    ABS. LYMPHOCYTES 2.8 0.8 - 3.5 K/UL    ABS. MONOCYTES 0.5 0.0 - 1.0 K/UL    ABS. EOSINOPHILS 0.2 0.0 - 0.4 K/UL    ABS.  BASOPHILS 0.0 0.0 - 0.1 K/UL METABOLIC PANEL, BASIC   Result Value Ref Range    Sodium 139 136 - 145 mmol/L    Potassium 3.9 3.5 - 5.1 mmol/L    Chloride 103 97 - 108 mmol/L    CO2 23 21 - 32 mmol/L    Anion gap 13 5 - 15 mmol/L    Glucose 222 (H) 65 - 100 mg/dL    BUN 16 6 - 20 MG/DL    Creatinine 1.01 0.55 - 1.02 MG/DL    BUN/Creatinine ratio 16 12 - 20      GFR est AA >60 >60 ml/min/1.73m2    GFR est non-AA 57 (L) >60 ml/min/1.73m2    Calcium 8.7 8.5 - 10.1 MG/DL   CBC WITH AUTOMATED DIFF   Result Value Ref Range    WBC 6.1 3.6 - 11.0 K/uL    RBC 3.84 3.80 - 5.20 M/uL    HGB 10.3 (L) 11.5 - 16.0 g/dL    HCT 32.2 (L) 35.0 - 47.0 %    MCV 83.9 80.0 - 99.0 FL    MCH 26.8 26.0 - 34.0 PG    MCHC 32.0 30.0 - 36.5 g/dL    RDW 13.7 11.5 - 14.5 %    PLATELET 042 919 - 563 K/uL    NEUTROPHILS 40 32 - 75 %    LYMPHOCYTES 47 12 - 49 %    MONOCYTES 9 5 - 13 %    EOSINOPHILS 4 0 - 7 %    BASOPHILS 0 0 - 1 %    ABS. NEUTROPHILS 2.4 1.8 - 8.0 K/UL    ABS. LYMPHOCYTES 2.8 0.8 - 3.5 K/UL    ABS. MONOCYTES 0.6 0.0 - 1.0 K/UL    ABS. EOSINOPHILS 0.2 0.0 - 0.4 K/UL    ABS.  BASOPHILS 0.0 0.0 - 0.1 K/UL   METABOLIC PANEL, BASIC   Result Value Ref Range    Sodium 139 136 - 145 mmol/L    Potassium 3.8 3.5 - 5.1 mmol/L    Chloride 107 97 - 108 mmol/L    CO2 21 21 - 32 mmol/L    Anion gap 11 5 - 15 mmol/L    Glucose 184 (H) 65 - 100 mg/dL    BUN 13 6 - 20 MG/DL    Creatinine 0.91 0.55 - 1.02 MG/DL    BUN/Creatinine ratio 14 12 - 20      GFR est AA >60 >60 ml/min/1.73m2    GFR est non-AA >60 >60 ml/min/1.73m2    Calcium 8.3 (L) 8.5 - 10.1 MG/DL   GLUCOSE, POC   Result Value Ref Range    Glucose (POC) 71 65 - 100 mg/dL    Performed by Adela Lemons \"Susanna\"*    GLUCOSE, POC   Result Value Ref Range    Glucose (POC) 82 65 - 100 mg/dL    Performed by Adela Lemons \"Susanna\"*    GLUCOSE, POC   Result Value Ref Range    Glucose (POC) 129 (H) 65 - 100 mg/dL    Performed by Pb Salas    GLUCOSE, POC   Result Value Ref Range    Glucose (POC) 203 (H) 65 - 100 mg/dL    Performed by Sunny Flax    GLUCOSE, POC   Result Value Ref Range    Glucose (POC) 187 (H) 65 - 100 mg/dL    Performed by Sunny Flax    GLUCOSE, POC   Result Value Ref Range    Glucose (POC) 249 (H) 65 - 100 mg/dL    Performed by Kera 28, POC   Result Value Ref Range    Glucose (POC) 225 (H) 65 - 100 mg/dL    Performed by Storm Skeeters    GLUCOSE, POC   Result Value Ref Range    Glucose (POC) 177 (H) 65 - 100 mg/dL    Performed by Sunny Flax    GLUCOSE, POC   Result Value Ref Range    Glucose (POC) 177 (H) 65 - 100 mg/dL    Performed by Sunny Flax    GLUCOSE, POC   Result Value Ref Range    Glucose (POC) 224 (H) 65 - 100 mg/dL    Performed by [facility]*    GLUCOSE, POC   Result Value Ref Range    Glucose (POC) 250 (H) 65 - 100 mg/dL    Performed by [facility]*    GLUCOSE, POC   Result Value Ref Range    Glucose (POC) 220 (H) 65 - 100 mg/dL    Performed by [facility]*    EKG, 12 LEAD, INITIAL   Result Value Ref Range    Ventricular Rate 69 BPM    Atrial Rate 69 BPM    P-R Interval 160 ms    QRS Duration 72 ms    Q-T Interval 410 ms    QTC Calculation (Bezet) 439 ms    Calculated P Axis 47 degrees    Calculated R Axis 0 degrees    Calculated T Axis -15 degrees    Diagnosis       Normal sinus rhythm  Normal ECG  Confirmed by Orly Bsoton ([id number]) on 3/7/2017 10:51:52 AM          Imaging review:  See below. Stroke workup  EKG  Normal sinus rhythm   Normal ECG     MRI Brain:   Chronic encephalomalacia in the white matter adjacent to the left  lateral ventricle. No evidence of acute process. Carotids:   1. No evidence of significant arterial occlusive disease in the  internal carotid arteries. 2. No significant stenosis in the external carotid arteries  bilaterally. 3. Antegrade flow in both vertebral arteries. 4. Normal flow in both subclavian arteries. TTE:   Size was normal. Systolic function was normal. Ejection  fraction was estimated to be 55 %. There were no regional wall motion  abnormalities. Wall thickness was normal.    Stroke labs:  HgBA1c    Lab Results   Component Value Date/Time    Hemoglobin A1c 6.5 2017 05:25 AM     LDL   Lab Results   Component Value Date/Time    LDL,Direct 90 2017 05:25 AM    LDL, calculated Not calculated due to elevated triglyceride level 2017 05:25 AM       HOME MEDS  Prior to Admission Medications   Prescriptions Last Dose Informant Patient Reported? Taking? FERROUS SULFATE, DRIED (IRON, DRIED, PO) 3/6/2017 at Unknown time  Yes Yes   Sig: Take 65 mg by mouth daily. FLUoxetine (PROZAC) 20 mg capsule 3/6/2017 at Unknown time  Yes Yes   Sig: Take 20 mg by mouth daily. POTASSIUM CHLORIDE (KLOR-CON M20 PO) 3/6/2017 at Unknown time  Yes Yes   Sig: Take 2 Tabs by mouth two (2) times a day. albuterol (PROAIR HFA) 90 mcg/actuation inhaler Unknown at Unknown time  No No   Sig: Take 2 Puffs by inhalation every four (4) hours as needed for Wheezing. amLODIPine (NORVASC) 5 mg tablet 3/6/2017 at Unknown time  Yes Yes   Sig: Take 5 mg by mouth daily. ascorbic acid (VITAMIN C) 250 mg tablet 3/6/2017 at Unknown time  Yes Yes   Sig: Take  by mouth. aspirin delayed-release 325 mg tablet 3/6/2017 at Unknown time  Yes Yes   Sig: Take 325 mg by mouth every six (6) hours as needed for Pain. cholecalciferol (VITAMIN D3) 1,000 unit cap 3/6/2017 at Unknown time  Yes Yes   Sig: Take 2,000 Units by mouth daily. clonazePAM (KLONOPIN) 1 mg tablet 3/6/2017 at Unknown time  Yes Yes   Sig: Take 1 mg by mouth two (2) times a day. insulin glargine (LANTUS) 100 unit/mL injection 3/5/2017 at Unknown time  Yes Yes   Si Units by SubCUTAneous route nightly. Indications: at bedtime   lisinopril-hydrochlorothiazide (PRINZIDE, ZESTORETIC) 20-12.5 mg per tablet 3/6/2017 at Unknown time  Yes Yes   Sig: Take  by mouth two (2) times a day.      losartan (COZAAR) 100 mg tablet 3/6/2017 at Unknown time  Yes Yes   Sig: Take 100 mg by mouth daily. magnesium oxide (MAG-OX) 400 mg tablet 3/6/2017 at Unknown time  Yes Yes   Sig: Take 400 mg by mouth two (2) times a day. metformin (GLUCOPHAGE) 500 mg tablet 3/6/2017 at Unknown time  Yes Yes   Sig: Take 500 mg by mouth two (2) times daily (with meals). metoprolol succinate (TOPROL-XL) 50 mg XL tablet 3/6/2017 at Unknown time  Yes Yes   Sig: Take 50 mg by mouth daily. rosuvastatin (CRESTOR) 10 mg tablet 3/5/2017 at Unknown time  Yes Yes   Sig: Take 10 mg by mouth nightly.       Facility-Administered Medications: None       CURRENT MEDS  Current Facility-Administered Medications   Medication Dose Route Frequency    diazePAM (VALIUM) tablet 2 mg  2 mg Oral TID    amLODIPine (NORVASC) tablet 5 mg  5 mg Oral DAILY    clonazePAM (KlonoPIN) tablet 1 mg  1 mg Oral BID    FLUoxetine (PROzac) capsule 20 mg  20 mg Oral DAILY    valsartan (DIOVAN) tablet 160 mg  160 mg Oral DAILY    magnesium oxide (MAG-OX) tablet 400 mg  400 mg Oral BID    metoprolol succinate (TOPROL-XL) XL tablet 50 mg  50 mg Oral DAILY    atorvastatin (LIPITOR) tablet 20 mg  20 mg Oral QHS    meclizine (ANTIVERT) tablet 25 mg  25 mg Oral TID    hydrOXYzine HCl (ATARAX) tablet 25 mg  25 mg Oral BID    0.9% sodium chloride infusion  50 mL/hr IntraVENous CONTINUOUS    insulin lispro (HUMALOG) injection   SubCUTAneous AC&HS    sodium chloride (NS) flush 5-10 mL  5-10 mL IntraVENous Q8H    aspirin (ASPIRIN) tablet 325 mg  325 mg Oral DAILY    heparin (porcine) injection 5,000 Units  5,000 Units SubCUTAneous Q8H    lisinopril (PRINIVIL, ZESTRIL) tablet 20 mg  20 mg Oral DAILY    hydroCHLOROthiazide (HYDRODIURIL) tablet 12.5 mg  12.5 mg Oral DAILY       IMPRESSION:  Patient Active Problem List   Diagnosis Code    Hyperlipidemia E78.5    Anemia D64.9    Chronic anticoagulation Z79.01    DM (diabetes mellitus), type 2, uncontrolled (HCC) E11.65    HTN (hypertension) I10    Nausea & vomiting R11.2    Unspecified constipation K59.00    Urinary retention R33.9    Abdominal pain, acute, bilateral lower quadrant R10.31, R10.32    History of CVA (cerebrovascular accident) Z86.73    Coumadin toxicity T45.511A    Ovarian cyst N83.209    Pelvic mass R19.00    Vaginal bleeding N93.9    Abnormal mammogram with microcalcification R92.0    Vertigo R42    Near syncope R55    Stenosis of both internal carotid arteries I65.23       Flower Lowe is a 48 y.o. female who presents with vertigo. It is episodic and in addition to that she does have tinnitus and hearing loss in the right ear. I do suspect a vestibular etiology. I do recommend outpatient consult with ENT for her vestibular symptoms and hearing loss and tinnitus in the right ear. RECOMMENDATIONS:  - Outpatient consultation with ENT for right ear hearing loss and tinnitus  - Tilt table study -outpatient if the symptoms recur  - Meclizine as needed for vertigo  - MRI brain w/o C - No acute findings. - Carotid US - normal  - TTE - normal  - Telemetry  - BP goal is less than 140/90  - Stroke labs (HgbA1c, TSH, lipid panel)  - Continue  mg daily  - Patient takes rosuvastatin 10 mg a day at home. Increase it to 20 mg a day as LDL is more than 70. In the hospital, patient is on atorvastatin 20 mg a day  - ST/OT/PT eval    Signing off.   Call with questions    Elie Connelly MD  Diplomate, American Board of Psychiatry & Neurology (Neurology)  [place] Board of Psychiatry & Neurology (Clinical Neurophysiology)

## 2020-10-19 NOTE — PATIENT INSTRUCTIONS
[facility]  [address]  [address]  [place], [place] [address]  [phone]  COVID-19 Update 08/07/2020: Due to the COVID-19 Virus, [facility] is offering only HIPPA secure Telehealth care at this time. In order to revive care, you ll need to call the clinic at [phone] and provide your email to clinic support staff.    [facility]  [address]   [address]  [place] [address]  [phone] (for appointments)  Fax: [phone]  COVID-19 Update 08/07/2020: ACP at both [facility] locations are taking new patients but doing all visits by telephone or video. See this website for up to date changes: [email or web address]  [facility] - [address]  [place] [address]  Phone:  [phone]  Fax: [phone]  Hours:  Monday - Friday 8:30 - 5:00pm  COVID-19 Update 08/07/2020: ACP at both [facility] locations are taking new patients but doing all visits by telephone or video. See this website for up to date changes: [email or web address]    [facility]  [address]  [place] [address]  [phone]  COVID-19 Update 08/07/2020 : Unable to reach by phone but website has the following update: In response to the COVID-19 going around we are utilizing V-See as a Telehealth alternative to in clinic visits. This does not mean our clinic is closed but that individuals may opt to utilize this service to lower risk factors of estephania COVID-19. A separate consent form needs to be completed to use telehealth. See [email or web address] for details. They also have telehubs set up at some of their locations for patient use. No face to face visits.    [facility]  [address]  [place] [address]  [phone]  COVID-19 Update (3/20/2020): Similar to primary  care behavioral health, moving towards telehealth; appear to still be accepting new patients at this time.     [facility] - offers psychiatry and psychology services across the lifespan  [facility]  [address] [place] [address] [address]  [place], [place]  [phone]  Serving the [facility] and surrounding areas.  Services provided:     Child, Adolescent, and Adult Psychotherapy     Family and Couples Counseling     Ketamine Treatment     Child and Adult Psychiatry     Neuropsychological Evaluation     Psychological Assessment and Testing     Domestic Violence Assessment     Anger Management/Domestic Violence Prevention Program    COVID-19 Update 3-: Unable to reach by phone. No update on website, but they do appear to regularly provide telepsychiatry so this is likely still an option.   [facility]  [address]   [address]  [place] [address]  [phone] (for appointments)  Fax: [phone]  COVID-19 Update 3-: ACP at both [facility] are taking new patients but doing all visits by telephone or video. See this website for up to date changes: [email or web address]  [facility] - [address]  [place] [address]  Phone:  [phone]  Fax: [phone]  Hours:  Monday - Friday 8:30 - 5:00pm    COVID-19 Update 3-: ACP at both [facility] are taking new patients but doing all visits by telephone or video. See this website for up to date changes: [email or web address]    [facility]  [address]  [place] [address]  [phone]  COVID-19 Update 3- : Unable to reach by phone but website has the following update: In response to the COVID-19 going around we are utilizing V-See as a Telehealth alternative to in  clinic visits. This does not mean our clinic is closed but that individuals may opt to utilize this service to lower risk factors of estephania COVID-19. A separate consent form needs to be completed to use telehealth. See [email or web address] for details.    "[facility]  [address]  [address]  [place], [place] [address]  [phone]  COVID-19 Update 03-: Due to the COVID-19 Virus, [facility] is offering only HIPPA secure Telehealth care at this time. In order to revive care, you ll need to call the clinic at [phone] and provide your email to clinic support staff.    [facility]  [address]  [place] [address]  [phone]  COVID-19 Update 3-:  [facility] mental health and Urgent care are both operating but screening for symptoms prior to seeing anyone in person.   Only providing psychiatry services to uninsured patients at this time.    General resources in case you are feeling increasingly depressed and/or suicidal:    Call 911 or go directly to an Emergency Room (such as [facility] or [facility]) if you need help immediately      24/7 Crisis Hotlines:    National Suicide Prevention Hotline                                600-330-BOCA (0655)                N[employer]WORK  8-359-890-4209 (2-955-JDWZABT)     [facility]                                                                                                       [phone]    Additional Crisis Hotlines:  Crisis Connection                                                                                                                        790.138.4342  [facility] Social Service ([facility])                                                                                                          [phone]  National Suicide Prevention Hotline                                                                                         917-624-PQHA (8255)    Suicide Hotline                                                               345.524.8584    [employer] (formerly First Call for Help)                                                                                Dial 2-1-18 (525-120-0572)     [facility]                             [phone]  (24 hours a day)  [address], [place] [address]  DROP IN:  Monday - Friday: 8:00 am - 7:00 pm  Saturday: 11:00 am - 3:00 pm   Sunday and Holidays: Closed     Walk-In Centers and Mobile Teams:  [facility] Walk-In Crisis Intervention                   [phone]    [facility] COPE (18+) Crisis Mobile Team                                 [phone]    [facility] Child (under 17) Crisis Mobile Team                 [phone]     Walk-In Counseling Center                                                                       [phone]  [facility]:   M, W, F:  1:00-3:00PM    M-Th:  6:30-8:30PM                 CRISIS PROGRAM,        [phone]               [facility]                                                                                         [phone]  [address] [place] [address] Ave:          M, W:      5:00-7:00PM       [facility]                                                                                                                        [phone]  [address]        T, Th:      6:00-8:00PM       Phone and mobile services [facility]:   Adult Mental Health Crisis Line: [phone]     Phone and mobile services [facility]   Adults [phone].

## 2020-10-19 NOTE — Clinical Note
She was very distressed today. Discussed with Salvatore. She is interested in day treatment program. May need further medication adjust.  Thank you,  Kapil Corbett MD

## 2020-10-19 NOTE — PROGRESS NOTES
Subjective     Betty Tamayo is a 30 year old female who presents to clinic today for the following health issues:    KARINA Hernández is a 30-year-old female who has a history of depression/ anxiety mood disorder previous therapy, Agoraphobia and panic attacks. 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  She has had difficulty focusing at work due to her depression and anxiety and is concerned she may lose her job.  She recently has established with Dr. Boyd who provided her with prazosin to assist with sleep which was not effective recently changed to Tenex 1 mg at bedtime for 7 days then 1 mg twice daily.  After starting she had difficulty remembering things and is concerned that this may be a side effect but she is continuing to the with the medication as recommended.  She notes significant anxiety of feeling that no one really cares and is frustrated by virtual care.  She is interested in a day program if possible to assist with her mental health but is concerned about her job.  She does have thoughts occasionally that people would be better off without her but does not have a suicidal plan she feels incredibly stressed and panicked.  She previously had counseling which brought up memories of childhood which were distressing and she often relives these thoughts.    She does have episodes in the past where she became quite anxious related to obstructive sleep apnea and focused on her physical symptoms.  She is concerned that it at times she lashes out when she is feeling significantly anxious.  She is currently on bupropion and Lamictal were helpful for her mood.   She has support from her fiance and children are reasons to live.    Patient Active Problem List   Diagnosis     Encounter for supervision of high risk pregnancy, , [facility] CNM     History of pre-eclampsia in prior pregnancy, currently pregnant     Nausea     UTI in pregnancy, first  "trimester     Vitamin D deficiency     Maternal varicella, non-immune     Medication exposure during first trimester of pregnancy     Uncomplicated asthma     Snoring     Hypersomnia     Class 3 severe obesity due to excess calories with body mass index (BMI) of 40.0 to 44.9 in adult, unspecified whether serious comorbidity present (H)     PTSD (post-traumatic stress disorder)     Major depressive disorder, recurrent episode, severe (H)     Mood disorder (H)     Generalized anxiety disorder     Past Medical History:   Diagnosis Date     Knee cartilage, torn, left     both knees    had cortisone injection     Right shoulder pain 12/9/14     Uncomplicated asthma      Past Surgical History:   Procedure Laterality Date     ANESTHESIA OUT OF OR MRI Right 1/8/2015    Procedure: ANESTHESIA OUT OF OR MRI;  Surgeon: Generic Anesthesia Provider;  Location: [facility] OR     ENT SURGERY      tonsillectomy 1995     wisdom teeth       Social History     Social History Narrative     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   She has difficulty losing weight.  No current trouble breathing O2 sats 100%.  She has obstructive sleep apnea.        Objective    /82   Pulse 90   Wt 114.8 kg (253 lb)   SpO2 98%   BMI 40.84 kg/m    Body mass index is 40.84 kg/m .  Physical Exam     GENERAL APPEARANCE: healthy, alert and tearful at times, raised her voice indicating frustration with her mental health situation and the feeling that \"no one cares \".  At times poor eye contact looking straight ahead but opened up and expressed appreciation when we reviewed options to be off work while she addresses  her mental health.  obese, BMI 40.  EYES: Eyes grossly normal to inspection, PERRL and conjunctivae and sclerae normal  HENT: deferred " wearing a mask  NECK: no adenopathy, no asymmetry, masses, or scars and thyroid normal to palpation  RESP: lungs clear to auscultation - no rales, rhonchi or wheezes  CV: regular rate and rhythm, normal S1 S2, no S3 or S4, no murmur, click or rub  ASKIN: no suspicious lesions or rashes  NEURO:  mentation intact and speech normal  PSYCH: mentation appears interactive quiet, at times tearful good questions and appears concerned for her mood, thought coherent, jiggling her foot frequently, anxious.  PHQ 8/31/2020 9/10/2020 10/19/2020   PHQ-9 Total Score 22 23 23   Q9: Thoughts of better off dead/self-harm past 2 weeks Several days Several days Several days   F/U: Thoughts of suicide or self-harm - Yes -   F/U: Self harm-plan - No -   F/U: Self-harm action - No -   F/U: Safety concerns - No -     NIXON-7 SCORE 8/31/2020 9/10/2020 10/19/2020   Total Score - 21 (severe anxiety) -   Total Score 21 21 20         Assessment & Plan   Betty is a 30-year-old female who presents today for increased depression anxiety with panic and possible mood disorder who requires continued psychiatric assessment.  I spoke with Maryse DAMON who contacted behavioral health providers and was able to speak with Salvatore Pettit who Betty already has an appointment with tomorrow to review moving forward for possible day treatment program to address her significant depression and anxiety.  I discussed continuing current medications.  I provided resources in the after visit summary for emergent care if required and also additional resources for further counseling.  I discussed providing a note to keep her off work from 10/19 through November 23 with a follow-up with me in mid November.  I discussed she should contact her human resources to request short-term disability and forward further paperwork to me if required, my card provided.  She agreed to seek emergent care should her condition worsen.  She declined emergency room evaluation today.  She verbally  agreed to  Recommendations.   She would also benefit from counseling.    Close follow-up within 1 month.  .    Diagnoses and all orders for this visit:    Generalized anxiety disorder  Panic attack as reaction to stress  Severe episode of recurrent major depressive disorder, without psychotic features (H)  Keep appt with Salvatore Pettit 10/20 to review options for day treatment programs.  Need for vaccination  -     FLU VAC PRESRV FREE QUAD SPLIT VIR 3+YRS IM  See AVS  Return in about 4 weeks (around 11/16/2020) for follow-up.    Kapil Corbett MD  [facility]

## 2020-10-20 ENCOUNTER — VIRTUAL VISIT (OUTPATIENT)
Dept: BEHAVIORAL HEALTH | Facility: CLINIC | Age: 30
End: 2020-10-20
Payer: COMMERCIAL

## 2020-10-20 DIAGNOSIS — F33.2 SEVERE EPISODE OF RECURRENT MAJOR DEPRESSIVE DISORDER, WITHOUT PSYCHOTIC FEATURES (H): ICD-10-CM

## 2020-10-20 DIAGNOSIS — F43.10 PTSD (POST-TRAUMATIC STRESS DISORDER): Primary | ICD-10-CM

## 2020-10-20 PROCEDURE — 90834 PSYTX W PT 45 MINUTES: CPT | Mod: GT | Performed by: PSYCHOLOGIST

## 2020-10-20 ASSESSMENT — COLUMBIA-SUICIDE SEVERITY RATING SCALE - C-SSRS
REASONS FOR IDEATION LIFETIME: MOSTLY TO GET ATTENTION, REVENGE OR A REACTION FROM OTHERS
LETHALITY/MEDICAL DAMAGE CODE FIRST PROTENTIAL ATTEMPT: BEHAVIOR LIKELY TO RESULT IN INJURY BUT NOT LIKELY TO CAUSE DEATH
LETHALITY/MEDICAL DAMAGE CODE FIRST POTENTIAL ATTEMPT: BEHAVIOR LIKELY TO RESULT IN INJURY BUT NOT LIKELY TO CAUSE DEATH
TOTAL  NUMBER OF ABORTED OR SELF INTERRUPTED ATTEMPTS PAST LIFETIME: YES
LETHALITY/MEDICAL DAMAGE CODE MOST RECENT ACTUAL ATTEMPT: NO PHYSICAL DAMAGE OR VERY MINOR PHYSICAL DAMAGE
REASONS FOR IDEATION PAST MONTH: MOSTLY TO GET ATTENTION, REVENGE OR A REACTION FROM OTHERS
ATTEMPT LIFETIME: YES
TOTAL  NUMBER OF INTERRUPTED ATTEMPTS PAST 3 MONTHS: NO
LETHALITY/MEDICAL DAMAGE CODE MOST LETHAL ACTUAL ATTEMPT: NO PHYSICAL DAMAGE OR VERY MINOR PHYSICAL DAMAGE
TOTAL  NUMBER OF ABORTED OR SELF INTERRUPTED ATTEMPTS LIFETIME: 1
4. HAVE YOU HAD THESE THOUGHTS AND HAD SOME INTENTION OF ACTING ON THEM?: YES
LETHALITY/MEDICAL DAMAGE CODE FIRST ACTUAL ATTEMPT: NO PHYSICAL DAMAGE OR VERY MINOR PHYSICAL DAMAGE
1. IN THE PAST MONTH, HAVE YOU WISHED YOU WERE DEAD OR WISHED YOU COULD GO TO SLEEP AND NOT WAKE UP?: YES
1. IN THE PAST MONTH, HAVE YOU WISHED YOU WERE DEAD OR WISHED YOU COULD GO TO SLEEP AND NOT WAKE UP?: YES
5. HAVE YOU STARTED TO WORK OUT OR WORKED OUT THE DETAILS OF HOW TO KILL YOURSELF? DO YOU INTEND TO CARRY OUT THIS PLAN?: NO
6. HAVE YOU EVER DONE ANYTHING, STARTED TO DO ANYTHING, OR PREPARED TO DO ANYTHING TO END YOUR LIFE?: NO
TOTAL  NUMBER OF ACTUAL ATTEMPTS LIFETIME: 1
ATTEMPT PAST THREE MONTHS: YES
TOTAL  NUMBER OF ACTUAL ATTEMPTS PAST 3 MONTHS: 1
4. HAVE YOU HAD THESE THOUGHTS AND HAD SOME INTENTION OF ACTING ON THEM?: NO
TOTAL  NUMBER OF INTERRUPTED ATTEMPTS LIFETIME: NO
3. HAVE YOU BEEN THINKING ABOUT HOW YOU MIGHT KILL YOURSELF?: YES
TOTAL  NUMBER OF ABORTED OR SELF INTERRUPTED ATTEMPTS PAST 3 MONTHS: YES
LETHALITY/MEDICAL DAMAGE CODE MOST RECENT POTENTIAL ATTEMPT: BEHAVIOR LIKELY TO RESULT IN INJURY BUT NOT LIKELY TO CAUSE DEATH
5. HAVE YOU STARTED TO WORK OUT OR WORKED OUT THE DETAILS OF HOW TO KILL YOURSELF? DO YOU INTEND TO CARRY OUT THIS PLAN?: NO
2. HAVE YOU ACTUALLY HAD ANY THOUGHTS OF KILLING YOURSELF LIFETIME?: YES
2. HAVE YOU ACTUALLY HAD ANY THOUGHTS OF KILLING YOURSELF?: NO
6. HAVE YOU EVER DONE ANYTHING, STARTED TO DO ANYTHING, OR PREPARED TO DO ANYTHING TO END YOUR LIFE?: NO

## 2020-10-20 ASSESSMENT — ANXIETY QUESTIONNAIRES: GAD7 TOTAL SCORE: 20

## 2020-10-20 NOTE — PROGRESS NOTES
[facility]: Integrated Behavioral Health  October 20, 2020      Behavioral Health Clinician Progress Note    Patient Name: Betty Tamayo           Service Type: Virtual visit over [employer]      Service Location:  Virtual appointment      Session Start Time: 8:30  Session End Time: 9:20      Session Length: 38 - 52      Attendees: Client    Visit Activities (Refresh list every visit): [facility] Only    Telemedicine Visit: The patient's condition can be safely assessed and treated via synchronous audio and visual telemedicine encounter.      Reason for Telemedicine Visit: COVID-19    Originating Site (Patient Location): Patient's home    Distant Site (Provider Location): Provider Remote Setting    Consent:  The patient/guardian has verbally consented to: the potential risks and benefits of telemedicine (video visit) versus in person care; bill my insurance or make self-payment for services provided; and responsibility for payment of non-covered services.     Mode of Communication:  Video Conference via [employer]    As the provider I attest to compliance with applicable laws and regulations related to telemedicine.      Diagnostic Assessment Date: 3/9/2020  Treatment Plan Review Date: 1/20/2021  See Flowsheets for today's PHQ-9 and NIXON-7 results  Previous PHQ-9:   PHQ-9 SCORE 8/31/2020 9/10/2020 10/19/2020   PHQ-9 Total Score MyChart - 23 (Severe depression) -   PHQ-9 Total Score 22 23 23     Previous NIXON-7:   NIXON-7 SCORE 8/31/2020 9/10/2020 10/19/2020   Total Score - 21 (severe anxiety) -   Total Score 21 21 20       BRAD LEVEL:  No flowsheet data found.    DATA  Extended Session (60+ minutes): No  Interactive Complexity: No  Crisis: No    Treatment Objective(s) Addressed in This Session:  Target Behavior(s): disease management/lifestyle changes      Depressed Mood: Decrease frequency and intensity of feeling down, depressed, hopeless  Anxiety: will develop more effective coping skills  "to manage anxiety symptoms  Mood Instability: will develop better understanding of triggers and coping strategies to stabilize mood  PTSD Symptom Management    Current Stressors / Issues:  [facility] met with Betty today for a follow-up. Goals of the current session included reviewing presenting concerns, behavioral treatment options, and conduct a risk assessment per concern of SI on 10/19/2020 identified during a PC visit. Betty reports she has felt more hopeless lately and is feeling more acutely distressed/out of control with her emotions. She noted her brother  in 2020 from an MI that was quite unexpected. Since this time, her family has tried to come together more, which is difficult for her because of her antagonistic relationship with her mother. Betty described struggling with emotional lability, anger/irritability, and notices her reactions to certain interpersonal events tends to be disproportionate to the situation, like her getting quite upset if her  does something without her. Betty notes she continues to have concerns she cannot control her emotions and this makes her feel like a burden to others and hopeless about her future. We talked some about her rejection and abandonment-related fears that make these situations difficult for her.     Due to concern of SI identified on 10/19, this writer conducted a suicide risk assessment. Betty indicated she has struggled with passive thoughts of suicide, such as \"my family would be better off without me\" several days per week. She denies experiencing any intent to hurt herself or making a plan to act on her suicidal thoughts. She denied having thoughts involving a method of suicide in addition. We talked about the nature of her thoughts and Betty agreed with this writer's observation that her SI seems triggered by relationship factors as a means to obtain attention or a reaction from others, as Betty notes she often does not feel heard or " understood by others.     Betty did report a moment of being quite upset a few months ago when she grabbed a knife and reportedly was threatening to kill herself. She notes not recalling various aspects of this event, which is quite scary for her. She notes that she was eventually able to put the knife down/away without self-harm after her fiance was able to calm her down. Betty believes she did not have intent to die during this moment, but was quite upset by something a family member said. She denied recent episodes like this and indicated she only experiences passive thoughts at this time.     Betty and this writer identified the following protective factors against self harm: commitment to her family and children, history of seeking help when needed, testament to manage her own personal safety at home, and future-orientation. Due to these protective factors, C-SSRS completed today, and this writer's observations, the estimated risk of suicide is low. Her history with impulsivity and emotional lability, self-harm behaviors, and one prior attempt remain risk factors worth considering.     We reviewed behavioral health treatment options to help Betty address her concerns. Provided her the recommendation to complete Adult Day Treatment and Betty agreed with this suggestion. We also talked about continuing with this writer for [facility] appointments until she is established with day treatment for continuity of care purposes.     Began talking about identified triggers for her trauma-related reactions. Explored briefly interpersonal and relationship factors that might be factors for her. We discussed the benefits of labeling fears and reactions to improve awareness of triggers as they occur.     Progress on Treatment Objective(s) / Homework:  New Objective established this session - PREPARATION (Decided to change - considering how); Intervened by negotiating a change plan and determining options / strategies for behavior  change, identifying triggers, exploring social supports, and working towards setting a date to begin behavior change    Motivational Interviewing    MI Intervention: Expressed Empathy/Understanding, Supported Autonomy, Collaboration, Evocation, Open-ended questions and Reflections: simple and complex     Change Talk Expressed by the Patient: Desire to change Reasons to change    Provider Response to Change Talk: E - Evoked more info from patient about behavior change, A - Affirmed patient's thoughts, decisions, or attempts at behavior change, R - Reflected patient's change talk and S - Summarized patient's change talk statements    Reviewed behavioral health treatment options - day treatment and [facility]    Supportive counseling    Insight oriented counseling    HW: journal and document triggers to identify/label fears    Care Plan review completed: Yes    Medication Review:  No changes to current psychiatric medication(s)    Medication Compliance:  Yes    Changes in Health Issues:   None reported    Chemical Use Review:   Substance Use: Chemical use reviewed, no active concerns identified      Tobacco Use: No current tobacco use.      Assessment: Current Emotional / Mental Status (status of significant symptoms):  Risk status (Self / Other harm or suicidal ideation)  Patient has had a history of suicidal ideation: passive thoughts recurrent, suicide attempts: 1 and self-injurious behavior: cutting behavior as a youth     Patient denies current fears or concerns for personal safety.  Patient denies current or recent suicidal ideation or behaviors.  Patient denies current or recent homicidal ideation or behaviors.  Patient denies current or recent self injurious behavior or ideation.  Patient denies other safety concerns.     A safety and risk management plan has not been developed at this time, however patient was encouraged to call [facility] should there be a change in any of these risk factors.    [place]  Suicide Severity Rating Scale (Lifetime/Recent)  [place] Suicide Severity Rating (Lifetime/Recent) 10/20/2020   1. Wish to be Dead (Lifetime) Yes   1. Wish to be Dead (Recent) Yes   2. Non-Specific Active Suicidal Thoughts (Lifetime) Yes   2. Non-Specific Active Suicidal Thoughts (Recent) No   3. Active Suicidal Ideation with any Methods (Not Plan) Without Intent to Act (Lifetime) Yes   3. Active Sucidal Ideation with any Methods (Not Plan) Without Intent to Act (Recent) No   4. Active Suicidal Ideation with Some Intent to Act, Without Specific Plan (Lifetime) Yes   4. Active Suicidal Ideation with Some Intent to Act, Without Specific Plan (Recent) No   5. Active Suicidal Ideation with Specific Plan and Intent (Lifetime) No   5. Active Suicidal Ideation with Specific Plan and Intent (Recent) No   Most Severe Ideation Rating (Lifetime) 3   Frequency (Lifetime) 3   Duration (Lifetime) 2   Controllability (Lifetime) 3   Protective Factors  (Lifetime) 2   Reasons for Ideation (Lifetime) 2   Most Severe Ideation Rating (Past Month) 3   Frequency (Past Month) 3   Duration (Past Month) 2   Controllability (Past Month) 3   Protective Factors (Past Month) 2   Reasons for Ideation (Past Month) 2   Actual Attempt (Lifetime) Yes   Actual Attempt Description (Lifetime) Had grabbed a knife and was threatening to kill herself   Total Number of Actual Attempts (Lifetime) 1   Actual Attempt (Past 3 Months) Yes   Actual Attempt Description (Past 3 Months) same as above   Total Number of Actual Attempts (Past 3 Months) 1   Has subject engaged in non-suicidal self-injurious behavior? (Lifetime) Yes   Has subject engaged in non-suicidal self-injurious behavior? (Past 3 Months) No   Interrupted Attempts (Lifetime) No   Interrupted Attempts (Past 3 Months) No   Aborted or Self-Interrupted Attempt (Lifetime) Yes   Aborted or Self Interrupted Attempt Description (Lifetime) put the knife away   Total Number Aborted or Self Interrupted  Attempts (Lifetime) 1   Aborted or Self-Interrupted Attempt (Past 3 Months) Yes   Preparatory Acts or Behavior (Lifetime) No   Preparatory Acts or Behavior (Past 3 Months) No   Most Recent Attempt Actual Lethality Code 0   Most Recent Attempt Potential Lethality Code 1   Most Lethal Attempt Actual Lethality Code 0   Most Lethal Attemplt Potential Lethality Code 1   Initial/First Attempt Actual Lethality Code 0   Initial/First Attempt Potential Lethality Code 1       Appearance:   Appropriate   Eye Contact:   Good   Psychomotor Behavior: Restless   Attitude:   Guarded   Orientation:   All  Speech   Rate / Production: Normal    Volume:  Soft   Mood:    Anxious  Depressed   Affect:    Appropriate   Thought Content:  Clear   Thought Form:  Coherent  Logical   Insight:    Good     Diagnoses:  1. PTSD (post-traumatic stress disorder)    2. Severe episode of recurrent major depressive disorder, without psychotic features (H)        Collateral Reports Completed:  Not Applicable    Plan: (Homework, other):  Betty was scheduled to return to [facility] counseling in two weeks. Placed referral to day treatment. She was also given information about mental health symptoms and treatment options  and Cognitive Behavioral Therapy skills to practice when experiencing anxiety and depression.  CD Recommendations: No indications of CD issues.     Salvatore Pettit Psy.D, LP 10/20/2020    ______________________________________________________________________    [facility] Integrated Behavioral Health Treatment Plan    Client's Name: Betty Tamayo  YOB: 1990    Date: 10/20/2020    DSM-V Diagnoses: 309.81 (F43.10) Posttraumatic Stress Disorder (includes Posttraumatic Stress Disorder for Children 6 Years and Younger)  Without dissociative symptoms; 296.33 (F33.2) Major Depressive Disorder, Recurrent Episode, Severe _  Psychosocial / Contextual Factors: NA    Clinical Global Impressions  First:  Considering your total clinical experience  with this particular patient population, how severe are the patient's symptoms at this time?: 6 (10/20/2020  9:57 AM)      Most recent:  No data recorded      Referral / Collaboration:  Will collaborate with care team as indicated during treatment.    Anticipated number of session or this episode of care: 4-5 until established with day treatment      MeasurableTreatment Goal(s) related to diagnosis / functional impairment(s)  Goal 1:  Patient will experience a reduction in depressive and anxious symptoms, along with a corresponding increase in positive emotion and life satisfaction.    Objective #A: Patient will experience a reduction in depressed mood, will develop more effective coping skills to manage depressive symptoms, will develop healthy cognitive patterns and beliefs, will increase ability to function adaptively and will continue to take medications as prescribed / participate in supportive activities and services    Status: Continued - Date(s): 10/20/2020    Objective #B: Patient will experience a reduction in anxiety, will develop more effective coping skills to manage anxiety symptoms, will develop healthy cognitive patterns and beliefs and will increase ability to function adaptively  Status: Continued - Date(s):10/20/2020    Objective #C: Patient will develop better understanding of triggers and coping strategies to stabilize mood   Status: Continued - Date(s): 10/20/2020    Goal 2:  Patient will identify and increase engagement in valued activity, i.e. improving social connections/relationships, pursuing occupational goals or personally meaningful pursuits, exploration of meaning in life.     Objective #A: Patient will identify meaningful activity in social, occupational and  personal goals, and increase behavioral activation around these goals   Status: Continued - Date(s): 10/20/2020    Objective #B: Patient will address relationship difficulties in a more adaptive manner  Status: Continued -  Date(s): 10/20/2020    Objective #C: Patient will develop coping/problem-solving skills to facilitate more adaptive adjustment and will effectively address problems that interfere with adaptive functioning  Status: Continued - Date(s):10/20/2020    Goal 3:  Patient will increase understanding of post-traumatic stress    Objective #A: Patient will develop strategies for more effective management of risk issues   Status: Continued - Date(s): 10/20/2020    Objective #B: Patient Will better understand triggers/factors for risk concerns  Status: Continued - Date(s): 10/20/2020    Objective #C: Patient will address relationship difficulties in a more adaptive manner  Status: Continued - Date(s): 10/20/2020    Possible Therapeutic Intervention(s)  Psycho-education regarding mental health diagnoses and treatment options    Supportive Counseling    Skills training    Explore skills useful to client in current situation.    Skills include assertiveness, communication, conflict management, problem-solving, relaxation, etc.    Solution-Focused Therapy    Explore patterns in patient's relationships and discuss options for new behaviors.    Explore patterns in patient's actions and choices and discuss options for new behaviors.    Cognitive-behavioral Therapy    Discuss common cognitive distortions, identify them in patient's life.    Explore ways to challenge, replace, and act against these cognitions.    Acceptance and Commitment Therapy    Explore and identify important values in patient's life.    Discuss ways to commit to behavioral activation around these values.    Psychodynamic psychotherapy    Discuss patient's emotional dynamics and issues and how they impact behaviors.    Explore patient's history of relationships and how they impact present behaviors.    Explore how to work with and make changes in these schemas and patterns.    Narrative Therapy    Explore the patient's story of his/her life from his/her  perspective.    Explore alternate ways of understanding their experience, identifying exceptions, developing new themes.    Interpersonal Psychotherapy    Explore patterns in relationships that are effective or ineffective at helping patient reach their goals, find satisfying experience.    Discuss new patterns or behaviors to engage in for improved social functioning.    Behavioral Activation    Discuss steps patient can take to become more involved in meaningful activity.    Identify barriers to these activities and explore possible solutions.    Mindfulness-Based Strategies    Discuss skills based on development and application of mindfulness.    Skills drawn from compassion-focused therapy, dialectical behavior therapy, mindfulness-based stress reduction, mindfulness-based cognitive therapy, etc.      We have developed these goals together during our work to this point. Patient has assisted in the development of these goals and has agreed to this treatment plan.       Salvatore Pettit, AKILA  October 20, 2020

## 2020-10-21 ENCOUNTER — TELEPHONE (OUTPATIENT)
Dept: BEHAVIORAL HEALTH | Facility: CLINIC | Age: 30
End: 2020-10-21

## 2020-10-21 NOTE — TELEPHONE ENCOUNTER
10/21/20 W[facility] referral request received from Dr. Salvatore Pettit referring Pt for a DA for MH OP. Left  for Pt to call Intake to schedule. MECCA

## 2020-10-22 ENCOUNTER — TELEPHONE (OUTPATIENT)
Dept: BEHAVIORAL HEALTH | Facility: CLINIC | Age: 30
End: 2020-10-22

## 2020-10-22 NOTE — TELEPHONE ENCOUNTER
ROBYN, Salvatore Pettit referring patient for OP MH programming- nothing specific listed.    Patient scheduled for MH eval 9/23/20 at 8am with Edelmira Sanchez via telephone call. Number listed in appt.

## 2020-10-23 ENCOUNTER — HOSPITAL ENCOUNTER (OUTPATIENT)
Dept: BEHAVIORAL HEALTH | Facility: CLINIC | Age: 30
Discharge: HOME | End: 2020-10-23
Attending: FAMILY MEDICINE | Admitting: FAMILY MEDICINE
Payer: COMMERCIAL

## 2020-10-23 ENCOUNTER — BEH TREATMENT PLAN (OUTPATIENT)
Dept: BEHAVIORAL HEALTH | Facility: CLINIC | Age: 30
End: 2020-10-23
Attending: PSYCHIATRY & NEUROLOGY

## 2020-10-23 ENCOUNTER — TELEPHONE (OUTPATIENT)
Dept: BEHAVIORAL HEALTH | Facility: CLINIC | Age: 30
End: 2020-10-23

## 2020-10-23 DIAGNOSIS — F33.1 MAJOR DEPRESSIVE DISORDER, RECURRENT EPISODE, MODERATE WITH ANXIOUS DISTRESS (H): ICD-10-CM

## 2020-10-23 PROCEDURE — 90791 PSYCH DIAGNOSTIC EVALUATION: CPT | Mod: 95 | Performed by: PSYCHOLOGIST

## 2020-10-23 ASSESSMENT — PATIENT HEALTH QUESTIONNAIRE - PHQ9
SUM OF ALL RESPONSES TO PHQ QUESTIONS 1-9: 19
5. POOR APPETITE OR OVEREATING: NEARLY EVERY DAY

## 2020-10-23 ASSESSMENT — ANXIETY QUESTIONNAIRES
5. BEING SO RESTLESS THAT IT IS HARD TO SIT STILL: NOT AT ALL
2. NOT BEING ABLE TO STOP OR CONTROL WORRYING: NEARLY EVERY DAY
7. FEELING AFRAID AS IF SOMETHING AWFUL MIGHT HAPPEN: NEARLY EVERY DAY
GAD7 TOTAL SCORE: 18
3. WORRYING TOO MUCH ABOUT DIFFERENT THINGS: NEARLY EVERY DAY
1. FEELING NERVOUS, ANXIOUS, OR ON EDGE: NEARLY EVERY DAY
6. BECOMING EASILY ANNOYED OR IRRITABLE: NEARLY EVERY DAY
IF YOU CHECKED OFF ANY PROBLEMS ON THIS QUESTIONNAIRE, HOW DIFFICULT HAVE THESE PROBLEMS MADE IT FOR YOU TO DO YOUR WORK, TAKE CARE OF THINGS AT HOME, OR GET ALONG WITH OTHER PEOPLE: EXTREMELY DIFFICULT
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
## 2020-10-23 NOTE — TELEPHONE ENCOUNTER
----- Message from Laura Hickey LP sent at 10/23/2020 10:45 AM CDT -----  Regarding: new day tx start 10/26 Monday  Location of programming:   Start Date: 10/26/2020   Group: Track 5A  [facility]   Provider: Sd Lopez   Number of visits to be scheduled: 36   Length/Duration of Appointment in minutes: 180 minutes   Visit Type (VIDEO/TELEPHONE/IN-PERSON): Video   Additional notes:

## 2020-10-23 NOTE — PROGRESS NOTES
"   Outpatient Mental Health Services - Adult     MY COPING PLAN FOR SAFETY     PATIENT'S NAME:    Betty Tamayo  MRN:                           [id number]     SAFETY PLAN:     Step 1: Warning signs / cues (Thoughts, images, mood, situation, behavior) that a crisis may be developing:     ? Thoughts: \"People would be better off without me\"  ? Images: none identified  ? Thinking Processes: highly critical and negative thoughts: feels like a burden to others, does not want children to witness her being upset  ? Mood: mood swings, irritability  ? Behaviors: needs to identify  ? Situations: needs to identify   ?    Step 2: Coping strategies - Things I can do to take my mind off of my problems without contacting another person (relaxation technique, physical activity):     ? Distress Tolerance Strategies:  needs to identify  ? Physical Activities: needs to identify  ? Focus on helpful thoughts:  needs to identify     Step 3: People and social settings that provide distraction:                 Name: Say lewis                       go for walks          Step 4: Remind myself of people and things that are important to me and worth living for:  children     Step 5: When I am in crisis, I can ask these people to help me use my safety plan:                 Name: Say              Step 6: Making the environment safe:      ? be around others     Step 7: Professionals or agencies I can contact during a crisis:     ? Suicide Prevention Lifeline: 9-911-723-DCTC (8157)  ? Crisis Text Line Service (available 24 hours a day, 7 days a week): Text [place] to 602232  ? Call  **CRISIS (257332) from a cell phone to talk to a team of professionals who can help you.          Crisis Services By [facility]: Phone Number:   [place]     [phone]   [place]    [phone]   [place]    [phone]   [place]    [phone]   [place]    [phone]   [place] [phone]   [place]     [phone]      ? Call 061 or go to my nearest " emergency department.             I helped develop this safety plan and agree to use it when needed.  I have been given a copy of this plan.          Today s date:  10/23/2020  Adapted from Safety Plan Template 2008 Anila Santoyo and Dakota German is reprinted with the express permission of the authors.  No portion of the Safety Plan Template may be reproduced without the express, written permission.  You can contact the authors at [email or web address] or [email or web address]

## 2020-10-23 NOTE — PROGRESS NOTES
Admission Date: 10/26/2020    Identify any current concerns with potential impact to admission:     medication/medical concerns: has sleep apnea and asthma     immediate safety concerns:none    Does patient have safety plan? yes  Note: Please copy safety plan copied into BEH Encounter     Other (insurance/childcare/transportation/housing/planned absences/etc): none    Patient's insurance is: preferredone . Does patient need appointment with provider? no    If patient has Medical Assistance (MA) is LOCUS and Functional Assessment completed? In DA    If patient is in Partial Hospitalization Program is LOCUS completed? na    Text: [phone]      Completed by: Nely Perera RN on 10/26/2020 at 12:14 PM

## 2020-10-23 NOTE — TELEPHONE ENCOUNTER
Writer called to conduct admission for starting Monday. No safety concerns. See related documentation.     Video test successful, except some glitches with sound quality.     Send text invite.    Nely Perera RN on 10/23/2020 at 3:41 PM

## 2020-10-23 NOTE — PROGRESS NOTES
Admission Date: 10/26/2020    Identify any current concerns with potential impact to admission:     medication/medical concerns: sleep apnea, asthma     immediate safety concerns: none    Does patient have safety plan? yes  Note: Please copy safety plan copied into BEH Encounter     Other (insurance/childcare/transportation/housing/planned absences/etc): none    Patient's insurance is: preferred one . Does patient need appointment with provider? no    If patient has Medical Assistance (MA) is LOCUS and Functional Assessment completed? na    If patient is in Partial Hospitalization Program is LOCUS completed? na      Completed by: Nely Perera RN on 10/23/2020 at 3:43 PM

## 2020-10-23 NOTE — PROGRESS NOTES
"RN Review of Medical History / Physical Health Screen  Outpatient Mental Health Programs - Adult    Adult Mental Health Day Treatment    PATIENT'S NAME: Betty Tamayo  MRN:   [id number]  :   1990  ACCT. NUMBER: [id number]  CURRENT AGE:  30 year old    DATE OF DIAGNOSTIC ASSESSMENT: 10/23/20  DATE OF ADMISSION: 10/26/20     Please see Diagnostic Assessment for additional Medical History.     General Health:   Have you had any exposure to any communicable disease in the past 2-3 weeks? no     Are you aware of safe sex practices? yes       Nutrition:    Are you on a special diet? If yes, please explain:  no   Do you have any concerns regarding your nutritional status? If yes, please explain:  no   Have you had any appetite changes in the last 3 months?  Yes, increased appetite likely r/t stress; see below     Have you had any weight loss or weight gain in the last 3 months?  Yes, how much? Lost 20 pounds in last 3 weeks d/t medication, has alfredone stopped this med     Do you have a history of an eating disorder? no   Do you have a history of being in an eating disorder program? no     Patient height and weight recorded by RN in epic flowsheet: no No; Unable to measure  Because of temporary in-person programmatic suspension due to COVID-19 pandemic, all pt weights and heights will be collected through patient self-report an recorded in physical health screening progress note upon admission to the program.                            Height/Weight Review:  Patient reported height:  5'6\"      Patient reports weight:  Date last checked:  256 pounds; today   Any referrals/needs identified?  none identified              BMI Review:  Was the patient informed of BMI? no      Findings See above       Fall Risk:   Have you had any falls in the past 3 months? no     Do you currently use any assistive devices for mobility?   no      Additional Comments/Assessment: Pt denies seizures (current/historical), dizziness, mobility " concerns. No fall risk assessed; No safety concerns r/t falls.      Per completion of the Medical History / Physical Health Screen, is there a recommendation to see / follow up with a primary care physician/clinic or dentist?    No.      Nely Perera RN  10/23/2020

## 2020-10-24 ASSESSMENT — ANXIETY QUESTIONNAIRES: GAD7 TOTAL SCORE: 18

## 2020-10-24 NOTE — DISCHARGE SUMMARY
Adult Mental Health Intensive Outpatient Discharge Summary/Instructions      Patient: Betty Tamayo MRN: [id number]   : 1990 Age: 30 year old Sex: female     Admission Date: 10/26/20  Discharge Date: 21  Diagnosis: 296.32 (F33.1) Major Depressive Disorder, Recurrent Episode, Moderate _ and With anxious distress  300.01 (F41.0) Panic Disorder  309.81 (F43.10) Posttraumatic Stress Disorder (includes Posttraumatic Stress Disorder for Children 6 Years and Younger)  Without dissociative symptoms.       Focus of Treatment / Progress    Personal Safety: Betty denied suicidal ideation at discharge.      * Follow your safety plan     * Call crisis lines as needed:    [facility] [phone] [facility] [phone]  [facility] [phone] Crisis Connection [phone]  [facility] [phone] [facility] [phone]  [facility] [phone] National Suicide Prevention [phone]  [facility] [phone] Suicide Prevention [phone]  [facility] [phone]    Managing symptoms of:  Betty worked on skills to manage strong emotions, depressive symptoms, anxiety symptoms, and trauma symptoms.   She also worked on setting boundaries.    Community support/health:  [place] [address] ([phone]) [email or web address], [place] Crisis text line( Text [place] to 536984),  [facility]  [place], [place] ([phone])  [facility] [place], [place] ( [phone])  [facility] [address] [facility], [place], [place], [address] [phone]      Managing Symptoms and Preventing Relapse    * Go to all of your appointments    * Take all medications as directed.      * Carry a current list if medication with you    * Do not use illicit (street) drugs.  Avoid alcohol    * Report these symptoms to your care team. These are early signs of relapse:   Thoughts of suicide   Losing more sleep   Increased confusion   Mood getting  "worse   Feeling more aggressive   Other:  Increased isolation, increased irritable mood    *Use these skills daily:  Talk to someone you trust at least one time weekly, set boundaries and say \"no\", be assertive, act opposite of negative feelings, accept challenges with a positive attitude, exercise at least three times per week for 30 minutes,  get enough sleep, eat healthy foods, get into a good routine    Copy of summary sent to: In Epic    Follow up with psychiatrist / main caregiver: Dr. Boyd    Next visit: 1/19/21    Follow up with your therapist: An intake was scheduled with Lyndsey Soto at [facility] for 1/7/21  Next visit: As scheduled.      Go to group therapy and / or support groups at: [facility] and [facility] [place](DBSA) support groups    See your medical doctor about:  For an annual physical exam or any general wellness or illness as needed.    Other:  Betty returned to work..       Your treatment team appreciates having the opportunity to work with you and wishes you the best.    Client Signature: Unable to sign due to COVID 19 Date / Time: 1/13/21 0930  Staff Signature: BIANKA [place], [facility]  Date / Time: 1/13/21 0930      "

## 2020-10-24 NOTE — PROGRESS NOTES
Adult Day Treatment Program:  Individualized Treatment Plan     Date of Plan: 20    Name: Betty Tamayo MRN: [id number]    : 1990    Programs:  Adult Day Treatment (ADT)     Clinical Track (if applicable):  5A    DSM5 Diagnosis  296.32 (F33.1) Major Depressive Disorder, Recurrent Episode, Moderate _ and With anxious distress  300.01 (F41.0) Panic Disorder  309.81 (F43.10) Posttraumatic Stress Disorder (includes Posttraumatic Stress Disorder for Children 6 Years and Younger)  Without dissociative symptoms.    Adult Day Treatment Program Multidisciplinary Team Members: Sd Lopez MD and/or Dr. Victoria Ngo; Poonam Archer, [facility], Eirc Lui, OTR/L, Poonam Ruffin, [facility], BC-DMT, Nely Perera RN, BSN    Betty Tamayo will participate in the Adult Day Treatment Program  3 days per week, 3 hours per day. Anticipated duration/discharge: 12 weeks    Due to COVID-19, services will be delivered via telemedicine until further notice.     Program Start Date: 10/26/20  Anticipated Discharge Date: 21 (pending authorization/clinical changes)    NOTE: Complete CGI     Review Date: Does Betty Tamayo continue to meet criteria to participate in the Adult Day Treatment Program, 3 days per week; 3 hours per day?   20                        Client Strengths:  needs to identify     Client Participation in Plan:  Contributed to goals and plan     Areas of Vulnerability:  Suicidal Ideation   Anxiety  Depressive symptoms   Trauma/Abuse/Neglect    Long-Term Goals:  Knowledge about illness and management of symptoms   Maintenance of personal safety     Abuse Prevention Plan:  Safe, therapeutic environment   Safety coping plan as needed   Education regarding illness and skill development   Coordination with care providers     Discharge Criteria:  Satisfactory progress toward treatment goals   Improvement re: identified problems and symptoms   Ability to continue recovery at next level of service    Has a discharge plan in place   Has safety/coping plan in place      Areas of Treatment Focus        Area of Treatment Focus:   Personal Safety  Start Date:    11/2/20    Goal:  Target Date: 12/28/20, 1/21/21 Status: Completed  Betty will notify staff when needing assistance to develop or implement a coping plan to manage suicidal or self injurious urges.Use coping plan for safety, as needed.      Progress:   12/28/21:   Betty denied suicidal ideation.  1/7/21:   Betty denied suicidal ideation at discharge.          Treatment Strategies:   Assess / reassess level of potential for harm to self or others  Engage in safety planning when indicated  Facilitate increased self awareness          Area of Treatment Focus:   Symptom Stabilization and Management  Start Date:    11/2/20    Goal:  Target Date: 12/28/20, 1/21/21 Status: Completed  When in life skills Betty  will provide an update related to perceived progress with mental health recovery weekly.      Progress:   12/28/21:  Betty is making progress.   Continue.  1/7/21:    Betty worked on skills to manage mental health.          Treatment Strategies:   Facilitate increased self awareness  Provide education regarding strategies and coping skills to assist with mental health recovery          Area of Treatment Focus:   Wellness and Mental Health  Start Date:    11/2/20    Goal:  Target Date: 12/28/20, 1/21/21 Status: Completed  Client will improve wellness related behaviors by getting enough sleep,exercise, balanced nutrition and take medications (if prescribed) to maintain good mental health.        Progress:   12/28/20:  Betty worked on stabilizing sleep and self-care behaviors.  1/7/21:  Goal met.        Treatment Strategies:   Facilitate increased self awareness  Provide education regarding wellness habits and routines which can assist with mental health recovery          Area of Treatment Focus:   Community Resources / Support and Discharge Planning  Start  "Date:    11/2/20    Goal:  Target Date: 12/28/20, 1/21/21 Status: Completed  Betty will establish an aftercare plan to include medical providers and social supports by discharge.  Betty works with Dr.Vincent Boyd for psychiatry.  Betty would like an individual therapist,    Progress:   12/28/21:   Betty has an upcoming appointment with Dr. Boyd.  She has scheduled an intake for [facility]/7/21 at [facility] with Lyndsey Dobson.  1/7/21:  Lyndsey has discharge appointments in place.  Goal met.        Treatment Strategies:   Facilitate increased self awareness  Provide education regarding relapse prevention,discharge planning and mental health support          Area of Treatment Focus:   Symptom Stabilization and Management  Start Date:    11/2/20    Goal:  Target Date: 12/28/20, 1/21/21 Status: Completed  Betty will learn and practice skills to manage intense emotions.  She will also learn and practice skills to reduce the frequency of angry outbursts.  She would like to learn triggers to these outbursts to help reduce how often they occur.        Progress:   12/28/21:  Betty worked on skills to reduce anger outbursts by setting boundaries and practicing assertive communication.  She practiced with her Mom.  She also worked on skills to manage intense emotions including being in the present moment, distraction, self-soothing, and challenging negative self-talk.  1/7/21:  Goal completed.          Treatment Strategies:   Teach adaptive coping skills and communication skills       Jose Lui, OTR/L      NOTE: Required signatures are completed manually and scanned into the electronic medical record. See \"Media\" tab in epic.    The Individualized Treatment Plan Signature Page has been routed to the provider for co-sign.      "

## 2020-10-26 ENCOUNTER — HOSPITAL ENCOUNTER (OUTPATIENT)
Dept: BEHAVIORAL HEALTH | Facility: CLINIC | Age: 30
End: 2020-10-26
Attending: PSYCHIATRY & NEUROLOGY
Payer: COMMERCIAL

## 2020-10-26 PROBLEM — F33.1 MAJOR DEPRESSIVE DISORDER, RECURRENT EPISODE, MODERATE WITH ANXIOUS DISTRESS (H): Status: ACTIVE | Noted: 2020-10-26

## 2020-10-26 PROCEDURE — 90853 GROUP PSYCHOTHERAPY: CPT | Mod: GT | Performed by: SOCIAL WORKER

## 2020-10-26 PROCEDURE — G0177 OPPS/PHP; TRAIN & EDUC SERV: HCPCS | Mod: 95

## 2020-10-26 NOTE — GROUP NOTE
Psychoeducation Group Note    PATIENT'S NAME: Betty Tamayo  MRN:   [id number]  :   1990  ACCT. NUMBER: [id number]  DATE OF SERVICE: 10/26/20  START TIME: 11:00 AM  END TIME: 11:50 AM  FACILITATOR: Nely Perera RN  TOPIC: FREDRICK RN Group: Medication Education and Management  Telemedicine Visit: The patient's condition can be safely assessed and treated via synchronous audio and visual telemedicine encounter.      Reason for Telemedicine Visit:  covid19    Originating Site (Patient Location): Patient's home    Distant Site (Provider Location): Provider Remote Setting    Consent:  The patient/guardian has verbally consented to: the potential risks and benefits of telemedicine (video visit) versus in person care; bill my insurance or make self-payment for services provided; and responsibility for payment of non-covered services.     Mode of Communication:  Video Conference via [employer]    As the provider I attest to compliance with applicable laws and regulations related to telemedicine.      Adult Mental Health Day Treatment  TRACK: 5A    NUMBER OF PARTICIPANTS: 6    Summary of Group / Topics Discussed:  Medication Educations and Management:  Medication Categories: Patient were provided with a brief overview of four major psychotropic medication categories. Expected effects, potential side effects, adverse reactions, and contraindications were discussed. Patients learned about how their medications work to treat their illness and reviewed safe medication management, handling, and disposal of medications.     Patient Session Goals / Objectives:  ? Listed the four major categories of psychotropic medications (antidepressants, antipsychotics, mood stabilizers, anti-anxiety)  ? Identified medications in each category and important adverse reactions/contraindications for use  ? Explained how the medications they take work to treat their illness       Patient Participation / Response:  Moderately  participated, sharing some personal reflections / insights and adequately adequately received / provided feedback with other participants.     Verbalized understanding of medication education and management topic    Treatment Plan:  Patient has an initial individualized treatment plan that was created as part of their diagnostic assessment / admission process.  A master individualized treatment plan is in the process of being developed with the patient and multi-disciplinary care team.    Nely Perera RN

## 2020-10-26 NOTE — GROUP NOTE
Psychotherapy Group Note    PATIENT'S NAME: Betty Tamayo  MRN:   [id number]  :   1990  ACCT. NUMBER: [id number]  DATE OF SERVICE: 10/26/20  START TIME: 10:00 AM  END TIME: 10:50 AM  FACILITATOR: Poonam Archer LICSW  TOPIC: MH EBP Group: Coping Skills  Adult Mental Health Day Treatment  TRACK: 5A    NUMBER OF PARTICIPANTS: 6    Summary of Group / Topics Discussed:  Coping Skills: Self-Soothe: Patients learned to apply self-soothe as a way to decrease heightened stress in the moment.  Patients identified situations that necessitate self-soothe strategies.  They focused on ways to manage physical symptoms of distress using the senses. They discussed how to distinguish when this can be useful in their lives when other strategies are more relevant or helpful.    Patient Session Goals / Objectives:    Understand the purpose of using the senses to decrease distress    Process what happens in the body when using self-soothe strategies    Demonstrate understanding of when to use self-soothe strategies    Identify and problem solve barriers to applying self-soothe strategies.    Choose 1-2 self-soothe strategies to apply during times of distress.    Telemedicine Visit: The patient's condition can be safely assessed and treated via synchronous audio and visual telemedicine encounter.      Reason for Telemedicine Visit: Services only offered telehealth    Originating Site (Patient Location): Patient's home    Distant Site (Provider Location): Provider Remote Setting    Consent:  The patient/guardian has verbally consented to: the potential risks and benefits of telemedicine (video visit) versus in person care; bill my insurance or make self-payment for services provided; and responsibility for payment of non-covered services.     Mode of Communication:  Video Conference via [employer]    As the provider I attest to compliance with applicable laws and regulations related to telemedicine.       Patient  Participation / Response:  Fully participated with the group by sharing personal reflections / insights and openly received / provided feedback with other participants.    Demonstrated knowledge of when to consider using a variety of coping skills in daily life    Treatment Plan:  Patient has an initial individualized treatment plan that was created as part of their diagnostic assessment / admission process.  A master individualized treatment plan is in the process of being developed with the patient and multi-disciplinary care team.    Poonam Archer, LINDSAYSW
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
## 2020-10-27 ENCOUNTER — VIRTUAL VISIT (OUTPATIENT)
Dept: BEHAVIORAL HEALTH | Facility: CLINIC | Age: 30
End: 2020-10-27
Attending: PSYCHIATRY & NEUROLOGY
Payer: COMMERCIAL

## 2020-10-27 ENCOUNTER — TELEPHONE (OUTPATIENT)
Dept: FAMILY MEDICINE | Facility: CLINIC | Age: 30
End: 2020-10-27

## 2020-10-27 DIAGNOSIS — R45.4 IRRITABILITY AND ANGER: ICD-10-CM

## 2020-10-27 DIAGNOSIS — F43.10 PTSD (POST-TRAUMATIC STRESS DISORDER): Primary | ICD-10-CM

## 2020-10-27 PROCEDURE — 99214 OFFICE O/P EST MOD 30 MIN: CPT | Mod: 95 | Performed by: PSYCHIATRY & NEUROLOGY

## 2020-10-27 PROCEDURE — G0177 OPPS/PHP; TRAIN & EDUC SERV: HCPCS | Mod: GT

## 2020-10-27 PROCEDURE — 90853 GROUP PSYCHOTHERAPY: CPT | Mod: 95 | Performed by: SOCIAL WORKER

## 2020-10-27 PROCEDURE — 90853 GROUP PSYCHOTHERAPY: CPT | Mod: GT | Performed by: SOCIAL WORKER

## 2020-10-27 NOTE — GROUP NOTE
Psychoeducation Group Note    PATIENT'S NAME: Betty Tamayo  MRN:   [id number]  :   1990  ACCT. NUMBER: [id number]  DATE OF SERVICE: 10/27/20  START TIME: 10:00 AM  END TIME: 10:50 AM  FACILITATOR: Jose Lui OTR/L  TOPIC: MH Life Skills Group: Resiliency Development  Telemedicine Visit: The patient's condition can be safely assessed and treated via synchronous audio and visual telemedicine encounter.      Reason for Telemedicine Visit: COVID-19    Originating Site (Patient Location): Patient's home    Distant Site (Provider Location): [facility]: [facility]    Consent:  The patient/guardian has verbally consented to: the potential risks and benefits of telemedicine (video visit) versus in person care; bill my insurance or make self-payment for services provided; and responsibility for payment of non-covered services.     Mode of Communication:  Video Conference via [employer]    As the provider I attest to compliance with applicable laws and regulations related to telemedicine.   Adult Mental Health Day Treatment  TRACK: 5A    NUMBER OF PARTICIPANTS: 6    Summary of Group / Topics Discussed:  Resiliency Development:  Coping Skills(Characteristics of Resiliency): Patients were taught how to identify stressors, signs of stress, coping skills, and prevention strategies for overall stress management.  Patients were given the opportunity to identify both ongoing and acute mental health symptoms and how to effectively manage these symptoms by developing an effective aftercare plan.  Patients increased awareness of community based resources.    Patient Session Goals / Objectives:    Identified how using coping skills can be used for symptom and stress management       Improved awareness of individualed symptoms and stressors and how to effectively cope     Established a relapse prevention plan to practice these skills in their own environments    Practiced and reflected on how to  generalize taught skills to their everyday life          Patient Participation / Response:  Fully participated with the group by sharing personal reflections / insights and openly received / provided feedback with other participants.    Demonstrated understanding of content through handout and group discussion  and Verbalized understanding of content    Treatment Plan:  Patient has a current master individualized treatment plan.  See Epic treatment plan for more information.    Jose Lui, OTR/L

## 2020-10-27 NOTE — GROUP NOTE
Psychotherapy Group Note    PATIENT'S NAME: Betty Tamayo  MRN:   [id number]  :   1990  ACCT. NUMBER: [id number]  DATE OF SERVICE: 10/27/20  START TIME: 11:00 AM  END TIME: 11:50 AM  FACILITATOR: Poonam Archer LICSW  TOPIC: MH EBP Group: Coping Skills  Adult Mental Health Day Treatment  TRACK: 5A    NUMBER OF PARTICIPANTS: 5    Summary of Group / Topics Discussed:  Coping Skills: Distraction: Patients learned to mindfully use distraction as a way to decrease heightened stress in the moment.  Patients will identified situations that necessitate healthy distraction strategies.  They explored ways to manage physical symptoms of distress using distraction. The group began to distinguish when this can be useful in their lives or when other strategies would be more relevant or helpful.    Patient Session Goals / Objectives:    Understand the purpose and benefits of using healthy distraction to decrease distress.    Process what happens in the body when using distraction strategies.    Demonstrate understanding of when to use distraction strategies.    Explore patient s current distraction activities, and how to take a more intentional approach to the use of distraction.    Identify and problem solve barriers to applying distraction strategies.    Choose 1-2 healthy distraction strategies to apply during times of distress.    Telemedicine Visit: The patient's condition can be safely assessed and treated via synchronous audio and visual telemedicine encounter.      Reason for Telemedicine Visit: Services only offered telehealth    Originating Site (Patient Location): Patient's home    Distant Site (Provider Location): Provider Remote Setting    Consent:  The patient/guardian has verbally consented to: the potential risks and benefits of telemedicine (video visit) versus in person care; bill my insurance or make self-payment for services provided; and responsibility for payment of non-covered services.      Mode of Communication:  Video Conference via [employer]    As the provider I attest to compliance with applicable laws and regulations related to telemedicine.       Patient Participation / Response:  Fully participated with the group by sharing personal reflections / insights and openly received / provided feedback with other participants.    Practiced 2-3 new coping skills in session    Treatment Plan:  Patient has an initial individualized treatment plan that was created as part of their diagnostic assessment / admission process.  A master individualized treatment plan is in the process of being developed with the patient and multi-disciplinary care team.    Poonam Archer, [facility]
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
## 2020-10-27 NOTE — PATIENT INSTRUCTIONS
Try holding the guanfacine dose for 2-3 days. The goal here is to see if there is a difference / improvement. Depending on what you notice, you may restart the medication, or send me a message to discuss next options.       Scheduling: If you have any concerns about today's visit or wish to schedule another appointment please call our office during normal business hours [phone] (8-5:00 M-F)    Contact Us: Please call [phone] during business hours (8-5:00 M-F).  If after clinic hours, or on the weekend, please call  [phone].    Financial Assistance [phone]  [facility] [phone]  [facility] [phone]  Medical Records [phone]    MENTAL HEALTH CRISIS NUMBERS:  [facility]:  [facility] - [phone]  [facility] - [phone]  Walk-In Counseling [facility] [phone]  COPE 24/7 [place] Mobile Team - [[phone]]    [facility]:  [facility] - [phone]  Walk-in counseling [facility] - [phone]  Walk-in counseling [facility] - [phone]  [facility] - [phone]  Urgent Care Adult Mental Health:   --Drop-in, 24/7 crisis line, and [place] Mobile Team [[phone]]    24/7 CRISIS TEXT LINE: www.crisistextline.org OR text 457-395 anywhere, anytime, any crisis    Poison Control Center - [phone]  [facility] - [phone]; or [facility] - [phone]    If you have a medical emergency please call 911 or go to the nearest ER.

## 2020-10-28 NOTE — TELEPHONE ENCOUNTER
October 27, 2020  I completed FMLA and Short term disability form will fax to clinic Wednesday. Please see scanned document.  FAX to [phone].  Kapil Corbett MD

## 2020-10-28 NOTE — PROGRESS NOTES
Patient Active Problem List   Diagnosis     Encounter for supervision of high risk pregnancy, , [facility] CNM     History of pre-eclampsia in prior pregnancy, currently pregnant     Nausea     UTI in pregnancy, first trimester     Vitamin D deficiency     Maternal varicella, non-immune     Medication exposure during first trimester of pregnancy     Uncomplicated asthma     Snoring     Hypersomnia     Class 3 severe obesity due to excess calories with body mass index (BMI) of 40.0 to 44.9 in adult, unspecified whether serious comorbidity present (H)     PTSD (post-traumatic stress disorder)     Major depressive disorder, recurrent episode, severe (H)     Mood disorder (H)     Generalized anxiety disorder     Major depressive disorder, recurrent episode, moderate with anxious distress (H)       Current Outpatient Medications:      buPROPion (WELLBUTRIN XL) 150 MG 24 hr tablet, 2 tab daily, Disp: 60 tablet, Rfl: 1     guanFACINE (TENEX) 1 MG tablet, Take 1 tablet (1 mg) by mouth At Bedtime for 7 days, THEN 1 tablet (1 mg) 2 times daily., Disp: 60 tablet, Rfl: 0     lamoTRIgine (LAMICTAL) 200 MG tablet, Take 1 tablet (200 mg) by mouth At Bedtime, Disp: 30 tablet, Rfl: 1  Psychiatry staffing: case discussed  Diagnosis:  As above, recent start, symptom level led to [place] from work

## 2020-10-28 NOTE — TELEPHONE ENCOUNTER
Faxed FMLA And Short Term Disability papers to [facility] at [phone].and scanned into chart.    Johanna Perry on 10/28/2020 at 4:03 PM

## 2020-10-29 ENCOUNTER — HOSPITAL ENCOUNTER (OUTPATIENT)
Dept: BEHAVIORAL HEALTH | Facility: CLINIC | Age: 30
End: 2020-10-29
Attending: PSYCHIATRY & NEUROLOGY
Payer: COMMERCIAL

## 2020-10-29 PROCEDURE — 90853 GROUP PSYCHOTHERAPY: CPT | Mod: GT | Performed by: SOCIAL WORKER

## 2020-10-29 NOTE — GROUP NOTE
Psychotherapy Group Note    PATIENT'S NAME: Betty Tamayo  MRN:   [id number]  :   1990  ACCT. NUMBER: [id number]  DATE OF SERVICE: 10/29/20  START TIME: 11:00 AM  END TIME: 11:50 AM  FACILITATOR: Poonam Ruffin LICSW  TOPIC:  EBP Group: Enhanced Mindfulness  Adult Mental Health Day Treatment  TRACK: 5A    NUMBER OF PARTICIPANTS: 5    Summary of Group / Topics Discussed:  Enhanced Mindfulness: Body and Mind Integration: Patients received an overview and education regarding the importance of including the body in the management of emotional health and self-care and as a direct route to mindfulness practice.  Patients discussed various ways in which the body can serve as an informant to their physical and emotional experiences/need. Patients discussed the body as a direct link to the present moment and to mindfulness practice.  Patients discussed current relationship with body, self-awareness, mindfulness practice and barriers to connection with body.  Patients were guided through breath work and movement to facilitate greater self-awareness, grounding, self-expression, and connection to other.  Patients discussed how the experiential could be applied to better manage mental health and develop clark connection to self.    Patient Session Goals / Objectives:    Identify how movement awareness could be used for grounding, stress management, self-expression, connection to other and self-regulation    Improved awareness of breath and movement preferences    Identify how movement and the body is used in mindfulness practice    Reflect on use of these practices in everyday life.    Identify barriers to attending to body  Telemedicine Visit: The patient's condition can be safely assessed and treated via synchronous audio and visual telemedicine encounter.      Reason for Telemedicine Visit: Services only offered telehealth and covid19    Originating Site (Patient Location): Patient's home    Distant Site  (Provider Location): Provider Remote Setting    Consent:  The patient/guardian has verbally consented to: the potential risks and benefits of telemedicine (video visit) versus in person care; bill my insurance or make self-payment for services provided; and responsibility for payment of non-covered services.     Mode of Communication:  Video Conference via [employer]    As the provider I attest to compliance with applicable laws and regulations related to telemedicine.       Patient Participation / Response:  Fully participated with the group by sharing personal reflections / insights and openly received / provided feedback with other participants.    Demonstrated understanding of topics discussed through group discussion and participation and Practiced skills in session    Treatment Plan:  Patient has an initial individualized treatment plan that was created as part of their diagnostic assessment / admission process.  A master individualized treatment plan is in the process of being developed with the patient and multi-disciplinary care team.    TERRA Sauceda

## 2020-10-29 NOTE — GROUP NOTE
Process Group Note    PATIENT'S NAME: Betty Tamayo  MRN:   [id number]  :   1990  ACCT. NUMBER: [id number]  DATE OF SERVICE: 10/29/20  START TIME:  9:00 AM  END TIME:  9:50 AM  FACILITATOR: Poonam Archer LICSW  TOPIC:  Process Group    Diagnoses:  296.32 (F33.1) Major Depressive Disorder, Recurrent Episode, Moderate _ and With anxious distress  300.01 (F41.0) Panic Disorder  309.81 (F43.10) Posttraumatic Stress Disorder (includes Posttraumatic Stress Disorder for Children 6 Years and Younger)  Without dissociative symptoms.      Adult Mental Health Day Treatment  TRACK: 5A    NUMBER OF PARTICIPANTS: 8    Telemedicine Visit: The patient's condition can be safely assessed and treated via synchronous audio and visual telemedicine encounter.      Reason for Telemedicine Visit: Services only offered telehealth    Originating Site (Patient Location): Patient's home    Distant Site (Provider Location): Provider Remote Setting    Consent:  The patient/guardian has verbally consented to: the potential risks and benefits of telemedicine (video visit) versus in person care; bill my insurance or make self-payment for services provided; and responsibility for payment of non-covered services.     Mode of Communication:  Video Conference via [facility]    As the provider I attest to compliance with applicable laws and regulations related to telemedicine.           Data:    Session content: At the start of this group, patients were invited to check in by identifying themselves, describing their current emotional status, and identifying issues to address in this group.   Area(s) of treatment focus addressed in this session included Symptom Management, Personal Safety and Community Resources/Discharge Planning.  Betty reported having high anxiety yesterday.   She stated that she found out that her 10 year old was accessing content on the internet that she did not want her to access.She state that she talked to  her daughter's father, who shares custody, and he was not as upset about the situation.  She asked for feedback from peers.  Peers offered support and feedback.  Client denied suicidal ideation, intent and plan. Client reported a high level of worry.    Therapeutic Interventions/Treatment Strategies:  Psychotherapist offered support, feedback and validation and reinforced use of skills. Treatment modalities used include Cognitive Behavioral Therapy. Interventions include Relationship Skills: Assisted patients in implementing more effective communication skills in their relationships.    Assessment:    Patient response:   Patient responded to session by listening and focusing on goals    Possible barriers to participation / learning include: and no barriers identified    Health Issues:   None reported       Substance Use Review:   Substance Use: No active concerns identified.    Mental Status/Behavioral Observations  Appearance:   Appropriate   Eye Contact:   Good   Psychomotor Behavior: Normal   Attitude:   Cooperative   Orientation:   All  Speech   Rate / Production: Normal    Volume:  Normal   Mood:    Anxious  Depressed   Affect:    Appropriate   Thought Content:   Clear  Thought Form:  Coherent  Logical     Insight:    Good     Plan:     Safety Plan: No current safety concerns identified.  Recommended that patient call 911 or go to the local ED should there be a change in any of these risk factors.     Barriers to treatment: None identified    Patient Contracts (see media tab):  None    Substance Use: Not addressed in session     Continue or Discharge: Patient will continue in Adult Day Treatment (ADT)  as planned. Patient is likely to benefit from learning and using skills as they work toward the goals identified in their treatment plan.      Poonam Archer, [facility]  October 29, 2020

## 2020-10-29 NOTE — GROUP NOTE
Psychotherapy Group Note    PATIENT'S NAME: Betty Tamayo  MRN:   [id number]  :   1990  ACCT. NUMBER: [id number]  DATE OF SERVICE: 10/29/20  START TIME: 10:00 AM  END TIME: 10:50 AM  FACILITATOR: Poonam Archer LICSW  TOPIC: MH EBP Group: Coping Skills  Adult Mental Health Day Treatment  TRACK: 5A    NUMBER OF PARTICIPANTS: 8    Summary of Group / Topics Discussed:  Coping Skills: Calming Strategies: Patients discussed and practiced strategies to increase sense of calm in the body.  Reviewed the benefits of calming strategies as well as past / current practices of each member.  Patients identified situations in which using these strategies would reduce stress. They developed the ability to distinguish when these strategies can be useful in their lives for management and stress and psychological well-being.    Patient Session Goals / Objectives:    Understand the purpose of using calming strategies to reduce stress    Review patients current calming practices and discuss a more formal way of practicing and accessing skills.    Increase ability to decide when to use various calming strategies (e.g. Progressive muscle relaxation, deep breathing, guided imagery, + thoughts).    Choose 1-2 calming strategies to apply during times of distress.    Telemedicine Visit: The patient's condition can be safely assessed and treated via synchronous audio and visual telemedicine encounter.      Reason for Telemedicine Visit: Services only offered telehealth    Originating Site (Patient Location): Patient's home    Distant Site (Provider Location): Provider Remote Setting    Consent:  The patient/guardian has verbally consented to: the potential risks and benefits of telemedicine (video visit) versus in person care; bill my insurance or make self-payment for services provided; and responsibility for payment of non-covered services.     Mode of Communication:  Video Conference via [employer]    As the provider I  attest to compliance with applicable laws and regulations related to telemedicine.       Patient Participation / Response:  Fully participated with the group by sharing personal reflections / insights and openly received / provided feedback with other participants.    Identified barriers to applying coping skills    Treatment Plan:  Patient has a current master individualized treatment plan.  See Epic treatment plan for more information.    Poonam Archer, LICSW

## 2020-11-02 ENCOUNTER — HOSPITAL ENCOUNTER (OUTPATIENT)
Dept: BEHAVIORAL HEALTH | Facility: CLINIC | Age: 30
End: 2020-11-02
Attending: PSYCHIATRY & NEUROLOGY
Payer: COMMERCIAL

## 2020-11-02 PROCEDURE — G0177 OPPS/PHP; TRAIN & EDUC SERV: HCPCS | Mod: GT

## 2020-11-02 PROCEDURE — 90853 GROUP PSYCHOTHERAPY: CPT | Mod: 95 | Performed by: SOCIAL WORKER

## 2020-11-02 NOTE — GROUP NOTE
Psychoeducation Group Note    PATIENT'S NAME: Betty Tamayo  MRN:   [id number]  :   1990  ACCT. NUMBER: [id number]  DATE OF SERVICE: 20  START TIME: 11:00 AM  END TIME: 11:50 AM  FACILITATOR: Nely Perera RN  TOPIC: MH RN Group: Health Maintenance  Telemedicine Visit: The patient's condition can be safely assessed and treated via synchronous audio and visual telemedicine encounter.      Reason for Telemedicine Visit:  covid19    Originating Site (Patient Location): Patient's home    Distant Site (Provider Location): Provider Remote Setting    Consent:  The patient/guardian has verbally consented to: the potential risks and benefits of telemedicine (video visit) versus in person care; bill my insurance or make self-payment for services provided; and responsibility for payment of non-covered services.     Mode of Communication:  Video Conference via [employer]    As the provider I attest to compliance with applicable laws and regulations related to telemedicine.      Adult Mental Health Day Treatment  TRACK: 5A    NUMBER OF PARTICIPANTS: 5    Summary of Group / Topics Discussed:  Health Maintenance: Eight Dimensions of Wellness: The concept of holistic health through the model of eight dimensions was introduced. Group members participated in identifying behaviors and activities in each of the dimensions of wellness.  The importance of each dimension was reinforced and the concept of balance in life as it relates to wellness was explored.      Patient Session Goals / Objectives:    Verbalized understanding of balance in wellness and how it relates to their life    Identified and explained the eight dimensions of wellness    Categorized activities and wellness needs into corresponding dimensions appropriately during exercise          Patient Participation / Response:  Fully participated with the group by sharing personal reflections / insights and openly received / provided feedback with other  participants.    Demonstrated understanding of topics discussed through group discussion and participation and Identified / Expressed personal readiness to practice skills    Treatment Plan:  Patient has a current master individualized treatment plan.  See Epic treatment plan for more information.    Nely Perera RN

## 2020-11-02 NOTE — GROUP NOTE
Psychotherapy Group Note    PATIENT'S NAME: Betty Tamayo  MRN:   [id number]  :   1990  ACCT. NUMBER: [id number]  DATE OF SERVICE: 20  START TIME: 10:00 AM  END TIME: 10:50 AM  FACILITATOR: Poonam Archer LICSW  TOPIC: MH EBP Group: Behavioral Activation  Adult Mental Health Day Treatment  TRACK: 5A    NUMBER OF PARTICIPANTS: 6    Summary of Group / Topics Discussed:  Behavioral Activation: The Change Process - Behavior Change: Patients explored the process and types of change, including but not limited to, theories of change, steps to making change, methods of changing behavior, and potential barriers.  Patients worked to identify what changes may benefit their daily lives, and work towards a plan to implement change.      Patient Session Goals / Objectives:    Demonstrate understanding of the change process.      Identify positive and negative behavioral patterns.    Make plans to track and implement changes and share experiences in group.    Identify personal barriers to change    Telemedicine Visit: The patient's condition can be safely assessed and treated via synchronous audio and visual telemedicine encounter.      Reason for Telemedicine Visit: Services only offered telehealth    Originating Site (Patient Location): Patient's home    Distant Site (Provider Location): Provider Remote Setting    Consent:  The patient/guardian has verbally consented to: the potential risks and benefits of telemedicine (video visit) versus in person care; bill my insurance or make self-payment for services provided; and responsibility for payment of non-covered services.     Mode of Communication:  Video Conference via [employer]    As the provider I attest to compliance with applicable laws and regulations related to telemedicine.       Patient Participation / Response:  Fully participated with the group by sharing personal reflections / insights and openly received / provided feedback with other  participants.    Identified barriers to change    Treatment Plan:  Patient has a current master individualized treatment plan.  See Epic treatment plan for more information.    Poonam Archer, [facility]

## 2020-11-02 NOTE — GROUP NOTE
Process Group Note    PATIENT'S NAME: Betty Tamayo  MRN:   [id number]  :   1990  ACCT. NUMBER: [id number]  DATE OF SERVICE: 20  START TIME:  9:00 AM  END TIME:  9:50 AM  FACILITATOR: Poonam Archer LICSW  TOPIC:  Process Group    Diagnoses:  296.32 (F33.1) Major Depressive Disorder, Recurrent Episode, Moderate _ and With anxious distress  300.01 (F41.0) Panic Disorder  309.81 (F43.10) Posttraumatic Stress Disorder (includes Posttraumatic Stress Disorder for Children 6 Years and Younger)  Without dissociative symptoms.      Adult Mental Health Day Treatment  TRACK: 5A    NUMBER OF PARTICIPANTS: 6    Telemedicine Visit: The patient's condition can be safely assessed and treated via synchronous audio and visual telemedicine encounter.      Reason for Telemedicine Visit: Services only offered telehealth    Originating Site (Patient Location): Patient's home    Distant Site (Provider Location): Provider Remote Setting    Consent:  The patient/guardian has verbally consented to: the potential risks and benefits of telemedicine (video visit) versus in person care; bill my insurance or make self-payment for services provided; and responsibility for payment of non-covered services.     Mode of Communication:  Video Conference via [employer]    As the provider I attest to compliance with applicable laws and regulations related to telemedicine.           Data:    Session content: At the start of this group, patients were invited to check in by identifying themselves, describing their current emotional status, and identifying issues to address in this group.   Area(s) of treatment focus addressed in this session included Symptom Management, Personal Safety and Community Resources/Discharge Planning.  Betty reported a very high level of anxiety with physical restlessness.   She reported extreme difficulty sleeping since Friday.  She believes that she has only gotten about 2 hours of sleep per night.   Client was tearful.  Client denied suicidal ideation, intent and plan. Betty reported that her fiance got her a vehicle and she is having trouble being happy about it.  She stated that she is worried about being a burden to her fiance.  She stated that she does not have medications for sleep.   Writer reviewed behavioral sleep skills.      Therapeutic Interventions/Treatment Strategies:  Psychotherapist offered support, feedback and validation and reinforced use of skills. Treatment modalities used include Cognitive Behavioral Therapy. Interventions include Behavioral Activation: reviewed sleep hygiene skills.    Assessment:    Patient response:   Patient responded to session by listening and focusing on goals    Possible barriers to participation / learning include: and no barriers identified    Health Issues:   None reported       Substance Use Review:   Substance Use: No active concerns identified.    Mental Status/Behavioral Observations  Appearance:   Appropriate   Eye Contact:   Good   Psychomotor Behavior: Restless   Attitude:   Cooperative   Orientation:   All  Speech   Rate / Production: Normal    Volume:  Normal   Mood:    Anxious   Affect:    Tearful  Thought Content:   Rumination  Thought Form:  Coherent  Logical     Insight:    Good     Plan:     Safety Plan: No current safety concerns identified.  Recommended that patient call 911 or go to the local ED should there be a change in any of these risk factors.     Barriers to treatment: None identified    Patient Contracts (see media tab):  None    Substance Use: Not addressed in session     Continue or Discharge: Patient will continue in Adult Day Treatment (ADT)  as planned. Patient is likely to benefit from learning and using skills as they work toward the goals identified in their treatment plan.      Poonam Archer, [facility]  November 2, 2020

## 2020-11-03 ENCOUNTER — HOSPITAL ENCOUNTER (OUTPATIENT)
Dept: BEHAVIORAL HEALTH | Facility: CLINIC | Age: 30
End: 2020-11-03
Attending: PSYCHIATRY & NEUROLOGY
Payer: COMMERCIAL

## 2020-11-03 ENCOUNTER — VIRTUAL VISIT (OUTPATIENT)
Dept: BEHAVIORAL HEALTH | Facility: CLINIC | Age: 30
End: 2020-11-03
Payer: COMMERCIAL

## 2020-11-03 DIAGNOSIS — Z53.9 NO SHOW: Primary | ICD-10-CM

## 2020-11-03 PROCEDURE — 90853 GROUP PSYCHOTHERAPY: CPT | Mod: GT | Performed by: SOCIAL WORKER

## 2020-11-03 PROCEDURE — G0177 OPPS/PHP; TRAIN & EDUC SERV: HCPCS | Mod: GT

## 2020-11-03 NOTE — GROUP NOTE
Psychoeducation Group Note    PATIENT'S NAME: Betty Tamayo  MRN:   [id number]  :   1990  ACCT. NUMBER: [id number]  DATE OF SERVICE: 20  START TIME: 10:00 AM  END TIME: 10:50 AM  FACILITATOR: Jose Lui OTR/L  TOPIC: MH Life Skills Group: Communication and Social Skills Development  Telemedicine Visit: The patient's condition can be safely assessed and treated via synchronous audio and visual telemedicine encounter.      Reason for Telemedicine Visit: COVID-19    Originating Site (Patient Location): Patient's home    Distant Site (Provider Location): [facility]: [facility]    Consent:  The patient/guardian has verbally consented to: the potential risks and benefits of telemedicine (video visit) versus in person care; bill my insurance or make self-payment for services provided; and responsibility for payment of non-covered services.     Mode of Communication:  Video Conference via [employer]    As the provider I attest to compliance with applicable laws and regulations related to telemedicine.   Adult Mental Health Day Treatment  TRACK: 5A    NUMBER OF PARTICIPANTS: 3    Summary of Group / Topics Discussed:  Communication and Social Skills Development: Active Listening Skills: {Patients were taught and gained awareness into personal barriers to effective listening and how this can negatively impact relationships.  Patients were taught skills and practiced how to improve communication with others by becoming an active listener.  Patients identified both personal strengths and opportunities for growth in this area to improve overall communication and connection with other people as a way to build meaningful relationships to combat mental health and substance use/abuse symptoms.      Patient Session Goals / Objectives:    Identified importance of active listening skills in effective communication and how this impacts their ability to communicate clearly with other people        Improved awareness of important aspects of listening skills and how this relates to mental health recovery        Established a plan for practice of these skills in their own environments    Practiced and reflected on how to generalize taught skills to their everyday life        Patient Participation / Response:  Fully participated with the group by sharing personal reflections / insights and openly received / provided feedback with other participants.    Demonstrated understanding of content through handout and group discussion  and Verbalized understanding of content    Treatment Plan:  Patient has a current master individualized treatment plan.  See Epic treatment plan for more information.    Jose Lui, OTR/L

## 2020-11-03 NOTE — PROGRESS NOTES
This patient was a no show for this scheduled appointment. Patient did not connect to [facility] for the appointment. Made two call attempts, both went to . Left scheduling instructions in a message on the first attempt.     Salvatore Pettit LP

## 2020-11-04 NOTE — GROUP NOTE
Process Group Note    PATIENT'S NAME: Betty Tamayo  MRN:   [id number]  :   1990  ACCT. NUMBER: [id number]  DATE OF SERVICE: 20  START TIME:  9:00 AM  END TIME:  9:50 AM  FACILITATOR: Poonam Archer LICSW  TOPIC:  Process Group    Diagnoses:  296.32 (F33.1) Major Depressive Disorder, Recurrent Episode, Moderate _ and With anxious distress  300.01 (F41.0) Panic Disorder  309.81 (F43.10) Posttraumatic Stress Disorder (includes Posttraumatic Stress Disorder for Children 6 Years and Younger)  Without dissociative symptoms.      Adult Mental Health Day Treatment  TRACK: 5A    NUMBER OF PARTICIPANTS: 4    Telemedicine Visit: The patient's condition can be safely assessed and treated via synchronous audio and visual telemedicine encounter.      Reason for Telemedicine Visit: Services only offered telehealth    Originating Site (Patient Location): Patient's home    Distant Site (Provider Location): Provider Remote Setting    Consent:  The patient/guardian has verbally consented to: the potential risks and benefits of telemedicine (video visit) versus in person care; bill my insurance or make self-payment for services provided; and responsibility for payment of non-covered services.     Mode of Communication:  Video Conference via [employer]    As the provider I attest to compliance with applicable laws and regulations related to telemedicine.           Data:    Session content: At the start of this group, patients were invited to check in by identifying themselves, describing their current emotional status, and identifying issues to address in this group.   Area(s) of treatment focus addressed in this session included Symptom Management, Personal Safety and Community Resources/Discharge Planning.  Betty reported having a tough day yesterday.  She stated that she was tearful, unable to do tasks. And laid in bed then eventually fell asleep.  She stated that she found out that she took her  medication incorrectly.  She stated that she ended up taking her nighttime medications in addition to her daytime medications in the morning.  She stated that she started setting alarms to help keep a stable sleep/wake cycle.  Client denied suicidal ideation, intent and plan.     Therapeutic Interventions/Treatment Strategies:  Psychotherapist offered support, feedback and validation and reinforced use of skills. Treatment modalities used include Cognitive Behavioral Therapy. Interventions include Behavioral Activation: reviewed siills to help take medications accurately including considering a medication minder box.  Reviewed sleep hygiene.  .    Assessment:    Patient response:   Patient responded to session by listening, focusing on goals and being attentive    Possible barriers to participation / learning include: and no barriers identified    Health Issues:   None reported       Substance Use Review:   Substance Use: No active concerns identified.    Mental Status/Behavioral Observations  Appearance:   Appropriate   Eye Contact:   Good   Psychomotor Behavior: Normal   Attitude:   Cooperative  Pleasant  Orientation:   All  Speech   Rate / Production: Normal    Volume:  Normal   Mood:    Anxious  Depressed   Affect:    Appropriate   Thought Content:   Clear  Thought Form:  Coherent  Logical     Insight:    Good     Plan:     Safety Plan: No current safety concerns identified.  Recommended that patient call 911 or go to the local ED should there be a change in any of these risk factors.     Barriers to treatment: None identified    Patient Contracts (see media tab):  None    Substance Use: Not addressed in session     Continue or Discharge: Patient will continue in Adult Day Treatment (ADT)  as planned. Patient is likely to benefit from learning and using skills as they work toward the goals identified in their treatment plan.      Poonam Archer, [facility]  November 4, 2020

## 2020-11-04 NOTE — GROUP NOTE
Psychotherapy Group Note    PATIENT'S NAME: Betty Tamayo  MRN:   [id number]  :   1990  ACCT. NUMBER: [id number]  DATE OF SERVICE: 20  START TIME: 11:00 AM  END TIME: 11:50 AM  FACILITATOR: Poonam Archer LICSW  TOPIC:  EBP Group: Behavioral Activation  Adult Mental Health Day Treatment  TRACK: 5A    NUMBER OF PARTICIPANTS: 3    Summary of Group / Topics Discussed:  Behavioral Activation: Motivation and Procrastination: Patients explored how they currently spend their time, identifying thoughts and feelings that are motivating and serve to increase desired behaviors.  They also examined behaviors that contribute to procrastination.  Different types of procrastination behaviors were identified, and strategies to reduce individual procrastination and increase motivation were explored and practiced.  Patients identified ways to increase goal-directed activities to enhance mood and reduce symptoms.        Patient Session Goals / Objectives:    Identify current patterns of procrastination behavior and how they influence thoughts and moods, and inhibit motivation.    Identify behaviors that can be implemented that contribute to improving thoughts and feelings, motivation, and reduce symptoms.    Identify and develop a plan to increase activities that promote a sense of accomplishment and competence.    Practice scheduling positive activities / behaviors into daily routines.    Telemedicine Visit: The patient's condition can be safely assessed and treated via synchronous audio and visual telemedicine encounter.      Reason for Telemedicine Visit: Services only offered telehealth    Originating Site (Patient Location): Patient's home    Distant Site (Provider Location): Provider Remote Setting    Consent:  The patient/guardian has verbally consented to: the potential risks and benefits of telemedicine (video visit) versus in person care; bill my insurance or make self-payment for services  provided; and responsibility for payment of non-covered services.     Mode of Communication:  Video Conference via [employer]    As the provider I attest to compliance with applicable laws and regulations related to telemedicine.       Patient Participation / Response:  Fully participated with the group by sharing personal reflections / insights and openly received / provided feedback with other participants.    Identified / Expressed personal readiness to make behavioral change    Treatment Plan:  Patient has a current master individualized treatment plan.  See Epic treatment plan for more information.    Poonam Archre, [facility]

## 2020-11-05 ENCOUNTER — HOSPITAL ENCOUNTER (OUTPATIENT)
Dept: BEHAVIORAL HEALTH | Facility: CLINIC | Age: 30
End: 2020-11-05
Attending: PSYCHIATRY & NEUROLOGY
Payer: COMMERCIAL

## 2020-11-05 PROCEDURE — 90853 GROUP PSYCHOTHERAPY: CPT | Mod: 95 | Performed by: COUNSELOR

## 2020-11-05 PROCEDURE — G0177 OPPS/PHP; TRAIN & EDUC SERV: HCPCS | Mod: GT

## 2020-11-05 NOTE — GROUP NOTE
"Process Group Note    PATIENT'S NAME: Betty Tamayo  MRN:   [id number]  :   1990  ACCT. NUMBER: [id number]  DATE OF SERVICE: 20  START TIME:  9:00 AM  END TIME:  9:50 AM  FACILITATOR: Josette [facility]  TOPIC:  Process Group    Diagnoses:  296.32 (F33.1) Major Depressive Disorder, Recurrent Episode, Moderate _ and With anxious distress  300.01 (F41.0) Panic Disorder  309.81 (F43.10) Posttraumatic Stress Disorder (includes Posttraumatic Stress Disorder for Children 6 Years and Younger)  Without dissociative symptoms.    Adult Mental Health Day Treatment  TRACK: 5A    NUMBER OF PARTICIPANTS: 4    Telemedicine Visit: The patient's condition can be safely assessed and treated via synchronous audio and visual telemedicine encounter.      Reason for Telemedicine Visit: Services only offered telehealth    Originating Site (Patient Location): Patient's home    Distant Site (Provider Location): Provider Remote Setting    Consent:  The patient/guardian has verbally consented to: the potential risks and benefits of telemedicine (video visit) versus in person care; bill my insurance or make self-payment for services provided; and responsibility for payment of non-covered services.     Mode of Communication:  Video Conference via [employer]    As the provider I attest to compliance with applicable laws and regulations related to telemedicine.            Data:    Session content: At the start of this group, patients were invited to check in by identifying themselves, describing their current emotional status, and identifying issues to address in this group.   Area(s) of treatment focus addressed in this session included Symptom Management and Personal Safety.    Betty reported feeling \"optimistic\" today.  Her goal for the day is to \"control her mood\" because she struggles with a \"short tempter.\"  She reported she was starting to feel irritated this morning but was able to walk away from the situation and " listen to music, which helped calm her down.   Client declined additional process time but contributed to group discussion and provided feedback and support to peers.      Therapeutic Interventions/Treatment Strategies:  Psychotherapist offered support, feedback and validation and reinforced use of skills. Treatment modalities used include Motivational Interviewing, Cognitive Behavioral Therapy and Dialectical Behavioral Therapy. Interventions include Emotions Management:  Discussed barriers to emotional regulation and Reviewed opposite action skill.    Assessment:    Patient response:   Patient responded to session by accepting feedback, giving feedback and listening    Possible barriers to participation / learning include: and no barriers identified    Health Issues:   None reported       Substance Use Review:   Substance Use: No active concerns identified.    Mental Status/Behavioral Observations  Appearance:   Appropriate   Eye Contact:   Good   Psychomotor Behavior: Normal   Attitude:   Cooperative   Orientation:   All  Speech   Rate / Production: Normal    Volume:  Normal   Mood:    Irritable   Affect:    Appropriate   Thought Content:   Clear  Thought Form:  Coherent  Logical     Insight:    Good     Plan:     Safety Plan: No current safety concerns identified.  Recommended that patient call 911 or go to the local ED should there be a change in any of these risk factors.     Barriers to treatment: None identified    Patient Contracts (see media tab):  None    Substance Use: Not addressed in session     Continue or Discharge: Patient will continue in Adult Day Treatment (ADT)  as planned. Patient is likely to benefit from learning and using skills as they work toward the goals identified in their treatment plan.      Josette West, [facility]  November 5, 2020

## 2020-11-05 NOTE — GROUP NOTE
Psychoeducation Group Note    PATIENT'S NAME: Betty Tamayo  MRN:   [id number]  :   1990  ACCT. NUMBER: [id number]  DATE OF SERVICE: 20  START TIME: 11:00 AM  END TIME: 11:50 AM  FACILITATOR: Tim Richard OT  TOPIC: MH Life Skills Group: Sensory Approaches in Mental Health  Adult Mental Health Day Treatment  TRACK: 5A    Telemedicine Visit: The patient's condition can be safely assessed and treated via synchronous audio and visual telemedicine encounter.      Reason for Telemedicine Visit: Services only offered telehealth and Covid 19    Originating Site (Patient Location): Patient's home    Distant Site (Provider Location): Provider Remote Setting    Consent:  The patient/guardian has verbally consented to: the potential risks and benefits of telemedicine (video visit) versus in person care; bill my insurance or make self-payment for services provided; and responsibility for payment of non-covered services.     Mode of Communication:  Video Conference via My[employer]op    As the provider I attest to compliance with applicable laws and regulations related to telemedicine.    NUMBER OF PARTICIPANTS: 4    Summary of Group / Topics Discussed:  Sensory Approaches in Mental Health:  Sensory Enhanced Mindfulness: Patients were taught and provided with an opportunity to explore and practice how using sensory enhanced mindfulness practices can help them stay grounded in the present moment as a way to manage mental health symptoms and stressors. Focus on interoception/proprioception and safe/functional breathing practices to develop comfortable mindfulness practices.      Patient Session Goals / Objectives:    Identified how using sensory enhanced mindfulness practices can be used for grounding, stress management, and self regulation      Improved awareness of different types of sensory enhanced mindfulness activities that assist with healthy coping of stress and symptoms      Established a plan for  practice of these skills in their own environments    Practiced and reflected on how to generalize taught skills to their everyday life        Patient Participation / Response:  Moderately participated, sharing some personal reflections / insights and adequately adequately received / provided feedback with other participants.    Verbalized understanding of content and Patient would benefit from additional opportunities to practice the content to be able to generalize it to their everyday life with increased intentionality, consistency, and efficacy in support of their psychiatric recovery    Treatment Plan:  Patient has a current master individualized treatment plan.  See Epic treatment plan for more information.    Tim Richard, OT

## 2020-11-05 NOTE — GROUP NOTE
Psychoeducation Group Note    PATIENT'S NAME: Betty Tamayo  MRN:   [id number]  :   1990  ACCT. NUMBER: [id number]  DATE OF SERVICE: 20  START TIME: 10:00 AM  END TIME: 10:50 AM  FACILITATOR: Erum Franklin RN  TOPIC: FREDRICK RN Group: Mental Health Maintenance  Adult Mental Health Day Treatment  TRACK: 5a    NUMBER OF PARTICIPANTS: 5    Summary of Group / Topics Discussed:  Mental Health Maintenance:  Assessments of Strengths: Patients completed a self-reflection on personal strengths worksheet. The concept of personal strength as it relates to resilience were explored. Patients shared responses with the group and participated in discussion.     Patient Session Goals / Objectives:  ? Patients identified 1-3 qualities they consider a personal strength.  ? Patients understood the concept of personal strengths and the connection it has to resiliency  Telemedicine Visit: The patient's condition can be safely assessed and treated via synchronous audio and visual telemedicine encounter.      Reason for Telemedicine Visit: Services only offered telehealth    Originating Site (Patient Location): Patient's home    Distant Site (Provider Location): Provider Remote Setting    Consent:  The patient/guardian has verbally consented to: the potential risks and benefits of telemedicine (video visit) versus in person care; bill my insurance or make self-payment for services provided; and responsibility for payment of non-covered services.     Mode of Communication:  Video Conference via Sopheon    As the provider I attest to compliance with applicable laws and regulations related to telemedicine.       Patient Participation / Response:  Fully participated with the group by sharing personal reflections / insights and openly received / provided feedback with other participants.    Demonstrated understanding of topics discussed through group discussion and participation    Treatment Plan:  Patient has a current  master individualized treatment plan.  See Epic treatment plan for more information.    Erum Franklin RN

## 2020-11-09 ENCOUNTER — VIRTUAL VISIT (OUTPATIENT)
Dept: BEHAVIORAL HEALTH | Facility: CLINIC | Age: 30
End: 2020-11-09
Payer: COMMERCIAL

## 2020-11-09 ENCOUNTER — HOSPITAL ENCOUNTER (OUTPATIENT)
Dept: BEHAVIORAL HEALTH | Facility: CLINIC | Age: 30
End: 2020-11-09
Attending: PSYCHIATRY & NEUROLOGY
Payer: COMMERCIAL

## 2020-11-09 DIAGNOSIS — F43.10 PTSD (POST-TRAUMATIC STRESS DISORDER): Primary | ICD-10-CM

## 2020-11-09 PROCEDURE — 90853 GROUP PSYCHOTHERAPY: CPT | Mod: 95 | Performed by: SOCIAL WORKER

## 2020-11-09 PROCEDURE — G0177 OPPS/PHP; TRAIN & EDUC SERV: HCPCS | Mod: GT

## 2020-11-09 PROCEDURE — 90832 PSYTX W PT 30 MINUTES: CPT | Mod: GT | Performed by: PSYCHOLOGIST

## 2020-11-09 NOTE — PROGRESS NOTES
[facility]: Integrated Behavioral Health  November 9, 2020      Behavioral Health Clinician Progress Note    Patient Name: Betty Tamayo           Service Type: Virtual visit over [employer]      Service Location:  Virtual appointment      Session Start Time: 12:30   Session End Time: 1:00      Session Length: 16 - 37      Attendees: Client    Visit Activities (Refresh list every visit): [facility] Only    Telemedicine Visit: The patient's condition can be safely assessed and treated via synchronous audio and visual telemedicine encounter.      Reason for Telemedicine Visit: COVID-19    Originating Site (Patient Location): Patient's home    Distant Site (Provider Location): Provider Remote Setting    Consent:  The patient/guardian has verbally consented to: the potential risks and benefits of telemedicine (video visit) versus in person care; bill my insurance or make self-payment for services provided; and responsibility for payment of non-covered services.     Mode of Communication:  Video Conference via [employer]    As the provider I attest to compliance with applicable laws and regulations related to telemedicine.      Diagnostic Assessment Date: 3/9/2020  Treatment Plan Review Date: 1/20/2021  See Flowsheets for today's PHQ-9 and NIXON-7 results  Previous PHQ-9:   PHQ-9 SCORE 9/10/2020 10/19/2020 10/23/2020   PHQ-9 Total Score MyChart 23 (Severe depression) - 22 (Severe depression)   PHQ-9 Total Score 23 23 19   Some encounter information is confidential and restricted. Go to Review Flowsheets activity to see all data.     Previous NIXON-7:   NIXON-7 SCORE 9/10/2020 10/19/2020 10/23/2020   Total Score 21 (severe anxiety) - 19 (severe anxiety)   Total Score 21 20 18   Some encounter information is confidential and restricted. Go to Review Flowsheets activity to see all data.       BRAD LEVEL:  No flowsheet data found.    DATA  Extended Session (60+ minutes): No  Interactive Complexity:  No  Crisis: No    Treatment Objective(s) Addressed in This Session:  Target Behavior(s): disease management/lifestyle changes      Depressed Mood: Decrease frequency and intensity of feeling down, depressed, hopeless  Anxiety: will develop more effective coping skills to manage anxiety symptoms  Mood Instability: will develop better understanding of triggers and coping strategies to stabilize mood  PTSD Symptom Management    Current Stressors / Issues:  Betty's condition appears about the same from our last visit. Fortunately she reports starting day treatment and finds the group aspect to be helpful, especially the feedback they give her about her concerns. Betty was dismayed however by there not being an individual therapy component to her day treatment and wishes she had a more established counselor. Betty says that the skills and education she receives in group is helpful to know, but she finds it not preventing her negative reactions to stress or mood instability. I shared feedback that skills, while beneficial for coping with distress, don't help resolve underlying conflicts and traumas. Betty said she was provided a list of possible referrals for a counselor, but has not been able to reach out. I offered to place a referral for her to get connected with a therapist in our system to help her with this goal. Betty accepted this and was thankful for the help in getting connected.     Placed Mental Health Referral for individual counseling. Provided psycho-education and support today.     Progress on Treatment Objective(s) / Homework:  Minimal progress - ACTION (Actively working towards change); Intervened by reinforcing change plan / affirming steps taken    Motivational Interviewing    MI Intervention: Expressed Empathy/Understanding, Supported Autonomy, Collaboration, Evocation, Open-ended questions and Reflections: simple and complex     Change Talk Expressed by the Patient: Desire to change Reasons to  change    Provider Response to Change Talk: E - Evoked more info from patient about behavior change, A - Affirmed patient's thoughts, decisions, or attempts at behavior change, R - Reflected patient's change talk and S - Summarized patient's change talk statements    Provided information about behavioral condition and available treatment options    Care Plan review completed: Yes    Medication Review:  No changes to current psychiatric medication(s)    Medication Compliance:  Yes    Changes in Health Issues:   None reported    Chemical Use Review:   Substance Use: Chemical use reviewed, no active concerns identified      Tobacco Use: No current tobacco use.      Assessment: Current Emotional / Mental Status (status of significant symptoms):  Risk status (Self / Other harm or suicidal ideation)  Patient has had a history of suicidal ideation: passive thoughts recurrent, suicide attempts: 1 and self-injurious behavior: cutting behavior as a youth     Patient denies current fears or concerns for personal safety.  Patient denies current or recent suicidal ideation or behaviors.  Patient denies current or recent homicidal ideation or behaviors.  Patient denies current or recent self injurious behavior or ideation.  Patient denies other safety concerns.     A safety and risk management plan has not been developed at this time, however patient was encouraged to call [place] [phone] should there be a change in any of these risk factors.    [place] Suicide Severity Rating Scale (Lifetime/Recent)  [place] Suicide Severity Rating (Lifetime/Recent) 10/20/2020   1. Wish to be Dead (Lifetime) Yes   1. Wish to be Dead (Recent) Yes   2. Non-Specific Active Suicidal Thoughts (Lifetime) Yes   2. Non-Specific Active Suicidal Thoughts (Recent) No   3. Active Suicidal Ideation with any Methods (Not Plan) Without Intent to Act (Lifetime) Yes   3. Active Sucidal Ideation with any Methods (Not Plan) Without Intent to Act (Recent) No    4. Active Suicidal Ideation with Some Intent to Act, Without Specific Plan (Lifetime) Yes   4. Active Suicidal Ideation with Some Intent to Act, Without Specific Plan (Recent) No   5. Active Suicidal Ideation with Specific Plan and Intent (Lifetime) No   5. Active Suicidal Ideation with Specific Plan and Intent (Recent) No   Most Severe Ideation Rating (Lifetime) 3   Frequency (Lifetime) 3   Duration (Lifetime) 2   Controllability (Lifetime) 3   Protective Factors  (Lifetime) 2   Reasons for Ideation (Lifetime) 2   Most Severe Ideation Rating (Past Month) 3   Frequency (Past Month) 3   Duration (Past Month) 2   Controllability (Past Month) 3   Protective Factors (Past Month) 2   Reasons for Ideation (Past Month) 2   Actual Attempt (Lifetime) Yes   Actual Attempt Description (Lifetime) Had grabbed a knife and was threatening to kill herself   Total Number of Actual Attempts (Lifetime) 1   Actual Attempt (Past 3 Months) Yes   Actual Attempt Description (Past 3 Months) same as above   Total Number of Actual Attempts (Past 3 Months) 1   Has subject engaged in non-suicidal self-injurious behavior? (Lifetime) Yes   Has subject engaged in non-suicidal self-injurious behavior? (Past 3 Months) No   Interrupted Attempts (Lifetime) No   Interrupted Attempts (Past 3 Months) No   Aborted or Self-Interrupted Attempt (Lifetime) Yes   Aborted or Self Interrupted Attempt Description (Lifetime) put the knife away   Total Number Aborted or Self Interrupted Attempts (Lifetime) 1   Aborted or Self-Interrupted Attempt (Past 3 Months) Yes   Preparatory Acts or Behavior (Lifetime) No   Preparatory Acts or Behavior (Past 3 Months) No   Most Recent Attempt Actual Lethality Code 0   Most Recent Attempt Potential Lethality Code 1   Most Lethal Attempt Actual Lethality Code 0   Most Lethal Attemplt Potential Lethality Code 1   Initial/First Attempt Actual Lethality Code 0   Initial/First Attempt Potential Lethality Code 1        Appearance:   Appropriate   Eye Contact:   Good   Psychomotor Behavior: Restless   Attitude:   Guarded   Orientation:   All  Speech   Rate / Production: Normal    Volume:  Soft   Mood:    Anxious  Depressed   Affect:    Appropriate   Thought Content:  Clear   Thought Form:  Coherent  Logical   Insight:    Good     Diagnoses:  1. PTSD (post-traumatic stress disorder)        Collateral Reports Completed:  Not Applicable    Plan: (Homework, other):  Betty was scheduled to return to [facility] counseling as needed. Placed referral to individual counselor for treatment. She was also given information about mental health symptoms and treatment options .  CD Recommendations: No indications of CD issues.     Salvatroe Pettit Psy.D, LP 11/9/2020    ______________________________________________________________________    [facility] Integrated Behavioral Health Treatment Plan    Client's Name: Betty Tamayo  YOB: 1990    Date: 10/20/2020    DSM-V Diagnoses: 309.81 (F43.10) Posttraumatic Stress Disorder (includes Posttraumatic Stress Disorder for Children 6 Years and Younger)  Without dissociative symptoms; 296.33 (F33.2) Major Depressive Disorder, Recurrent Episode, Severe _  Psychosocial / Contextual Factors: NA    Clinical Global Impressions  First:  Considering your total clinical experience with this particular patient population, how severe are the patient's symptoms at this time?: 5 (10/23/2020  8:13 AM)      Most recent:  Compared to the patient's condition at the START of treatment, this patient's condition is: 4 (10/23/2020  8:13 AM)        Referral / Collaboration:  Will collaborate with care team as indicated during treatment.    Anticipated number of session or this episode of care: 4-5 until established with day treatment      MeasurableTreatment Goal(s) related to diagnosis / functional impairment(s)  Goal 1:  Patient will experience a reduction in depressive and anxious symptoms, along with a  corresponding increase in positive emotion and life satisfaction.    Objective #A: Patient will experience a reduction in depressed mood, will develop more effective coping skills to manage depressive symptoms, will develop healthy cognitive patterns and beliefs, will increase ability to function adaptively and will continue to take medications as prescribed / participate in supportive activities and services    Status: Continued - Date(s): 10/20/2020    Objective #B: Patient will experience a reduction in anxiety, will develop more effective coping skills to manage anxiety symptoms, will develop healthy cognitive patterns and beliefs and will increase ability to function adaptively  Status: Continued - Date(s):10/20/2020    Objective #C: Patient will develop better understanding of triggers and coping strategies to stabilize mood   Status: Continued - Date(s): 10/20/2020    Goal 2:  Patient will identify and increase engagement in valued activity, i.e. improving social connections/relationships, pursuing occupational goals or personally meaningful pursuits, exploration of meaning in life.     Objective #A: Patient will identify meaningful activity in social, occupational and  personal goals, and increase behavioral activation around these goals   Status: Continued - Date(s): 10/20/2020    Objective #B: Patient will address relationship difficulties in a more adaptive manner  Status: Continued - Date(s): 10/20/2020    Objective #C: Patient will develop coping/problem-solving skills to facilitate more adaptive adjustment and will effectively address problems that interfere with adaptive functioning  Status: Continued - Date(s):10/20/2020    Goal 3:  Patient will increase understanding of post-traumatic stress    Objective #A: Patient will develop strategies for more effective management of risk issues   Status: Continued - Date(s): 10/20/2020    Objective #B: Patient Will better understand triggers/factors for risk  concerns  Status: Continued - Date(s): 10/20/2020    Objective #C: Patient will address relationship difficulties in a more adaptive manner  Status: Continued - Date(s): 10/20/2020    Possible Therapeutic Intervention(s)  Psycho-education regarding mental health diagnoses and treatment options    Supportive Counseling    Skills training    Explore skills useful to client in current situation.    Skills include assertiveness, communication, conflict management, problem-solving, relaxation, etc.    Solution-Focused Therapy    Explore patterns in patient's relationships and discuss options for new behaviors.    Explore patterns in patient's actions and choices and discuss options for new behaviors.    Cognitive-behavioral Therapy    Discuss common cognitive distortions, identify them in patient's life.    Explore ways to challenge, replace, and act against these cognitions.    Acceptance and Commitment Therapy    Explore and identify important values in patient's life.    Discuss ways to commit to behavioral activation around these values.    Psychodynamic psychotherapy    Discuss patient's emotional dynamics and issues and how they impact behaviors.    Explore patient's history of relationships and how they impact present behaviors.    Explore how to work with and make changes in these schemas and patterns.    Narrative Therapy    Explore the patient's story of his/her life from his/her perspective.    Explore alternate ways of understanding their experience, identifying exceptions, developing new themes.    Interpersonal Psychotherapy    Explore patterns in relationships that are effective or ineffective at helping patient reach their goals, find satisfying experience.    Discuss new patterns or behaviors to engage in for improved social functioning.    Behavioral Activation    Discuss steps patient can take to become more involved in meaningful activity.    Identify barriers to these activities and explore possible  solutions.    Mindfulness-Based Strategies    Discuss skills based on development and application of mindfulness.    Skills drawn from compassion-focused therapy, dialectical behavior therapy, mindfulness-based stress reduction, mindfulness-based cognitive therapy, etc.      We have developed these goals together during our work to this point. Patient has assisted in the development of these goals and has agreed to this treatment plan.       Salvatore Pettit, AKILA  October 20, 2020

## 2020-11-09 NOTE — GROUP NOTE
"Psychoeducation Group Note    PATIENT'S NAME: Betty Tamayo  MRN:   [id number]  :   1990  ACCT. NUMBER: [id number]  DATE OF SERVICE: 20  START TIME: 11:00 AM  END TIME: 11:50 AM  FACILITATOR: Nely Perera RN  TOPIC:  RN Group: Specialty Health  Telemedicine Visit: The patient's condition can be safely assessed and treated via synchronous audio and visual telemedicine encounter.      Reason for Telemedicine Visit:  ovid19    Originating Site (Patient Location): Patient's home    Distant Site (Provider Location): Provider Remote Setting    Consent:  The patient/guardian has verbally consented to: the potential risks and benefits of telemedicine (video visit) versus in person care; bill my insurance or make self-payment for services provided; and responsibility for payment of non-covered services.     Mode of Communication:  Video Conference via [employer]    As the provider I attest to compliance with applicable laws and regulations related to telemedicine.      Adult Mental Health Day Treatment  TRACK: 5A    NUMBER OF PARTICIPANTS: 5    Summary of Group / Topics Discussed:  Specialty Health:  Specialty Health: Anatomy of Trust: Patients discussed trust and watched Everton German's \"Anatomy of Trust\" video. Discussed the BRAVING acronym and strengthening self trust.    Patient Session Goals / Objectives:  ? Patients will have a deeper understanding of trust  ? Patients will have tools to have conversations with others about trust.  ? Patients will think of 1+ ways to strengthen self trust.              Patient Participation / Response:  Minimally participated, only when prompted / asked.    Verbalized understanding of anatomy of trust topic    Treatment Plan:  Patient has a current master individualized treatment plan.  See Epic treatment plan for more information.    Nely Perera RN  "

## 2020-11-09 NOTE — GROUP NOTE
Psychotherapy Group Note    PATIENT'S NAME: Betty Tamayo  MRN:   [id number]  :   1990  ACCT. NUMBER: [id number]  DATE OF SERVICE: 20  START TIME: 10:00 AM  END TIME: 10:50 AM  FACILITATOR: Poonam Archer LICSW  TOPIC:  EBP Group: Emotions Management  Adult Mental Health Day Treatment  TRACK: 5A    NUMBER OF PARTICIPANTS: 5    Summary of Group / Topics Discussed:  Emotions Management: Model of Emotions: Patients were introduced to the cyclical model of emotions.  Explored emotions are shaped by different events and one s interpretation of events.  Group discussed how emotions begin with an event, followed by one s interpretation, followed by associated feelings.  Discussion included a review of personal urges and actions that can/do follow an emotional experience in the patient s life, and the end results.    Patient Session Goals / Objectives:    Demonstrate understanding of types various emotions.    Identify and discuss specific emotions and when they occur; understand triggers.    Identify individual emotions and physical sensations that accompany them.    Discuss urges and actions, and how to influence the intensity of emotional reactions and disrupt the cycle.      Discuss barriers to emotional regulation.    Choose 1-2 strategies to assist with emotional response to potentially distressing situations.    Telemedicine Visit: The patient's condition can be safely assessed and treated via synchronous audio and visual telemedicine encounter.      Reason for Telemedicine Visit: Services only offered telehealth    Originating Site (Patient Location): Patient's home    Distant Site (Provider Location): Provider Remote Setting    Consent:  The patient/guardian has verbally consented to: the potential risks and benefits of telemedicine (video visit) versus in person care; bill my insurance or make self-payment for services provided; and responsibility for payment of non-covered services.      Mode of Communication:  Video Conference via Tackk    As the provider I attest to compliance with applicable laws and regulations related to telemedicine.       Patient Participation / Response:  Minimally participated, only when prompted / asked.    Demonstrated understanding of topics discussed through group discussion and participation    Treatment Plan:  Patient has a current master individualized treatment plan.  See Epic treatment plan for more information.    Poonam Archer, LINDSAYSW

## 2020-11-09 NOTE — GROUP NOTE
Process Group Note    PATIENT'S NAME: Betty Tamayo  MRN:   [id number]  :   1990  ACCT. NUMBER: [id number]  DATE OF SERVICE: 20  START TIME:  9:00 AM  END TIME:  9:50 AM  FACILITATOR: Poonam Archer LICSW  TOPIC:  Process Group    Diagnoses:  296.32 (F33.1) Major Depressive Disorder, Recurrent Episode, Moderate _ and With anxious distress  300.01 (F41.0) Panic Disorder  309.81 (F43.10) Posttraumatic Stress Disorder (includes Posttraumatic Stress Disorder for Children 6 Years and Younger)  Without dissociative symptoms.      Adult Mental Health Day Treatment  TRACK: 5A    NUMBER OF PARTICIPANTS: 5    Telemedicine Visit: The patient's condition can be safely assessed and treated via synchronous audio and visual telemedicine encounter.      Reason for Telemedicine Visit: Services only offered telehealth    Originating Site (Patient Location): Patient's home    Distant Site (Provider Location): Provider Remote Setting    Consent:  The patient/guardian has verbally consented to: the potential risks and benefits of telemedicine (video visit) versus in person care; bill my insurance or make self-payment for services provided; and responsibility for payment of non-covered services.     Mode of Communication:  Video Conference via [employer]    As the provider I attest to compliance with applicable laws and regulations related to telemedicine.           Data:    Session content: At the start of this group, patients were invited to check in by identifying themselves, describing their current emotional status, and identifying issues to address in this group.   Area(s) of treatment focus addressed in this session included Symptom Management, Personal Safety and Community Resources/Discharge Planning.  Betty reported depressed mood, feeling exhausted, poor sleep, high intensity nightmares, ad panic attacks that are occuring after awakening.  She stated that she had a good day Friday, then felt  depressed over the weekend.  She stated that she is not enjoying things and shared example of a special family activity that her fiance arranged.  She stated that she forced herself to get out of the house.  Client denied suicidal ideation, intent and plan.     Therapeutic Interventions/Treatment Strategies:  Psychotherapist offered support, feedback and validation and reinforced use of skills. Treatment modalities used include Cognitive Behavioral Therapy. Interventions include Coping Skills: Discussed use of self-soothe skills to decrease distress in the body.    Assessment:    Patient response:   Patient responded to session by listening and focusing on goals    Possible barriers to participation / learning include: and no barriers identified    Health Issues:   None reported       Substance Use Review:   Substance Use: No active concerns identified.    Mental Status/Behavioral Observations  Appearance:   Appropriate   Eye Contact:   Good   Psychomotor Behavior: Normal   Attitude:   Cooperative   Orientation:   All  Speech   Rate / Production: Normal    Volume:  Normal   Mood:    Anxious  Depressed   Affect:    Appropriate   Thought Content:   Rumination  Thought Form:  Coherent  Logical     Insight:    Good     Plan:     Safety Plan: No current safety concerns identified.  Recommended that patient call 911 or go to the local ED should there be a change in any of these risk factors.     Barriers to treatment: None identified    Patient Contracts (see media tab):  None    Substance Use: Not addressed in session     Continue or Discharge: Patient will continue in Adult Day Treatment (ADT)  as planned. Patient is likely to benefit from learning and using skills as they work toward the goals identified in their treatment plan.      Poonam Archer, [facility]  November 9, 2020

## 2020-11-10 ENCOUNTER — HOSPITAL ENCOUNTER (OUTPATIENT)
Dept: BEHAVIORAL HEALTH | Facility: CLINIC | Age: 30
End: 2020-11-10
Attending: PSYCHIATRY & NEUROLOGY
Payer: COMMERCIAL

## 2020-11-10 PROCEDURE — 90853 GROUP PSYCHOTHERAPY: CPT | Mod: GT | Performed by: SOCIAL WORKER

## 2020-11-10 PROCEDURE — G0177 OPPS/PHP; TRAIN & EDUC SERV: HCPCS | Mod: 95

## 2020-11-10 PROCEDURE — 90853 GROUP PSYCHOTHERAPY: CPT | Mod: 95 | Performed by: SOCIAL WORKER

## 2020-11-10 NOTE — GROUP NOTE
Psychoeducation Group Note    PATIENT'S NAME: Betty Tamayo  MRN:   [id number]  :   1990  ACCT. NUMBER: [id number]  DATE OF SERVICE: 11/10/20  START TIME: 10:00 AM  END TIME: 10:50 AM  FACILITATOR: Jose Lui OTR/L  TOPIC: MH Life Skills Group: Resiliency Development  Telemedicine Visit: The patient's condition can be safely assessed and treated via synchronous audio and visual telemedicine encounter.      Reason for Telemedicine Visit: COVID-19    Originating Site (Patient Location): Patient's home    Distant Site (Provider Location): [facility]: [facility]    Consent:  The patient/guardian has verbally consented to: the potential risks and benefits of telemedicine (video visit) versus in person care; bill my insurance or make self-payment for services provided; and responsibility for payment of non-covered services.     Mode of Communication:  Video Conference via [employer]    As the provider I attest to compliance with applicable laws and regulations related to telemedicine.   Adult Mental Health Day Treatment  TRACK: 5A    NUMBER OF PARTICIPANTS: 6    Summary of Group / Topics Discussed:  Resiliency Development:  Self Esteem and Self Compassion: A Letter to Myself: Patients were given time for reflection and built self awareness around components of self compassion and how it relates to self esteem and overall functioning.  Patients were also given the opportunity to improve self efficacy, self sufficiency, quality of life and a sense of mastery and competency by identifying what is good and to instill hope. Patients were taught about the importance of self kindness and ways to challenge negative thinking.      Patient Session Goals / Objectives:    Identified personal strengths and qualities about themselves as a way to provide hope and build self-compassion as a way to effectively manage both mental health and substance abuse/abuse symptoms     Improved awareness of  positive qualities and how these contribute to the process of recovery        Established a plan for practice of these skills in their own environments    Practiced and reflected on how to generalize taught skills to their everyday life        Patient Participation / Response:  Fully participated with the group by sharing personal reflections / insights and openly received / provided feedback with other participants.    Demonstrated understanding of content through handout and group discussion  and Verbalized understanding of content    Treatment Plan:  Patient has a current master individualized treatment plan.  See Epic treatment plan for more information.    Jose Lui, OTR/L

## 2020-11-10 NOTE — GROUP NOTE
Psychotherapy Group Note    PATIENT'S NAME: Betty Tamayo  MRN:   [id number]  :   1990  ACCT. NUMBER: [id number]  DATE OF SERVICE: 11/10/20  START TIME: 11:00 AM  END TIME: 11:50 AM  FACILITATOR: Poonam Archer LICSW  TOPIC:  EBP Group: Emotions Management  Adult Mental Health Day Treatment  TRACK: 5A    NUMBER OF PARTICIPANTS: 6    Summary of Group / Topics Discussed:  Emotions Management: Guilt and Shame: Patients explored and shared personal experiences associated with feelings of guilt and shame.  Group explored how these feelings develop, what they mean to each individual, and how to increase acceptance and usefulness of these feelings.  Group members assisted one another to contextualize these concepts and promote healing.     Patient Session Goals / Objectives:    Discuss and review definitions and personal views/experiences with guilt and shame    Understand the differences between guilt and shame    Explore how feelings of guilt and shame impact functioning    Understand and practice strategies to manage difficult emotions and move towards healing    Understand and normalize difficult emotions through group discussion    Understand the utility of guilt and shame    Target  unwanted  emotions for change    Telemedicine Visit: The patient's condition can be safely assessed and treated via synchronous audio and visual telemedicine encounter.      Reason for Telemedicine Visit: Services only offered telehealth    Originating Site (Patient Location): Patient's home    Distant Site (Provider Location): Provider Remote Setting    Consent:  The patient/guardian has verbally consented to: the potential risks and benefits of telemedicine (video visit) versus in person care; bill my insurance or make self-payment for services provided; and responsibility for payment of non-covered services.     Mode of Communication:  Video Conference via [email or web address]    As the provider I attest to compliance with  applicable laws and regulations related to telemedicine.       Patient Participation / Response:  Fully participated with the group by sharing personal reflections / insights and openly received / provided feedback with other participants.    Self-aware of experiences with difficult emotions, and strategies to employ to manage them    Treatment Plan:  Patient has a current master individualized treatment plan.  See Epic treatment plan for more information.    Poonam Archer, LICSW

## 2020-11-12 ENCOUNTER — HOSPITAL ENCOUNTER (OUTPATIENT)
Dept: BEHAVIORAL HEALTH | Facility: CLINIC | Age: 30
End: 2020-11-12
Attending: PSYCHIATRY & NEUROLOGY
Payer: COMMERCIAL

## 2020-11-12 ENCOUNTER — VIRTUAL VISIT (OUTPATIENT)
Dept: BEHAVIORAL HEALTH | Facility: CLINIC | Age: 30
End: 2020-11-12
Payer: COMMERCIAL

## 2020-11-12 DIAGNOSIS — F43.10 PTSD (POST-TRAUMATIC STRESS DISORDER): Primary | ICD-10-CM

## 2020-11-12 PROCEDURE — 99214 OFFICE O/P EST MOD 30 MIN: CPT | Mod: 95 | Performed by: PSYCHIATRY & NEUROLOGY

## 2020-11-12 PROCEDURE — 90853 GROUP PSYCHOTHERAPY: CPT | Mod: 95 | Performed by: SOCIAL WORKER

## 2020-11-12 PROCEDURE — 90853 GROUP PSYCHOTHERAPY: CPT | Mod: GT | Performed by: SOCIAL WORKER

## 2020-11-12 RX ORDER — PROPRANOLOL HYDROCHLORIDE 20 MG/1
20 TABLET ORAL 2 TIMES DAILY
Qty: 60 TABLET | Refills: 0 | Status: SHIPPED | OUTPATIENT
Start: 2020-11-12 | End: 2020-11-23

## 2020-11-12 RX ORDER — HYDROXYZINE HYDROCHLORIDE 25 MG/1
25-50 TABLET, FILM COATED ORAL DAILY PRN
Qty: 60 TABLET | Refills: 0 | Status: SHIPPED | OUTPATIENT
Start: 2020-11-12 | End: 2021-02-18

## 2020-11-12 ASSESSMENT — PATIENT HEALTH QUESTIONNAIRE - PHQ9
SUM OF ALL RESPONSES TO PHQ QUESTIONS 1-9: 23
10. IF YOU CHECKED OFF ANY PROBLEMS, HOW DIFFICULT HAVE THESE PROBLEMS MADE IT FOR YOU TO DO YOUR WORK, TAKE CARE OF THINGS AT HOME, OR GET ALONG WITH OTHER PEOPLE: EXTREMELY DIFFICULT
SUM OF ALL RESPONSES TO PHQ QUESTIONS 1-9: 23

## 2020-11-12 ASSESSMENT — ANXIETY QUESTIONNAIRES
GAD7 TOTAL SCORE: 21
GAD7 TOTAL SCORE: 21
3. WORRYING TOO MUCH ABOUT DIFFERENT THINGS: NEARLY EVERY DAY
7. FEELING AFRAID AS IF SOMETHING AWFUL MIGHT HAPPEN: NEARLY EVERY DAY
2. NOT BEING ABLE TO STOP OR CONTROL WORRYING: NEARLY EVERY DAY
7. FEELING AFRAID AS IF SOMETHING AWFUL MIGHT HAPPEN: NEARLY EVERY DAY
GAD7 TOTAL SCORE: 21
6. BECOMING EASILY ANNOYED OR IRRITABLE: NEARLY EVERY DAY
5. BEING SO RESTLESS THAT IT IS HARD TO SIT STILL: NEARLY EVERY DAY
1. FEELING NERVOUS, ANXIOUS, OR ON EDGE: NEARLY EVERY DAY
4. TROUBLE RELAXING: NEARLY EVERY DAY

## 2020-11-12 NOTE — GROUP NOTE
Process Group Note    PATIENT'S NAME: Betty Tamayo  MRN:   [id number]  :   1990  ACCT. NUMBER: [id number]  DATE OF SERVICE: 20  START TIME:  9:00 AM  END TIME:  9:50 AM  FACILITATOR: Poonam Archer LICSW  TOPIC:  Process Group    Diagnoses:  296.32 (F33.1) Major Depressive Disorder, Recurrent Episode, Moderate _ and With anxious distress  300.01 (F41.0) Panic Disorder  309.81 (F43.10) Posttraumatic Stress Disorder (includes Posttraumatic Stress Disorder for Children 6 Years and Younger)  Without dissociative symptoms.      Adult Mental Health Day Treatment  TRACK: 5A    NUMBER OF PARTICIPANTS: 7    Telemedicine Visit: The patient's condition can be safely assessed and treated via synchronous audio and visual telemedicine encounter.      Reason for Telemedicine Visit: Services only offered telehealth    Originating Site (Patient Location): Patient's home    Distant Site (Provider Location): Provider Remote Setting    Consent:  The patient/guardian has verbally consented to: the potential risks and benefits of telemedicine (video visit) versus in person care; bill my insurance or make self-payment for services provided; and responsibility for payment of non-covered services.     Mode of Communication:  Video Conference via [employer]    As the provider I attest to compliance with applicable laws and regulations related to telemedicine.           Data:    Session content: At the start of this group, patients were invited to check in by identifying themselves, describing their current emotional status, and identifying issues to address in this group.   Area(s) of treatment focus addressed in this session included Symptom Management, Personal Safety and Community Resources/Discharge Planning.  Betty reported negative self-talk.  She reported increased self-talk of thinking of herself as a failure.  She hared about a conflict with her fiance who shared about h is experience supporting her.   "She stated that she has been having anger outburst.   She plans to see her psychiatry provider this afternoon and will review the anger levels.  Client denied suicidal ideation, intent and plan.     Therapeutic Interventions/Treatment Strategies:  Psychotherapist offered support, feedback and validation and reinforced use of skills. Treatment modalities used include Cognitive Behavioral Therapy. Interventions include Coping Skills: Discussed use of self-soothe skills to decrease distress in the body and Discussed how the use of intentional \"in the moment\" actions can help reduce distress.    Assessment:    Patient response:   Patient responded to session by giving feedback, listening and focusing on goals    Possible barriers to participation / learning include: and no barriers identified    Health Issues:   None reported       Substance Use Review:   Substance Use: No active concerns identified.    Mental Status/Behavioral Observations  Appearance:   Appropriate   Eye Contact:   Good   Psychomotor Behavior: Normal   Attitude:   Cooperative   Orientation:   All  Speech   Rate / Production: Normal    Volume:  Normal   Mood:    Anxious  Depressed   Affect:    Appropriate   Thought Content:   Clear  Thought Form:  Coherent  Logical     Insight:    Good     Plan:     Safety Plan: No current safety concerns identified.  Recommended that patient call 911 or go to the local ED should there be a change in any of these risk factors.     Barriers to treatment: None identified    Patient Contracts (see media tab):  None    Substance Use: Not addressed in session     Continue or Discharge: Patient will continue in Adult Day Treatment (ADT)  as planned. Patient is likely to benefit from learning and using skills as they work toward the goals identified in their treatment plan.      Poonam Archer, [facility]  November 12, 2020  "

## 2020-11-12 NOTE — PATIENT INSTRUCTIONS
Scheduling: If you have any concerns about today's visit or wish to schedule another appointment please call our office during normal business hours [phone] (8-5:00 M-F)    Contact Us: Please call [phone] during business hours (8-5:00 M-F).  If after clinic hours, or on the weekend, please call  [phone].    Financial Assistance [phone]  [employer] Billing [phone]  [place] Billing [phone]  Medical Records [phone]  [place] Patient Bill of Rights    MENTAL HEALTH CRISIS NUMBERS:  [facility]:  [facility] - [phone]  [facility] - [phone]  Walk-In Counseling [facility] [phone]  COPE 24/7 [place] Mobile Team - [[phone]]    [facility]:  [facility] - [phone]  Walk-in counseling [facility] - [phone]  Walk-in counseling [facility] - [phone]  [facility] - [phone]  Urgent Care Adult Mental Health:   --Drop-in, 24/7 crisis line, and [facility] Mobile Team [[phone]]    24/7 CRISIS TEXT LINE: www.crisistextline.org OR text 309-605 anywhere, anytime, any crisis    Poison Control Center - [phone]  [facility] - [phone]; or [employer] - [phone]    If you have a medical emergency please call 911 or go to the nearest ER.

## 2020-11-12 NOTE — NURSING NOTE
"Chief Complaint   Patient presents with     Recheck Medication     PTSD irritability and anger     Would like out of today's visit:    Med check would like a medication adjustment help with mood.    Karyn Forrester LPN     VIDEO VISIT  Betty Tamayo is a 30 year old patient who is being evaluated via a billable video visit.      The patient has been notified of following:   \"This video visit will be conducted via a call between you and your physician/provider. We have found that certain health care needs can be provided without the need for an in-person physical exam. This service lets us provide the care you need with a video conversation. If a prescription is necessary we can send it directly to your pharmacy. If lab work is needed we can place an order for that and you can then stop by our lab to have the test done at a later time. Insurers are generally covering virtual visits as they would in-office visits so billing should not be different than normal.  If for some reason you do get billed incorrectly, you should contact the billing office to correct it and that number is in the AVS .    Video Conference to be completed via:  [place]    Patient has given verbal consent for video visit?:  Yes    Patient would prefer that any video invitations be sent by: Text to cell phone: [phone]      How would patient like to obtain AVS?:  CloudTran    AVS SmartPhrase [PsychAVS] has been placed in 'Patient Instructions':  Yes  "

## 2020-11-12 NOTE — GROUP NOTE
Psychotherapy Group Note    PATIENT'S NAME: Betty Tamayo  MRN:   [id number]  :   1990  ACCT. NUMBER: [id number]  DATE OF SERVICE: 20  START TIME: 11:00 AM  END TIME: 11:50 AM  FACILITATOR: Poonam Ruffin LICSW  TOPIC:  EBP Group: Enhanced Mindfulness  Adult Mental Health Day Treatment  TRACK: 5A    NUMBER OF PARTICIPANTS: 6    Summary of Group / Topics Discussed:  Enhanced Mindfulness: Body and Mind Integration: Patients received an overview and education regarding the importance of including the body in the management of emotional health and self-care and as a direct route to mindfulness practice.  Patients discussed various ways in which the body can serve as an informant to their physical and emotional experiences/need. Patients discussed the body as a direct link to the present moment and to mindfulness practice.  Patients discussed current relationship with body, self-awareness, mindfulness practice and barriers to connection with body.  Patients were guided through breath work and movement to facilitate greater self-awareness, grounding, self-expression, and connection to other.  Patients discussed how the experiential could be applied to better manage mental health and develop clark connection to self.    Patient Session Goals / Objectives:    Identify how movement awareness could be used for grounding, stress management, self-expression, connection to other and self-regulation    Improved awareness of breath and movement preferences    Identify how movement and the body is used in mindfulness practice    Reflect on use of these practices in everyday life.    Identify barriers to attending to body    Telemedicine Visit: The patient's condition can be safely assessed and treated via synchronous audio and visual telemedicine encounter.          Reason for Telemedicine Visit: Services only offered telehealth and [id number]        Originating Site (Patient Location): Patient's  home        Distant Site (Provider Location): Provider Remote Setting        Consent:  The patient/guardian has verbally consented to: the potential risks and benefits of telemedicine (video visit) versus in person care; bill my insurance or make self-payment for services provided; and responsibility for payment of non-covered services.         Mode of Communication:  Video Conference via [employer]        As the provider I attest to compliance with applicable laws and regulations related to telemedicine.         Patient Participation / Response:  Minimally participated, only when prompted / asked.    Practiced skills in session    Treatment Plan:  Patient has a current master individualized treatment plan.  See Epic treatment plan for more information.    LINDSAY SaucedaSW

## 2020-11-12 NOTE — GROUP NOTE
Psychotherapy Group Note    PATIENT'S NAME: Betty Tamayo  MRN:   [id number]  :   1990  ACCT. NUMBER: [id number]  DATE OF SERVICE: 20  START TIME: 10:00 AM  END TIME: 10:50 AM  FACILITATOR: Poonam Archer LICSW  TOPIC: MH EBP Group: Specialty Awareness  Adult Mental Health Day Treatment  TRACK: 5A    NUMBER OF PARTICIPANTS: 7    Summary of Group / Topics Discussed:  Specialty Topics: Life Transitions: The topic of life transitions was presented in order to help patients to better understand the challenges presented by life transitions, and how to best navigate them. Exploring the phases of transition and how one works through them was discussed. Patients were provided with information regarding community resources.     Patient Session Goals / Objectives:    Discussed the timing and nature of major life transitions    Explored how life transitions may impact mental health and functioning    Discussed coping strategies to manage symptoms and help with transitioning    Discussed and planned a successful transition  Telemedicine Visit: The patient's condition can be safely assessed and treated via synchronous audio and visual telemedicine encounter.      Reason for Telemedicine Visit: Services only offered telehealth    Originating Site (Patient Location): Patient's home    Distant Site (Provider Location): Provider Remote Setting    Consent:  The patient/guardian has verbally consented to: the potential risks and benefits of telemedicine (video visit) versus in person care; bill my insurance or make self-payment for services provided; and responsibility for payment of non-covered services.     Mode of Communication:  Video Conference via [employer]    As the provider I attest to compliance with applicable laws and regulations related to telemedicine.       Patient Participation / Response:  Fully participated with the group by sharing personal reflections / insights and openly received /  provided feedback with other participants.    Verbalized understanding of ways to proactively manage illness    Treatment Plan:  Patient has a current master individualized treatment plan.  See Epic treatment plan for more information.    Poonam Archer, LINDSAYSW
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
## 2020-11-13 ASSESSMENT — PATIENT HEALTH QUESTIONNAIRE - PHQ9: SUM OF ALL RESPONSES TO PHQ QUESTIONS 1-9: 23

## 2020-11-13 ASSESSMENT — ANXIETY QUESTIONNAIRES: GAD7 TOTAL SCORE: 21

## 2020-11-16 ENCOUNTER — HOSPITAL ENCOUNTER (OUTPATIENT)
Dept: BEHAVIORAL HEALTH | Facility: CLINIC | Age: 30
End: 2020-11-16
Attending: PSYCHIATRY & NEUROLOGY
Payer: COMMERCIAL

## 2020-11-16 PROCEDURE — G0177 OPPS/PHP; TRAIN & EDUC SERV: HCPCS | Mod: 95

## 2020-11-16 PROCEDURE — 90853 GROUP PSYCHOTHERAPY: CPT | Mod: 95 | Performed by: SOCIAL WORKER

## 2020-11-16 NOTE — GROUP NOTE
Process Group Note    PATIENT'S NAME: Betty Tamayo  MRN:   [id number]  :   1990  ACCT. NUMBER: [id number]  DATE OF SERVICE: 20  START TIME:  9:00 AM  END TIME:  9:50 AM  FACILITATOR: Poonam Archer LICSW  TOPIC:  Process Group    Diagnoses:  296.32 (F33.1) Major Depressive Disorder, Recurrent Episode, Moderate _ and With anxious distress  300.01 (F41.0) Panic Disorder  309.81 (F43.10) Posttraumatic Stress Disorder (includes Posttraumatic Stress Disorder for Children 6 Years and Younger)  Without dissociative symptoms.      [facility] Adult Mental Health Day Treatment  TRACK: 5A    NUMBER OF PARTICIPANTS: 5    Telemedicine Visit: The patient's condition can be safely assessed and treated via synchronous audio and visual telemedicine encounter.      Reason for Telemedicine Visit: Services only offered telehealth    Originating Site (Patient Location): Patient's home    Distant Site (Provider Location): Provider Remote Setting    Consent:  The patient/guardian has verbally consented to: the potential risks and benefits of telemedicine (video visit) versus in person care; bill my insurance or make self-payment for services provided; and responsibility for payment of non-covered services.     Mode of Communication:  Video Conference via [employer]    As the provider I attest to compliance with applicable laws and regulations related to telemedicine.           Data:    Session content: At the start of this group, patients were invited to check in by identifying themselves, describing their current emotional status, and identifying issues to address in this group.   Area(s) of treatment focus addressed in this session included Symptom Management, Personal Safety and Community Resources/Discharge Planning.  Betty reported improved mood since last week.   She stated that she had another conversation with her fiance.   She stated that she had a medication management appointment on Friday  and her mood stabilizer was increased.   She stated that other medications were also changed.  She reported some sleep disturbance last night of waking up for the day at 3 am.   She stated that she has fewer negative self-talk episodes at this time.  Client denied suicidal ideation, intent and plan.     Therapeutic Interventions/Treatment Strategies:  Psychotherapist offered support, feedback and validation and reinforced use of skills. Treatment modalities used include Cognitive Behavioral Therapy. Interventions include Relationship Skills: Assisted patients in implementing more effective communication skills in their relationships.    Assessment:    Patient response:   Patient responded to session by focusing on goals and being attentive    Possible barriers to participation / learning include: and no barriers identified    Health Issues:   None reported       Substance Use Review:   Substance Use: No active concerns identified.    Mental Status/Behavioral Observations  Appearance:   Appropriate   Eye Contact:   Good   Psychomotor Behavior: Normal   Attitude:   Cooperative  Friendly  Orientation:   All  Speech   Rate / Production: Normal    Volume:  Normal   Mood:    Depressed   Affect:    Appropriate   Thought Content:   Clear  Thought Form:  Coherent  Logical     Insight:    Good     Plan:     Safety Plan: No current safety concerns identified.  Recommended that patient call 911 or go to the local ED should there be a change in any of these risk factors.     Barriers to treatment: None identified    Patient Contracts (see media tab):  None    Substance Use: Not addressed in session     Continue or Discharge: Patient will continue in Adult Day Treatment (ADT)  as planned. Patient is likely to benefit from learning and using skills as they work toward the goals identified in their treatment plan.      Poonam Archer, [facility]  November 16, 2020

## 2020-11-16 NOTE — GROUP NOTE
Psychoeducation Group Note    PATIENT'S NAME: Betty Tamayo  MRN:   [id number]  :   1990  ACCT. NUMBER: [id number]  DATE OF SERVICE: 20  START TIME: 11:00 AM  END TIME: 11:50 AM  FACILITATOR: Nely Perera RN  TOPIC: MH RN Group: [facility]  Telemedicine Visit: The patient's condition can be safely assessed and treated via synchronous audio and visual telemedicine encounter.      Reason for Telemedicine Visit:  covid19    Originating Site (Patient Location): Patient's home    Distant Site (Provider Location): Provider Remote Setting    Consent:  The patient/guardian has verbally consented to: the potential risks and benefits of telemedicine (video visit) versus in person care; bill my insurance or make self-payment for services provided; and responsibility for payment of non-covered services.     Mode of Communication:  Video Conference via [employer]    As the provider I attest to compliance with applicable laws and regulations related to telemedicine.      [facility] Mental Health Day Treatment  TRACK: 5A    NUMBER OF PARTICIPANTS: 5    Summary of Group / Topics Discussed:  Foundations of Health: Nutrition: Functional nutrition: Patients were provided education on the role of nutrition in the body and all of the functions that nutrition influences including energy, body structure and bodily function as overarching categories. Select macronutrients and micronutrients and their important roles and functions were also reviewed.    Patient Session Goals / Objectives:    ? Described the role of macronutrients and micronutrients in the body  ? Explained the effects of  nutrition on mental health  ? Listed strategies for promoting balanced nutrition to promote wellness        Patient Participation / Response:  Fully participated with the group by sharing personal reflections / insights and openly received / provided feedback with other participants.    Demonstrated  understanding of topics discussed through group discussion and participation and Identified / Expressed personal readiness to practice skills    Treatment Plan:  Patient has a current master individualized treatment plan.  See Epic treatment plan for more information.    Nely Perera RN

## 2020-11-16 NOTE — GROUP NOTE
Psychotherapy Group Note    PATIENT'S NAME: Betty Tamayo  MRN:   [id number]  :   1990  ACCT. NUMBER: [id number]  DATE OF SERVICE: 20  START TIME: 10:00 AM  END TIME: 10:50 AM  FACILITATOR: Poonam Archer LICSW  TOPIC: MH EBP Group: Coping Skills  [facility] Adult Mental Health Day Treatment  TRACK: 5A    NUMBER OF PARTICIPANTS: 5    Summary of Group / Topics Discussed:  Coping Skills: Radical Acceptance: Patients learned radical acceptance as a way to tolerate heightened stress in the moment.  Patients identified situations that necessitate radical acceptance.  They focused on applying acceptance of the moment to tolerate difficult emotions and events. Patients discussed how to distinguish when this can be useful in their lives and when other skills are more relevant or helpful.    Patient Session Goals / Objectives:    Understand that some amount of pain exists for each of us in our lives    Process the difficulty of acceptance in our lives and benefits of radical acceptance to mood and functioning.    Demonstrate understanding of when to use the radical acceptance to tolerate distress by providing examples of situations where this could be applied.    Identify barriers to applying radical acceptance to difficult situations and explore strategies to overcome them    Telemedicine Visit: The patient's condition can be safely assessed and treated via synchronous audio and visual telemedicine encounter.      Reason for Telemedicine Visit: Services only offered telehealth    Originating Site (Patient Location): Patient's home    Distant Site (Provider Location): Provider Remote Setting    Consent:  The patient/guardian has verbally consented to: the potential risks and benefits of telemedicine (video visit) versus in person care; bill my insurance or make self-payment for services provided; and responsibility for payment of non-covered services.     Mode of Communication:  Video  Conference via [employer]    As the provider I attest to compliance with applicable laws and regulations related to telemedicine.         Patient Participation / Response:  Moderately participated, sharing some personal reflections / insights and adequately adequately received / provided feedback with other participants.    Identified barriers to applying coping skills    Treatment Plan:  Patient has a current master individualized treatment plan.  See Epic treatment plan for more information.    Poonam Archer, LICSW

## 2020-11-17 ENCOUNTER — HOSPITAL ENCOUNTER (OUTPATIENT)
Dept: BEHAVIORAL HEALTH | Facility: CLINIC | Age: 30
End: 2020-11-17
Attending: PSYCHIATRY & NEUROLOGY
Payer: COMMERCIAL

## 2020-11-17 PROCEDURE — 90853 GROUP PSYCHOTHERAPY: CPT | Mod: GT | Performed by: SOCIAL WORKER

## 2020-11-17 PROCEDURE — G0177 OPPS/PHP; TRAIN & EDUC SERV: HCPCS | Mod: GT

## 2020-11-17 NOTE — GROUP NOTE
Psychoeducation Group Note    PATIENT'S NAME: Betty Tamayo  MRN:   [id number]  :   1990  ACCT. NUMBER: [id number]  DATE OF SERVICE: 20  START TIME: 10:00 AM  END TIME: 10:50 AM  FACILITATOR: Jose Lui OTR/L  TOPIC: MH Life Skills Group: Resiliency Development  Telemedicine Visit: The patient's condition can be safely assessed and treated via synchronous audio and visual telemedicine encounter.      Reason for Telemedicine Visit: COVID-19    Originating Site (Patient Location): Patient's home    Distant Site (Provider Location): [facility]    Consent:  The patient/guardian has verbally consented to: the potential risks and benefits of telemedicine (video visit) versus in person care; bill my insurance or make self-payment for services provided; and responsibility for payment of non-covered services.     Mode of Communication:  Video Conference via [employer]    As the provider I attest to compliance with applicable laws and regulations related to telemedicine.   [facility] Adult Mental Health Day Treatment  TRACK: 5A    NUMBER OF PARTICIPANTS: 5    Summary of Group / Topics Discussed:  Resiliency Development:  Self Awareness (Emotional Awareness and Self-Knowledge): Patients explored and identified their strengths, emotions, barriers, skills, and behavior patterns that occur when changes happen in life.  Focus was on recognizing these aspects and developing ways to help support moving forward, making changes as needed to support resiliency.      Patient Session Goals / Objectives:    Identified emotions, barriers, skills, strengths, and behavior patterns that occur when changes happen in life and how to effectively cope     Improved awareness of the process of change and skills and strategies that support this       Established a plan for practice of these skills in their own environments    Practiced and reflected on how to generalize taught skills  to their everyday life        Patient Participation / Response:  Fully participated with the group by sharing personal reflections / insights and openly received / provided feedback with other participants.    Demonstrated understanding of content through handout and group discussion  and Verbalized understanding of content    Treatment Plan:  Patient has a current master individualized treatment plan.  See Epic treatment plan for more information.    Jose Lui, OTR/L

## 2020-11-17 NOTE — GROUP NOTE
Psychotherapy Group Note    PATIENT'S NAME: Betty Tamayo  MRN:   [id number]  :   1990  ACCT. NUMBER: [id number]  DATE OF SERVICE: 20  START TIME: 11:00 AM  END TIME: 11:50 AM  FACILITATOR: Poonam Archer LICSW  TOPIC: MH EBP Group: Coping Skills  [facility] Adult Mental Health Day Treatment  TRACK: 5A    NUMBER OF PARTICIPANTS: 5    Summary of Group / Topics Discussed:  Coping Skills: Improve the Moment: Patients learned to tolerate distress, by applying strategies to effect positive change in the present moment.  Patients will identified situations where they would benefit from applying strategies to improve the moment and reduce distress. Patients discussed how to distinguish when this can be useful in their lives or when other strategies would be more relevant or helpful.    Patient Session Goals / Objectives:    Discuss how the use of intentional  in the moment  actions can help reduce distress.    Review patients current practices and discuss a more formal way of practicing and accessing skills.    Increase ability to decide when to use improve the moment strategies    Choose 1-2 in the moment actions to apply during times of distress.  Telemedicine Visit: The patient's condition can be safely assessed and treated via synchronous audio and visual telemedicine encounter.      Reason for Telemedicine Visit: Services only offered telehealth    Originating Site (Patient Location): Patient's home    Distant Site (Provider Location): Provider Remote Setting    Consent:  The patient/guardian has verbally consented to: the potential risks and benefits of telemedicine (video visit) versus in person care; bill my insurance or make self-payment for services provided; and responsibility for payment of non-covered services.     Mode of Communication:  Video Conference via RoomClip    As the provider I attest to compliance with applicable laws and regulations related to telemedicine.        Patient Participation / Response:  Fully participated with the group by sharing personal reflections / insights and openly received / provided feedback with other participants.    Identified 2-3 positive coping strategies that have helped maintain / improve symptoms in the past    Treatment Plan:  Patient has a current master individualized treatment plan.  See Epic treatment plan for more information.    Poonam Archer, LICSW
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
## 2020-11-19 ENCOUNTER — HOSPITAL ENCOUNTER (OUTPATIENT)
Dept: BEHAVIORAL HEALTH | Facility: CLINIC | Age: 30
End: 2020-11-19
Attending: PSYCHIATRY & NEUROLOGY
Payer: COMMERCIAL

## 2020-11-19 PROCEDURE — 90853 GROUP PSYCHOTHERAPY: CPT | Mod: GT | Performed by: SOCIAL WORKER

## 2020-11-19 PROCEDURE — 90853 GROUP PSYCHOTHERAPY: CPT | Mod: 95 | Performed by: SOCIAL WORKER

## 2020-11-19 NOTE — GROUP NOTE
Psychotherapy Group Note    PATIENT'S NAME: Betty Tmaayo  MRN:   [id number]  :   1990  ACCT. NUMBER: [id number]  DATE OF SERVICE: 20  START TIME: 11:00 AM  END TIME: 11:50 AM  FACILITATOR: Poonam Ruffin LICSW  TOPIC:  EBP Group: Enhanced Mindfulness  [facility] Adult Mental Health Day Treatment  TRACK: 5A    NUMBER OF PARTICIPANTS: 5    Summary of Group / Topics Discussed:  Enhanced Mindfulness: Body and Mind Integration: Patients received an overview and education regarding the importance of including the body in the management of emotional health and self-care and as a direct route to mindfulness practice.  Patients discussed various ways in which the body can serve as an informant to their physical and emotional experiences/need. Patients discussed the body as a direct link to the present moment and to mindfulness practice.  Patients discussed current relationship with body, self-awareness, mindfulness practice and barriers to connection with body.  Patients were guided through breath work and movement to facilitate greater self-awareness, grounding, self-expression, and connection to other.  Patients discussed how the experiential could be applied to better manage mental health and develop clark connection to self.    Patient Session Goals / Objectives:    Identify how movement awareness could be used for grounding, stress management, self-expression, connection to other and self-regulation    Improved awareness of breath and movement preferences    Identify how movement and the body is used in mindfulness practice    Reflect on use of these practices in everyday life.    Identify barriers to attending to body  Telemedicine Visit: The patient's condition can be safely assessed and treated via synchronous audio and visual telemedicine encounter.      Reason for Telemedicine Visit: Services only offered telehealth and covid19    Originating Site (Patient Location): Patient's  home    Distant Site (Provider Location): Provider Remote Setting    Consent:  The patient/guardian has verbally consented to: the potential risks and benefits of telemedicine (video visit) versus in person care; bill my insurance or make self-payment for services provided; and responsibility for payment of non-covered services.     Mode of Communication:  Video Conference via Hashgo    As the provider I attest to compliance with applicable laws and regulations related to telemedicine.       Patient Participation / Response:  Moderately participated, sharing some personal reflections / insights and adequately adequately received / provided feedback with other participants.    Demonstrated understanding of topics discussed through group discussion and participation and Practiced skills in session    Treatment Plan:  Patient has a current master individualized treatment plan.  See Epic treatment plan for more information.    LINDSAY SaucedaSW

## 2020-11-19 NOTE — GROUP NOTE
Psychotherapy Group Note    PATIENT'S NAME: Betty Tamayo  MRN:   [id number]  :   1990  ACCT. NUMBER: [id number]  DATE OF SERVICE: 20  START TIME: 10:00 AM  END TIME: 10:50 AM  FACILITATOR: Poonam Archer LICSW  TOPIC: MH EBP Group: Coping Skills  [facility] Adult Mental Health Day Treatment  TRACK: 5A    NUMBER OF PARTICIPANTS: 7    Summary of Group / Topics Discussed:  Coping Skills: Building Positive Experiences: Patients discussed the importance of planning and engaging in positive experiences, as strategies to increase positive thinking, hope, and self-worth.  Explored the benefits of planning / creating positive experiences, including recognizing and reducing negativity bias by focusing on and building positive experiences.   Several approaches to building positive experiences were presented and discussed relevant to each patient.      Patient Session Goals / Objectives:    Understand the purpose of planning / creating / participating / sharing in positive experiences.    Explore patient s experiences related to negative thinking and how it influences activities and moodIdentify current positive events in patient s life.     Set goals to increase a variety of positive experiences.    Address barriers to planning / engaging in positive experiences.    Telemedicine Visit: The patient's condition can be safely assessed and treated via synchronous audio and visual telemedicine encounter.      Reason for Telemedicine Visit: Services only offered telehealth    Originating Site (Patient Location): Patient's home    Distant Site (Provider Location): Provider Remote Setting    Consent:  The patient/guardian has verbally consented to: the potential risks and benefits of telemedicine (video visit) versus in person care; bill my insurance or make self-payment for services provided; and responsibility for payment of non-covered services.     Mode of Communication:  Video Conference via  Demarcus    As the provider I attest to compliance with applicable laws and regulations related to telemedicine.         Patient Participation / Response:  Fully participated with the group by sharing personal reflections / insights and openly received / provided feedback with other participants.    Expressed understanding of the relevance / importance of coping skills at distressing times in life    Treatment Plan:  Patient has a current master individualized treatment plan.  See Epic treatment plan for more information.    Poonam Archer, [facility]

## 2020-11-19 NOTE — GROUP NOTE
Process Group Note    PATIENT'S NAME: Betty Tamayo  MRN:   [id number]  :   1990  ACCT. NUMBER: [id number]  DATE OF SERVICE: 20  START TIME:  9:00 AM  END TIME:  9:50 AM  FACILITATOR: Poonam Archer LICSW  TOPIC:  Process Group    Diagnoses:  296.32 (F33.1) Major Depressive Disorder, Recurrent Episode, Moderate _ and With anxious distress  300.01 (F41.0) Panic Disorder  309.81 (F43.10) Posttraumatic Stress Disorder (includes Posttraumatic Stress Disorder for Children 6 Years and Younger)  Without dissociative symptoms.      [facility] Adult Mental Health Day Treatment  TRACK: 5A    NUMBER OF PARTICIPANTS: 6    Telemedicine Visit: The patient's condition can be safely assessed and treated via synchronous audio and visual telemedicine encounter.      Reason for Telemedicine Visit: Services only offered telehealth    Originating Site (Patient Location): Patient's home    Distant Site (Provider Location): Provider Remote Setting    Consent:  The patient/guardian has verbally consented to: the potential risks and benefits of telemedicine (video visit) versus in person care; bill my insurance or make self-payment for services provided; and responsibility for payment of non-covered services.     Mode of Communication:  Video Conference via [employer]    As the provider I attest to compliance with applicable laws and regulations related to telemedicine.           Data:    Session content: At the start of this group, patients were invited to check in by identifying themselves, describing their current emotional status, and identifying issues to address in this group.   Area(s) of treatment focus addressed in this session included Symptom Management, Personal Safety and Community Resources/Discharge Planning.  Betty reported some anxiety today.  She stated that she feels less anxious about the upcoming family event as one sibling has cancelled and another is considering cancelling.   She  stated that this might mean that her mother cancels also.   She shared that her ex-partner also cancelled.  She stated that she enjoyed doing a game night with family.  Client denied suicidal ideation, intent and plan.     Therapeutic Interventions/Treatment Strategies:  Psychotherapist offered support, feedback and validation and reinforced use of skills. Treatment modalities used include Cognitive Behavioral Therapy. Interventions include Relationship Skills: Discussed strategies to promote healthier understanding of interpersonal relationships and Discussed relationships and ways to reduce conflict .    Assessment:    Patient response:   Patient responded to session by giving feedback, listening and focusing on goals    Possible barriers to participation / learning include: and no barriers identified    Health Issues:   None reported       Substance Use Review:   Substance Use: No active concerns identified.    Mental Status/Behavioral Observations  Appearance:   Appropriate   Eye Contact:   Good   Psychomotor Behavior: Normal   Attitude:   Cooperative   Orientation:   All  Speech   Rate / Production: Normal    Volume:  Normal   Mood:    Anxious  Depressed   Affect:    Appropriate   Thought Content:   Clear  Thought Form:  Coherent  Logical     Insight:    Good     Plan:     Safety Plan: No current safety concerns identified.  Recommended that patient call 911 or go to the local ED should there be a change in any of these risk factors.     Barriers to treatment: None identified    Patient Contracts (see media tab):  None    Substance Use: Not addressed in session     Continue or Discharge: Patient will continue in Adult Day Treatment (ADT)  as planned. Patient is likely to benefit from learning and using skills as they work toward the goals identified in their treatment plan.      Poonam Archer, [facility]  November 19, 2020

## 2020-11-20 ENCOUNTER — VIRTUAL VISIT (OUTPATIENT)
Dept: BEHAVIORAL HEALTH | Facility: CLINIC | Age: 30
End: 2020-11-20
Payer: COMMERCIAL

## 2020-11-20 DIAGNOSIS — Z53.9 ERRONEOUS ENCOUNTER--DISREGARD: Primary | ICD-10-CM

## 2020-11-20 NOTE — PROGRESS NOTES
Appointment cancelled. Betty stated she forgot the appointment. We rescheduled for 12/11/20.   
home

## 2020-11-23 ENCOUNTER — TELEPHONE (OUTPATIENT)
Dept: BEHAVIORAL HEALTH | Facility: CLINIC | Age: 30
End: 2020-11-23

## 2020-11-23 ENCOUNTER — VIRTUAL VISIT (OUTPATIENT)
Dept: FAMILY MEDICINE | Facility: CLINIC | Age: 30
End: 2020-11-23
Payer: COMMERCIAL

## 2020-11-23 DIAGNOSIS — F43.10 PTSD (POST-TRAUMATIC STRESS DISORDER): ICD-10-CM

## 2020-11-23 DIAGNOSIS — F33.2 SEVERE EPISODE OF RECURRENT MAJOR DEPRESSIVE DISORDER, WITHOUT PSYCHOTIC FEATURES (H): ICD-10-CM

## 2020-11-23 DIAGNOSIS — R45.4 IRRITABILITY AND ANGER: ICD-10-CM

## 2020-11-23 DIAGNOSIS — F41.1 GENERALIZED ANXIETY DISORDER: ICD-10-CM

## 2020-11-23 DIAGNOSIS — F33.2 SEVERE EPISODE OF RECURRENT MAJOR DEPRESSIVE DISORDER, WITHOUT PSYCHOTIC FEATURES (H): Primary | ICD-10-CM

## 2020-11-23 PROCEDURE — 99214 OFFICE O/P EST MOD 30 MIN: CPT | Mod: 95 | Performed by: FAMILY MEDICINE

## 2020-11-23 RX ORDER — PROPRANOLOL HYDROCHLORIDE 40 MG/1
40 TABLET ORAL 2 TIMES DAILY
Qty: 60 TABLET | Refills: 0 | Status: SHIPPED | OUTPATIENT
Start: 2020-11-23 | End: 2020-12-18

## 2020-11-23 RX ORDER — LAMOTRIGINE 200 MG/1
300 TABLET ORAL AT BEDTIME
Qty: 45 TABLET | Refills: 1 | Status: SHIPPED | OUTPATIENT
Start: 2020-11-23 | End: 2020-12-18

## 2020-11-23 ASSESSMENT — PATIENT HEALTH QUESTIONNAIRE - PHQ9
SUM OF ALL RESPONSES TO PHQ QUESTIONS 1-9: 19
5. POOR APPETITE OR OVEREATING: NEARLY EVERY DAY

## 2020-11-23 ASSESSMENT — ANXIETY QUESTIONNAIRES
7. FEELING AFRAID AS IF SOMETHING AWFUL MIGHT HAPPEN: NEARLY EVERY DAY
GAD7 TOTAL SCORE: 21
1. FEELING NERVOUS, ANXIOUS, OR ON EDGE: NEARLY EVERY DAY
IF YOU CHECKED OFF ANY PROBLEMS ON THIS QUESTIONNAIRE, HOW DIFFICULT HAVE THESE PROBLEMS MADE IT FOR YOU TO DO YOUR WORK, TAKE CARE OF THINGS AT HOME, OR GET ALONG WITH OTHER PEOPLE: VERY DIFFICULT
2. NOT BEING ABLE TO STOP OR CONTROL WORRYING: NEARLY EVERY DAY
5. BEING SO RESTLESS THAT IT IS HARD TO SIT STILL: NEARLY EVERY DAY
6. BECOMING EASILY ANNOYED OR IRRITABLE: NEARLY EVERY DAY
3. WORRYING TOO MUCH ABOUT DIFFERENT THINGS: NEARLY EVERY DAY

## 2020-11-23 NOTE — TELEPHONE ENCOUNTER
Called Betty for scheduled follow up call to check on how things have been going with the new medication. She noted that she has not had any benefit from the propranolol as of yet, but unlike the guanfacine, she has not had any noticeable side effects. Will increase the dose to 40mg twice daily at this time.     We did discuss also potentially increasing the lamotrigine as had been discussed recently as well. She is agreeable to increasing the dose to 300mg at this time.

## 2020-11-23 NOTE — PROGRESS NOTES
"Betty Tamayo is a 30 year old female who is being evaluated via a billable video visit.      The patient has been notified of following:     \"This video visit will be conducted via a call between you and your physician/provider. We have found that certain health care needs can be provided without the need for an in-person physical exam.  This service lets us provide the care you need with a video conversation.  If a prescription is necessary we can send it directly to your pharmacy.  If lab work is needed we can place an order for that and you can then stop by our lab to have the test done at a later time.    Video visits are billed at different rates depending on your insurance coverage.  Please reach out to your insurance provider with any questions.    If during the course of the call the physician/provider feels a video visit is not appropriate, you will not be charged for this service.\"    Patient has given verbal consent for Video visit? Yes  How would you like to obtain your AVS? MyChart  If you are dropped from the video visit, the video invite should be resent to: Text to cell phone: [phone]  Will anyone else be joining your video visit? No    Subjective     Betty Tamayo is a 30 year old female who presents today via video visit for the following health issues:    HPI      Betty Tamayo has history of depression/ anxiety mood disorder previous therapy, Agoraphobia and panic attacks. She has been attending day treatment program since 10/23 has been on short term disability from work to allow her to focus on mental health and attend the day program. She has been told she may require 8-12 total weeks of care. I discussed need to send additional short term disability paperwork to me to complete through 1/6/2021. She has been following with Dr Boyd for adjust of medications. She is having trouble sleeping and feels tired today but was able to be interactive through the video visit.    Patient " Active Problem List   Diagnosis     Encounter for supervision of high risk pregnancy, , [facility] CN[facility]     History of pre-eclampsia in prior pregnancy, currently pregnant     Nausea     UTI in pregnancy, first trimester     Vitamin D deficiency     Maternal varicella, non-immune     Medication exposure during first trimester of pregnancy     Uncomplicated asthma     Snoring     Hypersomnia     Class 3 severe obesity due to excess calories with body mass index (BMI) of 40.0 to 44.9 in adult, unspecified whether serious comorbidity present (H)     PTSD (post-traumatic stress disorder)     Major depressive disorder, recurrent episode, severe (H)     Mood disorder (H)     Generalized anxiety disorder     Major depressive disorder, recurrent episode, moderate with anxious distress (H)     Past Medical History:   Diagnosis Date     Knee cartilage, torn, left     both knees    had cortisone injection     Right shoulder pain 14     Uncomplicated asthma      Review of Systems   Fatigue, no viral illness      Objective           Vitals:  No vitals were obtained today due to virtual visit.    Physical Exam     GENERAL: Chronic fatigue, depressed but interactive indicates day program hs been helpful  EYES: Eyes grossly normal to inspection.  No discharge or erythema, or obvious scleral/conjunctival abnormalities.  RESP: No audible wheeze, cough, or visible cyanosis.  No visible retractions or increased work of breathing.    SKIN: Visible skin clear. No significant rash, abnormal pigmentation or lesions.  NEURO: Cranial nerves grossly intact.  Mentation and speech appropriate for age.  PSYCH: mentation appears depressed, affect flat, fatigued, interactive    PHQ 2020   PHQ-9 Total Score 23 23 19   Q9: Thoughts of better off dead/self-harm past 2 weeks Several days Several days Not at all   F/U: Thoughts of suicide or self-harm No No -   F/U: Self harm-plan - - -   F/U: Self-harm action - - -    F/U: Safety concerns No No -     NIXON-7 SCORE 11/12/2020 11/12/2020 11/23/2020   Total Score - 21 (severe anxiety) -   Total Score 21 21 21         Assessment and Plan  Betty was seen today for recheck depression/ anxiety mood disorder previous therapy, Agoraphobia and panic attacks. She has been attending day treatment program since 10/23 has been on short term disability from work to allow her to focus on mental health and attend the day program. I will complete forms once faxed to me to extend short term disability from 11/23 to 1/6/2021 to allow continued work on her mood, day program and continued work with psychiatry.    Diagnoses and all orders for this visit:    Severe episode of recurrent major depressive disorder, without psychotic features (H)    PTSD (post-traumatic stress disorder)    Generalized anxiety disorder      Video-Visit Details    Type of service:  Video Visit    Video Time: 11:41- 12:00 19 minutes    Originating Location (pt. Location): Home    Distant Location (provider location):  [facility]     Platform used for Video Visit: Shakeel Corbett MD

## 2020-11-23 NOTE — PROGRESS NOTES
Patient Active Problem List   Diagnosis     Encounter for supervision of high risk pregnancy, , [facility] CNM     History of pre-eclampsia in prior pregnancy, currently pregnant     Nausea     UTI in pregnancy, first trimester     Vitamin D deficiency     Maternal varicella, non-immune     Medication exposure during first trimester of pregnancy     Uncomplicated asthma     Snoring     Hypersomnia     Class 3 severe obesity due to excess calories with body mass index (BMI) of 40.0 to 44.9 in adult, unspecified whether serious comorbidity present (H)     PTSD (post-traumatic stress disorder)     Major depressive disorder, recurrent episode, severe (H)     Mood disorder (H)     Generalized anxiety disorder     Major depressive disorder, recurrent episode, moderate with anxious distress (H)       Current Outpatient Medications:      buPROPion (WELLBUTRIN XL) 150 MG 24 hr tablet, 2 tab daily, Disp: 60 tablet, Rfl: 1     hydrOXYzine (ATARAX) 25 MG tablet, Take 1-2 tablets (25-50 mg) by mouth daily as needed for anxiety, Disp: 60 tablet, Rfl: 0     lamoTRIgine (LAMICTAL) 200 MG tablet, Take 1 tablet (200 mg) by mouth At Bedtime, Disp: 30 tablet, Rfl: 1     propranolol (INDERAL) 20 MG tablet, Take 1 tablet (20 mg) by mouth 2 times daily, Disp: 60 tablet, Rfl: 0  Psychiatry staffing: case discussed  Diagnosis:  As above, active in looking at trauma, needs new therapists.

## 2020-11-23 NOTE — Clinical Note
Hello,  I am completing additional short term disability away from work for her through 1/6/ 21.  Thank you for your cares and medication recommendations.  Best wishes,  Kapil Corbett MD

## 2020-11-23 NOTE — NURSING NOTE
Chief Complaint   Patient presents with     Recheck Medication     pt would like to follow up       Tomy Alcaraz CMA, EMT at 10:49 AM on 11/23/2020.

## 2020-11-23 NOTE — PATIENT INSTRUCTIONS
Please  Send forms from human resources for work short term disability through 1/6/21 next available virtual.  Kapil Corbett MD

## 2020-11-24 ENCOUNTER — HOSPITAL ENCOUNTER (OUTPATIENT)
Dept: BEHAVIORAL HEALTH | Facility: CLINIC | Age: 30
End: 2020-11-24
Attending: PSYCHIATRY & NEUROLOGY
Payer: COMMERCIAL

## 2020-11-24 PROCEDURE — 90853 GROUP PSYCHOTHERAPY: CPT | Mod: GT | Performed by: SOCIAL WORKER

## 2020-11-24 ASSESSMENT — ANXIETY QUESTIONNAIRES: GAD7 TOTAL SCORE: 21

## 2020-11-24 NOTE — GROUP NOTE
Process Group Note    PATIENT'S NAME: Betty Tamayo  MRN:   [id number]  :   1990  ACCT. NUMBER: [id number]  DATE OF SERVICE: 20  START TIME:  9:00 AM  END TIME:  9:50 AM  FACILITATOR: Poonam Archer LICSW  TOPIC:  Process Group    Diagnoses:  296.32 (F33.1) Major Depressive Disorder, Recurrent Episode, Moderate _ and With anxious distress  300.01 (F41.0) Panic Disorder  309.81 (F43.10) Posttraumatic Stress Disorder (includes Posttraumatic Stress Disorder for Children 6 Years and Younger)  Without dissociative symptoms.      [facility] Adult Mental Health Day Treatment  TRACK: 5A    NUMBER OF PARTICIPANTS: 4    Telemedicine Visit: The patient's condition can be safely assessed and treated via synchronous audio and visual telemedicine encounter.      Reason for Telemedicine Visit: Services only offered telehealth    Originating Site (Patient Location): Patient's home    Distant Site (Provider Location): Provider Remote Setting    Consent:  The patient/guardian has verbally consented to: the potential risks and benefits of telemedicine (video visit) versus in person care; bill my insurance or make self-payment for services provided; and responsibility for payment of non-covered services.     Mode of Communication:  Video Conference via [employer]    As the provider I attest to compliance with applicable laws and regulations related to telemedicine.           Data:    Session content: At the start of this group, patients were invited to check in by identifying themselves, describing their current emotional status, and identifying issues to address in this group.   Area(s) of treatment focus addressed in this session included Symptom Management, Personal Safety and Community Resources/Discharge Planning.  Betty reported not being able to sleep, being angry, and tenses.   She stated that she had increased physical pain due to the anxiety level.  She stated that she felt frustrated as  she felt rushed during the primary care video appointment.   The provider did suggest that she be off for another month, but did not seem to have time to address the entire situation.  She stated that her medication management provider called her unexpectedly at 6 pm and she did not feel prepared for the appointment.  She stated that she felt rushed.  Client reported distress that she does not have an individual therapist yet.  She accepted appointment in January but is not able to get one sooner.  Client was tearful.  She stated she was worried that she took too much time in group.   Peers and writer reassured her that she did not take too much time.  Client denied suicidal ideation, intent and plan.     Therapeutic Interventions/Treatment Strategies:  Psychotherapist offered support, feedback and validation and reinforced use of skills. Treatment modalities used include Cognitive Behavioral Therapy. Interventions include Relationship Skills: Discussed strategies to promote healthier understanding of interpersonal relationships.    Assessment:    Patient response:   Patient responded to session by listening and focusing on goals    Possible barriers to participation / learning include: and no barriers identified    Health Issues:   None reported       Substance Use Review:   Substance Use: No active concerns identified.    Mental Status/Behavioral Observations  Appearance:   Appropriate   Eye Contact:   Good   Psychomotor Behavior: Normal   Attitude:   Cooperative   Orientation:   All  Speech   Rate / Production: Normal    Volume:  Normal   Mood:    Anxious  Depressed   Affect:    Appropriate   Thought Content:   Clear  Thought Form:  Coherent  Logical     Insight:    Good     Plan:     Safety Plan: No current safety concerns identified.  Recommended that patient call 911 or go to the local ED should there be a change in any of these risk factors.     Barriers to treatment: None identified    Patient Contracts (see  media tab):  None    Substance Use: Not addressed in session     Continue or Discharge: Patient will continue in Adult Day Treatment (ADT)  as planned. Patient is likely to benefit from learning and using skills as they work toward the goals identified in their treatment plan.      Poonam Archer, [facility]  November 24, 2020

## 2020-11-24 NOTE — GROUP NOTE
Psychotherapy Group Note    PATIENT'S NAME: Betty Tamayo  MRN:   [id number]  :   1990  ACCT. NUMBER: [id number]  DATE OF SERVICE: 20  START TIME: 11:00 AM  END TIME: 11:50 AM  FACILITATOR: Poonam Archer LICSW  TOPIC: MH EBP Group: Specialty Awareness  [facility] Adult Mental Health Day Treatment  TRACK: 5A    NUMBER OF PARTICIPANTS: 5    Summary of Group / Topics Discussed:  Specialty Topics: Trauma and PTSD: Patients were provided with information to understand the types and origins of trauma, the relationship between trauma and PTSD, the scope of Trauma-Informed Care, and treatment options. Patients were able to explore how trauma may have impacted their functioning directly or indirectly, with reference to the the complexity of trauma and associated treatment options. Patients reviewed their current awareness of this topic and relevance to their functioning. Individual experiences with symptoms and treatment options were discussed.     Patient Session Goals / Objectives:    Discussed definitions of trauma, Trauma-Informed Care, PTSD    Discussed how traumatic experiences affect the individual and their relationships (directly, indirectly, brain development and function)    Identified how a history of trauma or exposure to trauma may impact group work    Assisted patients to find ways to adapt functioning in light of past traumatic experience(s), and to identify suitable treatment options and community resources    Telemedicine Visit: The patient's condition can be safely assessed and treated via synchronous audio and visual telemedicine encounter.      Reason for Telemedicine Visit: Services only offered telehealth    Originating Site (Patient Location): Patient's home    Distant Site (Provider Location): Provider Remote Setting    Consent:  The patient/guardian has verbally consented to: the potential risks and benefits of telemedicine (video visit) versus in person care; bill  my insurance or make self-payment for services provided; and responsibility for payment of non-covered services.     Mode of Communication:  Video Conference via [employer]    As the provider I attest to compliance with applicable laws and regulations related to telemedicine.       Patient Participation / Response:  Fully participated with the group by sharing personal reflections / insights and openly received / provided feedback with other participants.    Identified / Expressed readiness to act on skill suggestions discussed in topic    Treatment Plan:  Patient has a current master individualized treatment plan.  See Epic treatment plan for more information.    Poonam Archer, LINDSAYSW

## 2020-11-25 NOTE — ADDENDUM NOTE
Encounter addended by: Krystyna Martines [facility] on: 11/25/2020 2:18 PM   Actions taken: Charge Capture section accepted

## 2020-11-30 ENCOUNTER — HOSPITAL ENCOUNTER (OUTPATIENT)
Dept: BEHAVIORAL HEALTH | Facility: CLINIC | Age: 30
End: 2020-11-30
Attending: PSYCHIATRY & NEUROLOGY
Payer: COMMERCIAL

## 2020-11-30 PROCEDURE — G0177 OPPS/PHP; TRAIN & EDUC SERV: HCPCS | Mod: 95

## 2020-11-30 PROCEDURE — 90853 GROUP PSYCHOTHERAPY: CPT | Mod: 95 | Performed by: SOCIAL WORKER

## 2020-11-30 PROCEDURE — 90853 GROUP PSYCHOTHERAPY: CPT | Mod: GT | Performed by: SOCIAL WORKER

## 2020-11-30 NOTE — GROUP NOTE
Psychoeducation Group Note    PATIENT'S NAME: Betty Tamayo  MRN:   [id number]  :   1990  ACCT. NUMBER: [id number]  DATE OF SERVICE: 20  START TIME: 11:00 AM  END TIME: 11:50 AM  FACILITATOR: Nely Perera RN  TOPIC:  RN Group: Health Maintenance  Telemedicine Visit: The patient's condition can be safely assessed and treated via synchronous audio and visual telemedicine encounter.      Reason for Telemedicine Visit:  covid19    Originating Site (Patient Location): Patient's home    Distant Site (Provider Location): Provider Remote Setting    Consent:  The patient/guardian has verbally consented to: the potential risks and benefits of telemedicine (video visit) versus in person care; bill my insurance or make self-payment for services provided; and responsibility for payment of non-covered services.     Mode of Communication:  Video Conference via [employer]    As the provider I attest to compliance with applicable laws and regulations related to telemedicine.      [facility] Adult Mental Health Day Treatment  TRACK: 5A    NUMBER OF PARTICIPANTS: 8    Summary of Group / Topics Discussed:  Health Maintenance: Discharge planning/Community resources: Patients worked on completing an instructor-facilitated discharge planning activity. Discharge planning begins for all patients after admission. Competent discharge planning promotes a successful transition and decreases the likelihood of mental health relapse. In this group, all dimensions of wellness were reviewed to assess for needs/discharge readiness. These dimensions included: physical, emotional, occupational/productivity, environmental, social, spiritual, intellectual, and financial. Patients worked on completing/updating their discharge planning and identifying their treatment needs prior to time of discharge.     Patient Session Goals / Objectives:  ? Identified unmet treatment needs to accomplish before discharge  ? Completed all  dimensions of the discharge planning packet  ? Participated in the planning process, make phone calls, set up appointments, got connected with community resources, followed up with treatment team as needed         Patient Participation / Response:  Minimally participated, only when prompted / asked.    Verbalized understanding of health maintenance topic    Treatment Plan:  Patient has a current master individualized treatment plan.  See Epic treatment plan for more information.    Nely Perera RN
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
## 2020-11-30 NOTE — GROUP NOTE
Psychotherapy Group Note    PATIENT'S NAME: Betty Tamayo  MRN:   [id number]  :   1990  ACCT. NUMBER: [id number]  DATE OF SERVICE: 20  START TIME: 10:00 AM  END TIME: 10:50 AM  FACILITATOR: Poonam Archer LICSW  TOPIC: MH EBP Group: Specialty Awareness  [facility] Adult Mental Health Day Treatment  TRACK: 5A    NUMBER OF PARTICIPANTS: 8    Summary of Group / Topics Discussed:  Specialty Topics: Grief/Transitions: Patients received an overview of the grief process.  Patients explored their relationship to loss and how that has affected their mental health symptoms.  Strategies for recognizing loss and ideas for engaging in the grief process was presented and discussed.  Patients identified needs for support and coping skills to manage loss.  The purpose of this specialty topic is to help patients identify the aspects of change due to loss that individuals experience in addition to mental health symptoms to better cope with the grief, loss, and life transitions.      Patient Session Goals / Objectives:    Identified grief, loss, and life transitions     Discussed how grief impacts mental health symptoms and disrupts usual functioning    Identified needs for support and coping with grief and planned further action for coping    Telemedicine Visit: The patient's condition can be safely assessed and treated via synchronous audio and visual telemedicine encounter.      Reason for Telemedicine Visit: Services only offered telehealth    Originating Site (Patient Location): Patient's home    Distant Site (Provider Location): Provider Remote Setting    Consent:  The patient/guardian has verbally consented to: the potential risks and benefits of telemedicine (video visit) versus in person care; bill my insurance or make self-payment for services provided; and responsibility for payment of non-covered services.     Mode of Communication:  Video Conference via [employer]    As the provider I  attest to compliance with applicable laws and regulations related to telemedicine.         Patient Participation / Response:  Fully participated with the group by sharing personal reflections / insights and openly received / provided feedback with other participants.    Identified / Expressed readiness to act on skill suggestions discussed in topic and Identified plan to address barriers to practicing skills discussed in topic    Treatment Plan:  Patient has a current master individualized treatment plan.  See Epic treatment plan for more information.    Poonam Archer, LINDSAYSW

## 2020-12-01 ENCOUNTER — HOSPITAL ENCOUNTER (OUTPATIENT)
Dept: BEHAVIORAL HEALTH | Facility: CLINIC | Age: 30
End: 2020-12-01
Attending: PSYCHIATRY & NEUROLOGY
Payer: COMMERCIAL

## 2020-12-01 PROCEDURE — 90853 GROUP PSYCHOTHERAPY: CPT | Mod: 95 | Performed by: SOCIAL WORKER

## 2020-12-01 PROCEDURE — G0177 OPPS/PHP; TRAIN & EDUC SERV: HCPCS | Mod: 95

## 2020-12-01 PROCEDURE — 90853 GROUP PSYCHOTHERAPY: CPT | Mod: GT | Performed by: SOCIAL WORKER

## 2020-12-01 NOTE — GROUP NOTE
Psychoeducation Group Note    PATIENT'S NAME: Betty Tamayo  MRN:   [id number]  :   1990  ACCT. NUMBER: [id number]  DATE OF SERVICE: 20  START TIME: 10:00 AM  END TIME: 10:50 AM  FACILITATOR: Jose Lui OTR/L  TOPIC: MH Life Skills Group: Communication and Social Skills Development  Telemedicine Visit: The patient's condition can be safely assessed and treated via synchronous audio and visual telemedicine encounter.      Reason for Telemedicine Visit: COVID-19    Originating Site (Patient Location): Patient's home    Distant Site (Provider Location): [facility]: [facility]    Consent:  The patient/guardian has verbally consented to: the potential risks and benefits of telemedicine (video visit) versus in person care; bill my insurance or make self-payment for services provided; and responsibility for payment of non-covered services.     Mode of Communication:  Video Conference via [employer]    As the provider I attest to compliance with applicable laws and regulations related to telemedicine.   [facility] Adult Mental Health Day Treatment  TRACK: 5A    NUMBER OF PARTICIPANTS: 7    Summary of Group / Topics Discussed:  Communication and Social Skills Development: Social Supports: Social Support Scale: Patients completed a social support self assessment to identify people who are supportive and to evaluate the effectiveness of their social support system.  Patients were taught and gained awareness of characteristics of an effective support and how to develop this in their lives.  Patients identified both personal strengths and opportunities for growth in this areas to improve overall communication and connection with other people.    Patient Session Goals / Objectives:    Identified strengths and opportunities for growth in developing their social support system and how this impacts their ability to connect and communicate with other people       Improved  awareness of important aspects of social support systems and how this relates to mental health recovery        Established a plan for practice of these skills in their own environments    Practiced and reflected on how to generalize taught skills to their everyday life          Patient Participation / Response:  Fully participated with the group by sharing personal reflections / insights and openly received / provided feedback with other participants.    Demonstrated understanding of content through handout/group discussion  and Verbalized understanding of content    Treatment Plan:  Patient has a current master individualized treatment plan.  See Epic treatment plan for more information.    Jose Lui, OTR/L

## 2020-12-01 NOTE — GROUP NOTE
Psychotherapy Group Note    PATIENT'S NAME: Betty Tamayo  MRN:   [id number]  :   1990  ACCT. NUMBER: [id number]  DATE OF SERVICE: 20  START TIME: 11:00 AM  END TIME: 11:50 AM  FACILITATOR: Poonam Archer LICSW  TOPIC: MH EBP Group: Specialty Awareness  [facility] Adult Mental Health Day Treatment  TRACK: 5A    NUMBER OF PARTICIPANTS: 7    Summary of Group / Topics Discussed:  Specialty Topics: Life Transitions: The topic of life transitions was presented in order to help patients to better understand the challenges presented by life transitions, and how to best navigate them. Exploring the phases of transition and how one works through them was discussed. Patients were provided with information regarding community resources.     Patient Session Goals / Objectives:    Discussed the timing and nature of major life transitions    Explored how life transitions may impact mental health and functioning    Discussed coping strategies to manage symptoms and help with transitioning    Discussed and planned a successful transition  Telemedicine Visit: The patient's condition can be safely assessed and treated via synchronous audio and visual telemedicine encounter.      Reason for Telemedicine Visit: Services only offered telehealth    Originating Site (Patient Location): Patient's home    Distant Site (Provider Location): Provider Remote Setting    Consent:  The patient/guardian has verbally consented to: the potential risks and benefits of telemedicine (video visit) versus in person care; bill my insurance or make self-payment for services provided; and responsibility for payment of non-covered services.     Mode of Communication:  Video Conference via [employer]    As the provider I attest to compliance with applicable laws and regulations related to telemedicine.       Patient Participation / Response:  Fully participated with the group by sharing personal reflections / insights and  openly received / provided feedback with other participants.    Identified / Expressed readiness to act on skill suggestions discussed in topic    Treatment Plan:  Patient has a current master individualized treatment plan.  See Epic treatment plan for more information.    Poonam Archer, [facility]

## 2020-12-01 NOTE — GROUP NOTE
Process Group Note    PATIENT'S NAME: Betty Tamayo  MRN:   [id number]  :   1990  ACCT. NUMBER: [id number]  DATE OF SERVICE: 20  START TIME:  9:00 AM  END TIME:  9:50 AM  FACILITATOR: Poonam Archer LICSW  TOPIC:  Process Group    Diagnoses:  296.32 (F33.1) Major Depressive Disorder, Recurrent Episode, Moderate _ and With anxious distress  300.01 (F41.0) Panic Disorder  309.81 (F43.10) Posttraumatic Stress Disorder (includes Posttraumatic Stress Disorder for Children 6 Years and Younger)  Without dissociative symptoms.      [facility] Adult Mental Health Day Treatment  TRACK: 5A    NUMBER OF PARTICIPANTS: 7    Telemedicine Visit: The patient's condition can be safely assessed and treated via synchronous audio and visual telemedicine encounter.      Reason for Telemedicine Visit: Services only offered telehealth    Originating Site (Patient Location): Patient's home    Distant Site (Provider Location): Provider Remote Setting    Consent:  The patient/guardian has verbally consented to: the potential risks and benefits of telemedicine (video visit) versus in person care; bill my insurance or make self-payment for services provided; and responsibility for payment of non-covered services.     Mode of Communication:  Video Conference via [employer]    As the provider I attest to compliance with applicable laws and regulations related to telemedicine.           Data:    Session content: At the start of this group, patients were invited to check in by identifying themselves, describing their current emotional status, and identifying issues to address in this group.   Area(s) of treatment focus addressed in this session included Symptom Management, Personal Safety and Community Resources/Discharge Planning.  Betty reported okay mood after group yesterday and this morning.   She identified this mood as being less depressed.   She stated that she noticed that when she I snot in distress she  has increased worry.  She identified catastrophic thinking about future deaths of family members or challenges to her relationship with her fiance.  Writer reviewed skills to challenge catastrophic thinking.  Client denied suicidal ideation, intent and plan.     Therapeutic Interventions/Treatment Strategies:  Psychotherapist offered support, feedback and validation and reinforced use of skills. Treatment modalities used include Dialectical Behavioral Therapy. Interventions include Cognitive Restructuring:  Assisted patient in formulating new neutral/positive alternatives to challenge less helpful / ineffective thoughts.    Assessment:    Patient response:   Patient responded to session by being attentive and accepting support    Possible barriers to participation / learning include: and no barriers identified    Health Issues:   None reported       Substance Use Review:   Substance Use: No active concerns identified.    Mental Status/Behavioral Observations  Appearance:   Appropriate   Eye Contact:   Good   Psychomotor Behavior: Normal   Attitude:   Cooperative   Orientation:   All  Speech   Rate / Production: Normal    Volume:  Normal   Mood:    Anxious   Affect:    Appropriate   Thought Content:   Rumination  Thought Form:  Coherent  Logical     Insight:    Good     Plan:     Safety Plan: No current safety concerns identified.  Recommended that patient call 911 or go to the local ED should there be a change in any of these risk factors.     Barriers to treatment: None identified    Patient Contracts (see media tab):  None    Substance Use: Not addressed in session     Continue or Discharge: Patient will continue in Adult Day Treatment (ADT)  as planned. Patient is likely to benefit from learning and using skills as they work toward the goals identified in their treatment plan.      Poonam Archer, [facility]  December 1, 2020

## 2020-12-03 ENCOUNTER — HOSPITAL ENCOUNTER (OUTPATIENT)
Dept: BEHAVIORAL HEALTH | Facility: CLINIC | Age: 30
End: 2020-12-03
Attending: PSYCHIATRY & NEUROLOGY
Payer: COMMERCIAL

## 2020-12-03 PROCEDURE — 90853 GROUP PSYCHOTHERAPY: CPT | Mod: 95 | Performed by: SOCIAL WORKER

## 2020-12-03 PROCEDURE — 90853 GROUP PSYCHOTHERAPY: CPT | Mod: GT | Performed by: SOCIAL WORKER

## 2020-12-03 NOTE — ADDENDUM NOTE
Encounter addended by: Poonam Archer [facility] on: 12/3/2020 4:01 PM   Actions taken: Flowsheet accepted

## 2020-12-03 NOTE — GROUP NOTE
Psychotherapy Group Note    PATIENT'S NAME: Betty Tamayo  MRN:   [id number]  :   1990  ACCT. NUMBER: [id number]  DATE OF SERVICE: 20  START TIME: 11:00 AM  END TIME: 11:50 AM  FACILITATOR: Poonam Ruffin LICSW  TOPIC: MH EBP Group: Enhanced Mindfulness  [facility] Adult Dual Diagnosis Day Treatment  TRACK: 5A    NUMBER OF PARTICIPANTS: 6    Summary of Group / Topics Discussed:  Enhanced Mindfulness: Body and Mind Integration: Patients received an overview and education regarding the importance of including the body in the management of emotional health and self-care and as a direct route to mindfulness practice.  Patients discussed various ways in which the body can serve as an informant to their physical and emotional experiences/need. Patients discussed the body as a direct link to the present moment and to mindfulness practice.  Patients discussed current relationship with body, self-awareness, mindfulness practice and barriers to connection with body.  Patients were guided through breath work and movement to facilitate greater self-awareness, grounding, self-expression, and connection to other.  Patients discussed how the experiential could be applied to better manage mental health and develop clark connection to self.    Patient Session Goals / Objectives:    Identify how movement awareness could be used for grounding, stress management, self-expression, connection to other and self-regulation    Improved awareness of breath and movement preferences    Identify how movement and the body is used in mindfulness practice    Reflect on use of these practices in everyday life.    Identify barriers to attending to body    Telemedicine Visit: The patient's condition can be safely assessed and treated via synchronous audio and visual telemedicine encounter.          Reason for Telemedicine Visit: Services only offered telehealth and covid19        Originating Site (Patient Location): Patient's  home        Distant Site (Provider Location): Provider Remote Setting        Consent:  The patient/guardian has verbally consented to: the potential risks and benefits of telemedicine (video visit) versus in person care; bill my insurance or make self-payment for services provided; and responsibility for payment of non-covered services.         Mode of Communication:  Video Conference via [employer]        As the provider I attest to compliance with applicable laws and regulations related to telemedicine.         Patient Participation / Response:  Fully participated with the group by sharing personal reflections / insights and openly received / provided feedback with other participants.    Demonstrated understanding of topics discussed through group discussion and participation and Practiced skills in session    Treatment Plan:  Patient has a current master individualized treatment plan.  See Epic treatment plan for more information.    LINDSAY SaucedaSW

## 2020-12-03 NOTE — GROUP NOTE
Psychotherapy Group Note    PATIENT'S NAME: Betty Tamayo  MRN:   [id number]  :   1990  ACCT. NUMBER: [id number]  DATE OF SERVICE: 20  START TIME: 10:00 AM  END TIME: 10:50 AM  FACILITATOR: Poonam Archer LICSW  TOPIC: MH EBP Group: Emotions Management  [facility] Adult Mental Health Day Treatment  TRACK: 5A    NUMBER OF PARTICIPANTS: 7    Summary of Group / Topics Discussed:  Emotions Management: Check Facts: Patients participated in an interactive approach to identifying and challenging cognitive distortions that arise following an event that triggers intense emotion.  Patients choose an emotional reaction/event to work on.  The group shared their experiences and thought processes for feedback.      Patient Session Goals / Objectives:    Learn the process of identifying thoughts associated with the situation and reaction    Learn to challenge cognitive distortions and reframe the situation/event/reaction     Distinguish between facts, feelings, thoughts    Gain understanding of how to interpret an emotional reaction    Practice identification of cognitive distortions and evaluating an emotionally charged situation more rationally/objectively/mindfully  Telemedicine Visit: The patient's condition can be safely assessed and treated via synchronous audio and visual telemedicine encounter.      Reason for Telemedicine Visit: Services only offered telehealth    Originating Site (Patient Location): Patient's home    Distant Site (Provider Location): Provider Remote Setting    Consent:  The patient/guardian has verbally consented to: the potential risks and benefits of telemedicine (video visit) versus in person care; bill my insurance or make self-payment for services provided; and responsibility for payment of non-covered services.     Mode of Communication:  Video Conference via [employer]    As the provider I attest to compliance with applicable laws and regulations related to  telemedicine.       Patient Participation / Response:  Fully participated with the group by sharing personal reflections / insights and openly received / provided feedback with other participants.    Demonstrated knowledge of when to consider applying a variety of emotions management skills in daily life    Treatment Plan:  Patient has a current master individualized treatment plan.  See Epic treatment plan for more information.    Poonam Archer, [facility]

## 2020-12-07 ENCOUNTER — HOSPITAL ENCOUNTER (OUTPATIENT)
Dept: BEHAVIORAL HEALTH | Facility: CLINIC | Age: 30
End: 2020-12-07
Attending: PSYCHIATRY & NEUROLOGY
Payer: COMMERCIAL

## 2020-12-07 PROCEDURE — G0177 OPPS/PHP; TRAIN & EDUC SERV: HCPCS | Mod: GT

## 2020-12-07 PROCEDURE — 90853 GROUP PSYCHOTHERAPY: CPT | Mod: GT

## 2020-12-07 NOTE — GROUP NOTE
Process Group Note    PATIENT'S NAME: Betty Tamayo  MRN:   [id number]  :   1990  ACCT. NUMBER: [id number]  DATE OF SERVICE: 20  START TIME:  9:00 AM  END TIME:  9:50 AM  FACILITATOR: Roma Reyes LICSW  TOPIC:  Process Group    Diagnoses:  296.32 (F33.1) Major Depressive Disorder, Recurrent Episode, Moderate _ and With anxious distress  300.01 (F41.0) Panic Disorder  309.81 (F43.10) Posttraumatic Stress Disorder (includes Posttraumatic Stress Disorder for Children 6 Years and Younger)  Without dissociative symptoms.      [facility] Adult Mental Health Day Treatment  TRACK: 5A    Telemedicine Visit: The patient's condition can be safely assessed and treated via synchronous audio and visual telemedicine encounter.      Reason for Telemedicine Visit: Services only offered telehealth    Originating Site (Patient Location): Patient's home    Distant Site (Provider Location): [facility]: [facility] [place]    Consent:  The patient/guardian has verbally consented to: the potential risks and benefits of telemedicine (video visit) versus in person care; bill my insurance or make self-payment for services provided; and responsibility for payment of non-covered services.     Mode of Communication:  Video Conference via [employer]    As the provider I attest to compliance with applicable laws and regulations related to telemedicine.      NUMBER OF PARTICIPANTS: 7          Data:    Session content: At the start of this group, patients were invited to check in by identifying themselves, describing their current emotional status, and identifying issues to address in this group.   Area(s) of treatment focus addressed in this session included Symptom Management and Personal Safety.  Pt reports that she feels depressed and irritable today, but does not want to take time. She assures the group that she is safe.     Therapeutic Interventions/Treatment Strategies:  Psychotherapist offered support,  feedback and validation and reinforced use of skills.    Assessment:    Patient response:   Patient responded to session by listening and appearing disengaged    Possible barriers to participation / learning include: and no barriers identified    Health Issues:   None reported       Substance Use Review:   Substance Use: No active concerns identified.    Mental Status/Behavioral Observations  Appearance:   Appropriate   Eye Contact:   Good   Psychomotor Behavior: Normal   Attitude:   Cooperative  Evasive  Orientation:   All  Speech   Rate / Production: Normal    Volume:  Normal   Mood:    Anxious  Depressed  Irritable   Affect:    Worrisome   Thought Content:   Clear  Thought Form:  Coherent  Logical     Insight:    Fair     Plan:     Safety Plan: No current safety concerns identified.  Recommended that patient call 911 or go to the local ED should there be a change in any of these risk factors.     Barriers to treatment: None identified    Patient Contracts (see media tab):  None    Substance Use: Not addressed in session     Continue or Discharge: Patient will continue in Adult Day Treatment (ADT)  as planned. Patient is likely to benefit from learning and using skills as they work toward the goals identified in their treatment plan.      Roma Hahn, [facility]  December 7, 2020

## 2020-12-07 NOTE — GROUP NOTE
Psychoeducation Group Note    PATIENT'S NAME: Betty Tamayo  MRN:   [id number]  :   1990  ACCT. NUMBER: [id number]  DATE OF SERVICE: 20  START TIME: 10:00 AM  END TIME: 10:50 AM  FACILITATOR: Jose Lui OTR/L  TOPIC: MH Life Skills Group: Resiliency Development  Telemedicine Visit: The patient's condition can be safely assessed and treated via synchronous audio and visual telemedicine encounter.      Reason for Telemedicine Visit:  COVID-19    Originating Site (Patient Location): Patient's home    Distant Site (Provider Location): [facility]: [facility]    Consent:  The patient/guardian has verbally consented to: the potential risks and benefits of telemedicine (video visit) versus in person care; bill my insurance or make self-payment for services provided; and responsibility for payment of non-covered services.     Mode of Communication:  Video Conference via [employer]    As the provider I attest to compliance with applicable laws and regulations related to telemedicine.   [facility] Adult Mental Health Day Treatment  TRACK: 5A    NUMBER OF PARTICIPANTS: 6    Summary of Group / Topics Discussed:  Resiliency Development:  Resources to Mertens with Stress: Patients were taught how to identify stressors, signs of stress, coping skills, and prevention strategies for overall stress management.  Patients were given the opportunity to identify both ongoing and acute mental health symptoms and how to effectively manage these symptoms by developing an effective aftercare plan.  Patients increased awareness of community based resources.    Patient Session Goals / Objectives:    Identified how using coping skills can be used for symptom and stress management       Improved awareness of individualed symptoms and stressors and how to effectively cope     Established a relapse prevention plan to practice these skills in their own environments    Practiced and reflected on how  to generalize taught skills to their everyday life          Patient Participation / Response:  Minimally participated, only when prompted / asked.    Patient presentation: Irritable ,distressed mood. Unable to effectively participate today. and Patient would benefit from additional opportunities to practice the content to be able to generalize it to their everyday life with increased intentionality, consistency, and efficacy in support of their psychiatric recovery    Treatment Plan:  Patient has a current master individualized treatment plan.  See Epic treatment plan for more information.    Jose Lui, OTR/L

## 2020-12-08 ENCOUNTER — HOSPITAL ENCOUNTER (OUTPATIENT)
Dept: BEHAVIORAL HEALTH | Facility: CLINIC | Age: 30
End: 2020-12-08
Attending: PSYCHIATRY & NEUROLOGY
Payer: COMMERCIAL

## 2020-12-08 PROCEDURE — G0177 OPPS/PHP; TRAIN & EDUC SERV: HCPCS | Mod: 95

## 2020-12-08 PROCEDURE — 90853 GROUP PSYCHOTHERAPY: CPT | Mod: 95

## 2020-12-08 NOTE — GROUP NOTE
Psychotherapy Group Note    PATIENT'S NAME: Betty Tamayo  MRN:   [id number]  :   1990  ACCT. NUMBER: [id number]  DATE OF SERVICE: 20  START TIME: 11:00 AM  END TIME: 11:25 AM  FACILITATOR: Roma Reyes LICSW  TOPIC: MH EBP Group: Specialty Awareness  [facility] Adult Mental Health Day Treatment  TRACK: 5A    Telemedicine Visit: The patient's condition can be safely assessed and treated via synchronous audio and visual telemedicine encounter.      Reason for Telemedicine Visit: Services only offered telehealth    Originating Site (Patient Location): Patient's home    Distant Site (Provider Location): [facility]: [facility]    Consent:  The patient/guardian has verbally consented to: the potential risks and benefits of telemedicine (video visit) versus in person care; bill my insurance or make self-payment for services provided; and responsibility for payment of non-covered services.     Mode of Communication:  Video Conference via [employer]    As the provider I attest to compliance with applicable laws and regulations related to telemedicine.      NUMBER OF PARTICIPANTS: 6    Summary of Group / Topics Discussed:  Specialty Topics: Hope: The topic of hope was presented in order to help patients better understand the symptoms of hopelessness and how to become more hopeful. Patients discussed their current awareness of the topic and relevance to their functioning. Individual experiences with symptoms and treatment options were also discussed. Patients explored options for ongoing/future treatment and symptom management.      Patient Session Goals / Objectives:    Discussed definition of hopelessness    Discussed how hopelessness impacts functioning    Set a plan to utilize skills to reduce hopelessness        Patient Participation / Response:  Minimally participated, only when prompted / asked.    Demonstrated understanding of topics discussed through group discussion and  participation    Treatment Plan:  Patient has a current master individualized treatment plan.  See Epic treatment plan for more information.    LINDSAY RudolphSW

## 2020-12-08 NOTE — GROUP NOTE
"Process Group Note    PATIENT'S NAME: Betty Tamayo  MRN:   [id number]  :   1990  ACCT. NUMBER: [id number]  DATE OF SERVICE: 20  START TIME:  9:00 AM  END TIME:  9:50 AM  FACILITATOR: Roma Reyes LICSW  TOPIC:  Process Group    Diagnoses:  296.32 (F33.1) Major Depressive Disorder, Recurrent Episode, Moderate _ and With anxious distress  300.01 (F41.0) Panic Disorder  309.81 (F43.10) Posttraumatic Stress Disorder (includes Posttraumatic Stress Disorder for Children 6 Years and Younger)  Without dissociative symptoms.      [facility] Adult Mental Health Day Treatment  TRACK: 5A    Telemedicine Visit: The patient's condition can be safely assessed and treated via synchronous audio and visual telemedicine encounter.      Reason for Telemedicine Visit: Services only offered telehealth    Originating Site (Patient Location): Patient's home    Distant Site (Provider Location): [facility]: [facility] [place]    Consent:  The patient/guardian has verbally consented to: the potential risks and benefits of telemedicine (video visit) versus in person care; bill my insurance or make self-payment for services provided; and responsibility for payment of non-covered services.     Mode of Communication:  Video Conference via [employer]    As the provider I attest to compliance with applicable laws and regulations related to telemedicine.      NUMBER OF PARTICIPANTS: 6          Data:    Session content: At the start of this group, patients were invited to check in by identifying themselves, describing their current emotional status, and identifying issues to address in this group.   Area(s) of treatment focus addressed in this session included Symptom Management and Personal Safety.  Pt initially says that she doesn't want to take time, but then becomes tearful, reports increased worry and anxiety. Pt describes a difficult day yesterday saying she \"crashed and became angry after one good week\". " "Pt fears that she is not getting better and states \"I don't want to mess things up\". We discussed the recovery process and ways to practice self care. She made a plan to paint this evening. Reports feeling somewhat better after talking. Denies safety concerns.    Therapeutic Interventions/Treatment Strategies:  Psychotherapist offered support, feedback and validation and reinforced use of skills. Treatment modalities used include Cognitive Behavioral Therapy. Interventions include Coping Skills: Discussed use of self-soothe skills to decrease distress in the body and Symptoms Management: Promoted understanding of their diagnoses and how it impacts their functioning.    Assessment:    Patient response:   Patient responded to session by accepting feedback, listening, focusing on goals, being attentive and accepting support    Possible barriers to participation / learning include: and no barriers identified    Health Issues:   None reported       Substance Use Review:   Substance Use: No active concerns identified.    Mental Status/Behavioral Observations  Appearance:   Appropriate   Eye Contact:   Good   Psychomotor Behavior: Agitated   Attitude:   Cooperative   Orientation:   All  Speech   Rate / Production: Normal    Volume:  Normal   Mood:    Anxious  Depressed  Fearful  Affect:    Tearful Worrisome   Thought Content:   Rumination  Thought Form:  Coherent  Logical  Obsessive     Insight:    Good     Plan:     Safety Plan: No current safety concerns identified.  Recommended that patient call 911 or go to the local ED should there be a change in any of these risk factors.     Barriers to treatment: None identified    Patient Contracts (see media tab):  None    Substance Use: Not addressed in session     Continue or Discharge: Patient will continue in Adult Day Treatment (ADT)  as planned. Patient is likely to benefit from learning and using skills as they work toward the goals identified in their treatment " plan.      Roma Hahn, [facility]  December 8, 2020

## 2020-12-08 NOTE — GROUP NOTE
Psychoeducation Group Note    PATIENT'S NAME: Betty Tamayo  MRN:   [id number]  :   1990  ACCT. NUMBER: [id number]  DATE OF SERVICE: 20  START TIME: 10:00 AM  END TIME: 10:50 AM  FACILITATOR: Jose Lui OTR/L  TOPIC: MH Life Skills Group: Lifestyle Balance and Structure  Telemedicine Visit: The patient's condition can be safely assessed and treated via synchronous audio and visual telemedicine encounter.      Reason for Telemedicine Visit: COVID-19    Originating Site (Patient Location): Patient's home    Distant Site (Provider Location): [facility]: [facility]    Consent:  The patient/guardian has verbally consented to: the potential risks and benefits of telemedicine (video visit) versus in person care; bill my insurance or make self-payment for services provided; and responsibility for payment of non-covered services.     Mode of Communication:  Video Conference via E[employer]    As the provider I attest to compliance with applicable laws and regulations related to telemedicine.   [facility] Adult Mental Health Day Treatment  TRACK: 5A    NUMBER OF PARTICIPANTS: 6    Summary of Group / Topics Discussed:  Lifestyle Balance and Strucure: Personal Recovery Outcome Measure: Patients were assisted to identify meaningful roles that they want to promote and the impact their mental health symptoms have on this.  Patients learned, applied, and generalized skills needed to live and participate in meaningful roles as effectively and independently as possible.  Patients developed awareness of strengths and challenges in fulfilling these roles and worked on integrating specific and individualized rituals and habits into their daily life.     Patient Session Goals / Objectives:    Improved awareness and engagement in life s meaningful roles, routines, habits, and rituals    Explored and identified current roles and challenges due to mental health symptoms     Identified  ways to establish and integrate daily self care and wellness routines and habits to support mental health recovery    Practiced and reflected on how to generalize taught skills to their everyday life      Patient Participation / Response:  Fully participated with the group by sharing personal reflections / insights and openly received / provided feedback with other participants.    Demonstrated understanding of content through handout/group discussion , Verbalized understanding of content and Patient would benefit from additional opportunities to practice the content to be able to generalize it to their everyday life with increased intentionality, consistency, and efficacy in support of their psychiatric recovery    Treatment Plan:  Patient has a current master individualized treatment plan.  See Epic treatment plan for more information.    Jose Lui, OTR/L

## 2020-12-10 ENCOUNTER — HOSPITAL ENCOUNTER (OUTPATIENT)
Dept: BEHAVIORAL HEALTH | Facility: CLINIC | Age: 30
End: 2020-12-10
Attending: PSYCHIATRY & NEUROLOGY
Payer: COMMERCIAL

## 2020-12-10 PROCEDURE — 90853 GROUP PSYCHOTHERAPY: CPT | Mod: GT | Performed by: SOCIAL WORKER

## 2020-12-10 NOTE — GROUP NOTE
Psychotherapy Group Note    PATIENT'S NAME: Betty Tamayo  MRN:   [id number]  :   1990  ACCT. NUMBER: [id number]  DATE OF SERVICE: 12/10/20  START TIME: 11:00 AM  END TIME: 11:50 AM  FACILITATOR: Poonam Ruffin LICSW  TOPIC:  EBP Group: Enhanced Mindfulness  [facility] Adult Mental Health Day Treatment  TRACK: 5A    NUMBER OF PARTICIPANTS: 4    Summary of Group / Topics Discussed:  Enhanced Mindfulness: Body and Mind Integration: Patients received an overview and education regarding the importance of including the body in the management of emotional health and self-care and as a direct route to mindfulness practice.  Patients discussed various ways in which the body can serve as an informant to their physical and emotional experiences/need. Patients discussed the body as a direct link to the present moment and to mindfulness practice.  Patients discussed current relationship with body, self-awareness, mindfulness practice and barriers to connection with body.  Patients were guided through breath work and movement to facilitate greater self-awareness, grounding, self-expression, and connection to other.  Patients discussed how the experiential could be applied to better manage mental health and develop clark connection to self.    Patient Session Goals / Objectives:    Identify how movement awareness could be used for grounding, stress management, self-expression, connection to other and self-regulation    Improved awareness of breath and movement preferences    Identify how movement and the body is used in mindfulness practice    Reflect on use of these practices in everyday life.    Identify barriers to attending to body    Telemedicine Visit: The patient's condition can be safely assessed and treated via synchronous audio and visual telemedicine encounter.          Reason for Telemedicine Visit: Services only offered telehealth and covid19        Originating Site (Patient Location): Patient's  home        Distant Site (Provider Location): Provider Remote Setting        Consent:  The patient/guardian has verbally consented to: the potential risks and benefits of telemedicine (video visit) versus in person care; bill my insurance or make self-payment for services provided; and responsibility for payment of non-covered services.         Mode of Communication:  Video Conference via SYLOB        As the provider I attest to compliance with applicable laws and regulations related to telemedicine.         Patient Participation / Response:  Fully participated with the group by sharing personal reflections / insights and openly received / provided feedback with other participants.    Demonstrated understanding of topics discussed through group discussion and participation and Practiced skills in session    Treatment Plan:  Patient has a current master individualized treatment plan.  See Epic treatment plan for more information.    LINDSAY SaucedaSW

## 2020-12-10 NOTE — GROUP NOTE
Psychotherapy Group Note    PATIENT'S NAME: Betty Tamayo  MRN:   [id number]  :   1990  ACCT. NUMBER: [id number]  DATE OF SERVICE: 12/10/20  START TIME: 10:00 AM  END TIME: 10:50 AM  FACILITATOR: Poonam Archer LICSW  TOPIC: MH EBP Group: Emotions Management  [facility] Adult Mental Health Day Treatment  TRACK: 5A    NUMBER OF PARTICIPANTS: 4    Summary of Group / Topics Discussed:  Emotions Management: Mood Tracking: Patients discussed and reviewed different resources to track one s mood, with a goal of identifying patterns and correlations between different factors and mood state.  Patients discussed ways to increase awareness of one s mood and how it may be impacted by environmental factors, diet, activity level, medication, etc. The group shared their experiences and thought processes for feedback.      Patient Session Goals / Objectives:    Increase awareness of daily mood patterns/changes    Report out identified factors that impact their mood    Demonstrate understanding of how to use different resources to track mood, and effectively use these to help manage symptoms    Telemedicine Visit: The patient's condition can be safely assessed and treated via synchronous audio and visual telemedicine encounter.      Reason for Telemedicine Visit: Services only offered telehealth    Originating Site (Patient Location): Patient's home    Distant Site (Provider Location): Provider Remote Setting    Consent:  The patient/guardian has verbally consented to: the potential risks and benefits of telemedicine (video visit) versus in person care; bill my insurance or make self-payment for services provided; and responsibility for payment of non-covered services.     Mode of Communication:  Video Conference via [place]    As the provider I attest to compliance with applicable laws and regulations related to telemedicine.       Patient Participation / Response:  Moderately participated, sharing  some personal reflections / insights and adequately adequately received / provided feedback with other participants.    Identified / Expressed personal readiness to practice new emotions management skills    Treatment Plan:  Patient has a current master individualized treatment plan.  See Epic treatment plan for more information.    Poonam Archer, Dorothea Dix Psychiatric CenterSW

## 2020-12-11 ENCOUNTER — VIRTUAL VISIT (OUTPATIENT)
Dept: BEHAVIORAL HEALTH | Facility: CLINIC | Age: 30
End: 2020-12-11
Payer: COMMERCIAL

## 2020-12-11 DIAGNOSIS — F33.1 MAJOR DEPRESSIVE DISORDER, RECURRENT EPISODE, MODERATE WITH ANXIOUS DISTRESS (H): Primary | ICD-10-CM

## 2020-12-11 PROCEDURE — 90832 PSYTX W PT 30 MINUTES: CPT | Mod: TEL | Performed by: PSYCHOLOGIST

## 2020-12-11 NOTE — PROGRESS NOTES
"[facility]: Integrated Behavioral Health  December 11, 2020      Behavioral Health Clinician Progress Note    Patient Name: Betty Tamayo           Service Type: Virtual visit over [employer]      Service Location:  Virtual appointment      Session Start Time: 10:15  Session End Time: 10:50      Session Length: 16 - 37      Attendees: Client    Visit Activities (Refresh list every visit): [facility] Only    Betty Tamayo is a 30 year old female who is being evaluated via a telephone visit.      The patient has been notified of the following:     \"We have found that certain health care needs can be provided without the need for a face to face visit.  This service lets us provide the care you need with a short phone conversation.      I will have full access to your [facility] medical record during this entire phone call.   I will be taking notes for your medical record.     Since this is like an office visit, we will bill your insurance company for this service.  Please check with your medical insurance if this type of telephone visit/virtual care is covered.  You may be responsible for the cost of this service if insurance coverage is denied.      There are potential benefits and risks of telephone visits (e.g. limits to patient confidentiality) that differ from in-person visits.?  Confidentiality still applies for telephone services, and nobody will record the visit.  It is important to be in a quiet, private space that is free of distractions (including cell phone or other devices) during the visit.??     If during the course of the call I believe a telephone visit is not appropriate, you will not be charged for this service\"    Consent has been obtained for this service by care team member: yes.      Diagnostic Assessment Date: 3/9/2020  Treatment Plan Review Date: 1/20/2021  See Flowsheets for today's PHQ-9 and NIXON-7 results  Previous PHQ-9:   PHQ-9 SCORE 11/12/2020 11/12/2020 " 11/23/2020   PHQ-9 Total Score MyChart - 23 (Severe depression) -   PHQ-9 Total Score 23 23 19   Some encounter information is confidential and restricted. Go to Review Flowsheets activity to see all data.     Previous NIXON-7:   NIXON-7 SCORE 11/12/2020 11/12/2020 11/23/2020   Total Score - 21 (severe anxiety) -   Total Score 21 21 21   Some encounter information is confidential and restricted. Go to Review Flowsheets activity to see all data.       BRAD LEVEL:  No flowsheet data found.    DATA  Extended Session (60+ minutes): No  Interactive Complexity: No  Crisis: No    Treatment Objective(s) Addressed in This Session:  Target Behavior(s): disease management/lifestyle changes      Review concerns of possible manic episode.     Current Stressors / Issues:  Completed a [facility] follow-up with Betty today on the phone; she initially forgot the appointment but was open to talking on the phone. Betty stated she has had highs and lows over the last few weeks. She presented with a significant concern about possibly being manic, as she reported her group counselor in day treatment expressed concern about potential sophie. When asked why, Betty reported her group counselor had expressed concern about her decreased need for sleep. Betty reported that she has been feeling much happier lately, and had attributed this to her medication, but feels scared/down that her positive mood might reflect another problem.     The majority of our session consisted of reviewing information about bipolar disorder and her presenting symptoms. My goal was to evaluate if her presentation is consistent with a manic episode. As we discussed, Betty endorsed the following symptoms: decreased need for sleep (she notes being up late at night doing various things), cleaning around the house much more (this is a marked change for her, per her report), appearing more distractible (feedback from her fiance, she says), and being more impulsive lately, especially  with shopping online. Betty indicated she has purchased more items she becomes suddenly interested in online, but then disregards the items when they arrive or moves on to something else.     Based on my own observations, her speech also sounded more rapid/pressured than usual while we talked on the phone. The time duration of her symptoms also appeared consistent with a manic episode, as they began about two weeks ago and have been persistent since then.     Betty became tearful after we reviewed these symptoms, noting she believes her recent experiences are consistent with sophie. I explained that bipolar disorder is treated differently than other mental health diagnoses, especially with medications, so I shared with her that I would like to consult with psychiatry before assigning this diagnosis, as there are limitations to diagnostic clarification over the phone. I explained I would reach out to Jordan Boyd MD for consult. Supportive counseling was also provided.    Betty is scheduled with a new [facility] therapist in January and is currently participating in day treatment. She agreed to continue meeting with me until she establishes with her new counselor. I encouraged continued participation in day treatment for now too.     She denied suicidal ideation or self-harm behavior (these can be risk factors for manic episodes). Also her behavior did not seem to warrant needing more immediate medical care, as can occur with sophie. I would like her to consult with psychiatry for further review of her symptoms and potential medication options.     Progress on Treatment Objective(s) / Homework:  Minimal progress - ACTION (Actively working towards change); Intervened by reinforcing change plan / affirming steps taken    Provided information about behavioral condition and available treatment options    Supportive counseling    Care Plan review completed: Yes    Medication Review:  No changes to current psychiatric  medication(s)    Medication Compliance:  Yes    Changes in Health Issues:   None reported    Chemical Use Review:   Substance Use: Chemical use reviewed, no active concerns identified      Tobacco Use: No current tobacco use.      Assessment: Current Emotional / Mental Status (status of significant symptoms):  Risk status (Self / Other harm or suicidal ideation)  Patient has had a history of suicidal ideation: passive thoughts recurrent, suicide attempts: 1 and self-injurious behavior: cutting behavior as a youth     Patient denies current fears or concerns for personal safety.  Patient denies current or recent suicidal ideation or behaviors.  Patient denies current or recent homicidal ideation or behaviors.  Patient denies current or recent self injurious behavior or ideation.  Patient denies other safety concerns.     A safety and risk management plan has not been developed at this time, however patient was encouraged to call [place] [phone] should there be a change in any of these risk factors.    Williams Suicide Severity Rating Scale (Lifetime/Recent)  Williams Suicide Severity Rating (Lifetime/Recent) 10/20/2020   1. Wish to be Dead (Lifetime) Yes   1. Wish to be Dead (Recent) Yes   2. Non-Specific Active Suicidal Thoughts (Lifetime) Yes   2. Non-Specific Active Suicidal Thoughts (Recent) No   3. Active Suicidal Ideation with any Methods (Not Plan) Without Intent to Act (Lifetime) Yes   3. Active Sucidal Ideation with any Methods (Not Plan) Without Intent to Act (Recent) No   4. Active Suicidal Ideation with Some Intent to Act, Without Specific Plan (Lifetime) Yes   4. Active Suicidal Ideation with Some Intent to Act, Without Specific Plan (Recent) No   5. Active Suicidal Ideation with Specific Plan and Intent (Lifetime) No   5. Active Suicidal Ideation with Specific Plan and Intent (Recent) No   Most Severe Ideation Rating (Lifetime) 3   Frequency (Lifetime) 3   Duration (Lifetime) 2   Controllability  (Lifetime) 3   Protective Factors  (Lifetime) 2   Reasons for Ideation (Lifetime) 2   Most Severe Ideation Rating (Past Month) 3   Frequency (Past Month) 3   Duration (Past Month) 2   Controllability (Past Month) 3   Protective Factors (Past Month) 2   Reasons for Ideation (Past Month) 2   Actual Attempt (Lifetime) Yes   Actual Attempt Description (Lifetime) Had grabbed a knife and was threatening to kill herself   Total Number of Actual Attempts (Lifetime) 1   Actual Attempt (Past 3 Months) Yes   Actual Attempt Description (Past 3 Months) same as above   Total Number of Actual Attempts (Past 3 Months) 1   Has subject engaged in non-suicidal self-injurious behavior? (Lifetime) Yes   Has subject engaged in non-suicidal self-injurious behavior? (Past 3 Months) No   Interrupted Attempts (Lifetime) No   Interrupted Attempts (Past 3 Months) No   Aborted or Self-Interrupted Attempt (Lifetime) Yes   Aborted or Self Interrupted Attempt Description (Lifetime) put the knife away   Total Number Aborted or Self Interrupted Attempts (Lifetime) 1   Aborted or Self-Interrupted Attempt (Past 3 Months) Yes   Preparatory Acts or Behavior (Lifetime) No   Preparatory Acts or Behavior (Past 3 Months) No   Most Recent Attempt Actual Lethality Code 0   Most Recent Attempt Potential Lethality Code 1   Most Lethal Attempt Actual Lethality Code 0   Most Lethal Attemplt Potential Lethality Code 1   Initial/First Attempt Actual Lethality Code 0   Initial/First Attempt Potential Lethality Code 1       Appearance:   Appropriate   Eye Contact:   Good   Psychomotor Behavior: Restless   Attitude:   Guarded   Orientation:   All  Speech   Rate / Production: Normal    Volume:  Soft   Mood:    Anxious  Depressed   Affect:    Appropriate   Thought Content:  Clear   Thought Form:  Coherent  Logical   Insight:    Good     Diagnoses:  1. Major depressive disorder, recurrent episode, moderate with anxious distress (H)      Provisional diagnosis: Bipolar I  Disorder    Collateral Reports Completed:  Not Applicable    Plan: (Homework, other):  Betty was scheduled to return to [facility] counseling in two weeks. Will consult with psychiatry. She was also given information about mental health symptoms and treatment options .  CD Recommendations: No indications of CD issues.     Salvatore Pettit Psy.D, LP     ______________________________________________________________________    [facility] Integrated Behavioral Health Treatment Plan    Client's Name: Betty Tamayo  YOB: 1990    Date: 10/20/2020    DSM-V Diagnoses: 309.81 (F43.10) Posttraumatic Stress Disorder (includes Posttraumatic Stress Disorder for Children 6 Years and Younger)  Without dissociative symptoms; 296.33 (F33.2) Major Depressive Disorder, Recurrent Episode, Severe _  Psychosocial / Contextual Factors: NA    Clinical Global Impressions  First:  Considering your total clinical experience with this particular patient population, how severe are the patient's symptoms at this time?: 5 (10/23/2020  8:13 AM)      Most recent:  Compared to the patient's condition at the START of treatment, this patient's condition is: 4 (10/23/2020  8:13 AM)        Referral / Collaboration:  Will collaborate with care team as indicated during treatment.    Anticipated number of session or this episode of care: 4-5 until established with day treatment      MeasurableTreatment Goal(s) related to diagnosis / functional impairment(s)  Goal 1:  Patient will experience a reduction in depressive and anxious symptoms, along with a corresponding increase in positive emotion and life satisfaction.    Objective #A: Patient will experience a reduction in depressed mood, will develop more effective coping skills to manage depressive symptoms, will develop healthy cognitive patterns and beliefs, will increase ability to function adaptively and will continue to take medications as prescribed / participate in supportive activities and services     Status: Continued - Date(s): 10/20/2020    Objective #B: Patient will experience a reduction in anxiety, will develop more effective coping skills to manage anxiety symptoms, will develop healthy cognitive patterns and beliefs and will increase ability to function adaptively  Status: Continued - Date(s):10/20/2020    Objective #C: Patient will develop better understanding of triggers and coping strategies to stabilize mood   Status: Continued - Date(s): 10/20/2020    Goal 2:  Patient will identify and increase engagement in valued activity, i.e. improving social connections/relationships, pursuing occupational goals or personally meaningful pursuits, exploration of meaning in life.     Objective #A: Patient will identify meaningful activity in social, occupational and  personal goals, and increase behavioral activation around these goals   Status: Continued - Date(s): 10/20/2020    Objective #B: Patient will address relationship difficulties in a more adaptive manner  Status: Continued - Date(s): 10/20/2020    Objective #C: Patient will develop coping/problem-solving skills to facilitate more adaptive adjustment and will effectively address problems that interfere with adaptive functioning  Status: Continued - Date(s):10/20/2020    Goal 3:  Patient will increase understanding of post-traumatic stress    Objective #A: Patient will develop strategies for more effective management of risk issues   Status: Continued - Date(s): 10/20/2020    Objective #B: Patient Will better understand triggers/factors for risk concerns  Status: Continued - Date(s): 10/20/2020    Objective #C: Patient will address relationship difficulties in a more adaptive manner  Status: Continued - Date(s): 10/20/2020    Possible Therapeutic Intervention(s)  Psycho-education regarding mental health diagnoses and treatment options    Supportive Counseling    Skills training    Explore skills useful to client in current situation.    Skills include  assertiveness, communication, conflict management, problem-solving, relaxation, etc.    Solution-Focused Therapy    Explore patterns in patient's relationships and discuss options for new behaviors.    Explore patterns in patient's actions and choices and discuss options for new behaviors.    Cognitive-behavioral Therapy    Discuss common cognitive distortions, identify them in patient's life.    Explore ways to challenge, replace, and act against these cognitions.    Acceptance and Commitment Therapy    Explore and identify important values in patient's life.    Discuss ways to commit to behavioral activation around these values.    Psychodynamic psychotherapy    Discuss patient's emotional dynamics and issues and how they impact behaviors.    Explore patient's history of relationships and how they impact present behaviors.    Explore how to work with and make changes in these schemas and patterns.    Narrative Therapy    Explore the patient's story of his/her life from his/her perspective.    Explore alternate ways of understanding their experience, identifying exceptions, developing new themes.    Interpersonal Psychotherapy    Explore patterns in relationships that are effective or ineffective at helping patient reach their goals, find satisfying experience.    Discuss new patterns or behaviors to engage in for improved social functioning.    Behavioral Activation    Discuss steps patient can take to become more involved in meaningful activity.    Identify barriers to these activities and explore possible solutions.    Mindfulness-Based Strategies    Discuss skills based on development and application of mindfulness.    Skills drawn from compassion-focused therapy, dialectical behavior therapy, mindfulness-based stress reduction, mindfulness-based cognitive therapy, etc.      We have developed these goals together during our work to this point. Patient has assisted in the development of these goals and has  agreed to this treatment plan.       Salvatore Pettit, LP  October 20, 2020

## 2020-12-14 ENCOUNTER — HOSPITAL ENCOUNTER (OUTPATIENT)
Dept: BEHAVIORAL HEALTH | Facility: CLINIC | Age: 30
End: 2020-12-14
Attending: PSYCHIATRY & NEUROLOGY
Payer: COMMERCIAL

## 2020-12-14 PROCEDURE — 90853 GROUP PSYCHOTHERAPY: CPT | Mod: 95 | Performed by: SOCIAL WORKER
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
## 2020-12-14 NOTE — GROUP NOTE
Psychotherapy Group Note    PATIENT'S NAME: Betty Tamayo  MRN:   [id number]  :   1990  ACCT. NUMBER: [id number]  DATE OF SERVICE: 20  START TIME: 10:00 AM  END TIME: 10:50 AM  FACILITATOR: Poonam Archer LICSW  TOPIC: MH EBP Group: Relationship Skills  [facility] Adult Mental Health Day Treatment  TRACK: 5A    NUMBER OF PARTICIPANTS: 6    Summary of Group / Topics Discussed:  Relationship Skills: Assertive Communication: Patients were provided with a general overview of assertive communication skills and how practicing assertive communication skills will assist patients in developing healthier and more effective relationships. Patients reviewed their current awareness on ability to practice assertive communication, ways to increase assertive communication, and identified/problem solved barriers to assertive communication.     Patient Session Goals / Objectives:    Identified and discussed patient individual challenges with communication    Presented and practiced effective communication skills in session    Assisted patients in implementing more effective communication skills in their relationships  Telemedicine Visit: The patient's condition can be safely assessed and treated via synchronous audio and visual telemedicine encounter.      Reason for Telemedicine Visit: Services only offered telehealth    Originating Site (Patient Location): Patient's home    Distant Site (Provider Location): Provider Remote Setting    Consent:  The patient/guardian has verbally consented to: the potential risks and benefits of telemedicine (video visit) versus in person care; bill my insurance or make self-payment for services provided; and responsibility for payment of non-covered services.     Mode of Communication:  Video Conference via everyArt    As the provider I attest to compliance with applicable laws and regulations related to telemedicine.       Patient Participation / Response:  Fully  participated with the group by sharing personal reflections / insights and openly received / provided feedback with other participants.    Identified / Expressed personal readiness to incorporate effective communication skills    Treatment Plan:  Patient has a current master individualized treatment plan.  See Epic treatment plan for more information.    Poonam Archer, [facility]

## 2020-12-15 ENCOUNTER — HOSPITAL ENCOUNTER (OUTPATIENT)
Dept: BEHAVIORAL HEALTH | Facility: CLINIC | Age: 30
End: 2020-12-15
Attending: PSYCHIATRY & NEUROLOGY
Payer: COMMERCIAL

## 2020-12-15 PROCEDURE — 90853 GROUP PSYCHOTHERAPY: CPT | Mod: GT | Performed by: SOCIAL WORKER

## 2020-12-15 NOTE — PROGRESS NOTES
Patient Active Problem List   Diagnosis     Encounter for supervision of high risk pregnancy, , [facility] CNM     History of pre-eclampsia in prior pregnancy, currently pregnant     Nausea     UTI in pregnancy, first trimester     Vitamin D deficiency     Maternal varicella, non-immune     Medication exposure during first trimester of pregnancy     Uncomplicated asthma     Snoring     Hypersomnia     Class 3 severe obesity due to excess calories with body mass index (BMI) of 40.0 to 44.9 in adult, unspecified whether serious comorbidity present (H)     PTSD (post-traumatic stress disorder)     Major depressive disorder, recurrent episode, severe (H)     Mood disorder (H)     Generalized anxiety disorder     Major depressive disorder, recurrent episode, moderate with anxious distress (H)       Current Outpatient Medications:      buPROPion (WELLBUTRIN XL) 150 MG 24 hr tablet, 2 tab daily, Disp: 60 tablet, Rfl: 1     hydrOXYzine (ATARAX) 25 MG tablet, Take 1-2 tablets (25-50 mg) by mouth daily as needed for anxiety, Disp: 60 tablet, Rfl: 0     lamoTRIgine (LAMICTAL) 200 MG tablet, Take 1.5 tablets (300 mg) by mouth At Bedtime, Disp: 45 tablet, Rfl: 1     propranolol (INDERAL) 40 MG tablet, Take 1 tablet (40 mg) by mouth 2 times daily, Disp: 60 tablet, Rfl: 0  Psychiatry staffing: case discussed  Diagnosis:    As above, looking for new therapist.  Sleep trouble

## 2020-12-15 NOTE — GROUP NOTE
"Process Group Note    PATIENT'S NAME: Betty Tamayo  MRN:   [id number]  :   1990  ACCT. NUMBER: [id number]  DATE OF SERVICE: 12/15/20  START TIME:  9:00 AM  END TIME:  9:50 AM  FACILITATOR: Poonam Archer LICSW  TOPIC:  Process Group    Diagnoses:  296.32 (F33.1) Major Depressive Disorder, Recurrent Episode, Moderate _ and With anxious distress  300.01 (F41.0) Panic Disorder  309.81 (F43.10) Posttraumatic Stress Disorder (includes Posttraumatic Stress Disorder for Children 6 Years and Younger)  Without dissociative symptoms.      [facility] Mental Health Day Treatment  TRACK: 5A    NUMBER OF PARTICIPANTS: 4    Telemedicine Visit: The patient's condition can be safely assessed and treated via synchronous audio and visual telemedicine encounter.      Reason for Telemedicine Visit: Services only offered telehealth    Originating Site (Patient Location): Patient's home    Distant Site (Provider Location): Provider Remote Setting    Consent:  The patient/guardian has verbally consented to: the potential risks and benefits of telemedicine (video visit) versus in person care; bill my insurance or make self-payment for services provided; and responsibility for payment of non-covered services.     Mode of Communication:  Video Conference via [employer]    As the provider I attest to compliance with applicable laws and regulations related to telemedicine.           Data:    Session content: At the start of this group, patients were invited to check in by identifying themselves, describing their current emotional status, and identifying issues to address in this group.   Area(s) of treatment focus addressed in this session included Symptom Management, Personal Safety and Community Resources/Discharge Planning.  Betty reported feeling \"blah\", exhausted, and having racing thoughts.  She did try to do a guided meditation which helped for a few minutes.  She also has scheduled an individual " therapy appointment, but the soonest she could get an appointment is 1/6/20.  Other places had similar or longer waits.  She plans to help herself by baking [employer] cookies and watching a [place] movie with her kids.  Client denied suicidal ideation, intent and plan.     Therapeutic Interventions/Treatment Strategies:  Psychotherapist offered support, feedback and validation and reinforced use of skills. Treatment modalities used include Cognitive Behavioral Therapy. Interventions include Coping Skills: Assisted patient in understanding the purpose of planning / creating / participating / sharing in positive experiences.    Assessment:    Patient response:   Patient responded to session by giving feedback, listening and focusing on goals    Possible barriers to participation / learning include: and no barriers identified    Health Issues:   Yes: Sleep disturbance, Associated Psychological Distress       Substance Use Review:   Substance Use: No active concerns identified.    Mental Status/Behavioral Observations  Appearance:   Appropriate   Eye Contact:   Good   Psychomotor Behavior: Normal   Attitude:   Cooperative   Orientation:   All  Speech   Rate / Production: Normal    Volume:  Normal   Mood:    Anxious  Depressed   Affect:    Appropriate   Thought Content:   Rumination  Thought Form:  Coherent  Logical     Insight:    Good     Plan:     Safety Plan: No current safety concerns identified.  Recommended that patient call 911 or go to the local ED should there be a change in any of these risk factors.     Barriers to treatment: None identified    Patient Contracts (see media tab):  None    Substance Use: Not addressed in session     Continue or Discharge: Patient will continue in Adult Day Treatment (ADT)  as planned. Patient is likely to benefit from learning and using skills as they work toward the goals identified in their treatment plan.      Poonam Archer, [facility]  December 15, 2020

## 2020-12-18 ENCOUNTER — VIRTUAL VISIT (OUTPATIENT)
Dept: BEHAVIORAL HEALTH | Facility: CLINIC | Age: 30
End: 2020-12-18
Payer: COMMERCIAL

## 2020-12-18 DIAGNOSIS — F43.10 PTSD (POST-TRAUMATIC STRESS DISORDER): ICD-10-CM

## 2020-12-18 DIAGNOSIS — F31.81 BIPOLAR 2 DISORDER (H): Primary | ICD-10-CM

## 2020-12-18 PROCEDURE — 99215 OFFICE O/P EST HI 40 MIN: CPT | Mod: 95 | Performed by: PSYCHIATRY & NEUROLOGY

## 2020-12-18 RX ORDER — PROPRANOLOL HYDROCHLORIDE 40 MG/1
40 TABLET ORAL 2 TIMES DAILY
Qty: 60 TABLET | Refills: 0 | Status: SHIPPED | OUTPATIENT
Start: 2020-12-18 | End: 2021-01-21

## 2020-12-18 RX ORDER — LAMOTRIGINE 200 MG/1
400 TABLET ORAL AT BEDTIME
Qty: 60 TABLET | Refills: 1 | Status: SHIPPED | OUTPATIENT
Start: 2020-12-18 | End: 2021-01-21

## 2020-12-18 RX ORDER — BUPROPION HYDROCHLORIDE 150 MG/1
150 TABLET ORAL EVERY MORNING
Qty: 30 TABLET | Refills: 1 | Status: SHIPPED | OUTPATIENT
Start: 2020-12-18 | End: 2021-01-21

## 2020-12-18 NOTE — NURSING NOTE
"Chief Complaint   Patient presents with     Recheck Medication     PTSD     Would like out of today's visit:  Med check  Karyn Forrester LPN     VIDEO VISIT  Betty Tamayo is a 30 year old patient who is being evaluated via a billable video visit.      The patient has been notified of following:   \"This video visit will be conducted via a call between you and your physician/provider. We have found that certain health care needs can be provided without the need for an in-person physical exam. This service lets us provide the care you need with a video conversation. If a prescription is necessary we can send it directly to your pharmacy. If lab work is needed we can place an order for that and you can then stop by our lab to have the test done at a later time. Insurers are generally covering virtual visits as they would in-office visits so billing should not be different than normal.  If for some reason you do get billed incorrectly, you should contact the billing office to correct it and that number is in the AVS .    Video Conference to be completed via:  [place]    Patient has given verbal consent for video visit?:  Yes    Patient would prefer that any video invitations be sent by: Text to cell phone: [phone]      How would patient like to obtain AVS?:  [employer]    AVS SmartPhrase [PsychAVS] has been placed in 'Patient Instructions':  Yes    "

## 2020-12-18 NOTE — PATIENT INSTRUCTIONS
Wellbutrin to 150mg  Increase lamotrigine to 400mg  Try hydroxyzine 25-50mg at bedtime for sleep.       Scheduling: If you have any concerns about today's visit or wish to schedule another appointment please call our office during normal business hours [phone] (8-5:00 M-F)    Contact Us: Please call [phone] during business hours (8-5:00 M-F).  If after clinic hours, or on the weekend, please call  [phone].    Financial Assistance [phone]  [facility] Billing [phone]  [facility] Billing [phone]  Medical Records [phone]  [facility] Patient Bill of [facility]    MENTAL HEALTH CRISIS NUMBERS:  [facility]:  [facility] - [phone]  [facility] - [phone]  Walk-In Counseling [facility] [phone]  COPE  [facility] Team - [[phone]]    [facility]:  [facility] - [phone]  Walk-in counseling [facility] - [phone]  Walk-in counseling [facility] - [phone]  [facility] - [phone]  Urgent Care Adult Mental Health:   --Drop-in,  crisis line, and [facility] Mobile Team [[phone]]     CRISIS TEXT LINE: www.crisistextline.org OR text 741-944 anywhere, anytime, any crisis    Poison Control Center - [phone]  [employer] - [phone]; or [employer] - [phone]    If you have a medical emergency please call 911 or go to the nearest ER.
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
## 2020-12-22 ENCOUNTER — HOSPITAL ENCOUNTER (OUTPATIENT)
Dept: BEHAVIORAL HEALTH | Facility: CLINIC | Age: 30
End: 2020-12-22
Attending: PSYCHIATRY & NEUROLOGY
Payer: COMMERCIAL

## 2020-12-22 PROCEDURE — 90853 GROUP PSYCHOTHERAPY: CPT | Mod: GT | Performed by: SOCIAL WORKER

## 2020-12-22 NOTE — GROUP NOTE
Process Group Note    PATIENT'S NAME: Betty Tamayo  MRN:   [id number]  :   1990  ACCT. NUMBER: [id number]  DATE OF SERVICE: 20  START TIME:  9:00 AM  END TIME:  9:50 AM  FACILITATOR: Poonam Archer LICSW  TOPIC:  Process Group    Diagnoses:  296.32 (F33.1) Major Depressive Disorder, Recurrent Episode, Moderate _ and With anxious distress  300.01 (F41.0) Panic Disorder  309.81 (F43.10) Posttraumatic Stress Disorder (includes Posttraumatic Stress Disorder for Children 6 Years and Younger)  Without dissociative symptoms.      [facility] Adult Mental Health Day Treatment  TRACK: 5A    NUMBER OF PARTICIPANTS: 7    Telemedicine Visit: The patient's condition can be safely assessed and treated via synchronous audio and visual telemedicine encounter.      Reason for Telemedicine Visit: Services only offered telehealth    Originating Site (Patient Location): Patient's home    Distant Site (Provider Location): Provider Remote Setting    Consent:  The patient/guardian has verbally consented to: the potential risks and benefits of telemedicine (video visit) versus in person care; bill my insurance or make self-payment for services provided; and responsibility for payment of non-covered services.     Mode of Communication:  Video Conference via [email or web address]    As the provider I attest to compliance with applicable laws and regulations related to telemedicine.           Data:    Session content: At the start of this group, patients were invited to check in by identifying themselves, describing their current emotional status, and identifying issues to address in this group.   Area(s) of treatment focus addressed in this session included Symptom Management, Personal Safety and Community Resources/Discharge Planning.  Betty was tearful in group today.   She stated that she talked to her psychiatry provider who stated that she might have Bipolar illness due to the symptoms she has been having.  She  "stated that she has been finding herself doing \"stupid stuff\" including spending money in an unusual way.  She stated that she feels on edge all of the time and her mind is racing.  She stated that is is concerned that this diagnosis means that she will have unexpected lows of depression.  Writer provided education on Bipolar illness.   Peers offered feedback of their experiences.  Client denied suicidal ideation, intent and plan.     Therapeutic Interventions/Treatment Strategies:  Psychotherapist offered support, feedback and validation and reinforced use of skills. Treatment modalities used include Cognitive Behavioral Therapy. Interventions include Cognitive Restructuring:  Assisted patient in formulating new neutral/positive alternatives to challenge less helpful / ineffective thoughts.    Assessment:    Patient response:   Patient responded to session by listening, focusing on goals and being attentive    Possible barriers to participation / learning include: and no barriers identified    Health Issues:   None reported       Substance Use Review:   Substance Use: No active concerns identified.    Mental Status/Behavioral Observations  Appearance:   Appropriate   Eye Contact:   Good   Psychomotor Behavior: Normal   Attitude:   Cooperative   Orientation:   All  Speech   Rate / Production: Normal    Volume:  Normal   Mood:    Anxious  Depressed  Sad   Affect:    Appropriate   Thought Content:   Rumination  Thought Form:  Coherent  Logical     Insight:    Good     Plan:     Safety Plan: No current safety concerns identified.  Recommended that patient call 911 or go to the local ED should there be a change in any of these risk factors.     Barriers to treatment: None identified    Patient Contracts (see media tab):  None    Substance Use: Not addressed in session     Continue or Discharge: Patient will continue in Adult Dual Disorder Program (DDP) as planned. Patient is likely to benefit from learning and using " skills as they work toward the goals identified in their treatment plan.      Poonam Archer, [employer]  December 22, 2020

## 2020-12-28 ENCOUNTER — TELEPHONE (OUTPATIENT)
Dept: BEHAVIORAL HEALTH | Facility: CLINIC | Age: 30
End: 2020-12-28

## 2020-12-29 ENCOUNTER — HOSPITAL ENCOUNTER (OUTPATIENT)
Dept: BEHAVIORAL HEALTH | Facility: CLINIC | Age: 30
End: 2020-12-29
Attending: PSYCHIATRY & NEUROLOGY
Payer: COMMERCIAL

## 2020-12-29 ENCOUNTER — TELEPHONE (OUTPATIENT)
Dept: BEHAVIORAL HEALTH | Facility: CLINIC | Age: 30
End: 2020-12-29

## 2020-12-29 DIAGNOSIS — F31.81 BIPOLAR 2 DISORDER (H): Primary | ICD-10-CM

## 2020-12-29 PROCEDURE — 90853 GROUP PSYCHOTHERAPY: CPT | Mod: GT | Performed by: SOCIAL WORKER

## 2020-12-29 RX ORDER — QUETIAPINE FUMARATE 25 MG/1
25-50 TABLET, FILM COATED ORAL
Qty: 60 TABLET | Refills: 0 | Status: SHIPPED | OUTPATIENT
Start: 2020-12-29 | End: 2021-01-21

## 2020-12-29 NOTE — TELEPHONE ENCOUNTER
I was finally able to connect with Betty per Dr. Boyd's request about sleep. She states her sleep has not improved. I told her I would contact Dr. Boyd and he would send the prescription of Seroquel to her preferred pharmacy, [facility] on [address].

## 2020-12-29 NOTE — GROUP NOTE
Telemedicine Visit: The patient's condition can be safely assessed and treated via synchronous audio and visual telemedicine encounter.      Reason for Telemedicine Visit: Patient has requested telehealth visit    Originating Site (Patient Location): Patient's home    Distant Site (Provider Location): [facility] Outpatient Setting: Day Tx    Consent:  The patient/guardian has verbally consented to: the potential risks and benefits of telemedicine (video visit) versus in person care; bill my insurance or make self-payment for services provided; and responsibility for payment of non-covered services.    Mode of Communication:  Video Conference via Zoom    As the provider I attest to compliance with applicable laws and regulations related to telemedicine.    Process Group Note    PATIENT'S NAME: Betty Tamayo  MRN:   [id number]  :   1990  ACCT. NUMBER: [id number]  DATE OF SERVICE: 20  START TIME:  9:00 AM  END TIME:  9:55 AM  FACILITATOR: Katina Cornejo LICSW  TOPIC:  Process Group    Diagnoses:  296.32 (F33.1) Major Depressive Disorder, Recurrent Episode, Moderate _ and With anxious distress  300.01 (F41.0) Panic Disorder  309.81 (F43.10) Posttraumatic Stress Disorder (includes Posttraumatic Stress Disorder for Children 6 Years and Younger)  Without dissociative symptoms.      [facility]+ Program  TRACK: 5A    NUMBER OF PARTICIPANTS: 4          Data:    Session content: At the start of this group, patients were invited to check in by identifying themselves, describing their current emotional status, and identifying issues to address in this group.   Area(s) of treatment focus addressed in this session included Symptom Management, Personal Safety, Community Resources/Discharge Planning, Abstinence/Relapse Prevention, Develop / Improve Independent Living Skills and Develop Socialization / Interpersonal Relationship Skills.    Betty reports anxious mood related to the transition  back to work. She reported on her worry and fears. She recently was diagnosed with Bipolar Disorder.  She has an appointment with her therapist and MD next week to discuss the transition back to work. She reports ambivalence. Betty works in medical records. There has been many changes and she does not know what to expect in her role when she returns. FERNANDA. Org resources given. Writer provided validation and encouraged the development of a self care/support plan.      Therapeutic Interventions/Treatment Strategies:  Psychotherapist offered support, feedback and validation and reinforced use of skills. Treatment modalities used include Cognitive Behavioral Therapy.    Assessment:    Patient response:   Patient responded to session by listening, focusing on goals, being attentive and verbalizing understanding    Possible barriers to participation / learning include: COVID-19    Health Issues:   None reported       Substance Use Review:   Substance Use: No active concerns identified.    Mental Status/Behavioral Observations  Appearance:   Appropriate   Eye Contact:   Good   Psychomotor Behavior: Normal   Attitude:   Cooperative  Interested Pleasant  Orientation:   All  Speech   Rate / Production: Normal    Volume:  Normal   Mood:    Anxious  Ambivalence  Affect:    Appropriate  Worrisome   Thought Content:   Clear and Safety denies any current safety concerns including suicidal ideation, self-harm, and homicidal ideation  Thought Form:  Coherent  Goal Directed  Logical     Insight:    Good     Plan:     Safety Plan: No current safety concerns identified.  Recommended that patient call 911 or go to the local ED should there be a change in any of these risk factors.     Barriers to treatment: COVID-19    Patient Contracts (see media tab):  None    Substance Use: Not addressed in session     Continue or Discharge: Patient will continue in Adult Day Treatment (ADT)  as planned. Patient is likely to benefit from learning and  using skills as they work toward the goals identified in their treatment plan. Betty will continue for mood stabilization and symptom management education for mental health recovery.     Katina Cornejo, [facility]  December 29, 2020

## 2020-12-31 ENCOUNTER — TELEPHONE (OUTPATIENT)
Dept: BEHAVIORAL HEALTH | Facility: CLINIC | Age: 30
End: 2020-12-31

## 2020-12-31 ENCOUNTER — VIRTUAL VISIT (OUTPATIENT)
Dept: BEHAVIORAL HEALTH | Facility: CLINIC | Age: 30
End: 2020-12-31
Payer: COMMERCIAL

## 2020-12-31 DIAGNOSIS — F43.10 PTSD (POST-TRAUMATIC STRESS DISORDER): Primary | ICD-10-CM

## 2020-12-31 PROCEDURE — 90832 PSYTX W PT 30 MINUTES: CPT | Mod: TEL | Performed by: PSYCHOLOGIST

## 2020-12-31 NOTE — PROGRESS NOTES
"[facility]: Integrated Behavioral Health  December 31, 2020      Behavioral Health Clinician Progress Note    Patient Name: Betty Tamayo           Service Type: Phone Visit      Service Location:  Phone visit      Session Start Time: 2:10  Session End Time: 2:31      Session Length: 16 - 37      Attendees: Client    Visit Activities (Refresh list every visit): [facility] [place] Only    Betty Tamayo is a 30 year old female who is being evaluated via a telephone visit.      The patient has been notified of the following:     \"We have found that certain health care needs can be provided without the need for a face to face visit.  This service lets us provide the care you need with a short phone conversation.      I will have full access to your [facility] medical record during this entire phone call.   I will be taking notes for your medical record.     Since this is like an office visit, we will bill your insurance company for this service.  Please check with your medical insurance if this type of telephone visit/virtual care is covered.  You may be responsible for the cost of this service if insurance coverage is denied.      There are potential benefits and risks of telephone visits (e.g. limits to patient confidentiality) that differ from in-person visits.?  Confidentiality still applies for telephone services, and nobody will record the visit.  It is important to be in a quiet, private space that is free of distractions (including cell phone or other devices) during the visit.??     If during the course of the call I believe a telephone visit is not appropriate, you will not be charged for this service\"    Consent has been obtained for this service by care team member: yes.      Diagnostic Assessment Date: 3/9/2020  Treatment Plan Review Date: 1/20/2021  See Flowsheets for today's PHQ-9 and NIXON-7 results  Previous PHQ-9:   PHQ-9 SCORE 11/12/2020 11/12/2020 11/23/2020   PHQ-9 Total Score " "MyChart - 23 (Severe depression) -   PHQ-9 Total Score 23 23 19   Some encounter information is confidential and restricted. Go to Review Flowsheets activity to see all data.     Previous NIXON-7:   NIXON-7 SCORE 11/12/2020 11/12/2020 11/23/2020   Total Score - 21 (severe anxiety) -   Total Score 21 21 21   Some encounter information is confidential and restricted. Go to Review Flowsheets activity to see all data.       BRAD LEVEL:  No flowsheet data found.    DATA  Extended Session (60+ minutes): No  Interactive Complexity: No  Crisis: No    Treatment Objective(s) Addressed in This Session:  Target Behavior(s): disease management/lifestyle changes      Review concerns of possible manic episode.     Current Stressors / Issues:  Completed a [facility] follow-up with Betty today on the phone; she initially forgot the appointment but was open to talking on the phone. Betty stated things have felt better for her over the last couple weeks. She shared that a few medication increases/changes have helped her mood be more stable, less irritable. She did indicate she finds herself \"detaching\" from her day treatment program, noting it has felt more frustrating than helpful lately. We talked about her experience with this and Betty indicated her returning to work next week and not being raffaele to complete the program and the turnover of patients/therapists in the group are her reasons for this.    We talked about her recommendation to possibly switch to the DBT program due to her work schedule and possible benefit from learning emotion regulation skills. Betty indicated she is open to this group and would like to pursue it. I provided her contact information and we agreed for her to talk to her current group counselor, who originally recommended it to her, for help with this transition.     Betty indicated she is set up with a new therapist for 1:1 counseling on 1/7/21. She indicated this will be helpful as well.     Supportive counseling " and help with establishing with speciality services was emphasized today.     Due to Betty's reported progress with mood stability, current involvement in day treatment and plans to transition to a new counselor and DBT program, we agreed to schedule [facility] visits as needed moving forward.     Progress on Treatment Objective(s) / Homework:  Minimal progress - ACTION (Actively working towards change); Intervened by reinforcing change plan / affirming steps taken    Provided information about behavioral condition and available treatment options    Supportive counseling    Care Plan review completed: Yes    Medication Review:  No changes to current psychiatric medication(s)    Medication Compliance:  Yes    Changes in Health Issues:   None reported    Chemical Use Review:   Substance Use: Chemical use reviewed, no active concerns identified      Tobacco Use: No current tobacco use.      Assessment: Current Emotional / Mental Status (status of significant symptoms):  Risk status (Self / Other harm or suicidal ideation)  Patient has had a history of suicidal ideation: passive thoughts recurrent, suicide attempts: 1 and self-injurious behavior: cutting behavior as a youth     Patient denies current fears or concerns for personal safety.  Patient denies current or recent suicidal ideation or behaviors.  Patient denies current or recent homicidal ideation or behaviors.  Patient denies current or recent self injurious behavior or ideation.  Patient denies other safety concerns.     A safety and risk management plan has not been developed at this time, however patient was encouraged to call [place] [phone] should there be a change in any of these risk factors.    [place] Suicide Severity Rating Scale (Lifetime/Recent)  [place] Suicide Severity Rating (Lifetime/Recent) 10/20/2020   1. Wish to be Dead (Lifetime) Yes   1. Wish to be Dead (Recent) Yes   2. Non-Specific Active Suicidal Thoughts (Lifetime) Yes   2. Non-Specific  Active Suicidal Thoughts (Recent) No   3. Active Suicidal Ideation with any Methods (Not Plan) Without Intent to Act (Lifetime) Yes   3. Active Sucidal Ideation with any Methods (Not Plan) Without Intent to Act (Recent) No   4. Active Suicidal Ideation with Some Intent to Act, Without Specific Plan (Lifetime) Yes   4. Active Suicidal Ideation with Some Intent to Act, Without Specific Plan (Recent) No   5. Active Suicidal Ideation with Specific Plan and Intent (Lifetime) No   5. Active Suicidal Ideation with Specific Plan and Intent (Recent) No   Most Severe Ideation Rating (Lifetime) 3   Frequency (Lifetime) 3   Duration (Lifetime) 2   Controllability (Lifetime) 3   Protective Factors  (Lifetime) 2   Reasons for Ideation (Lifetime) 2   Most Severe Ideation Rating (Past Month) 3   Frequency (Past Month) 3   Duration (Past Month) 2   Controllability (Past Month) 3   Protective Factors (Past Month) 2   Reasons for Ideation (Past Month) 2   Actual Attempt (Lifetime) Yes   Actual Attempt Description (Lifetime) Had grabbed a knife and was threatening to kill herself   Total Number of Actual Attempts (Lifetime) 1   Actual Attempt (Past 3 Months) Yes   Actual Attempt Description (Past 3 Months) same as above   Total Number of Actual Attempts (Past 3 Months) 1   Has subject engaged in non-suicidal self-injurious behavior? (Lifetime) Yes   Has subject engaged in non-suicidal self-injurious behavior? (Past 3 Months) No   Interrupted Attempts (Lifetime) No   Interrupted Attempts (Past 3 Months) No   Aborted or Self-Interrupted Attempt (Lifetime) Yes   Aborted or Self Interrupted Attempt Description (Lifetime) put the knife away   Total Number Aborted or Self Interrupted Attempts (Lifetime) 1   Aborted or Self-Interrupted Attempt (Past 3 Months) Yes   Preparatory Acts or Behavior (Lifetime) No   Preparatory Acts or Behavior (Past 3 Months) No   Most Recent Attempt Actual Lethality Code 0   Most Recent Attempt Potential Lethality  Code 1   Most Lethal Attempt Actual Lethality Code 0   Most Lethal Attemplt Potential Lethality Code 1   Initial/First Attempt Actual Lethality Code 0   Initial/First Attempt Potential Lethality Code 1       Appearance:   Appropriate   Eye Contact:   Good   Psychomotor Behavior: Restless   Attitude:   Guarded   Orientation:   All  Speech   Rate / Production: Normal    Volume:  Soft   Mood:    Anxious  Depressed   Affect:    Appropriate   Thought Content:  Clear   Thought Form:  Coherent  Logical   Insight:    Good     Diagnoses:  1. PTSD (post-traumatic stress disorder)      Provisional diagnosis: Bipolar I Disorder    Collateral Reports Completed:  Not Applicable    Plan: (Homework, other):  Betty was scheduled to return to [facility] counseling in as needed due to new therapist on 1/7/21, day treatment, and move to DBT program. Will consult with psychiatry as needed. She was also given information about mental health symptoms and treatment options .  CD Recommendations: No indications of CD issues.     Salvatore Pettit Psy.D, LP     ______________________________________________________________________    [facility] Integrated Behavioral Health Treatment Plan    Client's Name: Betty Tamayo  YOB: 1990    Date: 10/20/2020    DSM-V Diagnoses: 309.81 (F43.10) Posttraumatic Stress Disorder (includes Posttraumatic Stress Disorder for Children 6 Years and Younger)  Without dissociative symptoms; 296.33 (F33.2) Major Depressive Disorder, Recurrent Episode, Severe _  Psychosocial / Contextual Factors: NA    Clinical Global Impressions  First:  Considering your total clinical experience with this particular patient population, how severe are the patient's symptoms at this time?: 5 (12/15/2020 11:59 AM)      Most recent:  Compared to the patient's condition at the START of treatment, this patient's condition is: 4 (12/15/2020 11:59 AM)        Referral / Collaboration:  Will collaborate with care team as indicated during  treatment.    Anticipated number of session or this episode of care: 4-5 until established with day treatment      MeasurableTreatment Goal(s) related to diagnosis / functional impairment(s)  Goal 1:  Patient will experience a reduction in depressive and anxious symptoms, along with a corresponding increase in positive emotion and life satisfaction.    Objective #A: Patient will experience a reduction in depressed mood, will develop more effective coping skills to manage depressive symptoms, will develop healthy cognitive patterns and beliefs, will increase ability to function adaptively and will continue to take medications as prescribed / participate in supportive activities and services    Status: Continued - Date(s): 10/20/2020    Objective #B: Patient will experience a reduction in anxiety, will develop more effective coping skills to manage anxiety symptoms, will develop healthy cognitive patterns and beliefs and will increase ability to function adaptively  Status: Continued - Date(s):10/20/2020    Objective #C: Patient will develop better understanding of triggers and coping strategies to stabilize mood   Status: Continued - Date(s): 10/20/2020    Goal 2:  Patient will identify and increase engagement in valued activity, i.e. improving social connections/relationships, pursuing occupational goals or personally meaningful pursuits, exploration of meaning in life.     Objective #A: Patient will identify meaningful activity in social, occupational and  personal goals, and increase behavioral activation around these goals   Status: Continued - Date(s): 10/20/2020    Objective #B: Patient will address relationship difficulties in a more adaptive manner  Status: Continued - Date(s): 10/20/2020    Objective #C: Patient will develop coping/problem-solving skills to facilitate more adaptive adjustment and will effectively address problems that interfere with adaptive functioning  Status: Continued -  Date(s):10/20/2020    Goal 3:  Patient will increase understanding of post-traumatic stress    Objective #A: Patient will develop strategies for more effective management of risk issues   Status: Continued - Date(s): 10/20/2020    Objective #B: Patient Will better understand triggers/factors for risk concerns  Status: Continued - Date(s): 10/20/2020    Objective #C: Patient will address relationship difficulties in a more adaptive manner  Status: Continued - Date(s): 10/20/2020    Possible Therapeutic Intervention(s)  Psycho-education regarding mental health diagnoses and treatment options    Supportive Counseling    Skills training    Explore skills useful to client in current situation.    Skills include assertiveness, communication, conflict management, problem-solving, relaxation, etc.    Solution-Focused Therapy    Explore patterns in patient's relationships and discuss options for new behaviors.    Explore patterns in patient's actions and choices and discuss options for new behaviors.    Cognitive-behavioral Therapy    Discuss common cognitive distortions, identify them in patient's life.    Explore ways to challenge, replace, and act against these cognitions.    Acceptance and Commitment Therapy    Explore and identify important values in patient's life.    Discuss ways to commit to behavioral activation around these values.    Psychodynamic psychotherapy    Discuss patient's emotional dynamics and issues and how they impact behaviors.    Explore patient's history of relationships and how they impact present behaviors.    Explore how to work with and make changes in these schemas and patterns.    Narrative Therapy    Explore the patient's story of his/her life from his/her perspective.    Explore alternate ways of understanding their experience, identifying exceptions, developing new themes.    Interpersonal Psychotherapy    Explore patterns in relationships that are effective or ineffective at helping  patient reach their goals, find satisfying experience.    Discuss new patterns or behaviors to engage in for improved social functioning.    Behavioral Activation    Discuss steps patient can take to become more involved in meaningful activity.    Identify barriers to these activities and explore possible solutions.    Mindfulness-Based Strategies    Discuss skills based on development and application of mindfulness.    Skills drawn from compassion-focused therapy, dialectical behavior therapy, mindfulness-based stress reduction, mindfulness-based cognitive therapy, etc.      We have developed these goals together during our work to this point. Patient has assisted in the development of these goals and has agreed to this treatment plan.       Salvatore Pettit, AKILA  October 20, 2020

## 2020-12-31 NOTE — TELEPHONE ENCOUNTER
Writer called Pt today since not in group.  She answered and stated she forgot what day it was. She apologizes and intends to join again on Monday. Will pass along info to treatment team.

## 2021-01-04 ENCOUNTER — HOSPITAL ENCOUNTER (OUTPATIENT)
Dept: BEHAVIORAL HEALTH | Facility: CLINIC | Age: 31
End: 2021-01-04
Attending: PSYCHIATRY & NEUROLOGY
Payer: COMMERCIAL

## 2021-01-04 PROCEDURE — 90853 GROUP PSYCHOTHERAPY: CPT | Mod: 95 | Performed by: SOCIAL WORKER

## 2021-01-04 NOTE — GROUP NOTE
Psychotherapy Group Note    PATIENT'S NAME: Betty Tamayo  MRN:   [id number]  :   1990  ACCT. NUMBER: [id number]  DATE OF SERVICE: 21  START TIME: 10:00 AM  END TIME: 10:50 AM  FACILITATOR: Poonam Archer LICSW  TOPIC: MH EBP Group: Cognitive Restructuring  [facility] Adult Mental Health Day Treatment  TRACK: 5A    NUMBER OF PARTICIPANTS: 4    Summary of Group / Topics Discussed:  Cognitive Restructuring: Distortions: Patients received an overview of how to identify common cognitive distortions. Patients will explore alternatives to cognitive distortions and practice challenging their negative thought patterns. The goal is to help patients target modify ineffective thought patterns.     Patient Session Goals / Objectives:  Familiarized self with ineffective / unhealthy thoughts and how they develop.    Explored impact of ineffective thoughts / distortions on mood and activity  Formulated new neutral/positive alternatives to challenge less helpful / ineffective thoughts.  Practiced and plan to apply in daily life      Telemedicine Visit: The patient's condition can be safely assessed and treated via synchronous audio and visual telemedicine encounter.      Reason for Telemedicine Visit: Services only offered telehealth    Originating Site (Patient Location): Patient's home    Distant Site (Provider Location): Provider Remote Setting    Consent:  The patient/guardian has verbally consented to: the potential risks and benefits of telemedicine (video visit) versus in person care; bill my insurance or make self-payment for services provided; and responsibility for payment of non-covered services.     Mode of Communication:  Video Conference via [employer]    As the provider I attest to compliance with applicable laws and regulations related to telemedicine.          Patient Participation / Response:  {OP  PROGRESS NOTE PATIENT PARTICIPATION:654657}    {OP  PROGRESS NOTE PATIENT  RESPONSE - COG RESTRUCTURIN}    Treatment Plan:  Patient has {OP MH PROGRAMMATIC TX PLAN STATUS:571062}    Poonam Archer, [facility] CenterSW

## 2021-01-04 NOTE — GROUP NOTE
Process Group Note    PATIENT'S NAME: Betty Tamayo  MRN:   [id number]  :   1990  ACCT. NUMBER: [id number]  DATE OF SERVICE: 21  START TIME:  9:00 AM  END TIME:  9:50 AM  FACILITATOR: Poonam Archer LICSW  TOPIC:  Process Group    Diagnoses:  296.32 (F33.1) Major Depressive Disorder, Recurrent Episode, Moderate _ and With anxious distress  300.01 (F41.0) Panic Disorder  309.81 (F43.10) Posttraumatic Stress Disorder (includes Posttraumatic Stress Disorder for Children 6 Years and Younger)  Without dissociative symptoms.      [facility] Adult Mental Health Day Treatment  TRACK: 5A    NUMBER OF PARTICIPANTS: 4    Telemedicine Visit: The patient's condition can be safely assessed and treated via synchronous audio and visual telemedicine encounter.      Reason for Telemedicine Visit: Services only offered telehealth    Originating Site (Patient Location): Patient's home    Distant Site (Provider Location): Provider Remote Setting    Consent:  The patient/guardian has verbally consented to: the potential risks and benefits of telemedicine (video visit) versus in person care; bill my insurance or make self-payment for services provided; and responsibility for payment of non-covered services.     Mode of Communication:  Video Conference via [employer]    As the provider I attest to compliance with applicable laws and regulations related to telemedicine.           Data:    Session content: At the start of this group, patients were invited to check in by identifying themselves, describing their current emotional status, and identifying issues to address in this group.   Area(s) of treatment focus addressed in this session included Symptom Management, Personal Safety and Community Resources/Discharge Planning.  Betty reported concern about plan to return to work on  due to end of FMLA.  She stated that she does not feel ready to complete the program due to symptoms and new  diagnosis of Bipolar illness.  She stated that she continued to have increased stress about the decision to pursue additional leave or return to work.  She stated that she started on new medications for Bipolar illness and will see her provider in two weeks.   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  She stated that she is having mood instability, poor attention and concentration, and sleep disturbance.   She stated that the tearfulness has improved.  Client denied suicidal ideation, intent and plan.     Therapeutic Interventions/Treatment Strategies:  Psychotherapist offered support, feedback and validation and reinforced use of skills. Treatment modalities used include Cognitive Behavioral Therapy. Interventions include Behavioral Activation: Explored how behaviors effect mood and interact with thoughts and feelings.    Assessment:    Patient response:   Patient responded to session by giving feedback, listening and focusing on goals    Possible barriers to participation / learning include: and no barriers identified    Health Issues:   None reported       Substance Use Review:   Substance Use: No active concerns identified.    Mental Status/Behavioral Observations  Appearance:   Appropriate   Eye Contact:   Good   Psychomotor Behavior: Normal   Attitude:   Cooperative   Orientation:   All  Speech   Rate / Production: Normal    Volume:  Normal   Mood:    Anxious  Depressed   Affect:    Appropriate   Thought Content:   Clear  Thought Form:  Coherent  Logical     Insight:    Good     Plan:     Safety Plan: No current safety concerns identified.  Recommended that patient call 911 or go to the local ED should there be a change in any of these risk factors.     Barriers to treatment: None identified    Patient Contracts (see media tab):  None    Substance Use: Not addressed in session     Continue or Discharge: Patient will  continue in Adult Day Treatment (ADT)  as planned. Patient is likely to benefit from learning and using skills as they work toward the goals identified in their treatment plan.      Poonam Archer, [facility]  January 4, 2021

## 2021-01-06 ENCOUNTER — VIRTUAL VISIT (OUTPATIENT)
Dept: FAMILY MEDICINE | Facility: CLINIC | Age: 31
End: 2021-01-06
Payer: COMMERCIAL

## 2021-01-06 ENCOUNTER — TELEPHONE (OUTPATIENT)
Dept: FAMILY MEDICINE | Facility: CLINIC | Age: 31
End: 2021-01-06

## 2021-01-06 DIAGNOSIS — F31.81 BIPOLAR 2 DISORDER (H): ICD-10-CM

## 2021-01-06 DIAGNOSIS — F39 MOOD DISORDER (H): Primary | ICD-10-CM

## 2021-01-06 DIAGNOSIS — E78.5 HYPERLIPIDEMIA LDL GOAL <100: ICD-10-CM

## 2021-01-06 DIAGNOSIS — Z11.59 ENCOUNTER FOR HEPATITIS C SCREENING TEST FOR LOW RISK PATIENT: ICD-10-CM

## 2021-01-06 DIAGNOSIS — Z13.220 LIPID SCREENING: ICD-10-CM

## 2021-01-06 PROCEDURE — 99215 OFFICE O/P EST HI 40 MIN: CPT | Mod: 95 | Performed by: FAMILY MEDICINE

## 2021-01-06 ASSESSMENT — ANXIETY QUESTIONNAIRES
7. FEELING AFRAID AS IF SOMETHING AWFUL MIGHT HAPPEN: MORE THAN HALF THE DAYS
6. BECOMING EASILY ANNOYED OR IRRITABLE: NEARLY EVERY DAY
3. WORRYING TOO MUCH ABOUT DIFFERENT THINGS: NEARLY EVERY DAY
GAD7 TOTAL SCORE: 18
IF YOU CHECKED OFF ANY PROBLEMS ON THIS QUESTIONNAIRE, HOW DIFFICULT HAVE THESE PROBLEMS MADE IT FOR YOU TO DO YOUR WORK, TAKE CARE OF THINGS AT HOME, OR GET ALONG WITH OTHER PEOPLE: VERY DIFFICULT
2. NOT BEING ABLE TO STOP OR CONTROL WORRYING: NEARLY EVERY DAY
5. BEING SO RESTLESS THAT IT IS HARD TO SIT STILL: MORE THAN HALF THE DAYS
1. FEELING NERVOUS, ANXIOUS, OR ON EDGE: NEARLY EVERY DAY

## 2021-01-06 ASSESSMENT — PATIENT HEALTH QUESTIONNAIRE - PHQ9
5. POOR APPETITE OR OVEREATING: MORE THAN HALF THE DAYS
SUM OF ALL RESPONSES TO PHQ QUESTIONS 1-9: 12

## 2021-01-06 NOTE — PROGRESS NOTES
Video Visit Technology for this patient: Veritract Video Visit- Patient was left in waiting room     Betty Tamayo is a 30 year old female who is being evaluated via a billable video visit.      How would you like to obtain your AVS? MyChart  If the video visit is dropped, the invitation should be resent by: [phone]  Will anyone else be joining your video visit? No    Assessment & Plan   Betty Tamayo is a 30-year-old female with history of mood disorder PTSD, impulsivity and anger possible bipolar 2 per Dr. Brown's note review who presents for primary care follow-up.  She has benefited from an outpatient group therapy, behavioral counseling and psychiatric care with improvement of her mood and feels ready to ease back into work.  I asked that she contact her human resource department for forms for me to complete related to her short-term disability and release so that I may speak with occupational counselor if available for her to return to work.  For her joint pain she will follow-up with [facility] orthopedics and schedule fasting lab work.  I asked staff to check to see if occupational forms are available for me to sign and they will fax to me.  Return to work date will be set as January 11 to allow coordination of cares.  She should do a follow-up visit with me within 1 month may be virtual    Encounter for hepatitis C screening test for low risk patient  Due for lab  - HCV Screen with Reflex          Lipid screening  Due for lab and medication monitoring  - Lipid panel reflex to direct LDL Fasting    Mood disorder (H  Bipolar 2 disorder (H)  See above Chronic improving still in adjust phase            Return in about 5 weeks (around 2/10/2021).    Kapil Corbett MD  [facility]     Betty Tamayo is a 30 year old who presents to clinic today for the following health issues     HPI   Betty Tamayo 30-year-old female presents for follow-up of  primary care of her mood disorder, PTSD, anger, possible bipolar disorder interfering with work requiring work absence to assist with mood stability.  She has benefited from outpatient program behavioral therapy and psychiatric assessment currently undergoing medication modifications with much improvement of her symptoms.  She weaned herself off the outpatient program but because she thought she would have to go back to work after our visit.  She indicates she wanted to see if she could handle with out the support of the outpatient program and thus far she has been doing all right.  She is interested in going back to work in particular if there is an occupational program that allows her to ease back into work which I believe there may be.  She is willing to sign a release of records for me to connect with the program if needed.    She was told she may have bipolar disorder as her mood switches so quickly. She has been previously impulsively spending now better.   She is on a higher dose of Lamotrigine  They added Quetipine 25 mg at bedtime.  She is not currently in the outpatient program as she weaned herself    She is still out of work and feel she has no choice and would like to return to work.  Knee pain  She has joint pain. She has pain on her left knee previous trauma to the left leg and other joint pain. She feels she has arthritis and has been provided insoles.  She followed up with [facility] orthopedics and will consider another visit to assist with her joint pain.  Today I reviewed that she is due for lab work that I previously placed to also assess her joint pain and monitoring of her medications for her mood and requested that she schedule a fasting lab appointment.     Patient Active Problem List   Diagnosis     Encounter for supervision of high risk pregnancy, , [facility] CNM     History of pre-eclampsia in prior pregnancy, currently pregnant     Nausea     UTI in pregnancy, first trimester      Vitamin D deficiency     Maternal varicella, non-immune     Medication exposure during first trimester of pregnancy     Uncomplicated asthma     Snoring     Hypersomnia     Class 3 severe obesity due to excess calories with body mass index (BMI) of 40.0 to 44.9 in adult, unspecified whether serious comorbidity present (H)     PTSD (post-traumatic stress disorder)     Mood disorder (H)     Generalized anxiety disorder     Bipolar 2 disorder (H)     Past Medical History:   Diagnosis Date     Knee cartilage, torn, left     both knees    had cortisone injection     Right shoulder pain 12/9/14     Uncomplicated asthma      Current Outpatient Medications   Medication Sig Dispense Refill     buPROPion (WELLBUTRIN XL) 150 MG 24 hr tablet Take 1 tablet (150 mg) by mouth every morning 30 tablet 1     hydrOXYzine (ATARAX) 25 MG tablet Take 1-2 tablets (25-50 mg) by mouth daily as needed for anxiety 60 tablet 0     lamoTRIgine (LAMICTAL) 200 MG tablet Take 2 tablets (400 mg) by mouth At Bedtime 60 tablet 1     propranolol (INDERAL) 40 MG tablet Take 1 tablet (40 mg) by mouth 2 times daily 60 tablet 0     QUEtiapine (SEROQUEL) 25 MG tablet Take 1-2 tablets (25-50 mg) by mouth nightly as needed (for sleep) 60 tablet 0     Social History     Social History Narrative     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      Objective           Vitals:  No vitals were obtained today due to virtual visit.    Physical Exam   GENERAL: Healthy, alert and no distress  EYES: Eyes grossly normal to inspection.  No discharge or erythema, or obvious scleral/conjunctival abnormalities.  RESP: No audible wheeze, cough, or visible cyanosis.  No visible retractions or increased work of breathing.    SKIN: Visible skin clear. No  significant rash, abnormal pigmentation or lesions.  NEURO: Cranial nerves grossly intact.  Mentation and speech appropriate for age.  PSYCH: Mentation appears interactive and occasionally smiling improved eye contact, judgement and insight intact, normal speech and appearance well-groomed. Mood appears more stable.  PHQ 11/12/2020 11/23/2020 1/6/2021   PHQ-9 Total Score 23 19 12   Q9: Thoughts of better off dead/self-harm past 2 weeks Several days Not at all Not at all   F/U: Thoughts of suicide or self-harm No - -   F/U: Self harm-plan - - -   F/U: Self-harm action - - -   F/U: Safety concerns No - -     NIXON-7 SCORE 11/12/2020 11/23/2020 1/6/2021   Total Score 21 (severe anxiety) - -   Total Score 21 21 18                   Video-Visit Details    Type of service:  Video Visit    Video Time: 10:32- 11:04 AM    Originating Location (pt. Location): Home    Distant Location (provider location):  [facility] [place]     Platform used for Video Visit: Power-One

## 2021-01-06 NOTE — TELEPHONE ENCOUNTER
Called pt to ask about disability forms. Pt planned on obtaining the forms from her supervisor and then faxing them to the clinic. The forms would then be faxed to Dr. Corbett when the [facility] obtained them.    The pt's supervisor did not get back to the pt today, and pt still waiting on forms.     Will call pt tomorrow to follow up.     Pt has labs on Friday (1/8/20) AM and will have the information for Occupational Health that is required for the P[facility] for future communication between Dr. Corbett and [facility] health provider.     Viri Noe, EMT at 3:50 PM sign on 1/6/2021

## 2021-01-06 NOTE — NURSING NOTE
Chief Complaint   Patient presents with     Recheck Medication     Pt would like to discuss medications and mood      Viri Noe, EMT at 9:19 AM sign on 1/6/2021

## 2021-01-07 ENCOUNTER — TELEPHONE (OUTPATIENT)
Dept: BEHAVIORAL HEALTH | Facility: CLINIC | Age: 31
End: 2021-01-07

## 2021-01-07 ASSESSMENT — ANXIETY QUESTIONNAIRES: GAD7 TOTAL SCORE: 18

## 2021-01-07 NOTE — TELEPHONE ENCOUNTER
Writer called Pt today to see if joining group.  Pt answered and stated she stopped participation in the group due to returning to work.  Writer explained we were not clear on her intent to discharge but would discharge effective today and will inform the treatment team of the decision.    Writer explained would send Pt the PHQ-9 and NIXON-7 through [employer] today and requested she complete these.      She stated she is meeting with a new therapist today.     No safety concerns noted.  Will discharge effective today.

## 2021-01-07 NOTE — TELEPHONE ENCOUNTER
Reason for call: Patient did not respond to link for today's appointment.    Outcome: Writer left message about today's appointment.    Follow-up: Patient was given counseling center number if she would like to reschedule her appointment.    TERRA Varela

## 2021-01-07 NOTE — TELEPHONE ENCOUNTER
Please ask Betty Tamayo to sign disability forms on Friday as well so I may complete on Monday.  Best wishes,  Kapil Corbett MD

## 2021-01-08 DIAGNOSIS — M25.562 ARTHRALGIA OF BOTH KNEES: ICD-10-CM

## 2021-01-08 DIAGNOSIS — F41.1 GENERALIZED ANXIETY DISORDER: ICD-10-CM

## 2021-01-08 DIAGNOSIS — Z11.59 ENCOUNTER FOR HEPATITIS C SCREENING TEST FOR LOW RISK PATIENT: ICD-10-CM

## 2021-01-08 DIAGNOSIS — M25.561 ARTHRALGIA OF BOTH KNEES: ICD-10-CM

## 2021-01-08 DIAGNOSIS — Z13.220 LIPID SCREENING: ICD-10-CM

## 2021-01-08 LAB
ALBUMIN SERPL-MCNC: 3.6 G/DL (ref 3.4–5)
ALP SERPL-CCNC: 75 U/L (ref 40–150)
ALT SERPL W P-5'-P-CCNC: 27 U/L (ref 0–50)
ANION GAP SERPL CALCULATED.3IONS-SCNC: 3 MMOL/L (ref 3–14)
AST SERPL W P-5'-P-CCNC: 15 U/L (ref 0–45)
BASOPHILS # BLD AUTO: 0.1 10E9/L (ref 0–0.2)
BASOPHILS NFR BLD AUTO: 0.8 %
BILIRUB SERPL-MCNC: 0.2 MG/DL (ref 0.2–1.3)
BUN SERPL-MCNC: 12 MG/DL (ref 7–30)
CALCIUM SERPL-MCNC: 8.4 MG/DL (ref 8.5–10.1)
CHLORIDE SERPL-SCNC: 109 MMOL/L (ref 94–109)
CHOLEST SERPL-MCNC: 171 MG/DL
CO2 SERPL-SCNC: 26 MMOL/L (ref 20–32)
CREAT SERPL-MCNC: 0.81 MG/DL (ref 0.52–1.04)
CRP SERPL-MCNC: <2.9 MG/L (ref 0–8)
DIFFERENTIAL METHOD BLD: ABNORMAL
EOSINOPHIL # BLD AUTO: 0.1 10E9/L (ref 0–0.7)
EOSINOPHIL NFR BLD AUTO: 1.9 %
ERYTHROCYTE [DISTWIDTH] IN BLOOD BY AUTOMATED COUNT: 16.2 % (ref 10–15)
FERRITIN SERPL-MCNC: 14 NG/ML (ref 12–150)
GFR SERPL CREATININE-BSD FRML MDRD: >90 ML/MIN/{1.73_M2}
GLUCOSE SERPL-MCNC: 101 MG/DL (ref 70–99)
HCT VFR BLD AUTO: 38.6 % (ref 35–47)
HCV AB SERPL QL IA: NONREACTIVE
HDLC SERPL-MCNC: 48 MG/DL
HGB BLD-MCNC: 11.8 G/DL (ref 11.7–15.7)
IMM GRANULOCYTES # BLD: 0 10E9/L (ref 0–0.4)
IMM GRANULOCYTES NFR BLD: 0.3 %
LDLC SERPL CALC-MCNC: 104 MG/DL
LYMPHOCYTES # BLD AUTO: 2 10E9/L (ref 0.8–5.3)
LYMPHOCYTES NFR BLD AUTO: 27.4 %
MCH RBC QN AUTO: 26.2 PG (ref 26.5–33)
MCHC RBC AUTO-ENTMCNC: 30.6 G/DL (ref 31.5–36.5)
MCV RBC AUTO: 86 FL (ref 78–100)
MONOCYTES # BLD AUTO: 0.7 10E9/L (ref 0–1.3)
MONOCYTES NFR BLD AUTO: 9.2 %
NEUTROPHILS # BLD AUTO: 4.4 10E9/L (ref 1.6–8.3)
NEUTROPHILS NFR BLD AUTO: 60.4 %
NONHDLC SERPL-MCNC: 123 MG/DL
NRBC # BLD AUTO: 0 10*3/UL
NRBC BLD AUTO-RTO: 0 /100
PLATELET # BLD AUTO: 316 10E9/L (ref 150–450)
POTASSIUM SERPL-SCNC: 4.1 MMOL/L (ref 3.4–5.3)
PROT SERPL-MCNC: 6.9 G/DL (ref 6.8–8.8)
RBC # BLD AUTO: 4.5 10E12/L (ref 3.8–5.2)
SODIUM SERPL-SCNC: 138 MMOL/L (ref 133–144)
TRIGL SERPL-MCNC: 95 MG/DL
TSH SERPL DL<=0.005 MIU/L-ACNC: 0.49 MU/L (ref 0.4–4)
WBC # BLD AUTO: 7.3 10E9/L (ref 4–11)

## 2021-01-08 PROCEDURE — 86803 HEPATITIS C AB TEST: CPT | Mod: 90 | Performed by: PATHOLOGY

## 2021-01-08 PROCEDURE — 99000 SPECIMEN HANDLING OFFICE-LAB: CPT | Performed by: PATHOLOGY

## 2021-01-08 PROCEDURE — 36415 COLL VENOUS BLD VENIPUNCTURE: CPT | Performed by: PATHOLOGY

## 2021-01-08 PROCEDURE — 80050 GENERAL HEALTH PANEL: CPT | Performed by: PATHOLOGY

## 2021-01-08 PROCEDURE — 86140 C-REACTIVE PROTEIN: CPT | Performed by: PATHOLOGY

## 2021-01-08 PROCEDURE — 82728 ASSAY OF FERRITIN: CPT | Performed by: PATHOLOGY

## 2021-01-08 PROCEDURE — 80061 LIPID PANEL: CPT | Performed by: PATHOLOGY

## 2021-01-08 PROCEDURE — 86038 ANTINUCLEAR ANTIBODIES: CPT | Mod: 90 | Performed by: PATHOLOGY

## 2021-01-08 NOTE — TELEPHONE ENCOUNTER
Contacted pt to confirm that she would still come up to the clinic to sign forms after her 12:30pm labs. She confirmed that she would sign the disability form. She reports that her supervisor just got back to her regarding the number to request the fax of the occupational health team at her work. She left a voicemail this morning and is waiting to hear back. She may not have the info for the P[facility]REZ by 12:30 today but will still come up after labs to sign her portion of the disability form. She will reach out once she has the [facility] health information.     Viri Noe, EMT at 10:31 AM sign on 1/8/2021

## 2021-01-11 LAB — ANA SER QL IF: NEGATIVE

## 2021-01-12 ENCOUNTER — TELEPHONE (OUTPATIENT)
Dept: FAMILY MEDICINE | Facility: CLINIC | Age: 31
End: 2021-01-12

## 2021-01-12 NOTE — TELEPHONE ENCOUNTER
January 12, 2021  Review of forms provided show the forms were FMLA. I provided Betty with the Number for [facility] [phone] to call to send disability forms for me. Please ensure she signs forms for release if required.  Kapil Corbett MD  January 13, 2021  I faxed FMLA forms today to Sammie Juan to copy for scanning and fax FMLA to [facility], Still need disability forms to complete please notify me when they arrive.  Best wishes,  Kapil Corbett MD

## 2021-01-14 NOTE — TELEPHONE ENCOUNTER
FMLA forms were faxed to [facility] @ [phone], and also scanned to chart.    Sammie Juan RN on 1/14/2021 at 8:22 AM

## 2021-01-15 ENCOUNTER — HEALTH MAINTENANCE LETTER (OUTPATIENT)
Age: 31
End: 2021-01-15

## 2021-01-21 ENCOUNTER — VIRTUAL VISIT (OUTPATIENT)
Dept: BEHAVIORAL HEALTH | Facility: CLINIC | Age: 31
End: 2021-01-21
Payer: COMMERCIAL

## 2021-01-21 DIAGNOSIS — F31.81 BIPOLAR 2 DISORDER (H): Primary | ICD-10-CM

## 2021-01-21 DIAGNOSIS — Z79.899 ENCOUNTER FOR LONG-TERM (CURRENT) USE OF MEDICATIONS: ICD-10-CM

## 2021-01-21 DIAGNOSIS — F43.10 PTSD (POST-TRAUMATIC STRESS DISORDER): ICD-10-CM

## 2021-01-21 PROCEDURE — 99215 OFFICE O/P EST HI 40 MIN: CPT | Mod: 95 | Performed by: PSYCHIATRY & NEUROLOGY

## 2021-01-21 RX ORDER — PROPRANOLOL HYDROCHLORIDE 40 MG/1
40 TABLET ORAL 3 TIMES DAILY
Qty: 90 TABLET | Refills: 0 | Status: SHIPPED | OUTPATIENT
Start: 2021-01-21 | End: 2021-02-18

## 2021-01-21 RX ORDER — QUETIAPINE FUMARATE 25 MG/1
25-50 TABLET, FILM COATED ORAL 2 TIMES DAILY PRN
Qty: 120 TABLET | Refills: 0 | Status: SHIPPED | OUTPATIENT
Start: 2021-01-21 | End: 2021-02-18 | Stop reason: ALTCHOICE

## 2021-01-21 RX ORDER — LITHIUM CARBONATE 300 MG/1
TABLET, FILM COATED, EXTENDED RELEASE ORAL
Qty: 60 TABLET | Refills: 0 | Status: SHIPPED | OUTPATIENT
Start: 2021-01-21 | End: 2021-02-18 | Stop reason: SINTOL

## 2021-01-21 RX ORDER — LAMOTRIGINE 200 MG/1
400 TABLET ORAL AT BEDTIME
Qty: 60 TABLET | Refills: 0 | Status: SHIPPED | OUTPATIENT
Start: 2021-01-21 | End: 2021-01-21

## 2021-01-21 RX ORDER — LAMOTRIGINE 200 MG/1
300 TABLET ORAL AT BEDTIME
Qty: 45 TABLET | Refills: 1 | Status: SHIPPED | OUTPATIENT
Start: 2021-01-21 | End: 2021-03-08

## 2021-01-21 ASSESSMENT — ANXIETY QUESTIONNAIRES
GAD7 TOTAL SCORE: 21
1. FEELING NERVOUS, ANXIOUS, OR ON EDGE: NEARLY EVERY DAY
4. TROUBLE RELAXING: NEARLY EVERY DAY
6. BECOMING EASILY ANNOYED OR IRRITABLE: NEARLY EVERY DAY
2. NOT BEING ABLE TO STOP OR CONTROL WORRYING: NEARLY EVERY DAY
3. WORRYING TOO MUCH ABOUT DIFFERENT THINGS: NEARLY EVERY DAY
GAD7 TOTAL SCORE: 21
5. BEING SO RESTLESS THAT IT IS HARD TO SIT STILL: NEARLY EVERY DAY
7. FEELING AFRAID AS IF SOMETHING AWFUL MIGHT HAPPEN: NEARLY EVERY DAY
7. FEELING AFRAID AS IF SOMETHING AWFUL MIGHT HAPPEN: NEARLY EVERY DAY
GAD7 TOTAL SCORE: 21

## 2021-01-21 ASSESSMENT — PATIENT HEALTH QUESTIONNAIRE - PHQ9
SUM OF ALL RESPONSES TO PHQ QUESTIONS 1-9: 18
10. IF YOU CHECKED OFF ANY PROBLEMS, HOW DIFFICULT HAVE THESE PROBLEMS MADE IT FOR YOU TO DO YOUR WORK, TAKE CARE OF THINGS AT HOME, OR GET ALONG WITH OTHER PEOPLE: EXTREMELY DIFFICULT
SUM OF ALL RESPONSES TO PHQ QUESTIONS 1-9: 18

## 2021-01-21 NOTE — NURSING NOTE
"Chief Complaint   Patient presents with     Recheck Medication     PTSD Bipolar 2 disorder     Would like out of today's visit:  Med check  Karyn Forrester LPN     VIDEO VISIT  Betty Tamayo is a 30 year old patient who is being evaluated via a billable video visit.      The patient has been notified of following:   \"This video visit will be conducted via a call between you and your physician/provider. We have found that certain health care needs can be provided without the need for an in-person physical exam. This service lets us provide the care you need with a video conversation. If a prescription is necessary we can send it directly to your pharmacy. If lab work is needed we can place an order for that and you can then stop by our lab to have the test done at a later time. Insurers are generally covering virtual visits as they would in-office visits so billing should not be different than normal.  If for some reason you do get billed incorrectly, you should contact the billing office to correct it and that number is in the AVS .    Video Conference to be completed via:  Esther    Patient has given verbal consent for video visit?:  Yes    Patient would prefer that any video invitations be sent by: Text to cell phone: [phone]      How would patient like to obtain AVS?:  BookBub    AVS SmartPhrase [PsychAVS] has been placed in 'Patient Instructions':  Yes    "
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
## 2021-01-21 NOTE — PATIENT INSTRUCTIONS
Stop taking bupropion.     May take Seroquel up to three times daily, max of 100mg per day    Increase propranolol to 40mg three times daily    Start lithium ER 300mg at bedtime for 1 week, then increase to 600mg at bedtime.   After 1 week at 600mg, go to the lab 10-14 hours after your last dose for serum level check.         See the end of the document for details about lithium.     Contact Us: Please call [phone] during business hours (8-5:00 M-F).    Financial Assistance [phone]  [facility] Billing [phone]  [facility] Billing [phone]  Medical Records [phone]  [facility] Patient Bill of Rights ([email or web address])    **For crisis resources, please see the information at the end of this document**  Patient Education   INSTRUCTIONS FOR USE OF LITHIUM      DO:    - call clinic if you, or another provider, makes any medication changes  - call clinic if your use of ibuprofen (or similar) increases at all  - use contraception- ALWAYS  - call clinic if - call clinic if you experience any symptom listed below  - LABS:   obtain all labs as directed,  labs are done every 6 months on an ongoing basis;                 lithium level is drawn 5 days after starting lithium and after dose changes;                  labs also include kidney and thyroid function;                 all labs are drawn between 10 and 14 hours after your last dose (12hrs is ideal)      DO NOT:    - stop this med abruptly  - use street drugs or alcohol while being treated with lithium  - increase use of ibuprofen (or similar) without calling the clinic      TRY TO AVOID:    - any use of ibuprofen (or similar);  MAY use acetaminophen (Tylenol) for pain  - excessive sweating  - excessive salt intake  - use of over the counter herbal remedies      FOOD:  take with or without food but AVOID excessive salt intake      COMMON ADVERSE EFFECTS: acne, weight gain, nausea, tremor, increased  urination, increased thirst, sedation, loose stools, blurring of memory and concentration      CALL CLINIC URGENTLY or EMERGENCY ROOM:  if you have any concerns, especially the following...  - blurred vision  - heart palpitations  - ear ringing  - excessive urination (especially at night)  OR  excessive thirst  - seizure  - kidney problems or any new medical problem  - thoughts of death or hurting yourself    - suspect Serotonin Syndrome:   confusion   tremor  severe headache  sweats  fever   funny feeling in muscles  need to pace / restlessness   severe nausea, vomiting  diarrhea      PAIN MEDICATION POSSIBLE DRUG INTERACTIONS:  NSAIDs                                     AVOID USE  buprofen  oxaprozin (Daypro)  diclofenac (Arthrotec, Voltaren)  indomethacin (Indocin)  ketoprofen  (Orudis)  ketorolac (Toradol)  meloxicam (Mobic)  nabumetone (Relafen)  naproxen  piroxicam ( Feldene)  tolmetin (Tolectin)  celecoxib   fenoprofen  OK TO USE  sulindac (Clinoril)  aspirin  salsalate (Disalcid)  acetominophen    All can raise Li level very high (>50%), very fast     at variable dosing--even with prn use.     They inhibit renal clearance of lithium.          Clinical Action:    Avoid combining if possible but if adding lithium to a     scheduled NSAID regimen, monitor weekly for the    first few weeks.                              These 4 drugs do NOT raise Li level    sulindac can decrease level by 50% initially,   then normalize           MENTAL HEALTH CRISIS NUMBERS:  [facility]:  [facility] - [phone]  [facility] - [phone]  Walk-In Counseling [facility] [phone]  COPE 24/7 [place] Mobile Team - [[phone]]    [facility]:  [facility] - [phone]  Walk-in counseling [facility] - [phone]  Walk-in counseling [facility] - [phone]  Crisis Residence [facility] -  [phone]  Urgent Care Adult Mental Health:   --Drop-in, 24/7 crisis line, and [facility] Mobile Team [[phone]]    24/7 CRISIS TEXT LINE: www.crisistextline.org OR text 082-391 anywhere, anytime, any crisis    Poison Control Center - [phone]  [facility] - [phone]; or [facility] - [phone]    If you have a medical emergency please call 911 or go to the nearest ER.

## 2021-01-22 ASSESSMENT — PATIENT HEALTH QUESTIONNAIRE - PHQ9: SUM OF ALL RESPONSES TO PHQ QUESTIONS 1-9: 18

## 2021-01-22 ASSESSMENT — ANXIETY QUESTIONNAIRES: GAD7 TOTAL SCORE: 21

## 2021-02-10 ENCOUNTER — VIRTUAL VISIT (OUTPATIENT)
Dept: FAMILY MEDICINE | Facility: CLINIC | Age: 31
End: 2021-02-10
Payer: COMMERCIAL

## 2021-02-10 DIAGNOSIS — R10.13 EPIGASTRIC PAIN: ICD-10-CM

## 2021-02-10 DIAGNOSIS — M19.90 ARTHRITIS: ICD-10-CM

## 2021-02-10 DIAGNOSIS — F39 MOOD DISORDER (H): ICD-10-CM

## 2021-02-10 DIAGNOSIS — M25.562 ARTHRALGIA OF BOTH KNEES: ICD-10-CM

## 2021-02-10 DIAGNOSIS — M25.561 ARTHRALGIA OF BOTH KNEES: ICD-10-CM

## 2021-02-10 DIAGNOSIS — F31.81 BIPOLAR 2 DISORDER (H): ICD-10-CM

## 2021-02-10 DIAGNOSIS — F43.0 PANIC ATTACK AS REACTION TO STRESS: ICD-10-CM

## 2021-02-10 DIAGNOSIS — M25.512 ACUTE PAIN OF BOTH SHOULDERS: ICD-10-CM

## 2021-02-10 DIAGNOSIS — F41.0 PANIC ATTACK AS REACTION TO STRESS: ICD-10-CM

## 2021-02-10 DIAGNOSIS — Z51.81 ENCOUNTER FOR THERAPEUTIC DRUG MONITORING: ICD-10-CM

## 2021-02-10 DIAGNOSIS — M25.511 ACUTE PAIN OF BOTH SHOULDERS: ICD-10-CM

## 2021-02-10 DIAGNOSIS — M25.50 MULTIPLE JOINT PAIN: Primary | ICD-10-CM

## 2021-02-10 PROCEDURE — 99215 OFFICE O/P EST HI 40 MIN: CPT | Mod: 95 | Performed by: FAMILY MEDICINE

## 2021-02-10 ASSESSMENT — ANXIETY QUESTIONNAIRES
6. BECOMING EASILY ANNOYED OR IRRITABLE: NEARLY EVERY DAY
3. WORRYING TOO MUCH ABOUT DIFFERENT THINGS: NEARLY EVERY DAY
1. FEELING NERVOUS, ANXIOUS, OR ON EDGE: NEARLY EVERY DAY
IF YOU CHECKED OFF ANY PROBLEMS ON THIS QUESTIONNAIRE, HOW DIFFICULT HAVE THESE PROBLEMS MADE IT FOR YOU TO DO YOUR WORK, TAKE CARE OF THINGS AT HOME, OR GET ALONG WITH OTHER PEOPLE: VERY DIFFICULT
7. FEELING AFRAID AS IF SOMETHING AWFUL MIGHT HAPPEN: NEARLY EVERY DAY
5. BEING SO RESTLESS THAT IT IS HARD TO SIT STILL: SEVERAL DAYS
2. NOT BEING ABLE TO STOP OR CONTROL WORRYING: NEARLY EVERY DAY
GAD7 TOTAL SCORE: 18

## 2021-02-10 ASSESSMENT — PATIENT HEALTH QUESTIONNAIRE - PHQ9
5. POOR APPETITE OR OVEREATING: MORE THAN HALF THE DAYS
SUM OF ALL RESPONSES TO PHQ QUESTIONS 1-9: 16

## 2021-02-10 NOTE — PROGRESS NOTES
Betty is a 30 year old who is being evaluated via a billable video visit.      How would you like to obtain your AVS? MyChart  If the video visit is dropped, the invitation should be resent by: Text to cell phone: [phone]  Will anyone else be joining your video visit? No      Assessment & Plan     Betty Tamayo 30-year-old history of depression/ anxiety mood disorder previous therapy, Agoraphobia,  panic attacks, Bipolar 2 and multiple joint pain now epigastric pain who presents today via video visit for discussion of multiple joint pain that has been present for the last 2 to 3 months.  Shoulder pain 8 out of 10 has been keeping her up at night.  She has been using high doses of ibuprofen and Tylenol as noted below I recommended she discontinue both and have lab evaluation.  At the most she could take 1 dose of ibuprofen 800 mg daily and will try topical Voltaren gel.  She recently started lithium and therefore as she is having stomach upset she needs to indicate to Dr. Brown her symptoms to determine if adjust of dose is needed.  Sometimes ibuprofen can cause stomach irritation therefore if her stomach epigastric pain continues discontinue ibuprofen.  She needs to follow-up with her [facility] orthopedics providers for further recommendations.  For her knee pain I did suggest compression such as a knee sleeve as a means to assist with pain.  I reviewed we need to monitor thyroid function and blood glucose on her current medications for her mood.  If her test for inflammation is elevated she would need of referral for rheumatology.  There is a family component of arthritis will check for rheumatoid arthritis.    Multiple joint pain  Arthralgia of both knees  Acute pain of both shoulders  Arthritis  New concern worse over the last 2-3 months  - TSH with free T4 reflex  - CRP inflammation  - Rheumatoid factor  - Vitamin D Deficiency  - diclofenac (VOLTAREN) 1 % topical gel  Dispense: 350 g; Refill:  3      Epigastric pain  See above may be related to recent large doses of Ibuprofen and tylenol need labs stop as noted above. May need PPI or H 2 blocker if symptoms persist  - Lipase  - CBC with platelets differential    Encounter for therapeutic drug monitoring  See above  - Comprehensive metabolic panel  - TSH with free T4 reflex  - Hemoglobin A1c  - CBC with platelets differential    Mood disorder (H)  Bipolar 2 disorder (H)  See above contact Dr Boyd after lab completion to determine if adjust doses needed also to review stomach pain  57 minutes spent on the date of the encounter doing chart review, history, exam, diagnostics review, documentation, counseling and coordination of cares as noted.                   Return in about 5 weeks (around 3/17/2021).    Kapil Corbett MD  [facility] [place]    Radha Hernández is a 30 year old who presents for the following health issues     HPI   Betty Tamayo has history of depression/ anxiety mood disorder previous therapy, Agoraphobia,  panic attacks, Bipolar 2 and multiple joint pain presents for video visit:  Joint pain  Betty has been experiencing worsening joint pain in multiple areas in particular  Knees, ankles 5/10 shoulders 8/10 keeping up at night worse within the last 2 months. At first she thought she was sleeping in unusual manner as her shoulder was painful. She does not take vit D.She sees someone at [facility] for her severe arthritis of knees and will have injections and had an left ankle MRI.  She has a family HX of arthritis, mother hands and knees, dad in hands.  She has been using ibuprofen 800 mg tid and tylenol  3-4 tabs three time daily and over the counter patches. I reviewed need to stop use of tylenol and lower dose of Ibuprofen to maximum once daily.  Her pain is interfering with her activities of daily living in particular her knee pain has difficulty concentrating due to knee pain.  Over-the-counter  patches do seem to help but the alternating ibuprofen and Tylenol does not seem to touch her pain.  Mood  She recently started on lithium for approximately 1 month.  I reviewed there are interactions with medications and lithium.  She has noted stomach pain in the epigastric region since starting lithium also is on Seroquel and lamotrigine was lowered as noted below. The large amount of Ibuprofen and tylenol  likely are contributing as well to stomach pain.  .  Last visit Dr Boyd had the following recommendations:  Start lithium ER 300mg at bedtime for 1 week, then increase to 600mg at bedtime  - Discontinue bupropion  - Decrease back to lamotrigine 300mg daily  - Increase propranolol 40mg three times daily  - Increase to Seroquel 25-50mg twice daily as needed for anxiety / sleep  2) THERAPY: Psychotherapy is a primary recommendation.   Currently on wait list for [facility] psych clinic trauma program.   Will continue brief therapy with Salvatore Pettit until trauma program.   Currently attending day treatment  She had no other questions for today denies fevers or signs of infection.  Labs reviewed in EPIC  BP Readings from Last 3 Encounters:   10/19/20 136/82   20 130/82   20 (!) 138/105    Wt Readings from Last 3 Encounters:   10/19/20 114.8 kg (253 lb)   20 121.6 kg (268 lb)   20 125.6 kg (277 lb)                  Patient Active Problem List   Diagnosis     Encounter for supervision of high risk pregnancy, , [facility] CNM     History of pre-eclampsia in prior pregnancy, currently pregnant     Nausea     UTI in pregnancy, first trimester     Vitamin D deficiency     Maternal varicella, non-immune     Medication exposure during first trimester of pregnancy     Uncomplicated asthma     Arthralgia of both knees     Snoring     Hypersomnia     Class 3 severe obesity due to excess calories with body mass index (BMI) of 40.0 to 44.9 in adult, unspecified whether serious comorbidity present (H)     PTSD  (post-traumatic stress disorder)     Mood disorder (H)     Generalized anxiety disorder     Bipolar 2 disorder (H)     Multiple joint pain     Arthritis     Past Surgical History:   Procedure Laterality Date     ANESTHESIA OUT OF OR MRI Right 1/8/2015    Procedure: ANESTHESIA OUT OF OR MRI;  Surgeon: Generic Anesthesia Provider;  Location: [facility] OR     ENT SURGERY      tonsillectomy 1995     wisdom teeth         Social History     Tobacco Use     Smoking status: Former Smoker     Packs/day: 0.50     Types: Cigarettes     Quit date: 9/12/2017     Years since quitting: 3.4     Smokeless tobacco: Never Used   Substance Use Topics     Alcohol use: Yes     Frequency: Monthly or less     Drinks per session: 1 or 2     Binge frequency: Never     Comment: stopped in pregnancy     Family History   Problem Relation Age of Onset     Diabetes Mother      Cerebrovascular Disease Mother      Anxiety Disorder Mother      Depression Mother      Cerebrovascular Disease Sister      Diabetes Maternal Grandmother      Hypertension Maternal Grandmother      Sleep Apnea Brother      Parkinsonism No family hx of          Current Outpatient Medications   Medication Sig Dispense Refill     diclofenac (VOLTAREN) 1 % topical gel 2 grams to small joint and 4 grams to large joints up to 4 times daily for joint pain as needed 350 g 3     hydrOXYzine (ATARAX) 25 MG tablet Take 1-2 tablets (25-50 mg) by mouth daily as needed for anxiety 60 tablet 0     lamoTRIgine (LAMICTAL) 200 MG tablet Take 1.5 tablets (300 mg) by mouth At Bedtime 45 tablet 1     lithium ER (LITHOBID) 300 MG CR tablet Take 1 tablet (300 mg) by mouth At Bedtime for 7 days, THEN 2 tablets (600 mg) At Bedtime. 60 tablet 0     propranolol (INDERAL) 40 MG tablet Take 1 tablet (40 mg) by mouth 3 times daily 90 tablet 0     QUEtiapine (SEROQUEL) 25 MG tablet Take 1-2 tablets (25-50 mg) by mouth 2 times daily as needed (for anxiety / sleep) 120 tablet 0     No Known Allergies  Recent Labs    Lab Test 01/08/21  1214 12/16/19  1254 10/12/17  1455   A1C  --   --  5.7   *  --   --    HDL 48*  --   --    TRIG 95  --   --    ALT 27 29 49   CR 0.81 0.71 0.60   GFRESTIMATED >90 >90 >90   GFRESTBLACK >90 >90 >90   POTASSIUM 4.1 3.7  --    TSH 0.49 1.24  --             Review of Systems         Objective           Vitals:  No vitals were obtained today due to virtual visit.    Physical Exam   GENERAL: Healthy, alert and no distress  EYES: Eyes grossly normal to inspection.  No discharge or erythema, or obvious scleral/conjunctival abnormalities.  RESP: No audible wheeze, cough, or visible cyanosis.  No visible retractions or increased work of breathing.    SKIN: Visible skin clear. No significant rash, abnormal pigmentation or lesions.  NEURO: Cranial nerves grossly intact.  Mentation and speech appropriate for age.  PSYCH: Mentation appears interactive good eye contact sitting in her car affect flat, judgement and insight intact, normal speech and appearance well-groomed.Mood stable.  MS unable to see joints sitting in car with coat on as it is below zero                Video-Visit Details    Type of service:  Video Visit    Video Time:9:30- 10:02    Originating Location (pt. Location): Other [facility]    Distant Location (provider location):  [facility] [place]     Platform used for Video Visit: [employer]

## 2021-02-10 NOTE — NURSING NOTE
Chief Complaint   Patient presents with     Recheck Medication     pt would like to follow up from one month ago       Tomy Alcaraz CMA, EMT at 9:07 AM on 2/10/2021.

## 2021-02-10 NOTE — Clinical Note
Dr Boyd  I ordered labs she is having joint and stomach pain taking too much ibuprofen and tylenol. I noted recent start of lithium. I will be gone for one week after 2/12 through 2/22. Please review labs once they are completed. My Partner Xiomy Valiente will be covering while I am gone, I will copy her on my note as well.  Best wishes,  Kapil Corbett MD

## 2021-02-10 NOTE — PATIENT INSTRUCTIONS
Discontinue Ibuprofen and Tylenol  As you are taking too high of doses.  At the most you could take 1 dose of ibuprofen 800 mg daily and will start topical Voltaren gel 2 grams for small joints and 4 grams for larger joints.  As you recently started lithium and  having stomach upset please update Dr. Boyd with your symptoms to determine if adjust of dose is needed.    Sometimes ibuprofen can cause stomach irritation therefore if stomach epigastric pain continues discontinue ibuprofen.   Please  follow-up with [facility] orthopedics providers for further recommendations on joint pain.  Please get labs completed in 1-2 days for my review.  Best wishes,  Kapil Corbett MD

## 2021-02-10 NOTE — Clinical Note
Please assist with lab visit 1-2 days and follow-up one month may be virtual.  Best wishes,  Kapil Corbett MD

## 2021-02-11 ASSESSMENT — ANXIETY QUESTIONNAIRES: GAD7 TOTAL SCORE: 18

## 2021-02-18 ENCOUNTER — VIRTUAL VISIT (OUTPATIENT)
Dept: BEHAVIORAL HEALTH | Facility: CLINIC | Age: 31
End: 2021-02-18
Payer: COMMERCIAL

## 2021-02-18 DIAGNOSIS — F43.10 PTSD (POST-TRAUMATIC STRESS DISORDER): ICD-10-CM

## 2021-02-18 DIAGNOSIS — F31.81 BIPOLAR 2 DISORDER (H): Primary | ICD-10-CM

## 2021-02-18 PROCEDURE — 99215 OFFICE O/P EST HI 40 MIN: CPT | Mod: 95 | Performed by: PSYCHIATRY & NEUROLOGY

## 2021-02-18 PROCEDURE — 99417 PROLNG OP E/M EACH 15 MIN: CPT | Mod: 95 | Performed by: PSYCHIATRY & NEUROLOGY

## 2021-02-18 RX ORDER — PROPRANOLOL HYDROCHLORIDE 40 MG/1
40 TABLET ORAL 3 TIMES DAILY
Qty: 90 TABLET | Refills: 0 | Status: SHIPPED | OUTPATIENT
Start: 2021-02-18 | End: 2021-03-08

## 2021-02-18 RX ORDER — GABAPENTIN 300 MG/1
CAPSULE ORAL
Qty: 90 CAPSULE | Refills: 0 | Status: SHIPPED | OUTPATIENT
Start: 2021-02-18 | End: 2021-03-08

## 2021-02-18 RX ORDER — LURASIDONE HYDROCHLORIDE 40 MG/1
40 TABLET, FILM COATED ORAL
Qty: 30 TABLET | Refills: 0 | Status: SHIPPED | OUTPATIENT
Start: 2021-02-18 | End: 2021-02-26

## 2021-02-18 ASSESSMENT — ANXIETY QUESTIONNAIRES
GAD7 TOTAL SCORE: 21
GAD7 TOTAL SCORE: 21
6. BECOMING EASILY ANNOYED OR IRRITABLE: NEARLY EVERY DAY
7. FEELING AFRAID AS IF SOMETHING AWFUL MIGHT HAPPEN: NEARLY EVERY DAY
GAD7 TOTAL SCORE: 21
5. BEING SO RESTLESS THAT IT IS HARD TO SIT STILL: NEARLY EVERY DAY
1. FEELING NERVOUS, ANXIOUS, OR ON EDGE: NEARLY EVERY DAY
3. WORRYING TOO MUCH ABOUT DIFFERENT THINGS: NEARLY EVERY DAY
4. TROUBLE RELAXING: NEARLY EVERY DAY
2. NOT BEING ABLE TO STOP OR CONTROL WORRYING: NEARLY EVERY DAY
7. FEELING AFRAID AS IF SOMETHING AWFUL MIGHT HAPPEN: NEARLY EVERY DAY

## 2021-02-18 ASSESSMENT — PATIENT HEALTH QUESTIONNAIRE - PHQ9
SUM OF ALL RESPONSES TO PHQ QUESTIONS 1-9: 17
10. IF YOU CHECKED OFF ANY PROBLEMS, HOW DIFFICULT HAVE THESE PROBLEMS MADE IT FOR YOU TO DO YOUR WORK, TAKE CARE OF THINGS AT HOME, OR GET ALONG WITH OTHER PEOPLE: EXTREMELY DIFFICULT
SUM OF ALL RESPONSES TO PHQ QUESTIONS 1-9: 17

## 2021-02-18 NOTE — NURSING NOTE
"Chief Complaint   Patient presents with     Recheck Medication     PTSD Bipolar     Would like out of today's visit:  Med check  Karyn Forrester LPN     VIDEO VISIT  Betty Tamayo is a 30 year old patient who is being evaluated via a billable video visit.      The patient has been notified of following:   \"This video visit will be conducted via a call between you and your physician/provider. We have found that certain health care needs can be provided without the need for an in-person physical exam. This service lets us provide the care you need with a video conversation. If a prescription is necessary we can send it directly to your pharmacy. If lab work is needed we can place an order for that and you can then stop by our lab to have the test done at a later time. Insurers are generally covering virtual visits as they would in-office visits so billing should not be different than normal.  If for some reason you do get billed incorrectly, you should contact the billing office to correct it and that number is in the AVS .    Video Conference to be completed via:  Esther    Patient has given verbal consent for video visit?:  Yes    Patient would prefer that any video invitations be sent by: Text to cell phone: [phone]      How would patient like to obtain AVS?:  [employer]    AVS SmartPhrase [PsychAVS] has been placed in 'Patient Instructions':  Yesm    "

## 2021-02-18 NOTE — PATIENT INSTRUCTIONS
Discontinue lithium and Seroquel.     Start lurasidone (Latuda) 40mg daily with food. Take this at dinner to start, in case of sedation.     May take gabapentin 300mg up to twice daily as needed for anxiety and 300-900mg at bedtime as needed for anxiety / sleep    Call the customer service number for [facility] genetic testing at:  [phone]  They can help you determine what the price of testing would be.     For scheduling or non-urgent medication questions:   Please call [phone] during business hours (8-5:00 M-F).    **For crisis resources, please see the information at the end of this document**  Patient Education   Financial Assistance [phone]  [facility] Billing [phone]  [facility] Billing [phone]  Medical Records [phone]  [facility] Patient Bill of Rights ([email or web address])      MENTAL HEALTH CRISIS NUMBERS:  [facility]:  [facility] - [phone]  Crisis Residence [facility] - [phone]  Walk-In Counseling [facility] [phone]  [place] 24/7 [place] Mobile Team - [[phone]]    [facility]:  [facility] - [phone]  Walk-in counseling [facility] - [phone]  Walk-in counseling [facility] - [phone]  Crisis Residence [facility] - [phone]  Urgent Care Adult Mental Health:   --Drop-in, 24/7 crisis line, and [facility] Mobile Team [[phone]]    24/7 CRISIS TEXT LINE: www.crisistextline.org OR text [phone] anywhere, anytime, any crisis    Poison Control Center - [phone]  [facility] Lifeline - [phone]; or [employer] Lifeline - [phone]    If you have a medical emergency please call 911 or go to the nearest ER.
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
## 2021-02-19 ASSESSMENT — PATIENT HEALTH QUESTIONNAIRE - PHQ9: SUM OF ALL RESPONSES TO PHQ QUESTIONS 1-9: 17

## 2021-02-19 ASSESSMENT — ANXIETY QUESTIONNAIRES: GAD7 TOTAL SCORE: 21

## 2021-02-22 DIAGNOSIS — Z51.81 ENCOUNTER FOR THERAPEUTIC DRUG MONITORING: ICD-10-CM

## 2021-02-22 DIAGNOSIS — M19.90 ARTHRITIS: ICD-10-CM

## 2021-02-22 DIAGNOSIS — R10.13 EPIGASTRIC PAIN: ICD-10-CM

## 2021-02-22 DIAGNOSIS — Z79.899 ENCOUNTER FOR LONG-TERM (CURRENT) USE OF MEDICATIONS: ICD-10-CM

## 2021-02-22 DIAGNOSIS — M25.50 MULTIPLE JOINT PAIN: ICD-10-CM

## 2021-02-22 LAB
ALBUMIN SERPL-MCNC: 3.4 G/DL (ref 3.4–5)
ALP SERPL-CCNC: 70 U/L (ref 40–150)
ALT SERPL W P-5'-P-CCNC: 25 U/L (ref 0–50)
ANION GAP SERPL CALCULATED.3IONS-SCNC: 4 MMOL/L (ref 3–14)
AST SERPL W P-5'-P-CCNC: 17 U/L (ref 0–45)
BASOPHILS # BLD AUTO: 0.1 10E9/L (ref 0–0.2)
BASOPHILS NFR BLD AUTO: 0.8 %
BILIRUB SERPL-MCNC: 0.3 MG/DL (ref 0.2–1.3)
BUN SERPL-MCNC: 12 MG/DL (ref 7–30)
CALCIUM SERPL-MCNC: 8.1 MG/DL (ref 8.5–10.1)
CHLORIDE SERPL-SCNC: 109 MMOL/L (ref 94–109)
CO2 SERPL-SCNC: 27 MMOL/L (ref 20–32)
CREAT SERPL-MCNC: 0.77 MG/DL (ref 0.52–1.04)
CRP SERPL-MCNC: <2.9 MG/L (ref 0–8)
DEPRECATED CALCIDIOL+CALCIFEROL SERPL-MC: 9 UG/L (ref 20–75)
DIFFERENTIAL METHOD BLD: ABNORMAL
EOSINOPHIL # BLD AUTO: 0.2 10E9/L (ref 0–0.7)
EOSINOPHIL NFR BLD AUTO: 2.4 %
ERYTHROCYTE [DISTWIDTH] IN BLOOD BY AUTOMATED COUNT: 15.5 % (ref 10–15)
GFR SERPL CREATININE-BSD FRML MDRD: >90 ML/MIN/{1.73_M2}
GLUCOSE SERPL-MCNC: 85 MG/DL (ref 70–99)
HBA1C MFR BLD: 5.4 % (ref 0–5.6)
HCT VFR BLD AUTO: 39.1 % (ref 35–47)
HGB BLD-MCNC: 11.8 G/DL (ref 11.7–15.7)
IMM GRANULOCYTES # BLD: 0 10E9/L (ref 0–0.4)
IMM GRANULOCYTES NFR BLD: 0.3 %
LIPASE SERPL-CCNC: 53 U/L (ref 73–393)
LITHIUM SERPL-SCNC: <0.2 MMOL/L (ref 0.6–1.2)
LYMPHOCYTES # BLD AUTO: 2.9 10E9/L (ref 0.8–5.3)
LYMPHOCYTES NFR BLD AUTO: 38 %
MCH RBC QN AUTO: 26.4 PG (ref 26.5–33)
MCHC RBC AUTO-ENTMCNC: 30.2 G/DL (ref 31.5–36.5)
MCV RBC AUTO: 88 FL (ref 78–100)
MONOCYTES # BLD AUTO: 0.5 10E9/L (ref 0–1.3)
MONOCYTES NFR BLD AUTO: 6.2 %
NEUTROPHILS # BLD AUTO: 3.9 10E9/L (ref 1.6–8.3)
NEUTROPHILS NFR BLD AUTO: 52.3 %
NRBC # BLD AUTO: 0 10*3/UL
NRBC BLD AUTO-RTO: 0 /100
PLATELET # BLD AUTO: 328 10E9/L (ref 150–450)
POTASSIUM SERPL-SCNC: 4.2 MMOL/L (ref 3.4–5.3)
PROT SERPL-MCNC: 6.8 G/DL (ref 6.8–8.8)
RBC # BLD AUTO: 4.47 10E12/L (ref 3.8–5.2)
SODIUM SERPL-SCNC: 141 MMOL/L (ref 133–144)
TSH SERPL DL<=0.005 MIU/L-ACNC: 0.92 MU/L (ref 0.4–4)
WBC # BLD AUTO: 7.5 10E9/L (ref 4–11)

## 2021-02-22 PROCEDURE — 80050 GENERAL HEALTH PANEL: CPT | Performed by: PATHOLOGY

## 2021-02-22 PROCEDURE — 83036 HEMOGLOBIN GLYCOSYLATED A1C: CPT | Performed by: PATHOLOGY

## 2021-02-22 PROCEDURE — 83690 ASSAY OF LIPASE: CPT | Performed by: PATHOLOGY

## 2021-02-22 PROCEDURE — 80178 ASSAY OF LITHIUM: CPT | Mod: 90 | Performed by: PATHOLOGY

## 2021-02-22 PROCEDURE — 86140 C-REACTIVE PROTEIN: CPT | Performed by: PATHOLOGY

## 2021-02-22 PROCEDURE — 36415 COLL VENOUS BLD VENIPUNCTURE: CPT | Performed by: PATHOLOGY

## 2021-02-22 PROCEDURE — 86431 RHEUMATOID FACTOR QUANT: CPT | Mod: 90 | Performed by: PATHOLOGY

## 2021-02-22 PROCEDURE — 82306 VITAMIN D 25 HYDROXY: CPT | Mod: 90 | Performed by: PATHOLOGY

## 2021-02-23 LAB — RHEUMATOID FACT SER NEPH-ACNC: <7 IU/ML (ref 0–20)

## 2021-02-25 ENCOUNTER — VIRTUAL VISIT (OUTPATIENT)
Dept: PSYCHIATRY | Facility: CLINIC | Age: 31
End: 2021-02-25
Attending: PSYCHOLOGIST
Payer: COMMERCIAL

## 2021-02-25 DIAGNOSIS — F41.1 GENERALIZED ANXIETY DISORDER: ICD-10-CM

## 2021-02-25 DIAGNOSIS — F43.10 PTSD (POST-TRAUMATIC STRESS DISORDER): ICD-10-CM

## 2021-02-25 DIAGNOSIS — F31.81 BIPOLAR 2 DISORDER (H): Primary | ICD-10-CM

## 2021-02-25 PROCEDURE — 90832 PSYTX W PT 30 MINUTES: CPT | Mod: GT | Performed by: PSYCHOLOGIST

## 2021-02-28 ENCOUNTER — TELEPHONE (OUTPATIENT)
Dept: PSYCHIATRY | Facility: CLINIC | Age: 31
End: 2021-02-28
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
## 2021-02-28 NOTE — TELEPHONE ENCOUNTER
Writer called patient on 2/26 to schedule follow-up appointment after appointment on 2/25 abruptly ended due to what seemed like technical concerns. There was no evidence that patient was agitated or experiencing high emotions when the call ended. Writer left a second voicemail with return contact information.

## 2021-03-08 ENCOUNTER — VIRTUAL VISIT (OUTPATIENT)
Dept: PSYCHIATRY | Facility: CLINIC | Age: 31
End: 2021-03-08
Attending: PSYCHIATRY & NEUROLOGY
Payer: COMMERCIAL

## 2021-03-08 ENCOUNTER — TELEPHONE (OUTPATIENT)
Dept: PSYCHIATRY | Facility: CLINIC | Age: 31
End: 2021-03-08

## 2021-03-08 DIAGNOSIS — F31.81 BIPOLAR 2 DISORDER (H): Primary | ICD-10-CM

## 2021-03-08 DIAGNOSIS — F43.10 PTSD (POST-TRAUMATIC STRESS DISORDER): ICD-10-CM

## 2021-03-08 PROCEDURE — 99215 OFFICE O/P EST HI 40 MIN: CPT | Mod: 95 | Performed by: PSYCHIATRY & NEUROLOGY

## 2021-03-08 RX ORDER — DIAZEPAM 5 MG
5 TABLET ORAL 2 TIMES DAILY
Qty: 60 TABLET | Refills: 0 | Status: SHIPPED | OUTPATIENT
Start: 2021-03-08 | End: 2021-03-22

## 2021-03-08 RX ORDER — PROPRANOLOL HYDROCHLORIDE 40 MG/1
40 TABLET ORAL 2 TIMES DAILY
Qty: 60 TABLET | Refills: 0 | Status: SHIPPED | OUTPATIENT
Start: 2021-03-08 | End: 2021-03-22

## 2021-03-08 RX ORDER — ARIPIPRAZOLE 5 MG/1
5 TABLET ORAL DAILY
Qty: 30 TABLET | Refills: 0 | Status: SHIPPED | OUTPATIENT
Start: 2021-03-08 | End: 2021-03-22

## 2021-03-08 RX ORDER — GABAPENTIN 300 MG/1
CAPSULE ORAL
Qty: 90 CAPSULE | Refills: 0 | Status: SHIPPED | OUTPATIENT
Start: 2021-03-08 | End: 2021-05-24
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
## 2021-03-08 NOTE — TELEPHONE ENCOUNTER
On 3/8/2021, at 0913, writer called patient at mobile to confirm Virtual Visit. Writer unable to make contact with patient. Writer left detailed voice message for callback. [phone], left as call back number. APOLINAR Frias, EMT

## 2021-03-08 NOTE — Clinical Note
Hey guys, I think this patient would be a good candidate for the genetics study. Great to know it's up again, you should ask Scarlet to send something out about this!

## 2021-03-08 NOTE — TELEPHONE ENCOUNTER
On March 8, 2021 at 10:28 AM writer called patient at [phone] to confirm Virtual Visit. Writer unable to make contact with patient. No voice mail left due to call back. Writer  Natalie Irving CMA

## 2021-03-12 ENCOUNTER — VIRTUAL VISIT (OUTPATIENT)
Dept: PSYCHIATRY | Facility: CLINIC | Age: 31
End: 2021-03-12
Attending: PSYCHOLOGIST
Payer: COMMERCIAL

## 2021-03-12 DIAGNOSIS — F43.10 PTSD (POST-TRAUMATIC STRESS DISORDER): ICD-10-CM

## 2021-03-12 DIAGNOSIS — F41.1 GENERALIZED ANXIETY DISORDER: ICD-10-CM

## 2021-03-12 DIAGNOSIS — F31.81 BIPOLAR 2 DISORDER (H): Primary | ICD-10-CM

## 2021-03-12 PROCEDURE — 90834 PSYTX W PT 45 MINUTES: CPT | Mod: GT | Performed by: PSYCHOLOGIST
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
## 2021-03-16 ENCOUNTER — TELEPHONE (OUTPATIENT)
Dept: PSYCHIATRY | Facility: CLINIC | Age: 31
End: 2021-03-16

## 2021-03-16 NOTE — TELEPHONE ENCOUNTER
Per Dr. Boyd, writer attempted to reach pt to check-in following last week's appointment. No answer; LVM encouraging pt to return writer's phone call. Writer will attempt to reach pt again if no response by end of week.

## 2021-03-19 ENCOUNTER — VIRTUAL VISIT (OUTPATIENT)
Dept: PSYCHIATRY | Facility: CLINIC | Age: 31
End: 2021-03-19
Attending: PSYCHOLOGIST
Payer: COMMERCIAL

## 2021-03-19 DIAGNOSIS — F43.10 PTSD (POST-TRAUMATIC STRESS DISORDER): ICD-10-CM

## 2021-03-19 DIAGNOSIS — F31.81 BIPOLAR 2 DISORDER (H): Primary | ICD-10-CM

## 2021-03-19 DIAGNOSIS — F41.1 GENERALIZED ANXIETY DISORDER: ICD-10-CM

## 2021-03-19 PROCEDURE — 90837 PSYTX W PT 60 MINUTES: CPT | Mod: GT | Performed by: PSYCHOLOGIST
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
## 2021-03-19 NOTE — TELEPHONE ENCOUNTER
Placed follow-up phone call to pt. No answer; LVM with writer's c/b information. Invited pt to return writer's phone call to check-in.

## 2021-03-22 ENCOUNTER — TELEPHONE (OUTPATIENT)
Dept: PSYCHIATRY | Facility: CLINIC | Age: 31
End: 2021-03-22

## 2021-03-22 ENCOUNTER — VIRTUAL VISIT (OUTPATIENT)
Dept: PSYCHIATRY | Facility: CLINIC | Age: 31
End: 2021-03-22
Attending: PSYCHIATRY & NEUROLOGY
Payer: COMMERCIAL

## 2021-03-22 DIAGNOSIS — F43.10 PTSD (POST-TRAUMATIC STRESS DISORDER): Primary | ICD-10-CM

## 2021-03-22 DIAGNOSIS — F31.81 BIPOLAR 2 DISORDER (H): ICD-10-CM

## 2021-03-22 PROCEDURE — 99214 OFFICE O/P EST MOD 30 MIN: CPT | Mod: GT | Performed by: PSYCHIATRY & NEUROLOGY

## 2021-03-22 RX ORDER — ARIPIPRAZOLE 5 MG/1
5 TABLET ORAL DAILY
Qty: 30 TABLET | Refills: 0 | Status: SHIPPED | OUTPATIENT
Start: 2021-03-22 | End: 2021-04-12

## 2021-03-22 RX ORDER — PROPRANOLOL HYDROCHLORIDE 40 MG/1
40 TABLET ORAL 2 TIMES DAILY
Qty: 60 TABLET | Refills: 0 | Status: SHIPPED | OUTPATIENT
Start: 2021-03-22 | End: 2021-04-12

## 2021-03-22 RX ORDER — DIAZEPAM 5 MG
2.5-5 TABLET ORAL 2 TIMES DAILY
Qty: 60 TABLET | Refills: 0 | Status: SHIPPED | OUTPATIENT
Start: 2021-03-22 | End: 2021-04-12
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
## 2021-03-22 NOTE — PATIENT INSTRUCTIONS
**For crisis resources, please see the information at the end of this document**     Patient Education      Thank you for coming to the [facility]ENT[facility] HEALTH & ADDI[facility] [place] [facility].    Lab Testing:  If you had lab testing today and your results are reassuring or normal they will be mailed to you or sent through [employer] within 7 days. If the lab tests need quick action we will call you with the results. The phone number we will call with results is # [phone] (home) . If this is not the best number please call our clinic and change the number.    Medication Refills:  If you need any refills please call your pharmacy and they will contact us. Our fax number for refills is [phone]. Please allow three business for refill processing. If you need to  your refill at a new pharmacy, please contact the new pharmacy directly. The new pharmacy will help you get your medications transferred.     Scheduling:  If you have any concerns about today's visit or wish to schedule another appointment please call our office during normal business hours [phone] (8-5:00 M-F)    Contact Us:  Please call [phone] during business hours (8-5:00 M-F).  If after clinic hours, or on the weekend, please call  [phone].    Financial Assistance [phone]  [facility] Billing [phone]  Central Billing Office, [facility]: [phone]  [facility] Billing [phone]  Medical Records [phone]  [facility] Patient Bill of Rights [email or web address]       MENTAL HEALTH CRISIS NUMBERS:  For a medical emergency please call  911 or go to the nearest ER.     [facility]:   [facility] -[phone]   Crisis Residence [facility] -[phone]   Walk-In Counseling Center [facility] -[phone]   COPE 24/7 [place] Mobile Team -[phone] (adults)/[phone] (child)  CHILD: [facility] needs assessment  team - [phone]      [facility]:   [facility] - [phone]   Walk-in counseling [facility] - [phone]   Walk-in counseling [facility] - [phone]   Crisis Residence [facility] - [phone]  Urgent Care Adult Mental [facility]-[phone] mobile unit/ 24/7 crisis line    National Crisis Numbers:   National Suicide Prevention Lifeline: 9-155-217-TALK (364-825-6914)  Poison Control Center - 6-715-898-9624  [employer]/resources for a list of additional resources (SOS)  Trans Lifeline a hotline for transgender people 1-733.535.8958  [employer] a hotline for LGBT youth 1-552-569-6132  Crisis Text Line: For any crisis 24/7   To: 557542  see www.crisistextline.org  - IF MAKING A CALL FEELS TOO HARD, send a text!         Again thank you for choosing [facility] MENTAL HEALTH & ADDICTION [facility] and please let us know how we can best partner with you to improve you and your family's health.    You may be receiving a survey regarding this appointment. We would love to have your feedback, both positive and negative. The survey is done by an external company, so your answers are anonymous.

## 2021-03-22 NOTE — TELEPHONE ENCOUNTER
On 3/22/2021, at 11:10, writer called patient at [phone] to confirm Virtual Visit. Writer unable to make contact with patient. Writer left detailed voice message for call back. [phone] left as call back number. Radha Ross, [facility]

## 2021-04-02 ENCOUNTER — VIRTUAL VISIT (OUTPATIENT)
Dept: PSYCHIATRY | Facility: CLINIC | Age: 31
End: 2021-04-02
Attending: PSYCHOLOGIST
Payer: COMMERCIAL

## 2021-04-02 DIAGNOSIS — F43.10 PTSD (POST-TRAUMATIC STRESS DISORDER): Primary | ICD-10-CM

## 2021-04-02 DIAGNOSIS — F31.81 BIPOLAR 2 DISORDER (H): ICD-10-CM

## 2021-04-02 DIAGNOSIS — F41.1 GENERALIZED ANXIETY DISORDER: ICD-10-CM

## 2021-04-02 PROCEDURE — 90837 PSYTX W PT 60 MINUTES: CPT | Mod: GT | Performed by: PSYCHOLOGIST

## 2021-04-03 NOTE — PROGRESS NOTES
[facility]    Dialectical Behavior Therapy Program    DBT Individual Psychotherapy Progress Note    Date: Apr 2, 2021    Patient Name: Betty Tamayo     Patient Pronouns: She/Her    Patient MRN: [id number]    Provider: Leonor tSroud, PhD LP    Procedure: Individual DBT session    Appointment Duration: 10:05 to 11:00 (55 minutes)    Diagnosis:   Encounter Diagnoses   Name Primary?     PTSD (post-traumatic stress disorder) Yes     Bipolar 2 disorder (H)      Generalized anxiety disorder         Type of service:  video visit for psychotherapy   Reason for video visit:  Patient unable to travel due to Covid-19  Originating Site (patient location):  [facility]   Location- Place of employment  Distant Site (provider location):  Remote location  Mode of Communication:  Video Conference via [email or web address]  Consent:  Patient has given verbal consent for video visit?: Yes      Pre-DBT Session: Betty is in pre-DBT. Session was spent discussing the diary card and how she can make it to therapy and stay in session even if something happens that makes her want to leave the session early (this has happened twice). The most recent time she was doing the session at work and people were walking by and knocking on the room that she was in and she noted that she had intense anxiety and hung up. Writer discussed having a code word so that writer would know that she was wanting to escape and writer could try and keep her in session. She discussed how she has frequent anger outbursts and even though she knows yelling is ineffective, she sometimes does not want to stop. She said that if she can leave the room and smoke marijuana, that helps her, but that is not a good coping strategy for her since she does not always have it and it is illegal. Writer discussed continuing to practice TIPP skills so that she can have another tool for when the intensity gets high.     Diary Card Completed  Before Session? Not applicable    Patient Used Phone Coaching Since Last Session: No     Primary Targets Addressed in This Session:  Other: Commitment to treatment and reviewing the diary card.     DBT Skills reviewed or taught in Session:    TIP: body Temperature, Intensely exercise, Progressive muscle relaxation    Interactive Complexity Code Was Used (08346): No.     Behavioral Observations/Mental Status: Patient arrived late by 5 min to session. Patient was adequately groomed. Eye contact was good. Mood today was friendly. Observed affect was full range. Speech was regular rate and rhythm. Thought process was Logical, Linear and Goal directed. Patient was actively engaged in session.    Risk Assessment: The patient has the following risk and protective factors:      Risk factors: previous history of suicide attempts, suicidal ideation and anger/rage  Protective factors:  Supportive friends and/or family, Hopeful, Is a parent and Stable employment     Based on risk and protective factors, patient is assessed to be at the following levels of acute and chronic risk.     Acute Risk: Low Acute Risk (i.e., no current suicidal intent, specific plan, preparatory behaviors, and high confidence in patient's ability to maintain safety).  Chronic Risk: Intermediate Chronic Risk (i.e., chronic suicidal ideation with protective factors and coping skills to endure crisis without self-directed violence)    Behavioral Assignments:  1) Complete DBT Diary Card daily  2) Complete DBT Skills Group homework  3) Practice TIPP skills     Plan: Patient will continue in full-model, outpatient DBT program until completion of one full-round of DBT skills/the end of their 6-month treatment agreement.

## 2021-04-09 ENCOUNTER — VIRTUAL VISIT (OUTPATIENT)
Dept: PSYCHIATRY | Facility: CLINIC | Age: 31
End: 2021-04-09
Attending: PSYCHOLOGIST
Payer: COMMERCIAL

## 2021-04-09 DIAGNOSIS — F43.10 PTSD (POST-TRAUMATIC STRESS DISORDER): ICD-10-CM

## 2021-04-09 DIAGNOSIS — F41.1 GENERALIZED ANXIETY DISORDER: ICD-10-CM

## 2021-04-09 DIAGNOSIS — F31.81 BIPOLAR 2 DISORDER (H): Primary | ICD-10-CM

## 2021-04-09 PROCEDURE — 90837 PSYTX W PT 60 MINUTES: CPT | Mod: GT | Performed by: PSYCHOLOGIST
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
## 2021-04-12 ENCOUNTER — TELEPHONE (OUTPATIENT)
Dept: PSYCHIATRY | Facility: CLINIC | Age: 31
End: 2021-04-12

## 2021-04-12 ENCOUNTER — VIRTUAL VISIT (OUTPATIENT)
Dept: PSYCHIATRY | Facility: CLINIC | Age: 31
End: 2021-04-12
Attending: PSYCHIATRY & NEUROLOGY
Payer: COMMERCIAL

## 2021-04-12 DIAGNOSIS — F43.10 PTSD (POST-TRAUMATIC STRESS DISORDER): Primary | ICD-10-CM

## 2021-04-12 DIAGNOSIS — F31.81 BIPOLAR 2 DISORDER (H): ICD-10-CM

## 2021-04-12 PROCEDURE — 99214 OFFICE O/P EST MOD 30 MIN: CPT | Mod: 95 | Performed by: PSYCHIATRY & NEUROLOGY

## 2021-04-12 PROCEDURE — 90833 PSYTX W PT W E/M 30 MIN: CPT | Mod: 95 | Performed by: PSYCHIATRY & NEUROLOGY

## 2021-04-12 RX ORDER — ARIPIPRAZOLE 10 MG/1
10 TABLET ORAL DAILY
Qty: 30 TABLET | Refills: 0 | Status: SHIPPED | OUTPATIENT
Start: 2021-04-12 | End: 2021-04-19

## 2021-04-12 RX ORDER — PROPRANOLOL HYDROCHLORIDE 40 MG/1
40 TABLET ORAL 2 TIMES DAILY
Qty: 60 TABLET | Refills: 0 | Status: SHIPPED | OUTPATIENT
Start: 2021-04-12 | End: 2021-05-03

## 2021-04-12 RX ORDER — LORAZEPAM 0.5 MG/1
.5-1 TABLET ORAL DAILY PRN
Qty: 20 TABLET | Refills: 0 | Status: SHIPPED | OUTPATIENT
Start: 2021-04-12 | End: 2021-04-19

## 2021-04-12 NOTE — PATIENT INSTRUCTIONS
**For crisis resources, please see the information at the end of this document**     Patient Education      Thank you for coming to the [facility] [place] [facility].    Lab Testing:  If you had lab testing today and your results are reassuring or normal they will be mailed to you or sent through [facility] within 7 days. If the lab tests need quick action we will call you with the results. The phone number we will call with results is # [phone] (home) . If this is not the best number please call our clinic and change the number.    Medication Refills:  If you need any refills please call your pharmacy and they will contact us. Our fax number for refills is [phone]. Please allow three business for refill processing. If you need to  your refill at a new pharmacy, please contact the new pharmacy directly. The new pharmacy will help you get your medications transferred.     Scheduling:  If you have any concerns about today's visit or wish to schedule another appointment please call our office during normal business hours [phone] (8-5:00 M-F)    Contact Us:  Please call [phone] during business hours (8-5:00 M-F).  If after clinic hours, or on the weekend, please call  [phone].    Financial Assistance [phone]  [facility] Billing [phone]  Central Billing Office, [facility]: [phone]  [facility] Billing [phone]  Medical Records [phone]  [facility] Patient Bill of Rights [email or web address]       MENTAL HEALTH CRISIS NUMBERS:  For a medical emergency please call  911 or go to the nearest ER.     [facility]:   [facility] -[phone]   Crisis Residence [facility] Residence -[phone]   Walk-In Counseling Center [facility] -[phone]   COPE 24/7 [place] Mobile Team -[phone] (adults)/[phone] (child)  CHILD: [facility] needs assessment  team - [phone]      [facility]:   [facility] - [phone]   Walk-in counseling [facility] - [phone]   Walk-in counseling [facility] - [phone]   Crisis Residence [facility] [place] [facility] - [phone]  Urgent Care Adult Mental [facility]-[phone] mobile unit/ 24/7 crisis line    National Crisis Numbers:   National Suicide Prevention Lifeline: 3-112-678-TALK (592-583-3584)  Poison Control Center - 6-348-962-9256  [employer]/resources for a list of additional resources (SOS)  Trans Lifeline a hotline for transgender people 1-734.998.7601  [employer] a hotline for LGBT youth 3-024-399-3767  Crisis Text Line: For any crisis 24/7   To: 013774  see www.crisistextline.org  - IF MAKING A CALL FEELS TOO HARD, send a text!         Again thank you for choosing [facility] MENTAL HE[facility] & ADDICTION [facility] and please let us know how we can best partner with you to improve you and your family's health.    You may be receiving a survey regarding this appointment. We would love to have your feedback, both positive and negative. The survey is done by an external company, so your answers are anonymous.

## 2021-04-12 NOTE — TELEPHONE ENCOUNTER
On 4/12/2021, at 9:40, writer called patient at [phone] to confirm Virtual Visit. Writer unable to make contact with patient. Writer left detailed voice message for call back. [phone] left as call back number. Radha Ross, [facility]

## 2021-04-12 NOTE — LETTER
April 12, 2021      Betty MCKEON Roni  [address] [place] [address]        To Whom It May Concern,     Betty was seen by me today 4/12/21 for an urgent medical appointment due to an acute issue. She is not able to attend work for the remainder of the day today. Please excuse her absence.       Sincerely,        Jordan Boyd MD
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
## 2021-04-13 ENCOUNTER — TELEPHONE (OUTPATIENT)
Dept: PSYCHIATRY | Facility: CLINIC | Age: 31
End: 2021-04-13

## 2021-04-13 NOTE — CONFIDENTIAL NOTE
Writer left brief message identifying self and offering support in connecting to appropriate resources with her employer (SW consult requested by Dr. Boyd). Requested call back. Will follow up on 4/14.    BIANKA Brar, [facility] Hospital[facility]  [phone]

## 2021-04-14 NOTE — TELEPHONE ENCOUNTER
Social Work   Incoming/Outgoing Call  [facility]    Incoming From:  Betty Tamayo    Reason for Call:  Betty returning writer's phone call offering support with leave process.    Response/Plan:  Betty reports she missed work yesterday but is at work today. She is unsure length of time needed for leave. She is hoping to get help navigating the leave process. She took a leave in November, but is unsure what to do as she is unsure how much FMLA she has left. Symptoms are also making it difficult for her to complete task on her own.    Writer reviewed working for same organization as patient and obtained verbal consent to look up HR contact information. Writer assisted Betty in calling [employer] to start request for FMLA and short term disability. Writer assisted in advocating to ensure that both programs were applied for and provided contact information for the clinic. Writer advised that if FMLA benefits are used up, she may qualify for an accomodation through ADA.    Writer worked with Betty to identify appropriate request for leave. She notes Dr. Boyd previously provided 2 weeks approval at a time, then reviewed needs and followed up as needed. Betty had a particularly difficult week last week. She is scheduled to start DBT group on 4/23 and has individual therapy appointments scheduled each week. Writer suggested requesting leave though 4/30, with plan to reassess at that time. Betty thought this would be helpful. Betty has talked with supervisor, who is aware of current needs. Writer encouraged Betty to follow up with supervisor regarding making leave request with [employer] and identify next steps (ie can leave start upon alerting supervisor or wait until physician letter received).    Betty felt it was helpful to have support in process. With her permission, writer emailed HR information in case she needed to review or contact HR. Writer encouraged Betty to follow up if she needed other supports.      Will  route to patient's current psychiatric provider(s) as an FYI.   Please call or EPIC message with any questions or concerns.    BIANKA Spencer, [facility] Hospital[facility]  [phone]

## 2021-04-15 ENCOUNTER — TELEPHONE (OUTPATIENT)
Dept: PSYCHIATRY | Facility: CLINIC | Age: 31
End: 2021-04-15

## 2021-04-15 NOTE — TELEPHONE ENCOUNTER
4 faxed pages was received from [facility] requesting completion of Disability forms. The forms were put in Yamilet MCKEON RN folder. .Mirna Camilo LPN

## 2021-04-16 ENCOUNTER — VIRTUAL VISIT (OUTPATIENT)
Dept: PSYCHIATRY | Facility: CLINIC | Age: 31
End: 2021-04-16
Attending: PSYCHOLOGIST
Payer: COMMERCIAL

## 2021-04-16 DIAGNOSIS — F31.81 BIPOLAR 2 DISORDER (H): ICD-10-CM

## 2021-04-16 DIAGNOSIS — F41.1 GENERALIZED ANXIETY DISORDER: ICD-10-CM

## 2021-04-16 DIAGNOSIS — F43.10 PTSD (POST-TRAUMATIC STRESS DISORDER): Primary | ICD-10-CM

## 2021-04-16 PROCEDURE — 90837 PSYTX W PT 60 MINUTES: CPT | Mod: 95 | Performed by: PSYCHOLOGIST

## 2021-04-19 ENCOUNTER — TELEPHONE (OUTPATIENT)
Dept: PSYCHIATRY | Facility: CLINIC | Age: 31
End: 2021-04-19

## 2021-04-19 ENCOUNTER — VIRTUAL VISIT (OUTPATIENT)
Dept: PSYCHIATRY | Facility: CLINIC | Age: 31
End: 2021-04-19
Attending: PSYCHIATRY & NEUROLOGY
Payer: COMMERCIAL

## 2021-04-19 DIAGNOSIS — F31.81 BIPOLAR 2 DISORDER (H): ICD-10-CM

## 2021-04-19 DIAGNOSIS — F43.10 PTSD (POST-TRAUMATIC STRESS DISORDER): Primary | ICD-10-CM

## 2021-04-19 PROCEDURE — 99214 OFFICE O/P EST MOD 30 MIN: CPT | Mod: 95 | Performed by: PSYCHIATRY & NEUROLOGY

## 2021-04-19 RX ORDER — ARIPIPRAZOLE 15 MG/1
15 TABLET ORAL DAILY
Qty: 15 TABLET | Refills: 0 | Status: SHIPPED | OUTPATIENT
Start: 2021-04-19 | End: 2021-05-03

## 2021-04-19 RX ORDER — LORAZEPAM 1 MG/1
1-2 TABLET ORAL DAILY PRN
Qty: 10 TABLET | Refills: 0 | Status: SHIPPED | OUTPATIENT
Start: 2021-04-19 | End: 2021-05-03

## 2021-04-19 NOTE — PATIENT INSTRUCTIONS
**For crisis resources, please see the information at the end of this document**     Patient Education      Thank you for coming to the [facility] MENTAL HEALTH & ADDI[facility] [place] CLINIC.    Lab Testing:  If you had lab testing today and your results are reassuring or normal they will be mailed to you or sent through [facility] within 7 days. If the lab tests need quick action we will call you with the results. The phone number we will call with results is # [phone] (home) . If this is not the best number please call our clinic and change the number.    Medication Refills:  If you need any refills please call your pharmacy and they will contact us. Our fax number for refills is [phone]. Please allow three business for refill processing. If you need to  your refill at a new pharmacy, please contact the new pharmacy directly. The new pharmacy will help you get your medications transferred.     Scheduling:  If you have any concerns about today's visit or wish to schedule another appointment please call our office during normal business hours [phone] (8-5:00 M-F)    Contact Us:  Please call [phone] during business hours (8-5:00 M-F).  If after clinic hours, or on the weekend, please call  [phone].    Financial Assistance [phone]  [facility] B[facility] [phone]  Central Billing Office, [facility]: [phone]  [facility] Billing [phone]  Medical Records [phone]  [facility] Patient Bill of Rights [email or web address]       MENTAL HEALTH CRISIS NUMBERS:  For a medical emergency please call  911 or go to the nearest ER.     [facility]:   [facility] -[phone]   Crisis Residence [facility] Residence -[phone]   Walk-In Counseling Center [facility] -[phone]   COPE 24/7 [place] Mobile Team -[phone] (adults)/[phone] (child)  CHILD: [facility] needs assessment  team - [phone]      [facility]:   [facility] - [phone]   Walk-in counseling [facility] - [phone]   Walk-in counseling [facility] - [phone]   Crisis Residence [facility] - [phone]  Urgent Care Adult Mental [facility]-[phone] mobile unit/ 24/7 crisis line    National Crisis Numbers:   National Suicide Prevention Lifeline: 7-876-402-TALK (969-593-1612)  Poison Control Center - 3-238-161-6043  Myandb/resources for a list of additional resources (SOS)  Trans Lifeline a hotline for transgender people 1-438.503.2429  [employer] a hotline for LGBT youth 4-017-090-2025  Crisis Text Line: For any crisis 24/7   To: 783837  see www.crisistextline.org  - IF MAKING A CALL FEELS TOO HARD, send a text!         Again thank you for choosing [facility] MENTAL HEALTH & ADDICTION [facility] and please let us know how we can best partner with you to improve you and your family's health.    You may be receiving a survey regarding this appointment. We would love to have your feedback, both positive and negative. The survey is done by an external company, so your answers are anonymous.
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
## 2021-04-19 NOTE — TELEPHONE ENCOUNTER
--Received incoming FMLA forms from [facility].  --Spoke with pt to inquire about type of leave she is requesting. She states she has been off work since Wednesday 4/14. At this time she is requesting two weeks off from work, with proposed return to work date of 4/28 (with statement that return to work date will be reassessed). She will complete an PÉREZ so that writer can fax the completed paperwork back to [facility] directly.  --PÉREZ for [facility] emailed to pt per her request.

## 2021-04-19 NOTE — TELEPHONE ENCOUNTER
On 4/19/2021, at 10:30, writer called patient at [phone] to confirm Virtual Visit. Writer unable to make contact with patient. Writer left detailed voice message for call back. [phone] left as call back number. Radha Ross, [facility]
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
## 2021-04-22 NOTE — TELEPHONE ENCOUNTER
Yamilet Lebron, Bhavya Jerry RN   Caller: Unspecified (3 days ago,  4:44 PM)             Hey-     This pt's FMLA paperwork is complete - it's sitting on my desk in triage. If you hear back from her tomorrow, would you be willing to either fax the paperwork directly to [facility] or email to the pt?     She told me on Monday she would prefer we fax directly to [facility], but I am still waiting on the PÉREZ (I sent her a blank PÉREZ on Monday). So I sent her a MyChart today just confirming what she would like us to do.     If you don't hear back tomorrow, no need for additional follow-up, I will call her Friday!     Thank you so, so much!          Writer received the release from the pt via email. This was printed and faxed to scanning.    Completed FMLA forms and initial letter/request were faxed to [facility] at [phone]. Will send to patient via email if she requests a copy as well.     Forms faxed to scanning and held in nurse triage.

## 2021-04-26 ENCOUNTER — VIRTUAL VISIT (OUTPATIENT)
Dept: PSYCHIATRY | Facility: CLINIC | Age: 31
End: 2021-04-26
Attending: PSYCHOLOGIST
Payer: COMMERCIAL

## 2021-04-26 DIAGNOSIS — F31.81 BIPOLAR 2 DISORDER (H): Primary | ICD-10-CM

## 2021-04-26 DIAGNOSIS — F43.10 PTSD (POST-TRAUMATIC STRESS DISORDER): ICD-10-CM

## 2021-04-26 PROCEDURE — 90853 GROUP PSYCHOTHERAPY: CPT | Mod: HN | Performed by: STUDENT IN AN ORGANIZED HEALTH CARE EDUCATION/TRAINING PROGRAM

## 2021-04-26 NOTE — PROGRESS NOTES
[facility]    Dialectic Behavior Therapy Clinic     Adult DBT Skills Group     DBT (Dialectic Behavior Therapy) is a cognitive behavioral therapy that includes skills group, which uses didactics, modeling, behavior rehearsal, and homework exercises to aid patients in acquiring new skills and the opportunity to practice new behaviors.    Date of Service: Apr 26, 2021  Group Length: 120 minutes  Start time: 2:30   End time: 4:30  Number of Participants: 8  Group Facilitators: Leonor Stroud, PhD, LP and Ruben Goins MD    Client: Betty Tamayo  Pronouns: She/Her/Hers/Herself  YOB: 1990  MRN: [id number]    Type of service:  video visit for group therapy  Reason for video visit:  Patient unable to travel due to Covid-19  Originating Site (patient location):  [facility]   Location- Patient's home  Distant Site (provider location):  Remote location  Mode of Communication:  Video Conference via Zoom  Consent:  Patient has given verbal consent for video visit?: Yes     Mindfulness: List of words that begin with letter  F  for 2 minutes  Homework Reviewed: Emotion Regulation worksheet 9  Group Topic for Today: Orientation to DBT skills group (General Handout 1 & 3), Orientation to mindfulness (Mindfulness Handouts 1, 1A, 2, 3, and 3A)  Homework Assigned: Mindfulness worksheet 3    Assessment: Patient was not on time for group (5 minutes late). Patient did not complete the homework assigned the previous week prior to arriving at group (not expected as this was her first week of group). Patient was not engaged in homework review and was not engaged in the didactic portion of group. Mood appeared Anxious. Patient had her camera off for most of the group.     Diagnosis:   Encounter Diagnoses   Name Primary?     Bipolar 2 disorder (H) Yes     PTSD (post-traumatic stress disorder)        Plan: Continue in full-model outpatient DBT program.     -----  I was  present for the entirety of this psychotherapy group and I agree with the above progress note and plan.    Leonor Stroud, PhD,   Clinical Psychologist  Apr 26, 2021

## 2021-04-30 ENCOUNTER — TELEPHONE (OUTPATIENT)
Dept: PSYCHIATRY | Facility: CLINIC | Age: 31
End: 2021-04-30

## 2021-04-30 ENCOUNTER — BEH TREATMENT PLAN (OUTPATIENT)
Dept: PSYCHIATRY | Facility: CLINIC | Age: 31
End: 2021-04-30

## 2021-04-30 ENCOUNTER — VIRTUAL VISIT (OUTPATIENT)
Dept: PSYCHIATRY | Facility: CLINIC | Age: 31
End: 2021-04-30
Attending: PSYCHOLOGIST
Payer: COMMERCIAL

## 2021-04-30 DIAGNOSIS — F41.1 GENERALIZED ANXIETY DISORDER: ICD-10-CM

## 2021-04-30 DIAGNOSIS — F43.10 PTSD (POST-TRAUMATIC STRESS DISORDER): ICD-10-CM

## 2021-04-30 DIAGNOSIS — F31.81 BIPOLAR 2 DISORDER (H): Primary | ICD-10-CM

## 2021-04-30 PROCEDURE — 90837 PSYTX W PT 60 MINUTES: CPT | Mod: 95 | Performed by: PSYCHOLOGIST

## 2021-04-30 NOTE — TELEPHONE ENCOUNTER
--Received incoming fax from [facility] requesting updated information regarding pt's leave from work, expected return to work date, etc.  --Writer attempted to contact pt for an update and to discuss return to work date. No answer; LVM requesting a c/b to further discuss. Paperwork is due by 5/6/21.

## 2021-04-30 NOTE — PROGRESS NOTES
Date last reviewed - 04/30/21     Date treatment plan to be reviewed - 7/29/21     Treatment Plan: The patient is currently enrolled in full-model Dialectical Behavior Therapy, an evidence-based treatment for Borderline Personality Disorder and suicidal behavior. The patient will continue in the outpatient DBT program until the end of their 6-month treatment agreement and the completion of one full-round of DBT skills group.  Until completion, patient will continue with weekly individual DBT sessions, weekly participation in the DBT skills group, and use of DBT phone coaching.  The plan to address the patient's presenting problems are described below.      TREATMENT  PLAN          SYMPTOMS; PROBLEMS   MEASURABLE GOALS;    FUNCTIONAL IMPROVEMENT INTERVENTIONS;   GAINS MADE DISCHARGE CRITERIA   Emotion dysregulation; Problems in interpersonal relationships; difficulties tolerating distress Increase mindfulness, distress tolerance, interpersonal effectiveness, and emotion regulation skills.  Individual therapy, group therapy, phone coaching, diary card marked symptom improvement     Progress: Patient signed treatment plan on 04/30/21. They will begin DBT skills group on 4/26/2021. Progress towards treatment goals not measurable at this time as treatment is in beginning stages.

## 2021-04-30 NOTE — PROGRESS NOTES
[facility]    Dialectical Behavior Therapy Program    DBT Individual Psychotherapy Progress Note    Date: Apr 30, 2021    Patient Name: Betty Tamayo     Patient Pronouns: She/Her    Patient MRN: [id number]    Provider: Leonor Stroud, PhD LP    Procedure: Individual DBT session    Appointment Duration: 9:05 to 10:00 (55 minutes)    Diagnosis:   Encounter Diagnoses   Name Primary?     Bipolar 2 disorder (H) Yes     PTSD (post-traumatic stress disorder)      Generalized anxiety disorder         Type of service:  video visit for psychotherapy  Reason for video visit:  Patient unable to travel due to Covid-19  Originating Site (patient location):  [facility]   Location- Patient's home  Distant Site (provider location):  Remote location  Mode of Communication:  Video Conference via [email or web address]  Consent:  Patient has given verbal consent for video visit?: Yes      Pre-DBT Session:   Session was spent reviewing and completing the contract. She identified the following TIBs:  1. Remembering to come to group and individual sessions  2. Anxiety getting in the way of me doing things like group or session  3. Making sure I have time for transportation for sessions/group and that I have a good location to do sessions/group  4. Anxiety about background noise during group or session    Betty also identified the following treatment goals:  1. Being able to express emotions in a healthier way  2. Identifying my triggers for emotions  3. Developing more skills for panic and social anxiety to help with social/professional life  4. Reduce/eliminate life threatening behavior    She willingly signed the contract. She noted some anxiety/panic during group and said that the only reason she kept her camera off was a swollen eye, but she planned to come on camera in future weeks. She became tearful discussing ways that she felt like she was no longer herself and writer emphasized that  skills might be helpful for reducing depression/anxiety and helping her feel more like herself again.     Diary Card Completed Before Session? No    Patient Used Phone Coaching Since Last Session: No     Primary Targets Addressed in This Session:  Other: Reviewing contract and completing pre-DBT.    DBT Skills reviewed or taught in Session:    TIP: body Temperature, Intensely exercise, Progressive     Interactive Complexity Code Was Used (58597): No.    Behavioral Observations/Mental Status: Patient arrived on time to session. Patient was adequately groomed. Eye contact was good. Mood today was overwhelmed. Observed affect was full range. Speech was regular rate and rhythm. Thought process was Logical, Linear and Goal directed. Patient was actively engaged in session.    Risk Assessment: The patient has the following risk and protective factors:      Risk factors: previous history of suicide attempts, suicidal ideation and anger/rage  Protective factors:  Supportive friends and/or family, Cultural or Roman Catholic beliefs discouraging suicide, Is a parent, Stable housing and Restricted access to lethal means     Based on risk and protective factors, patient is assessed to be at the following levels of acute and chronic risk.     Acute Risk: Low Acute Risk (i.e., no current suicidal intent, specific plan, preparatory behaviors, and high confidence in patient's ability to maintain safety).  Chronic Risk: Intermediate Chronic Risk (i.e., chronic suicidal ideation with protective factors and coping skills to endure crisis without self-directed violence)    Behavioral Assignments:  1) Complete DBT Diary Card daily  2) Complete DBT Skills Group homework     Plan: Patient will continue in full-model, outpatient DBT program until completion of one full-round of DBT skills/the end of their 6-month treatment agreement.     Treatment Plan Completed: 4/30/2021

## 2021-05-03 ENCOUNTER — VIRTUAL VISIT (OUTPATIENT)
Dept: PSYCHIATRY | Facility: CLINIC | Age: 31
End: 2021-05-03
Attending: PSYCHIATRY & NEUROLOGY
Payer: COMMERCIAL

## 2021-05-03 ENCOUNTER — TELEPHONE (OUTPATIENT)
Dept: PSYCHIATRY | Facility: CLINIC | Age: 31
End: 2021-05-03

## 2021-05-03 ENCOUNTER — VIRTUAL VISIT (OUTPATIENT)
Dept: PSYCHIATRY | Facility: CLINIC | Age: 31
End: 2021-05-03
Attending: PSYCHOLOGIST
Payer: COMMERCIAL

## 2021-05-03 DIAGNOSIS — F43.10 PTSD (POST-TRAUMATIC STRESS DISORDER): Primary | ICD-10-CM

## 2021-05-03 DIAGNOSIS — F41.1 GENERALIZED ANXIETY DISORDER: ICD-10-CM

## 2021-05-03 DIAGNOSIS — F31.81 BIPOLAR 2 DISORDER (H): ICD-10-CM

## 2021-05-03 PROCEDURE — 90853 GROUP PSYCHOTHERAPY: CPT | Mod: 95 | Performed by: PSYCHOLOGIST

## 2021-05-03 PROCEDURE — 99215 OFFICE O/P EST HI 40 MIN: CPT | Mod: 95 | Performed by: PSYCHIATRY & NEUROLOGY

## 2021-05-03 RX ORDER — PROPRANOLOL HYDROCHLORIDE 40 MG/1
TABLET ORAL
Qty: 120 TABLET | Refills: 0 | Status: SHIPPED | OUTPATIENT
Start: 2021-05-03 | End: 2021-05-24

## 2021-05-03 RX ORDER — LORAZEPAM 1 MG/1
1-2 TABLET ORAL DAILY PRN
Qty: 15 TABLET | Refills: 0 | Status: SHIPPED | OUTPATIENT
Start: 2021-05-03 | End: 2021-05-24

## 2021-05-03 RX ORDER — ARIPIPRAZOLE 15 MG/1
15 TABLET ORAL DAILY
Qty: 30 TABLET | Refills: 0 | Status: SHIPPED | OUTPATIENT
Start: 2021-05-03 | End: 2021-06-21

## 2021-05-03 RX ORDER — PROPRANOLOL HCL 60 MG
CAPSULE, EXTENDED RELEASE 24HR ORAL DAILY
Status: CANCELLED | OUTPATIENT
Start: 2021-05-03

## 2021-05-03 NOTE — TELEPHONE ENCOUNTER
On 5/3/2021, at 9:45, writer called patient at [phone] to confirm Virtual Visit. Writer unable to make contact with patient. Writer left detailed voice message for call back. [phone] left as call back number. Radha Ross, [facility]

## 2021-05-03 NOTE — TELEPHONE ENCOUNTER
Placed additional outgoing phone call to pt regarding additional paperwork from employer. No answer; LVM requesting a c/b.
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
## 2021-05-03 NOTE — PATIENT INSTRUCTIONS
Increase propranolol to 120mg (3 tablets) total daily for 1 week, then if tolerating, 160mg (4 tablets) total daily.   May take this divided up as either twice, three times, or four times daily.       **For crisis resources, please see the information at the end of this document**     Patient Education      Thank you for coming to the [facility] MENTAL HEALTH & ADDICTION [place] [facility].    Lab Testing:  If you had lab testing today and your results are reassuring or normal they will be mailed to you or sent through Fundrise within 7 days. If the lab tests need quick action we will call you with the results. The phone number we will call with results is # [phone] (home) . If this is not the best number please call our clinic and change the number.    Medication Refills:  If you need any refills please call your pharmacy and they will contact us. Our fax number for refills is [phone]. Please allow three business for refill processing. If you need to  your refill at a new pharmacy, please contact the new pharmacy directly. The new pharmacy will help you get your medications transferred.     Scheduling:  If you have any concerns about today's visit or wish to schedule another appointment please call our office during normal business hours [phone] (8-5:00 M-F)    Contact Us:  Please call [phone] during business hours (8-5:00 M-F).  If after clinic hours, or on the weekend, please call  [phone].    Financial Assistance [phone]  [facility] Billing [phone]  Central Billing Office, [facility]alth: [phone]  [facility] Billing [phone]  Medical Records [phone]  [facility] Patient Bill of Rights [email or web address]       MENTAL HEALTH CRISIS NUMBERS:  For a medical emergency please call  911 or go to the nearest ER.     [facility]:   [facility] -[phone]   Crisis Residence [facility]  [facility] -[phone]   Walk-[facility] Counseling Center [facility] -[phone]   COPE 24/7 [place] Mobile Team -[phone] (adults)/[phone] (child)  CHILD: [facility] needs assessment team - [phone]      [facility]:   [facility] - [phone]   Walk-in counseling [facility] - [phone]   Walk-in counseling [facility] - [phone]   Crisis Residence [facility] - [phone]  Urgent Care Adult Mental [facility]-[phone] mobile unit/ 24/7 crisis line    National Crisis Numbers:   National Suicide Prevention Lifeline: 3-491-317-TALK (445-343-5831)  Poison Control Center - 2-604-515-3817  [employer]/resources for a list of additional resources (SOS)  Trans Lifeline a hotline for transgender people 1-401.225.7923  [employer] a hotline for LGBT youth 9-256-536-9612  Crisis Text Line: For any crisis 24/7   To: 798771  see www.crisistextline.org  - IF MAKING A CALL FEELS TOO HARD, send a text!         Again thank you for choosing [facility] MENTAL HEALTH & ADDICTION [facility] and please let us know how we can best partner with you to improve you and your family's health.    You may be receiving a survey regarding this appointment. We would love to have your feedback, both positive and negative. The survey is done by an external company, so your answers are anonymous.

## 2021-05-04 NOTE — PROGRESS NOTES
[facility]    Dialectic Behavior Therapy Clinic     Adult DBT Skills Group     DBT (Dialectic Behavior Therapy) is a cognitive behavioral therapy that includes skills group, which uses didactics, modeling, behavior rehearsal, and homework exercises to aid patients in acquiring new skills and the opportunity to practice new behaviors.    Date of Service: May 3, 2021  Group Length: 120 minutes  Start time: 2:30   End time: 4:30  Number of Participants: 7  Group Facilitators: Leonor Stroud, PhD, LP    Client: Betty Tamayo  Pronouns: She/Her/Hers/Herself  YOB: 1990  MRN: [id number]    Type of service:  video visit for group therapy  Reason for video visit:  Patient unable to travel due to Covid-19  Originating Site (patient location):  [facility]   Location- Patient's home  Distant Site (provider location):  Remote location  Mode of Communication:  Video Conference via Zoom  Consent:  Patient has given verbal consent for video visit?: Yes     Mindfulness: Progressive muscle relaxation  Homework Reviewed: Wise Mind Worksheet, 3A  Group Topic for Today: What and How Skills  Homework Assigned: Mindfulness WS 4 or 5    Assessment: Patient was not on time for group (Betty was 30 minutes late). Patient did not complete the homework assigned the previous week prior to arriving at group. Patient was not engaged in homework review and was not engaged in the didactic portion of group. Mood appeared Euthymic. Betty said that she used the TIPP skill earlier in the day and that it helped. She turned her camera off for part of group.     Diagnosis:   Encounter Diagnoses   Name Primary?     PTSD (post-traumatic stress disorder) Yes     Bipolar 2 disorder (H)      Generalized anxiety disorder        Plan: Continue in full-model outpatient DBT program.

## 2021-05-05 NOTE — TELEPHONE ENCOUNTER
Placed additional phone call to pt regarding paperwork and return to work. No answer. LVM letting her know that Dr. Boyd recommends indicating an additional two weeks off of work, but wants to ensure pt is in agreement. Asked that pt get back to writer via phone or [employer] message to confirm whether she agrees with this recommendation.

## 2021-05-07 ENCOUNTER — VIRTUAL VISIT (OUTPATIENT)
Dept: PSYCHIATRY | Facility: CLINIC | Age: 31
End: 2021-05-07
Attending: PSYCHOLOGIST
Payer: COMMERCIAL

## 2021-05-07 DIAGNOSIS — F43.10 PTSD (POST-TRAUMATIC STRESS DISORDER): Primary | ICD-10-CM

## 2021-05-07 DIAGNOSIS — F31.81 BIPOLAR 2 DISORDER (H): ICD-10-CM

## 2021-05-07 PROCEDURE — 90837 PSYTX W PT 60 MINUTES: CPT | Mod: 95 | Performed by: PSYCHOLOGIST

## 2021-05-08 NOTE — PROGRESS NOTES
[facility]    Dialectical Behavior Therapy Program    DBT Individual Psychotherapy Progress Note    Date: May 7, 2021    Patient Name: Betty Tamayo     Patient Pronouns: She/Her    Patient MRN: [id number]    Provider: Leonor Stroud, PhD LP    Procedure: Individual DBT session    Appointment Duration: 1:05 to 2:00 (55 minutes)    Diagnosis:   Encounter Diagnoses   Name Primary?     PTSD (post-traumatic stress disorder) Yes     Bipolar 2 disorder (H)         Type of service:  video visit for psychotherapy  Reason for video visit:  Patient unable to travel due to Covid-19  Originating Site (patient location):  [facility]   Location- Patient's car outside work  Distant Site (provider location):  Remote location  Mode of Communication:  Video Conference via AmWell  Consent:  Patient has given verbal consent for video visit?: Yes     Diary Card Completed Before Session? YES    Patient Used Phone Coaching Since Last Session: No    Primary Targets Addressed in This Session:  Therapy-interfering behavior:  Client - Betty noted that she was 30 minutes late to group due to driving home after work and getting caught in traffic. She noted that when she got home from work her panic and anxiety was so high that she was debating not coming to group but she convinced herself to join and used TIPP to regulate herself. Writer discussed this highly skillful behavior and taught her the Opposite to Emotion Action skill (and identified the emotions as fear and shame).  Quality of life interfering behavior: Betty described high anxiety at work and that she would like to leave her job but she is not in a position to look for a new one right now due to her emotional state. She noted that her boss, who is accommodating and nice to her, sometimes makes comments about her psychiatric diagnoses or her mental state that make Betty uncomfortable. Writer validated this as justified and  discussed ways that she could use assertiveness skills to ask for different things at work that might improve her day to day experience. Mid-session Betty reported high anxiety and writer engaged in a breathing exercise which she said was helpful.     DBT Skills reviewed or taught in Session:    Opposite to emotion action, TIP: body Temperature, Intensely exercise, Progressive     Interactive Complexity Code Was Used (30430): No.    Behavioral Observations/Mental Status: Patient arrived on time to session. Patient was adequately groomed. Eye contact was good. Mood today was overwhelmed and anxious. Observed affect was full range and tearful throughout. Speech was regular rate and rhythm. Thought process was Logical, Linear and Goal directed. Patient was actively engaged in session.    Risk Assessment: The patient has the following risk and protective factors:      Risk factors: previous history of suicide attempts, suicidal ideation and anger/rage  Protective factors:  Supportive friends and/or family, Cultural or Catholic beliefs discouraging suicide, Is a parent, Stable housing and Restricted access to lethal means     Based on risk and protective factors, patient is assessed to be at the following levels of acute and chronic risk.     Acute Risk: Low Acute Risk (i.e., no current suicidal intent, specific plan, preparatory behaviors, and high confidence in patient's ability to maintain safety).  Chronic Risk: Intermediate Chronic Risk (i.e., chronic suicidal ideation with protective factors and coping skills to endure crisis without self-directed violence)    Behavioral Assignments:  1) Complete DBT Diary Card daily  2) Complete DBT Skills Group homework     Plan: Patient will continue in full-model, outpatient DBT program until completion of one full-round of DBT skills/the end of their 6-month treatment agreement.

## 2021-05-10 NOTE — TELEPHONE ENCOUNTER
--Received incoming phone call from pt. She explains that there was some confusion between her employer and [employer] and that she did end up returning to work last week, however, it has been extremely difficult staying on track and completing work duties. She is requesting to extend her leave with a return to work date of June 1st.  --Return to work date of 6/1/21 approved by Dr. Boyd.  --Paperwork completed and routed to attending for signature.

## 2021-05-16 ENCOUNTER — HEALTH MAINTENANCE LETTER (OUTPATIENT)
Age: 31
End: 2021-05-16

## 2021-05-17 ENCOUNTER — VIRTUAL VISIT (OUTPATIENT)
Dept: PSYCHIATRY | Facility: CLINIC | Age: 31
End: 2021-05-17
Attending: PSYCHOLOGIST
Payer: COMMERCIAL

## 2021-05-17 DIAGNOSIS — F43.10 PTSD (POST-TRAUMATIC STRESS DISORDER): Primary | ICD-10-CM

## 2021-05-17 DIAGNOSIS — F41.1 GENERALIZED ANXIETY DISORDER: ICD-10-CM

## 2021-05-17 PROCEDURE — 90853 GROUP PSYCHOTHERAPY: CPT | Mod: 95 | Performed by: PSYCHOLOGIST

## 2021-05-17 NOTE — PROGRESS NOTES
[facility]    Dialectic Behavior Therapy Clinic     Adult DBT Skills Group     DBT (Dialectic Behavior Therapy) is a cognitive behavioral therapy that includes skills group, which uses didactics, modeling, behavior rehearsal, and homework exercises to aid patients in acquiring new skills and the opportunity to practice new behaviors.    Date of Service: May 17, 2021  Group Length: 120 minutes  Start time: 2:30   End time: 3pm  Number of Participants: 6  Group Facilitators: Leonor Stroud, PhD, LP and Ruben Goins MD    Client: Betty Tamayo  Pronouns: She/Her/Hers/Herself  YOB: 1990  MRN: [id number]    Type of service:  video visit for group therapy  Reason for video visit:  Patient unable to travel due to Covid-19  Originating Site (patient location):  [facility]   Location- Patient's home  Distant Site (provider location):  Remote location  Mode of Communication:  Video Conference via Zoom  Consent:  Patient has given verbal consent for video visit?: Yes     Mindfulness: 5 senses while looking out window  Homework Reviewed: Interpersonal Effectiveness worksheet 3  Group Topic for Today: Interpersonal Effectiveness Handout 5 (DEAR MAN)  Homework Assigned: Interpersonal Effectiveness worksheet 4 (Writing Out Interpersonal Effectiveness Scripts)    Assessment: Patient was on time for group. Patient did not complete the homework assigned the previous week prior to arriving at group. Patient was not engaged in homework review and was not engaged in the didactic portion of group. Mood appeared Anxious. Betty left group after 30 minutes. She did not respond to a phone call by one of the group facilitators nursing home through group to check on her.     Diagnosis:   Encounter Diagnoses   Name Primary?     PTSD (post-traumatic stress disorder) Yes     Generalized anxiety disorder        Plan: Continue in full-model outpatient DBT program.     -----  I was  present for the entirety of this psychotherapy group and I agree with the above progress note and plan.    Leonor Stroud, PhD,   Clinical Psychologist  May 17, 2021

## 2021-05-20 ENCOUNTER — VIRTUAL VISIT (OUTPATIENT)
Dept: PSYCHIATRY | Facility: CLINIC | Age: 31
End: 2021-05-20
Attending: PSYCHOLOGIST
Payer: COMMERCIAL

## 2021-05-20 ENCOUNTER — TELEPHONE (OUTPATIENT)
Dept: PSYCHIATRY | Facility: CLINIC | Age: 31
End: 2021-05-20

## 2021-05-20 DIAGNOSIS — F31.81 BIPOLAR 2 DISORDER (H): ICD-10-CM

## 2021-05-20 DIAGNOSIS — F43.10 PTSD (POST-TRAUMATIC STRESS DISORDER): Primary | ICD-10-CM

## 2021-05-20 DIAGNOSIS — F41.1 GENERALIZED ANXIETY DISORDER: ICD-10-CM

## 2021-05-20 PROCEDURE — 90837 PSYTX W PT 60 MINUTES: CPT | Mod: 95 | Performed by: PSYCHOLOGIST

## 2021-05-20 NOTE — CONFIDENTIAL NOTE
SW received call from Leonor Stroud (DBT therapist) and Betty. She is scheduled to return to work in approximately 10 days and she feels like she would benefit from extra support. Dr. Stroud is recommending PHP or day treatment. Writer will look into options and follow up with Betty later in the day. Writer suggested talking with her HR department to see if she has to go back full or can return part time. It may be helpful to return part time and complete day treatment option for additional support. Betty also notes that she would prefer in person treatment.    4:00 pm: Writer left message for Betty requesting call back and provided call back number.    Possible Options:  -[facility] [phone] (website notes immediate openings in [place], writer left message inquiring about virtual vs in person services)  -[facility] [phone] (scheduling 4 weeks out, hybrid model with 8 people max in person, intake suggested Betty talk with insurance company to see how much they cover before starting intake)  -[facility] [phone] (all virtual)  -[facility] [phone] (currently virtual, talk of changing up in near future, scheduling intakes out 1-2 days)  -writer has not called [facility], but may also want to consider    BIANKA Brar, [facility]  [phone]

## 2021-05-21 NOTE — CONFIDENTIAL NOTE
Writer left second message requesting call back to follow up about community resources (ie day treatment or PHP). Provided contact information.    BIANKA Brar, [facility]  [phone]

## 2021-05-24 ENCOUNTER — VIRTUAL VISIT (OUTPATIENT)
Dept: PSYCHIATRY | Facility: CLINIC | Age: 31
End: 2021-05-24
Attending: PSYCHOLOGIST
Payer: COMMERCIAL

## 2021-05-24 ENCOUNTER — VIRTUAL VISIT (OUTPATIENT)
Dept: PSYCHIATRY | Facility: CLINIC | Age: 31
End: 2021-05-24
Attending: PSYCHIATRY & NEUROLOGY
Payer: COMMERCIAL

## 2021-05-24 DIAGNOSIS — F43.10 PTSD (POST-TRAUMATIC STRESS DISORDER): Primary | ICD-10-CM

## 2021-05-24 DIAGNOSIS — G47.00 INSOMNIA, UNSPECIFIED TYPE: ICD-10-CM

## 2021-05-24 DIAGNOSIS — F43.10 PTSD (POST-TRAUMATIC STRESS DISORDER): ICD-10-CM

## 2021-05-24 DIAGNOSIS — F41.1 GENERALIZED ANXIETY DISORDER: ICD-10-CM

## 2021-05-24 DIAGNOSIS — F31.81 BIPOLAR 2 DISORDER (H): Primary | ICD-10-CM

## 2021-05-24 PROCEDURE — 90853 GROUP PSYCHOTHERAPY: CPT | Mod: HN | Performed by: STUDENT IN AN ORGANIZED HEALTH CARE EDUCATION/TRAINING PROGRAM

## 2021-05-24 PROCEDURE — 99214 OFFICE O/P EST MOD 30 MIN: CPT | Mod: 95 | Performed by: PSYCHIATRY & NEUROLOGY

## 2021-05-24 PROCEDURE — 90833 PSYTX W PT W E/M 30 MIN: CPT | Mod: 95 | Performed by: PSYCHIATRY & NEUROLOGY

## 2021-05-24 RX ORDER — LORAZEPAM 1 MG/1
1-2 TABLET ORAL DAILY PRN
Qty: 15 TABLET | Refills: 0 | Status: SHIPPED | OUTPATIENT
Start: 2021-05-24 | End: 2021-06-21

## 2021-05-24 RX ORDER — LAMOTRIGINE 25 MG/1
TABLET ORAL
Qty: 42 TABLET | Refills: 0 | Status: SHIPPED | OUTPATIENT
Start: 2021-05-24 | End: 2021-06-21

## 2021-05-24 RX ORDER — OXCARBAZEPINE 150 MG/1
TABLET, FILM COATED ORAL
Qty: 120 TABLET | Refills: 1 | Status: SHIPPED | OUTPATIENT
Start: 2021-05-24 | End: 2021-06-21

## 2021-05-24 ASSESSMENT — PAIN SCALES - GENERAL: PAINLEVEL: NO PAIN (0)

## 2021-05-24 NOTE — LETTER
May 24, 2021      Betty Tamayo  [address] [place] [address]        To Whom It May Concern,     Betty is currently planning to return to work soon. Unfortunately, she is still experiencing significant mental health symptoms that impact her capacity significantly. If she were allowed to perform her duties using telehealth, this would significantly reduce the impact of her ongoing symptoms on her ability to perform, and would reduce the chance that she symptoms would worsen again to the point of necessitating another leave of absence.   Please accommodate her healthcare needs to the extent possible.       Sincerely,        Jordan Boyd MD

## 2021-05-24 NOTE — PATIENT INSTRUCTIONS
**For crisis resources, please see the information at the end of this document**     Patient Education      Thank you for coming to the [facility] MENTAL HEALTH & ADDICTION [place] [facility].    Lab Testing:  If you had lab testing today and your results are reassuring or normal they will be mailed to you or sent through [employer] within 7 days. If the lab tests need quick action we will call you with the results. The phone number we will call with results is # [phone] (home) . If this is not the best number please call our clinic and change the number.    Medication Refills:  If you need any refills please call your pharmacy and they will contact us. Our fax number for refills is [phone]. Please allow three business for refill processing. If you need to  your refill at a new pharmacy, please contact the new pharmacy directly. The new pharmacy will help you get your medications transferred.     Scheduling:  If you have any concerns about today's visit or wish to schedule another appointment please call our office during normal business hours [phone] (8-5:00 M-F)    Contact Us:  Please call [phone] during business hours (8-5:00 M-F).  If after clinic hours, or on the weekend, please call  [phone].    Financial Assistance [phone]  [facility] Billing [phone]  Central Billing Office, [facility]: [phone]  [facility] Billing [phone]  Medical Records [phone]  [facility] Patient Bill of Rights [email or web address]       MENTAL HEALTH CRISIS NUMBERS:  For a medical emergency please call  911 or go to the nearest ER.     [facility]:   [facility] -[phone]   Crisis Residence [facility] Residence -[phone]   Walk-In Counseling Center [facility] -[phone]   COPE 24/7 [place] Mobile Team -[phone] (adults)/[phone] (child)  CHILD: [facility] needs assessment  team - [phone]      [facility]:   [facility] - [phone]   Walk-in counseling [facility] - [phone]   Walk-in counseling [facility] - [phone]   Crisis Residence [facility] Residence - [phone]  Urgent Care Adult Mental [place]-[phone] mobile unit/ 24/7 crisis line    National Crisis Numbers:   National Suicide Prevention Lifeline: 7-973-437-TALK (455-209-0912)  Poison Control Center - 3-212-898-2270  [facility]./resources for a list of additional resources (SOS)  Trans Lifeline a hotline for transgender people 1-435.497.8187  The [employer] Project a hotline for LGBT youth 4-308-165-3371  Crisis Text Line: For any crisis 24/7   To: 586727  see www.crisistextline.org  - IF MAKING A CALL FEELS TOO HARD, send a text!         Again thank you for choosing [facility] MENTAL HEALTH & ADDICTION [facility] and please let us know how we can best partner with you to improve you and your family's health.    You may be receiving a survey regarding this appointment. We would love to have your feedback, both positive and negative. The survey is done by an external company, so your answers are anonymous.
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
## 2021-05-24 NOTE — PROGRESS NOTES
[facility]    Dialectic Behavior Therapy Clinic     Adult DBT Skills Group     DBT (Dialectic Behavior Therapy) is a cognitive behavioral therapy that includes skills group, which uses didactics, modeling, behavior rehearsal, and homework exercises to aid patients in acquiring new skills and the opportunity to practice new behaviors.    Date of Service: May 24, 2021  Group Length: 120 minutes  Start time: 2:30   End time: 4:30  Number of Participants: 6  Group Facilitators: Leonor Stroud, PhD, LP and Ruben Goins MD    Client: Betty Tamayo  Pronouns: She/Her/Hers/Herself  YOB: 1990  MRN: [id number]    Type of service:  video visit for group therapy  Reason for video visit:  Patient unable to travel due to Covid-19  Originating Site (patient location):  [facility]   Location- Patient's home  Distant Site (provider location):  Remote location  Mode of Communication:  Video Conference via Zoom  Consent:  Patient has given verbal consent for video visit?: Yes     Mindfulness: Mindful movement (balancing)  Homework Reviewed: Interpersonal Effectiveness worksheet 4 (Writing Out Interpersonal Effectiveness Scripts)  Group Topic for Today: Interpersonal Effectiveness Handout 6 (GIVE)  Homework Assigned: Interpersonal Effectiveness Worksheet 12 (validating others)    Assessment: Patient was on time for group. Patient did not complete the homework assigned the previous week prior to arriving at group. Patient was engaged in homework review and was not engaged in the didactic portion of group. Mood appeared Anxious. Betty shared her experience of using wise-mind and ice packs (distress tolerance skill) this past week.     Diagnosis:   Encounter Diagnoses   Name Primary?     PTSD (post-traumatic stress disorder) Yes     Generalized anxiety disorder        Plan: Continue in full-model outpatient DBT program.     -----  I was present for the entirety of this  psychotherapy group and I agree with the above progress note and plan.    Leonor Stroud, PhD,   Clinical Psychologist  May 24, 2021

## 2021-05-25 NOTE — CONFIDENTIAL NOTE
5/25/21: Betty returned writer's call. Writer provided update on PHP information. Writer also suggested [facility]'s Trauma Intensive Outpatient Program. Betty agreed to call [facility] ([phone]). Programming is currently virtual and 6 weeks wait time.  Betty is unsure if she will be able to attend several hours of treatment per day and reports financially she cannot afford to work part-time.    Betty also noted Dr. Boyd wrote a note requesting that she complete work remotely and she is wondering who to provide this to. Writer provided brief information on ADA and reasonable accomodation requests. Writer provided email address to HR and suggested Betty communicate with her supervisor and HR department regarding request. Writer also encouraged Betty to work towards treatment for social anxiety/trauma (ie address avoidance). Betty is attending individual therapy and a 2 hour DBT group once per week.    SW will follow up with Betty in 2-3 business days to check on progress of reasonable accomodation and will follow up next week to see how work is going and identify next steps regarding partial hospitalization or intensive outpatient programming.

## 2021-05-28 NOTE — PROGRESS NOTES
[facility]    Dialectical Behavior Therapy Program    DBT Individual Psychotherapy Progress Note    Date: May 20, 2021    Patient Name: Betty Tamayo     Patient Pronouns: She/Her    Patient MRN: [id number]    Provider: Leonor Stroud, PhD LP    Procedure: Individual DBT session    Appointment Duration: 10:00 to 11:00 (60 minutes)    Diagnosis:   Encounter Diagnoses   Name Primary?     PTSD (post-traumatic stress disorder) Yes     Generalized anxiety disorder      Bipolar 2 disorder (H)         Type of service:  video visit for psychotherapy   Reason for video visit:  Patient unable to travel due to Covid-19  Originating Site (patient location):  [facility]   Location- Patient's home  Distant Site (provider location):  Remote location  Mode of Communication:  Video Conference via [employer]  Consent:  Patient has given verbal consent for video visit?: Yes     Diary Card Completed Before Session? YES, partially    Patient Used Phone Coaching Since Last Session: No    Primary Targets Addressed in This Session:  Therapy-interfering behavior:  Client - Time was spent discussing ways that Betty could more consistently fill out the diary card. Writer also discussed Betty's group attendance. She noted high anxiety during group. Writer discussed using the TIPP skill during group, reaching out to writer via chat, and opposite action to stay in group. Writer also agreed to give Betty a call before the next week to encourage her to join and she was agreeable to that plan.     Betty noted frustration that she was currently on leave from her job to ostensibly work on her mental health but that she felt like she was not putting the time to good use. She wanted to use the time to do a more intensive treatment. Writer, with Betty's permission, reached out to department , Lois Tipton LCSW, to help with idea generation and problem solving. Ms.  Saeed agreed to do some research on in-person day treatment and PHP programs that Betty might be able to participate in and also suggested that part-time work might enable her to continue participating even after her leave finishes up. Betty was open to these ideas and to getting a call back about these ideas.     DBT Skills reviewed or taught in Session:    TIP: body Temperature, Intensely exercise, Progressive     Interactive Complexity Code Was Used (79955): No.    Behavioral Observations/Mental Status: Patient arrived on time to session. Patient was adequately groomed. Eye contact was good. Mood today was dysphoric and overwhelmed. Observed affect was full range and tearful. Speech was regular rate and rhythm. Thought process was Logical, Linear and Goal directed. Patient was actively engaged in session.    Risk Assessment: The patient has the following risk and protective factors:      Risk factors: previous history of suicide attempts, suicidal ideation and anger/rage  Protective factors:  Supportive friends and/or family, Cultural or Lutheran beliefs discouraging suicide, Is a parent, Stable housing and Restricted access to lethal means     Based on risk and protective factors, patient is assessed to be at the following levels of acute and chronic risk.     Acute Risk: Low Acute Risk (i.e., no current suicidal intent, specific plan, preparatory behaviors, and high confidence in patient's ability to maintain safety).  Chronic Risk: Intermediate Chronic Risk (i.e., chronic suicidal ideation with protective factors and coping skills to endure crisis without self-directed violence)    Behavioral Assignments:  1) Complete DBT Diary Card daily  2) Complete DBT Skills Group homework       Plan: Patient will continue in full-model, outpatient DBT program until completion of one full-round of DBT skills/the end of their 6-month treatment agreement.

## 2021-06-04 ENCOUNTER — TELEPHONE (OUTPATIENT)
Dept: PSYCHIATRY | Facility: CLINIC | Age: 31
End: 2021-06-04

## 2021-06-04 NOTE — TELEPHONE ENCOUNTER
Has inability to focus / concentrate due to anxiety and autonomic hyperarousal.   Has inability to be in crowds due to anxiety and panic symptoms.   Has inability to be around other people due to irritability and emotional dysregulation.     She can work if she can have accommodations such as frequent breaks, and until symptoms improve, be assigned tasks that are less stressful and complex.

## 2021-06-04 NOTE — TELEPHONE ENCOUNTER
--Received incoming fax from [employer] requesting copies of progress notes dated May 24, 2021 and forward along with PÉREZ.  --Writer faxed 5/24/21 visit note with Dr. Boyd to [employer] at [phone].   --PÉREZ sent to scanning.

## 2021-06-04 NOTE — TELEPHONE ENCOUNTER
--Received incoming fax from [facility] with request to complete additional information related to pt's leave of absence.

## 2021-06-08 NOTE — TELEPHONE ENCOUNTER
Completed. Signed forms were returned to this writer and faxed to [facility] attn Raghav Berry at [phone]. The original copies were sent to scanning and copies are held in Psych until scanning is verified.Mirna Camilo LPN

## 2021-06-11 ENCOUNTER — APPOINTMENT (OUTPATIENT)
Dept: PSYCHIATRY | Facility: CLINIC | Age: 31
End: 2021-06-11
Attending: PSYCHOLOGIST
Payer: COMMERCIAL

## 2021-06-11 NOTE — TELEPHONE ENCOUNTER
Writer received an additional incoming faxed dated 6/10/21 with a requested for copies of progress notes dated May 24, 2021 and forward. This is identical to the request writer addressed on 6/4/21.    Writer re-faxed copy of 5/24/21 office visit note to [facility] at [phone].

## 2021-06-14 ENCOUNTER — TELEPHONE (OUTPATIENT)
Dept: PSYCHIATRY | Facility: CLINIC | Age: 31
End: 2021-06-14

## 2021-06-14 NOTE — TELEPHONE ENCOUNTER
CANDIS for pt providing the DBT coaching phone number and also offering pt an appointment with Dr. Boyd this afternoon at 4:30p.

## 2021-06-14 NOTE — TELEPHONE ENCOUNTER
[facility] Call Center    Phone Message    May a detailed message be left on voicemail: yes     Reason for Call: Other: Call Back      Pt called, stating she was returning a phone call from Yamilet. Yamilet was not available, pt would like a call back.    Action Taken: Message routed to:  Other: nursing pool    Travel Screening: Not Applicable

## 2021-06-14 NOTE — TELEPHONE ENCOUNTER
Spoke with pt. She was told today that her request to work remotely has been denied. Pt is feeling overwhelmed and flustered by this news. She is not currently receiving any accommodations at work and is uncertain about specific accommodations she would benefit from.     Pt asking to see Dr. Boyd sooner than 6/21. She is currently at work and denies acute safety concerns.     Pt intends to attend DBT group this afternoon with Dr. Stroud. She is also asking for the DBT coaching number.

## 2021-06-18 ENCOUNTER — VIRTUAL VISIT (OUTPATIENT)
Dept: PSYCHIATRY | Facility: CLINIC | Age: 31
End: 2021-06-18
Attending: PSYCHOLOGIST
Payer: COMMERCIAL

## 2021-06-18 DIAGNOSIS — F31.81 BIPOLAR 2 DISORDER (H): Primary | ICD-10-CM

## 2021-06-18 PROCEDURE — 90837 PSYTX W PT 60 MINUTES: CPT | Mod: 95 | Performed by: PSYCHOLOGIST

## 2021-06-21 ENCOUNTER — VIRTUAL VISIT (OUTPATIENT)
Dept: PSYCHIATRY | Facility: CLINIC | Age: 31
End: 2021-06-21
Attending: PSYCHOLOGIST
Payer: COMMERCIAL

## 2021-06-21 ENCOUNTER — VIRTUAL VISIT (OUTPATIENT)
Dept: PSYCHIATRY | Facility: CLINIC | Age: 31
End: 2021-06-21
Attending: PSYCHIATRY & NEUROLOGY
Payer: COMMERCIAL

## 2021-06-21 DIAGNOSIS — F43.10 PTSD (POST-TRAUMATIC STRESS DISORDER): Primary | ICD-10-CM

## 2021-06-21 DIAGNOSIS — G47.00 INSOMNIA, UNSPECIFIED TYPE: ICD-10-CM

## 2021-06-21 DIAGNOSIS — F31.81 BIPOLAR 2 DISORDER (H): Primary | ICD-10-CM

## 2021-06-21 DIAGNOSIS — F43.10 PTSD (POST-TRAUMATIC STRESS DISORDER): ICD-10-CM

## 2021-06-21 PROCEDURE — 99214 OFFICE O/P EST MOD 30 MIN: CPT | Mod: 95 | Performed by: PSYCHIATRY & NEUROLOGY

## 2021-06-21 PROCEDURE — 90853 GROUP PSYCHOTHERAPY: CPT | Mod: HN | Performed by: STUDENT IN AN ORGANIZED HEALTH CARE EDUCATION/TRAINING PROGRAM

## 2021-06-21 RX ORDER — LAMOTRIGINE 100 MG/1
100 TABLET ORAL DAILY
Qty: 30 TABLET | Refills: 1 | Status: SHIPPED | OUTPATIENT
Start: 2021-06-21 | End: 2021-07-19

## 2021-06-21 RX ORDER — LORAZEPAM 1 MG/1
1-2 TABLET ORAL DAILY PRN
Qty: 15 TABLET | Refills: 0 | Status: SHIPPED | OUTPATIENT
Start: 2021-06-21 | End: 2021-08-02

## 2021-06-21 RX ORDER — OXCARBAZEPINE 600 MG/1
600 TABLET, FILM COATED ORAL 2 TIMES DAILY
Qty: 60 TABLET | Refills: 1 | Status: SHIPPED | OUTPATIENT
Start: 2021-06-21 | End: 2021-08-02

## 2021-06-21 RX ORDER — QUETIAPINE FUMARATE 100 MG/1
100-200 TABLET, FILM COATED ORAL DAILY PRN
Qty: 60 TABLET | Refills: 1 | Status: SHIPPED | OUTPATIENT
Start: 2021-06-21 | End: 2021-08-02

## 2021-06-21 RX ORDER — ARIPIPRAZOLE 15 MG/1
15 TABLET ORAL DAILY
Qty: 30 TABLET | Refills: 1 | Status: SHIPPED | OUTPATIENT
Start: 2021-06-21 | End: 2021-07-19

## 2021-06-21 ASSESSMENT — PAIN SCALES - GENERAL: PAINLEVEL: NO PAIN (0)
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
## 2021-06-21 NOTE — PATIENT INSTRUCTIONS
**For crisis resources, please see the information at the end of this document**     Patient Education      Thank you for coming to the [facility] [place] [facility].    Lab Testing:  If you had lab testing today and your results are reassuring or normal they will be mailed to you or sent through [employer] within 7 days. If the lab tests need quick action we will call you with the results. The phone number we will call with results is # [phone] (home) . If this is not the best number please call our clinic and change the number.    Medication Refills:  If you need any refills please call your pharmacy and they will contact us. Our fax number for refills is [phone]. Please allow three business for refill processing. If you need to  your refill at a new pharmacy, please contact the new pharmacy directly. The new pharmacy will help you get your medications transferred.     Scheduling:  If you have any concerns about today's visit or wish to schedule another appointment please call our office during normal business hours [phone] (8-5:00 M-F)    Contact Us:  Please call [phone] during business hours (8-5:00 M-F).  If after clinic hours, or on the weekend, please call  [phone].    Financial Assistance [phone]  [facility] Billing [phone]  Central Billing Office, [facility]: [phone]  [facility] Billing [phone]  Medical Records [phone]  [facility] Patient Bill of Rights [email or web address]       MENTAL HEALTH CRISIS NUMBERS:  For a medical emergency please call  911 or go to the nearest ER.     [facility]:   [facility] -[phone]   Crisis Residence [facility] Residence -[phone]   Walk-In Counseling Center [facility] -[phone]   COPE 24/7 [facility] Mobile Team -[phone] (adults)/[phone] (child)  CHILD: [facility] needs assessment  team - [phone]      [facility]:   [facility] - [phone]   Walk-in counseling [facility] - [phone]   Walk-in counseling [facility] - [phone]   Crisis Residence [facility] Residence - [phone]  Urgent Care Adult Mental [facility]-[phone] mobile unit/ 24/7 crisis line    National Crisis Numbers:   National Suicide Prevention Lifeline: 1-829-695-TALK (133-958-2846)  Poison Control Center - 8-674-273-5580  [employer]/resources for a list of additional resources (SOS)  Trans Lifeline a hotline for transgender people 1-716.367.9744  [employer] a hotline for LGBT youth 5-435-664-3613  Crisis Text Line: For any crisis 24/7   To: 930898  see www.crisistextline.org  - IF MAKING A CALL FEELS TOO HARD, send a text!         Again thank you for choosing [facility] MENTAL HEALTH & ADDICTION [facility] and please let us know how we can best partner with you to improve you and your family's health.    You may be receiving a survey regarding this appointment. We would love to have your feedback, both positive and negative. The survey is done by an external company, so your answers are anonymous.

## 2021-06-21 NOTE — PROGRESS NOTES
[facility]    Dialectic Behavior Therapy Clinic     Adult DBT Skills Group     DBT (Dialectic Behavior Therapy) is a cognitive behavioral therapy that includes skills group, which uses didactics, modeling, behavior rehearsal, and homework exercises to aid patients in acquiring new skills and the opportunity to practice new behaviors.    Date of Service: Jun 21, 2021  Group Length: 120 minutes  Start time: 2:30   End time: 4:30  Number of Participants: 5  Group Facilitators: Fauzia Domingo, PhD, LP, Ruben Goins MD and Ruben Mcintyre MD    Client: Betty Tamayo  Pronouns: She/Her/Hers/Herself  YOB: 1990  MRN: [id number]    Type of service:  video visit for group therapy  Reason for video visit:  Patient unable to travel due to Covid-19  Originating Site (patient location):  [facility]   Location- Patient's home  Distant Site (provider location):  Remote location  Mode of Communication:  Video Conference via Zoom  Consent:  Patient has given verbal consent for video visit?: Yes     Mindfulness: Mindfulness of seeing  Homework Reviewed: Interpersonal Effectiveness worksheet 6 (The Dime game)  Group Topic for Today: Interpersonal Effectiveness handouts 10-12  Homework Assigned: Interpersonal Effectiveness worksheet 8 (Finding and Getting People to Like You)    Assessment: Patient was not on time for group. Patient did not complete the homework assigned the previous week prior to arriving at group. Patient was engaged in homework review and was not engaged in the didactic portion of group. Mood appeared Anxious. Betty shared that she had a tough week, and this made it challenging to complete her diary card. She utilized TIPPs skills this week, specifically using an ice pack.     Diagnosis:   Encounter Diagnosis   Name Primary?     PTSD (post-traumatic stress disorder) Yes       Plan: Continue in full-model outpatient DBT program.   I was present for and  actively participated in the entire group therapy session, my observations of Betty Tamayo s progress and participation are that Fauzia was new to the group  today-Betty bales in came in a few minutes late but let therapist know ahead of time. We [place].  Fauzia Domingo, PhD LP

## 2021-06-21 NOTE — PROGRESS NOTES
[facility]    Dialectical Behavior Therapy Program    DBT Individual Psychotherapy Progress Note    Date: Jun 18, 2021    Patient Name: Betty Tamayo     Patient Pronouns: She/Her    Patient MRN: [id number]    Provider: Leonor Stroud, PhD LP    Procedure: Individual DBT session    Appointment Duration: 1:00 to 1:55 (55 minutes)    Diagnosis:   Encounter Diagnosis   Name Primary?     Bipolar 2 disorder (H) Yes        Type of service:  video visit for psychotherapy  Reason for video visit:  Patient unable to travel due to Covid-19  Originating Site (patient location):  [facility]   Location- Place of employment  Distant Site (provider location):  Remote location  Mode of Communication:  Video Conference via [employer]  Consent:  Patient has given verbal consent for video visit?: Yes     Diary Card Completed Before Session? No    Patient Used Phone Coaching Since Last Session: Yes, date 6/16 - patient called and noted ongoing panic attacks at work. Writer recommended distress tolerance skills (TIPP, taking walks and not thinking about work, and deep breathing exercises) as a way to get through the day.     Primary Targets Addressed in This Session:  Therapy-interfering behavior:  Client - Writer discussed patient's difficulty getting to group and completing the diary card. She noted that her work schedule makes it difficult for her to get home to start group on time. She wondered whether she could start group with her video off and writer encouraged her to join group with video off and turn it on once she got home - recognizing this is a short term strategy until she has more skills to manage a discussion with her workplace about leaving early enough to participate in group.     Quality of life interfering behavior:  Panic attacks at work - patient complained that she was trying all of the skills that writer had discussed (TIPP, breathing, taking breaks) but that her  anxiety is still high throughout the day. Writer acknowledged that the distress tolerance skills were not going to be effective at changing the situation and discussed the different ways to solve a problem in DBT. She noted uncertainty about leaving the job now, given that she knows she is in a high emotional place and that she may regret it. Writer discussed the distress tolerance skills as way to get through until the situation changes/she gains more skills.     DBT Skills reviewed or taught in Session:    Opposite to emotion action, TIP: body Temperature, Intensely exercise, Progressive     Interactive Complexity Code Was Used (49973): No.    Behavioral Observations/Mental Status: Patient arrived on time to session. Patient was adequately groomed. Eye contact was good. Mood today was overwhelmed and anxious. Observed affect was full range. Speech was regular rate and rhythm. Thought process was Logical, Linear and Goal directed. Patient was actively engaged in session.    Risk Assessment: The patient has the following risk and protective factors:      Risk factors: previous history of suicide attempts, suicidal ideation and anger/rage  Protective factors:  Supportive friends and/or family, Cultural or Uatsdin beliefs discouraging suicide, Is a parent, Stable housing and Restricted access to lethal means     Based on risk and protective factors, patient is assessed to be at the following levels of acute and chronic risk.     Acute Risk: Low Acute Risk (i.e., no current suicidal intent, specific plan, preparatory behaviors, and high confidence in patient's ability to maintain safety).  Chronic Risk: Intermediate Chronic Risk (i.e., chronic suicidal ideation with protective factors and coping skills to endure crisis without self-directed violence)    Behavioral Assignments:  1) Complete DBT Diary Card daily  2) Complete DBT Skills Group homework     Plan: Patient will continue in full-model, outpatient DBT program  until completion of one full-round of DBT skills/the end of their 6-month treatment agreement.

## 2021-06-23 ENCOUNTER — TELEPHONE (OUTPATIENT)
Dept: PSYCHIATRY | Facility: CLINIC | Age: 31
End: 2021-06-23

## 2021-06-23 NOTE — TELEPHONE ENCOUNTER
Writer attempted to reach pt to explore additional work accommodations as the request to work remotely was denied. Left writer's contact information and encouraged pt to return phone call.

## 2021-06-23 NOTE — TELEPHONE ENCOUNTER
"Spoke with pt regarding work accommodations:  --Previous request for remote work and private workspace denied as work was unable to accommodate these requests.  --Pt reports leaving early from work 2 days/week over the last two weeks. She does not want this to impact her employment. She is wondering if a request could be submitted allowing her to take intermittent time off during symptom exacerbation.  --Discuss Dr. Boyd's primary recommendation of DBT. Pt expressed interest in the ability to leave early from work and \"flex\" her hours so that she can attend DBT group on Monday afternoon.     Will route update to provider for approval. Letter will need to be sent directly to [facility], per pt request.   "

## 2021-06-23 NOTE — Clinical Note
I made the language a little more aggressive :) I really hope this works. Thank you so much for drafting this!!

## 2021-06-24 ENCOUNTER — VIRTUAL VISIT (OUTPATIENT)
Dept: PSYCHIATRY | Facility: CLINIC | Age: 31
End: 2021-06-24
Attending: PSYCHOLOGIST
Payer: COMMERCIAL

## 2021-06-24 DIAGNOSIS — F43.10 PTSD (POST-TRAUMATIC STRESS DISORDER): ICD-10-CM

## 2021-06-24 DIAGNOSIS — F31.81 BIPOLAR 2 DISORDER (H): Primary | ICD-10-CM

## 2021-06-24 PROCEDURE — 90837 PSYTX W PT 60 MINUTES: CPT | Mod: 95 | Performed by: PSYCHOLOGIST

## 2021-06-25 NOTE — TELEPHONE ENCOUNTER
--Updated accommodation letter completed and signed by provider.  --Letter faxed to [facility] at: [phone].

## 2021-06-28 ENCOUNTER — TELEPHONE (OUTPATIENT)
Dept: PSYCHIATRY | Facility: CLINIC | Age: 31
End: 2021-06-28

## 2021-06-28 NOTE — TELEPHONE ENCOUNTER
Received incoming phone call from [facility]. They are requesting an updated letter specifying amount of hours pt can take off during symptom exacerbation and duration of effectiveness (ex: 1-2 months).    Letter updated and approved by Dr. Boyd. Faxed back to [facility] at: [phone].

## 2021-06-28 NOTE — LETTER
"6/28/2021       RE: Betty Tamayo  [address] [place] [address]     To Whom it May Concern,    We are aware that Betty already has some accommodations in place, but unfortunately they are insufficient for her to maintain given the severity of her condition. Betty continues to experience debilitating mental health symptoms related to factors outside of her control that interfere significantly with her ability to function. She is currently engaged in intensive treatment for her condition, but her treatment is currently hindered by her work schedule, which has prevented her from being able to attend, resulting in deterioration. She must be permitted to attend her DBT treatment on Monday afternoons from 2:30p-4:30p due to her work schedule. At this time, DBT continues to be the primary treatment recommendation in combination with regular medication management appointments. This recommendation has been assessed by multiple providers involved in her care.     As our previous accommodation recommendations, remote work and private workspace, were denied, we are are strongly recommending the following accommodations:     1.) Please allow Betty to \"flex\" her work schedule during the week so that she can leave by 2p on Mondays to consistently attend DBT group.   2.) Please allow Betty the option of intermittent time away from work 1-2 times per week for 2-4 hours per episode for the next 2 months should symptom exacerbation occur.     It is likely that without these necessary additional accommodations in place, Betty will continue to experience a decline in functioning that will significantly impact her ability to perform work duties, and may result in involuntary absence. Please accommodate this request to the fullest extent possible.     Sincerely,        Jordan Boyd MD    "

## 2021-06-28 NOTE — LETTER
"6/28/2021       RE: Betty Tamayo  [address] [place] [address]     To Whom it May Concern,    We are aware that Betty already has some accommodations in place, but unfortunately they are insufficient for her to maintain given the severity of her condition. Betty continues to experience debilitating mental health symptoms related to factors outside of her control that interfere significantly with her ability to function. She is currently engaged in intensive treatment for her condition, but her treatment is currently hindered by her work schedule, which has prevented her from being able to attend, resulting in deterioration. She must be permitted to attend her DBT treatment on Monday afternoons from 2:30p-4:30p due to her work schedule. At this time, DBT continues to be the primary treatment recommendation in combination with regular medication management appointments. This recommendation has been assessed by multiple providers involved in her care.     As our previous accommodation recommendations, remote work and private workspace, were denied, we are are strongly recommending the following accommodations:     1.) Please allow Betty to \"flex\" her work schedule during the week so that she can leave by 2p on Mondays to consistently attend DBT group.   2.) Please allow Betty the option of intermittent time away from work 1-2 times per week for 2-4 hours per episode for the next 2 months should symptom exacerbation occur.     It is likely that without these necessary additional accommodations in place, Betty will continue to experience a decline in functioning that will significantly impact her ability to perform work duties, and may result in involuntary absence. Please accommodate this request to the fullest extent possible.     Sincerely,        Jordan Boyd MD    "

## 2021-06-30 NOTE — PROGRESS NOTES
"    [facility]    Dialectical Behavior Therapy Program    DBT Individual Psychotherapy Progress Note    Date: Jun 24, 2021    Patient Name: Betty Tamayo     Patient Pronouns: She/Her    Patient MRN: [id number]    Provider: Leonor Stroud, PhD LP    Procedure: Individual DBT session    Appointment Duration: 2:05 to 3:00 (55 minutes)    Diagnosis:   Encounter Diagnoses   Name Primary?     Bipolar 2 disorder (H) Yes     PTSD (post-traumatic stress disorder)         Type of service:  video visit for psychotherapy  Reason for video visit:  Patient unable to travel due to Covid-19  Originating Site (patient location):  [facility]   Location- Place of employment  Distant Site (provider location):  Remote location  Mode of Communication:  Video Conference via [employer]Well  Consent:  Patient has given verbal consent for video visit?: Yes      Diary Card Completed Before Session? YES, but did not have it with her    Patient Used Phone Coaching Since Last Session: No    Primary Targets Addressed in This Session:  Therapy-interfering behavior:  Client - Problem solving diary card and having it with her.   Quality of life interfering behavior:  Betty reported difficulty transitioning to home at the end of the day, noting that she is still wound up and finds herself being short with her family, particularly her fiance. Writer highlighted and validated how much Betty has on her plate and suggested she find a few minutes for her to take some \"me\" time before entering the house. She said her commute used to give her that but since there is a lot of construction, she does not feel she is able to unwind but noted that once she is in the house, her kids have lots of demands. Writer discussed sitting in her car for a few minutes before going inside and she said she could try that. She also worried that her kids were noticing her crying and having a more difficult time and that she tried to " pretend nothing was happening but she knows that particularly her older kids  on her moods. Writer discussed various age-appropriate ways that she could explain what was happening so that she was not hiding but also not giving information that she did not think was appropriate for their age level.     DBT Skills reviewed or taught in Session:    Effectiveness: focus on what works    Interactive Complexity Code Was Used (81501): No.     Behavioral Observations/Mental Status: Patient arrived late by 5 min to session. Patient was adequately groomed. Eye contact was good. Mood today was friendly. Observed affect was full range. Speech was regular rate and rhythm. Thought process was Logical, Linear and Goal directed. Patient was actively engaged in session.    Risk Assessment: The patient has the following risk and protective factors:      Risk factors: previous history of suicide attempts, suicidal ideation and anger/rage  Protective factors:  Supportive friends and/or family, Cultural or Spiritism beliefs discouraging suicide, Is a parent, Stable housing and Restricted access to lethal means     Based on risk and protective factors, patient is assessed to be at the following levels of acute and chronic risk.     Acute Risk: Low Acute Risk (i.e., no current suicidal intent, specific plan, preparatory behaviors, and high confidence in patient's ability to maintain safety).  Chronic Risk: Intermediate Chronic Risk (i.e., chronic suicidal ideation with protective factors and coping skills to endure crisis without self-directed violence)  Behavioral Assignments:  1) Complete DBT Diary Card daily  2) Complete DBT Skills Group homework  3) Take time to herself before going into the house at the end of the day.      Plan: Patient will continue in full-model, outpatient DBT program until completion of one full-round of DBT skills/the end of their 6-month treatment agreement.

## 2021-07-01 ENCOUNTER — VIRTUAL VISIT (OUTPATIENT)
Dept: PSYCHIATRY | Facility: CLINIC | Age: 31
End: 2021-07-01
Attending: PSYCHOLOGIST
Payer: COMMERCIAL

## 2021-07-01 DIAGNOSIS — F43.10 POSTTRAUMATIC STRESS DISORDER: Primary | ICD-10-CM

## 2021-07-01 PROCEDURE — 90834 PSYTX W PT 45 MINUTES: CPT | Mod: 95 | Performed by: PSYCHOLOGIST

## 2021-07-02 ENCOUNTER — TELEPHONE (OUTPATIENT)
Dept: PSYCHIATRY | Facility: CLINIC | Age: 31
End: 2021-07-02

## 2021-07-02 NOTE — TELEPHONE ENCOUNTER
[facility] Call Center    Phone Message    May a detailed message be left on voicemail: yes     Reason for Call: Other: [employer] Calling Back      [employer] calling. Requesting the form be re-faxed to [phone] but with a time frame indicated for which this request is valid.      Action Taken: Message routed to:  Other: nursing pool    Travel Screening: Not Applicable

## 2021-07-02 NOTE — PROGRESS NOTES
"    [facility]    Dialectical Behavior Therapy Program    DBT Individual Psychotherapy Progress Note    Date: Jul 1, 2021    Patient Name: Betty Tamayo     Patient Pronouns: She/Her    Patient MRN: [id number]    Provider: Leonor Stroud, PhD LP    Procedure: Individual DBT session    Appointment Duration: 2:00 to 2:40 (40 minutes)    Diagnosis:   Encounter Diagnosis   Name Primary?     Posttraumatic stress disorder Yes        Type of service:  video visit for psychotherapy  Reason for video visit:  Patient unable to travel due to Covid-19  Originating Site (patient location):  [facility]   Location- Patient's home  Distant Site (provider location):  Remote location  Mode of Communication:  Video Conference via [employer]  Consent:  Patient has given verbal consent for video visit?: Yes     Diary Card Completed Before Session? YES    Patient Used Phone Coaching Since Last Session: Patient called for phone coaching but did not leave a voicemail or message. Writer let her know that she would have to do so for coaching (she had not realized this).     Primary Targets Addressed in This Session:  Therapy-interfering behavior:  Client - Betty left group after only 15 minutes last week. She said that it was a very \"hard day\" - her mother had been calling and texting her and told her that her car was repossessed. Betty was too dysregulated to problem solve this due to potentially losing her job that day.   Quality of life interfering behavior:  Session was spent discussing Betty's work status. She said she had a meeting earlier in the day with HR and her boss to discuss her request for accommodations. HR told her that they had not reached an agreement about what to do and so she was put on short-term disability and they would reach out to her within two weeks to update her on her status. She said that after the meeting her boss told her that she was likely to be laid off. She " was tearful discussing this, noting shame, anger, and sadness. She said that she had been performing better at work recently and she felt the only reason this was happening was because she was asking for so many accommodations. Writer discusssed skills to use that day. She was willing to use breathing and ice, but noted that she would not have time to do any pleasant events for herself because she had to  her car that had been repossessed. She said that she would have to first go to get the keys from her mom - writer discussed having someone else do this for her, given her recent conflict with her mom and her difficult relationship with her in general. She agreed to ask her fiance to do this for her. She denied any increase in SI/NSSI given the news. Writer will be out of town this next week but she agreed to reach out for coaching if she needed additional help given that there will be no individual session next week.     DBT Skills reviewed or taught in Session:    Accumulate positives (build positive experiences), Build Mastery, [place] ahead of time, TIP: body Temperature, Intensely exercise, Progressive     Interactive Complexity Code Was Used (46705): No.     Behavioral Observations/Mental Status: Patient arrived on time to session. Patient was well groomed. Eye contact was avoidant. Mood today was dysphoric and overwhelmed. Observed affect was full range and tearful. Speech was regular rate and rhythm. Thought process was Logical, Linear and Goal directed. Patient was actively engaged in session.    Risk Assessment: The patient has the following risk and protective factors:      Risk factors: previous history of suicide attempts, suicidal ideation and anger/rage  Protective factors:  Supportive friends and/or family, Cultural or Shinto beliefs discouraging suicide, Is a parent, Stable housing and Restricted access to lethal means     Based on risk and protective factors, patient is assessed to be at the  following levels of acute and chronic risk.     Acute Risk: Low Acute Risk (i.e., no current suicidal intent, specific plan, preparatory behaviors, and high confidence in patient's ability to maintain safety).  Chronic Risk: Intermediate Chronic Risk (i.e., chronic suicidal ideation with protective factors and coping skills to endure crisis without self-directed violence)     Plan: Patient will continue in full-model, outpatient DBT program until completion of one full-round of DBT skills/the end of their 6-month treatment agreement.

## 2021-07-06 ENCOUNTER — TELEPHONE (OUTPATIENT)
Dept: PSYCHIATRY | Facility: CLINIC | Age: 31
End: 2021-07-06

## 2021-07-13 NOTE — TELEPHONE ENCOUNTER
4 faxed pages was received from [facility] requesting Progress Notes from 6/21/21 to current. 12 pages of Progress Notes was printed from 6/21/21 appointment. These were faxed to [facility] at [phone]. A Quick Disclosure was entered.Mirna Camilo LPN

## 2021-07-16 ENCOUNTER — TELEPHONE (OUTPATIENT)
Dept: PSYCHIATRY | Facility: CLINIC | Age: 31
End: 2021-07-16

## 2021-07-16 NOTE — TELEPHONE ENCOUNTER
Betty macedo appointment scheduled for 7/15. Writer reached out and left a voicemail and will continue to follow up.

## 2021-07-19 ENCOUNTER — VIRTUAL VISIT (OUTPATIENT)
Dept: PSYCHIATRY | Facility: CLINIC | Age: 31
End: 2021-07-19
Attending: PSYCHIATRY & NEUROLOGY
Payer: COMMERCIAL

## 2021-07-19 DIAGNOSIS — G47.00 INSOMNIA, UNSPECIFIED TYPE: ICD-10-CM

## 2021-07-19 DIAGNOSIS — F31.81 BIPOLAR 2 DISORDER (H): Primary | ICD-10-CM

## 2021-07-19 DIAGNOSIS — F43.10 PTSD (POST-TRAUMATIC STRESS DISORDER): ICD-10-CM

## 2021-07-19 PROCEDURE — 99214 OFFICE O/P EST MOD 30 MIN: CPT | Mod: 95 | Performed by: PSYCHIATRY & NEUROLOGY

## 2021-07-19 RX ORDER — LAMOTRIGINE 200 MG/1
200 TABLET ORAL DAILY
Qty: 30 TABLET | Refills: 0 | Status: SHIPPED | OUTPATIENT
Start: 2021-07-19 | End: 2021-08-02

## 2021-07-19 RX ORDER — BUPROPION HYDROCHLORIDE 150 MG/1
150 TABLET ORAL EVERY MORNING
Qty: 30 TABLET | Refills: 0 | Status: SHIPPED | OUTPATIENT
Start: 2021-07-19 | End: 2021-08-02

## 2021-07-19 ASSESSMENT — PAIN SCALES - GENERAL: PAINLEVEL: NO PAIN (0)

## 2021-07-19 NOTE — PATIENT INSTRUCTIONS
**For crisis resources, please see the information at the end of this document**     Patient Education      Thank you for coming to the [facility] [place] [facility].    Lab Testing:  If you had lab testing today and your results are reassuring or normal they will be mailed to you or sent through [employer] within 7 days. If the lab tests need quick action we will call you with the results. The phone number we will call with results is # [phone] (home) . If this is not the best number please call our clinic and change the number.    Medication Refills:  If you need any refills please call your pharmacy and they will contact us. Our fax number for refills is [phone]. Please allow three business for refill processing. If you need to  your refill at a new pharmacy, please contact the new pharmacy directly. The new pharmacy will help you get your medications transferred.     Scheduling:  If you have any concerns about today's visit or wish to schedule another appointment please call our office during normal business hours [phone] (8-5:00 M-F)    Contact Us:  Please call [phone] during business hours (8-5:00 M-F).  If after clinic hours, or on the weekend, please call  [phone].    Financial Assistance [phone]  [facility] Billing [phone]  Central Billing Office, [facility]: [phone]  [place] Billing [phone]  Medical Records [phone]  [place] Patient Bill of Rights [email or web address]       MENTAL HEALTH CRISIS NUMBERS:  For a medical emergency please call  911 or go to the nearest ER.     [facility]:   [facility] -[phone]   Crisis Residence [facility] -[phone]   Walk-In Counseling Center [facility] -[phone]   COPE 24/7 [facility] Mobile Team -[phone] (adults)/[phone] (child)  CHILD: [facility] needs assessment  team - [phone]      [facility]:   [facility] - [phone]   Walk-in counseling [facility] - [phone]   Walk-in counseling [facility] - [phone]   Crisis Residence [facility] - [phone]  Urgent Care Adult Mental [facility]-[phone] mobile unit/ 24/7 crisis line    National Crisis Numbers:   National Suicide Prevention Lifeline: 3-652-371-TALK (973-526-8097)  Poison Control Center - 0-319-807-8427  Dotour.com/resources for a list of additional resources (SOS)  Trans Lifeline a hotline for transgender people 1-523.249.3098  [employer] a hotline for LGBT youth 4-818-718-9628  Crisis Text Line: For any crisis 24/7   To: 977600  see www.crisistextline.org  - IF MAKING A CALL FEELS TOO HARD, send a text!         Again thank you for choosing [facility] [place] and please let us know how we can best partner with you to improve you and your family's health.    You may be receiving a survey regarding this appointment. We would love to have your feedback, both positive and negative. The survey is done by an external company, so your answers are anonymous.
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
## 2021-07-22 ENCOUNTER — TELEPHONE (OUTPATIENT)
Dept: PSYCHIATRY | Facility: CLINIC | Age: 31
End: 2021-07-22

## 2021-07-22 NOTE — TELEPHONE ENCOUNTER
SW left voicemail. Writer checking in and hoping to see if there are any resources/supports writer can help Betty connect to. Writer also offered to help problem-solve any difficulties in connecting to current supports/interventions.    Provided contact number.    BIANKA Brar, [facility]  [phone]

## 2021-08-02 ENCOUNTER — TELEPHONE (OUTPATIENT)
Dept: PSYCHIATRY | Facility: CLINIC | Age: 31
End: 2021-08-02

## 2021-08-02 ENCOUNTER — VIRTUAL VISIT (OUTPATIENT)
Dept: PSYCHIATRY | Facility: CLINIC | Age: 31
End: 2021-08-02
Attending: PSYCHOLOGIST
Payer: COMMERCIAL

## 2021-08-02 ENCOUNTER — VIRTUAL VISIT (OUTPATIENT)
Dept: PSYCHIATRY | Facility: CLINIC | Age: 31
End: 2021-08-02
Attending: PSYCHIATRY & NEUROLOGY
Payer: COMMERCIAL

## 2021-08-02 DIAGNOSIS — F31.81 BIPOLAR 2 DISORDER (H): Primary | ICD-10-CM

## 2021-08-02 DIAGNOSIS — F41.1 GENERALIZED ANXIETY DISORDER: ICD-10-CM

## 2021-08-02 DIAGNOSIS — G47.00 INSOMNIA, UNSPECIFIED TYPE: ICD-10-CM

## 2021-08-02 DIAGNOSIS — F43.10 PTSD (POST-TRAUMATIC STRESS DISORDER): ICD-10-CM

## 2021-08-02 PROCEDURE — 90834 PSYTX W PT 45 MINUTES: CPT | Mod: 95 | Performed by: PSYCHOLOGIST

## 2021-08-02 PROCEDURE — 90833 PSYTX W PT W E/M 30 MIN: CPT | Mod: 95 | Performed by: PSYCHIATRY & NEUROLOGY

## 2021-08-02 PROCEDURE — 99214 OFFICE O/P EST MOD 30 MIN: CPT | Mod: 95 | Performed by: PSYCHIATRY & NEUROLOGY

## 2021-08-02 ASSESSMENT — PATIENT HEALTH QUESTIONNAIRE - PHQ9
SUM OF ALL RESPONSES TO PHQ QUESTIONS 1-9: 26
SUM OF ALL RESPONSES TO PHQ QUESTIONS 1-9: 26
10. IF YOU CHECKED OFF ANY PROBLEMS, HOW DIFFICULT HAVE THESE PROBLEMS MADE IT FOR YOU TO DO YOUR WORK, TAKE CARE OF THINGS AT HOME, OR GET ALONG WITH OTHER PEOPLE: EXTREMELY DIFFICULT

## 2021-08-02 ASSESSMENT — ANXIETY QUESTIONNAIRES
3. WORRYING TOO MUCH ABOUT DIFFERENT THINGS: NEARLY EVERY DAY
7. FEELING AFRAID AS IF SOMETHING AWFUL MIGHT HAPPEN: NEARLY EVERY DAY
4. TROUBLE RELAXING: NEARLY EVERY DAY
7. FEELING AFRAID AS IF SOMETHING AWFUL MIGHT HAPPEN: NEARLY EVERY DAY
GAD7 TOTAL SCORE: 21
2. NOT BEING ABLE TO STOP OR CONTROL WORRYING: NEARLY EVERY DAY
6. BECOMING EASILY ANNOYED OR IRRITABLE: NEARLY EVERY DAY
GAD7 TOTAL SCORE: 21
1. FEELING NERVOUS, ANXIOUS, OR ON EDGE: NEARLY EVERY DAY
5. BEING SO RESTLESS THAT IT IS HARD TO SIT STILL: NEARLY EVERY DAY
8. IF YOU CHECKED OFF ANY PROBLEMS, HOW DIFFICULT HAVE THESE MADE IT FOR YOU TO DO YOUR WORK, TAKE CARE OF THINGS AT HOME, OR GET ALONG WITH OTHER PEOPLE?: EXTREMELY DIFFICULT
GAD7 TOTAL SCORE: 21

## 2021-08-02 NOTE — PATIENT INSTRUCTIONS
**For crisis resources, please see the information at the end of this document**     Patient Education      Thank you for coming to the [facility] [place] [facility].    Lab Testing:  If you had lab testing today and your results are reassuring or normal they will be mailed to you or sent through HotDesk within 7 days. If the lab tests need quick action we will call you with the results. The phone number we will call with results is # [phone] (home) . If this is not the best number please call our clinic and change the number.    Medication Refills:  If you need any refills please call your pharmacy and they will contact us. Our fax number for refills is [phone]. Please allow three business for refill processing. If you need to  your refill at a new pharmacy, please contact the new pharmacy directly. The new pharmacy will help you get your medications transferred.     Scheduling:  If you have any concerns about today's visit or wish to schedule another appointment please call our office during normal business hours [phone] (8-5:00 M-F)    Contact Us:  Please call [phone] during business hours (8-5:00 M-F).  If after clinic hours, or on the weekend, please call  [phone].    Financial Assistance [phone]  [facility] Billing [phone]  Central Billing Office, [facility]: [phone]  [facility] Billing [phone]  Medical Records [phone]  [facility] Patient Bill of Rights [email or web address]       MENTAL HEALTH CRISIS NUMBERS:  For a medical emergency please call  911 or go to the nearest ER.     [facility]:   [facility] -[phone]   Crisis Residence [facility] -[phone]   Walk-In Counseling Center [facility] -[phone]   COPE 24/7 [place] Mobile Team -[phone] (adults)/[phone] (child)  CHILD: [facility] needs assessment  team - [phone]      [facility]:   [facility] - [phone]   Walk-in counseling [facility] - [phone]   Walk-in counseling [facility] - [phone]   Crisis Residence [facility] - [phone]  Urgent Care Adult Mental [facility]-[phone] mobile unit/ 24/7 crisis line    National Crisis Numbers:   National Suicide Prevention Lifeline: 2-193-572-TALK (483-380-0324)  Poison Control Center - 5-106-635-4296  [employer]/resources for a list of additional resources (SOS)  Trans Lifeline a hotline for transgender people 1-260.461.8020  [employer] a hotline for LGBT youth 8-399-621-1701  Crisis Text Line: For any crisis 24/7   To: 431830  see www.crisistextline.org  - IF MAKING A CALL FEELS TOO HARD, send a text!         Again thank you for choosing [facility] and please let us know how we can best partner with you to improve you and your family's health.    You may be receiving a survey regarding this appointment. We would love to have your feedback, both positive and negative. The survey is done by an external company, so your answers are anonymous.

## 2021-08-02 NOTE — TELEPHONE ENCOUNTER
4 faxed pages, including signed PÉREZ, was received from [facility] requesting Progress Notes from June 22, 2021 forward. 11 pages from 7/19/2021 encounter was printed and faxed to [phone]. A Quick Disclosure was entered.Mirna Camilo LPN
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
## 2021-08-02 NOTE — TELEPHONE ENCOUNTER
Spoke with pt regarding request for updated work-related paperwork from [facility]. Pt reports the following:  --Is current on a CONNOR as requested by her employer while accommodation request is being sorted out. No return to work date has been set.  --Pt does not feel ready to return to work without accommodations in place.    Message sent to provider re: completion of paperwork.

## 2021-08-02 NOTE — Clinical Note
Tim webster, I know Betty flaked before on the pharmacogenomics study, but I think she is ready to do it again now. Please give her a call or let me know the number so I can have her call and get scheduled.

## 2021-08-02 NOTE — TELEPHONE ENCOUNTER
On August 2, 2021, at 1:27 PM, writer called patient at mobile to confirm Virtual Visit. Writer unable to make contact with patient. Writer left detailed voice message for call back. [phone] left as call back number. Sd Frias, EMT

## 2021-08-03 ENCOUNTER — TELEPHONE (OUTPATIENT)
Dept: PSYCHIATRY | Facility: CLINIC | Age: 31
End: 2021-08-03

## 2021-08-03 ASSESSMENT — ANXIETY QUESTIONNAIRES: GAD7 TOTAL SCORE: 21

## 2021-08-03 ASSESSMENT — PATIENT HEALTH QUESTIONNAIRE - PHQ9: SUM OF ALL RESPONSES TO PHQ QUESTIONS 1-9: 26

## 2021-08-03 NOTE — PROGRESS NOTES
"     [facility]    Psychotherapy Progress Note     Date: Aug 2, 2021    Patient name: Betty Tamayo     Patient MRN: [id number]    Provider: Leonor Stroud, PhD LP    Procedure: Individual psychotherapy session    Session length: 45 minutes (start: 10:00, stop: 10:45).    Diagnosis:   Encounter Diagnoses   Name Primary?     Bipolar 2 disorder (H) Yes     PTSD (post-traumatic stress disorder)      Generalized anxiety disorder         Type of service:  video visit for psychotherapy  Reason for video visit:  Patient unable to travel due to Covid-19  Originating Site (patient location):  Patient's home  Distant Site (provider location):  [facility]  Mode of Communication:  Video Conference via [employer]  Consent:  Patient has given verbal consent for video visit?: Yes     As the provider I attest to compliance with applicable laws and regulations related to telemedicine.    Content: This was the first session with current writer in over a month and patient has not been attending DBT group during this time. Writer assessed for suicide and patient originally said that she had not had any suicidality over the last month and then remembered an incident in which she had locked herself in the bathroom with a knife and had threatened to her fiance that she would kill herself. She noted that he was able to \"talk me down,\" but that it had been scary. Writer discussed ways to manage means, including having her fiance lock up the sharp knives and her excess medications somewhere that she was not aware of where there were. She agreed to do this after session. Writer discussed that suicides often happen during moments of impulsivity (she noted that she currently does not have suicidal ideation or intent but that in high emotion she is uncertain about what she might do). Writer discussed strategies for managing in these situations and reviewed the safety plan from " 2021.     Additional time was spent discussing Betty's medication compliance since last session with current writer. She noted that she has not been taking her medications regularly. She said that initially it started because she got off schedule when she was no longer going into work (as she had been taking them at work) but then more recently she has not been taking them because she says she does not notice a difference from when she takes them and when she doesn't. Writer discussed being honest with her psychiatrist and writer offered to send a message to share this information. She said this would be helpful as she could imagine not being honest about her medication non-compliance and that she would leave with nothing changed. She noted feeling more hopeless as she had tried so many medications and nothing had been helpful, which made her feel even worse about the prospects that something might change for her.     Last, writer discussed that due to her four misses, her contract for DBT . She expressed understanding and time was spent discussing other treatment options. Writer suggested that she have a conversation with the , Francesca Tipton, who had reached out to her recently to discuss alternative treatment options. She was amenable to this and agreed to answer her phone if it rang from the [facility] or to return a missed call if she was not able to answer it. Writer agreed to continue seeing her as she transitions to a different treatment option.     Behavioral observations/mental status: Patient arrived on time to session. Patient was adequately groomed. Eye contact was good. Mood today was dysphoric, overwhelmed and anxious. Observed affect was full range and tearful. Speech was regular rate and rhythm. Thought process was Logical, Linear and Goal directed. Patient was actively engaged in session.    Homework Assigned: 1) have fiance move knives and medications to a hidden  location; 2) discuss with her psychiatrist her non-compliance with medication and other treatment options; 3) speak with SW about other treatment options (e.g., PHP, day treatment) as her contract for DBT has  due to 4 missed appointments.     Risk Assessment: The patient has the following risk and protective factors:      Risk factors: previous history of suicide attempts, suicidal ideation and anger/rage  Protective factors:  Supportive friends and/or family, Cultural or Rastafari beliefs discouraging suicide, Is a parent, Stable housing and Restricted access to lethal means     Based on risk and protective factors, patient is assessed to be at the following levels of acute and chronic risk.     Acute Risk: Intermediate Acute Risk (i.e., no current suicidal intent, specific plan, preparatory behaviors, and high confidence in patient's ability to maintain safety).  Chronic Risk: Intermediate Chronic Risk (i.e., chronic suicidal ideation with protective factors and coping skills to endure crisis without self-directed violence)    Betty's acute risk is intermediate due to the impulsivity of her suicidal behavior. As such, reducing means as much as possible and having a safety plan in place is important for managing her risk. She does not currently endorse ideation, intent, or planning, so based on this she is safe to continue outpatient interventions. Writer will continue to regularly monitor this risk.     Plan: Continue with individual psychotherapy next week.

## 2021-08-03 NOTE — CONFIDENTIAL NOTE
SW left message. Writer calling to check in and to offer support in connecting to additional resources (ie higher intensity of mental health services), per requests from Eliseo Boyd and [employer]. Writer requested call back and provided contact number.    Will follow up in 2-3 business days if no response.    BIANKA Brar, [facility]  [phone]
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
## 2021-08-09 ENCOUNTER — TELEPHONE (OUTPATIENT)
Dept: PSYCHIATRY | Facility: CLINIC | Age: 31
End: 2021-08-09

## 2021-08-09 ENCOUNTER — VIRTUAL VISIT (OUTPATIENT)
Dept: PSYCHIATRY | Facility: CLINIC | Age: 31
End: 2021-08-09
Attending: PSYCHOLOGIST
Payer: COMMERCIAL

## 2021-08-09 DIAGNOSIS — F41.1 GENERALIZED ANXIETY DISORDER: ICD-10-CM

## 2021-08-09 DIAGNOSIS — F43.10 PTSD (POST-TRAUMATIC STRESS DISORDER): Primary | ICD-10-CM

## 2021-08-09 DIAGNOSIS — F31.81 BIPOLAR 2 DISORDER (H): ICD-10-CM

## 2021-08-09 PROCEDURE — 90834 PSYTX W PT 45 MINUTES: CPT | Mod: 95 | Performed by: PSYCHOLOGIST

## 2021-08-09 NOTE — TELEPHONE ENCOUNTER
Darien Ma -    Thanks for the update. That is frustrating! Betty and I are scheduled to meet this afternoon at 1 pm so I will follow up and encourage her to return your call - maybe even at the tail end of our session, as a way to encourage her to do it! Fingers crossed.     Leonor

## 2021-08-09 NOTE — TELEPHONE ENCOUNTER
SW left second follow up message offering support in connecting to resources/intensive support.    Writer will provide update to treatment team to identify next steps.    BIANKA Brar, [facility]  [phone]

## 2021-08-10 NOTE — PROGRESS NOTES
[facility]    Psychotherapy Progress Note     Date: Aug 9, 2021    Patient name: Betty Tamayo     Patient MRN: [id number]    Provider: Leonor Stroud, PhD LP    Procedure: Individual psychotherapy session    Session length: 45 minutes (start: 1:00, stop: 1:45).    Diagnosis:   Encounter Diagnoses   Name Primary?     PTSD (post-traumatic stress disorder) Yes     Bipolar 2 disorder (H)      Generalized anxiety disorder         Type of service:  video visit for psychotherapy  Reason for video visit:  Patient unable to travel due to Covid-19  Originating Site (patient location):  Patient's home  Distant Site (provider location):  Remote location  Mode of Communication:  Video Conference via [employer]  Consent:  Patient has given verbal consent for video visit?: Yes     As the provider I attest to compliance with applicable laws and regulations related to telemedicine.    Content: Writer checked in about the three things assigned for homework from previous session. She said that she asked her fiance to move her sharp knives and medications and that he did so and the conversation went better than expected. Writer praised her for doing this. She said that she spoke openly with Dr. Boyd about medications but she still feels uncertain about what the resolution was and whether she is supposed to continue taking her medications or whether the decision was that she would stop. She has noticed that she does not feel different on the medications and that is reducing her motivation to keep taking them. Writer encouraged her to message Dr. Boyd to make sure they were on the same page and offered to attend a psychiatry appointment with her in the future if that would be helpful. Last writer followed up about speaking with Francesca Tipton from the social work team. She said the calls from Francesca go straight to voicemail so she would try calling her back later in the  day or soon.     She learned the resolution of her work limbo - she will be kept on short term disability through October but she will lose her insurance at the end of the month. She is nervous about this, given how much she relies on her insurance. Writer encouraged her to talk to Francesca about this. She identified several other stressors this past week, including conflict with her fiance. She noted that her emotions rise so fast and so even when she tries to do skillful things such as leave the room, she will continue to ruminate on the conflict and thus she is unable to avoid the blow up. Writer discussed other options, such as pairing leaving the room with ice or running up and down the stairs or going for a brisk walk when she is noticing high anger. Writer discussed thinking about other things during that time and she suggested she could think of positive things her fiance does or happy things related to her children. She was agreeable to trying these options.     Behavioral observations/mental status: Patient arrived late by 10 min to session. Patient was adequately groomed. Eye contact was good. Mood today was dysphoric and anxious. Observed affect was full range. Speech was regular rate and rhythm. Thought process was Logical, Linear and Goal directed. Patient was actively engaged in session.    Homework Assigned: Reach out to Francesca; try additional skills, discussed above, for managing high anger.     Risk Assessment: The patient has the following risk and protective factors:      Risk factors: previous history of suicide attempts, suicidal ideation and anger/rage  Protective factors:  Supportive friends and/or family, Cultural or Shinto beliefs discouraging suicide, Is a parent, Stable housing and Restricted access to lethal means     Based on risk and protective factors, patient is assessed to be at the following levels of acute and chronic risk.     Acute Risk: Intermediate Acute Risk (i.e., no current  suicidal intent, specific plan, preparatory behaviors, and high confidence in patient's ability to maintain safety).  Chronic Risk: Intermediate Chronic Risk (i.e., chronic suicidal ideation with protective factors and coping skills to endure crisis without self-directed violence)     Betty's acute risk is intermediate due to the impulsivity of her suicidal behavior. As such, reducing means as much as possible and having a safety plan in place is important for managing her risk. She does not currently endorse ideation, intent, or planning, so based on this she is safe to continue outpatient interventions. Writer will continue to regularly monitor this risk.     Plan: Continue with individual psychotherapy next week.

## 2021-08-16 ENCOUNTER — TELEPHONE (OUTPATIENT)
Dept: PSYCHIATRY | Facility: CLINIC | Age: 31
End: 2021-08-16

## 2021-08-16 ENCOUNTER — VIRTUAL VISIT (OUTPATIENT)
Dept: PSYCHIATRY | Facility: CLINIC | Age: 31
End: 2021-08-16
Attending: PSYCHOLOGIST
Payer: COMMERCIAL

## 2021-08-16 DIAGNOSIS — F41.1 GENERALIZED ANXIETY DISORDER: ICD-10-CM

## 2021-08-16 DIAGNOSIS — F43.10 PTSD (POST-TRAUMATIC STRESS DISORDER): Primary | ICD-10-CM

## 2021-08-16 DIAGNOSIS — F31.81 BIPOLAR 2 DISORDER (H): ICD-10-CM

## 2021-08-16 PROCEDURE — 90837 PSYTX W PT 60 MINUTES: CPT | Mod: 95 | Performed by: PSYCHOLOGIST

## 2021-08-16 NOTE — CONFIDENTIAL NOTE
"Betty returned writereynaldo's outreach attempts.    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    Betty identified top needs as figuring out insurance and getting into a therapy class.    Writer provided information on insurance, including options of Cobra coverage, possibly medical assistance, and/or commercial insurance through the [employer] marketplace. If her insurance ends at the end of the month, she would have a qualifying event that would allow her to apply for new insurance. Yoanr provided information on [employer] and encouraged her to look at her options on the website before the end of the month.    On suggestion from her therapist, Betty asked about treatment groups that would address trauma symptoms.  provided information on [facility] ([phone], 4-6 week wait) and [facility] ([phone], 6-10 week wait).    Betty reports she is able to make these calls on her own. She agreed to let yoanr follow up within the next week. Phone calls works best.    Lois Tipton, BIANKA, LICSW  [phone]    "

## 2021-08-18 NOTE — CONFIDENTIAL NOTE
ALEX left voicemail for Betty. Writer hoping for update on insurance plans and to see if she called [facility] and [facility].    Provided contact information and let patient know writer would be available today, then out until next Tuesday.    BIANKA Brar, [facility]  [phone]

## 2021-08-22 NOTE — PROGRESS NOTES
[facility]    Psychotherapy Progress Note     Date: Aug 16, 2021    Patient name: Betty Tamayo     Patient MRN: [id number]    Provider: Leonor Stroud, PhD LP    Procedure: Individual psychotherapy session    Session length: 55 minutes (start: 2:00, stop: 2:55).    Diagnosis:   Encounter Diagnoses   Name Primary?     PTSD (post-traumatic stress disorder) Yes     Bipolar 2 disorder (H)      Generalized anxiety disorder         Type of service:  video visit for psychotherapy  Reason for video visit:  Patient unable to travel due to Covid-19  Originating Site (patient location):  Patient's home  Distant Site (provider location):  Remote location  Mode of Communication:  Video Conference via [email or web address]  Consent:  Patient has given verbal consent for video visit?: Yes     As the provider I attest to compliance with applicable laws and regulations related to telemedicine.    Content: Betty continues to endorse high levels of distress, noting that anxiety and irritability come on frequently and very intensely when they do. Betty described knowing that there were things she needed to do to improve her situation, such as reaching out to the  in the department to discuss treatment and insurance options, but that she finds it nearly impossible to do so when she thinks of it. She described anxiety spiking and feeling unable to describe what her needs actually are in those moments. Writer asked whether she would be willing to call during session and she said that she could call afterward, but session was spent planning out what she would specifically say on the call. She agreed to call after the session. Writer also brainstormed crisis management skills for moments when anxiety/irritability is spiking (TIPP skills, STOP skill, distract). She expressed willingness to keep trying these options.     Behavioral observations/mental status: Patient arrived on time to  session. Patient was adequately groomed. Eye contact was good. Mood today was sad and overwhelmed. Observed affect was full range and tearful. Speech was regular rate and rhythm. Thought process was Logical, Linear and Goal directed. Patient was actively engaged in session.    Homework Assigned: Call Lois Tipton [facility], to discuss insurance and treatment options.     Risk Assessment: The patient has the following risk and protective factors:      Risk factors: previous history of suicide attempts, suicidal ideation and anger/rage  Protective factors:  Supportive friends and/or family, Cultural or Synagogue beliefs discouraging suicide, Is a parent, Stable housing and Restricted access to lethal means     Based on risk and protective factors, patient is assessed to be at the following levels of acute and chronic risk.     Acute Risk: Intermediate Acute Risk (i.e., no current suicidal intent, specific plan, preparatory behaviors, and high confidence in patient's ability to maintain safety).  Chronic Risk: Intermediate Chronic Risk (i.e., chronic suicidal ideation with protective factors and coping skills to endure crisis without self-directed violence)     Betty's acute risk is intermediate due to the impulsivity of her suicidal behavior. As such, reducing means as much as possible and having a safety plan in place is important for managing her risk. She does not currently endorse ideation, intent, or planning, so based on this she is safe to continue outpatient interventions. Writer will continue to regularly monitor this risk.     Plan: Continue with individual psychotherapy next week.

## 2021-08-23 ENCOUNTER — TELEPHONE (OUTPATIENT)
Dept: PSYCHIATRY | Facility: CLINIC | Age: 31
End: 2021-08-23

## 2021-08-23 ENCOUNTER — VIRTUAL VISIT (OUTPATIENT)
Dept: PSYCHIATRY | Facility: CLINIC | Age: 31
End: 2021-08-23
Attending: PSYCHOLOGIST
Payer: COMMERCIAL

## 2021-08-23 ENCOUNTER — VIRTUAL VISIT (OUTPATIENT)
Dept: PSYCHIATRY | Facility: CLINIC | Age: 31
End: 2021-08-23
Attending: PSYCHIATRY & NEUROLOGY
Payer: COMMERCIAL

## 2021-08-23 DIAGNOSIS — F31.81 BIPOLAR 2 DISORDER (H): Primary | ICD-10-CM

## 2021-08-23 DIAGNOSIS — F41.1 GENERALIZED ANXIETY DISORDER: ICD-10-CM

## 2021-08-23 DIAGNOSIS — F43.10 PTSD (POST-TRAUMATIC STRESS DISORDER): ICD-10-CM

## 2021-08-23 PROCEDURE — 90837 PSYTX W PT 60 MINUTES: CPT | Mod: 95 | Performed by: PSYCHOLOGIST

## 2021-08-23 PROCEDURE — 90792 PSYCH DIAG EVAL W/MED SRVCS: CPT | Mod: 95 | Performed by: STUDENT IN AN ORGANIZED HEALTH CARE EDUCATION/TRAINING PROGRAM

## 2021-08-23 NOTE — PATIENT INSTRUCTIONS
Treatment Plan Today:     1) Medications-Continue current medications, we have made no changes as we were unable to reach you today to discuss the options    2) Follow-up appt with Dr Lennox SARMIENTO to discuss recommendations, for all other concerns please reach out to Dr Boyd    3) Crisis numbers are below and clinic after hours number is [phone]              **For crisis resources, please see the information at the end of this document**     Patient Education      Thank you for coming to the [facility].    Lab Testing:  If you had lab testing today and your results are reassuring or normal they will be mailed to you or sent through GuideWall within 7 days. If the lab tests need quick action we will call you with the results. The phone number we will call with results is # [phone] (home) . If this is not the best number please call our clinic and change the number.    Medication Refills:  If you need any refills please call your pharmacy and they will contact us. Our fax number for refills is [phone]. Please allow three business for refill processing. If you need to  your refill at a new pharmacy, please contact the new pharmacy directly. The new pharmacy will help you get your medications transferred.     Scheduling:  If you have any concerns about today's visit or wish to schedule another appointment please call our office during normal business hours [phone] (8-5:00 M-F)    Contact Us:  Please call [phone] during business hours (8-5:00 M-F).  If after clinic hours, or on the weekend, please call  [phone].    Financial Assistance [phone]  [facility] Billing [phone]  Central Billing Office, [facility]: [phone]  [facility] Billing [phone]  Medical Records [phone]  [facility] Patient Bill of Rights [email or web address]       MENTAL HEALTH CRISIS  NUMBERS:  For a medical emergency please call  911 or go to the nearest ER.     [facility]:   [facility] -[phone]   Crisis Residence [facility] -[phone]   Walk-In Counseling Center [facility] [place] [facility] -[phone]   COPE 24/7 [place] Mobile Team -[phone] (adults)/[phone] (child)  CHILD: [facility] needs assessment team - [phone]      [facility]:   [facility] - [phone]   Walk-in counseling [facility] - [phone]   Walk-in counseling [facility] - [phone]   Crisis Residence [facility] - [phone]  Urgent Care Adult Mental [facility]-[phone] mobile unit/ 24/7 crisis line    National Crisis Numbers:   National Suicide Prevention Lifeline: 2-501-707-TALK (269-260-1492)  Poison Control Center - 1-991.680.2719  Taggstr/resources for a list of additional resources (SOS)  Trans Lifeline a hotline for transgender people 1-117.251.2111  [employer] a hotline for LGBT youth 1-703.192.6016  Crisis Text Line: For any crisis 24/7   To: 815830  see www.crisistextline.org  - IF MAKING A CALL FEELS TOO HARD, send a text!         Again thank you for choosing [facility] ADDI[facility] and please let us know how we can best partner with you to improve you and your family's health.    You may be receiving a survey regarding this appointment. We would love to have your feedback, both positive and negative. The survey is done by an external company, so your answers are anonymous.

## 2021-08-23 NOTE — TELEPHONE ENCOUNTER
On 8/23/2021, at 753, writer called patient at [phone] to confirm Virtual Visit. Writer unable to make contact with patient. Writer left detailed voice message for call back. [phone] left as call back number.  Radha Ross, [facility]

## 2021-08-25 ENCOUNTER — TELEPHONE (OUTPATIENT)
Dept: PSYCHIATRY | Facility: CLINIC | Age: 31
End: 2021-08-25

## 2021-08-25 NOTE — CONFIDENTIAL NOTE
SW left follow up voicemail. Writer checking in on patient needs and requested update on health insurance and contact with intensive programs.    Provided contact number.    BIANKA Brar, [facility]  [phone]

## 2021-08-29 NOTE — PROGRESS NOTES
[facility]    Psychotherapy Progress Note     Date: Aug 23, 2021    Patient name: Betty Tamayo     Patient MRN: [id number]    Provider: Leonor Stroud, PhD LP    Procedure: Individual psychotherapy session    Session length: 55 minutes (start: 1:00, stop: 1:55).    Diagnosis:   Encounter Diagnoses   Name Primary?     Bipolar 2 disorder (H) Yes     PTSD (post-traumatic stress disorder)      Generalized anxiety disorder         Type of service:  video visit for psychotherapy  Reason for video visit:  Patient unable to travel due to Covid-19  Originating Site (patient location):  Patient's home  Distant Site (provider location):  Remote location  Mode of Communication:  Video Conference via AmWell  Consent:  Patient has given verbal consent for video visit?: Yes     As the provider I attest to compliance with applicable laws and regulations related to telemedicine.    Content: Writer checked in about patient's call with TERRA Paz, from SW team at [facility]. She said that Francesca gave her resources for following up for next steps with therapy and with insurance. She noted that the highest priority was figuring out the insurance and she has a  from her old job who may help as well. Writer inquired about what help she might need to make these various calls, recognizing that doing things like this can be really challenging when depression symptoms are high. Betty discussed an incident this week when she acted quickly out of anger, noting that she has negative interpretations of what other people say that happen quickly and which cause her anger response to spike quickly. Writer reviewed the STOP and TIPP skill and discussed cognitive strategies to remind herself of this tendency to have automatic negative interpretations. Betty noted that she had to go with her sister and kids to the [employer] and she was worrying about this. Writer  discussed coping ahead by telling sisters about her anxiety and putting a plan in place to get space if she found that anxiety was spiking.     Behavioral observations/mental status: Patient arrived on time to session. Patient was adequately groomed. Eye contact was good. Mood today was dysphoric and anxious. Observed affect was full range. Speech was regular rate and rhythm. Thought process was Logical, Linear and Goal directed. Patient was actively engaged in session.    Homework Assigned: Practice TIPP and STOP skills when anger/anxiety spike.     Risk Assessment: The patient has the following risk and protective factors:      Risk factors: previous history of suicide attempts, suicidal ideation and anger/rage  Protective factors:  Supportive friends and/or family, Cultural or Amish beliefs discouraging suicide, Is a parent, Stable housing and Restricted access to lethal means     Based on risk and protective factors, patient is assessed to be at the following levels of acute and chronic risk.     Acute Risk: Intermediate Acute Risk (i.e., no current suicidal intent, specific plan, preparatory behaviors, and high confidence in patient's ability to maintain safety).  Chronic Risk: Intermediate Chronic Risk (i.e., chronic suicidal ideation with protective factors and coping skills to endure crisis without self-directed violence)     Betty's acute risk is intermediate due to the impulsivity of her suicidal behavior. As such, reducing means as much as possible and having a safety plan in place is important for managing her risk. She does not currently endorse ideation, intent, or planning, so based on this she is safe to continue outpatient interventions. Writer will continue to regularly monitor this risk.     Plan: Continue with individual psychotherapy next week.

## 2021-09-01 ENCOUNTER — VIRTUAL VISIT (OUTPATIENT)
Dept: PSYCHIATRY | Facility: CLINIC | Age: 31
End: 2021-09-01
Attending: PSYCHIATRY & NEUROLOGY
Payer: COMMERCIAL

## 2021-09-01 ENCOUNTER — TELEPHONE (OUTPATIENT)
Dept: PSYCHIATRY | Facility: CLINIC | Age: 31
End: 2021-09-01

## 2021-09-01 DIAGNOSIS — F41.1 GENERALIZED ANXIETY DISORDER: ICD-10-CM

## 2021-09-01 DIAGNOSIS — F43.10 PTSD (POST-TRAUMATIC STRESS DISORDER): ICD-10-CM

## 2021-09-01 DIAGNOSIS — F31.81 BIPOLAR 2 DISORDER (H): Primary | ICD-10-CM

## 2021-09-01 DIAGNOSIS — G47.00 INSOMNIA, UNSPECIFIED TYPE: ICD-10-CM

## 2021-09-01 PROCEDURE — 99214 OFFICE O/P EST MOD 30 MIN: CPT | Mod: 95 | Performed by: PSYCHIATRY & NEUROLOGY

## 2021-09-01 PROCEDURE — 90833 PSYTX W PT W E/M 30 MIN: CPT | Mod: 95 | Performed by: PSYCHIATRY & NEUROLOGY

## 2021-09-01 NOTE — TELEPHONE ENCOUNTER
On September 1, 2021, at 8:31 AM, writer called patient at mobile to confirm Virtual Visit. Writer unable to make contact with patient. Writer left detailed voice message for call back. [phone] left as call back number. Sd Frias, EMT
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
## 2021-09-05 ENCOUNTER — HEALTH MAINTENANCE LETTER (OUTPATIENT)
Age: 31
End: 2021-09-05

## 2021-09-23 ENCOUNTER — TELEPHONE (OUTPATIENT)
Dept: PSYCHIATRY | Facility: CLINIC | Age: 31
End: 2021-09-23

## 2021-09-23 NOTE — TELEPHONE ENCOUNTER
ALEX left voicemail for Betty. SW calling to check in on well-being. Writer also hoping to get update on whether Betty was able to obtain on-going health insurance. Provided contact number and encouraged call back if she wanted help with insurance or other resources.    Lois Tipton, BIANKA, [facility]  [phone]

## 2021-09-26 ENCOUNTER — OFFICE VISIT (OUTPATIENT)
Dept: URGENT CARE | Facility: URGENT CARE | Age: 31
End: 2021-09-26
Payer: COMMERCIAL

## 2021-09-26 VITALS
OXYGEN SATURATION: 99 % | SYSTOLIC BLOOD PRESSURE: 129 MMHG | HEART RATE: 66 BPM | DIASTOLIC BLOOD PRESSURE: 85 MMHG | TEMPERATURE: 97.5 F

## 2021-09-26 DIAGNOSIS — R10.84 ABDOMINAL PAIN, GENERALIZED: Primary | ICD-10-CM

## 2021-09-26 PROCEDURE — 99214 OFFICE O/P EST MOD 30 MIN: CPT | Performed by: PREVENTIVE MEDICINE

## 2021-09-26 NOTE — PATIENT INSTRUCTIONS
Go to ED now.  Need labs and ct imaging to evaluate abdominal pain.  Has rebound ttp left lower quadrant.

## 2021-09-26 NOTE — PROGRESS NOTES
Assessment & Plan     Abdominal pain  Has diffuse diffuse ttp and rebound ttp llq and needs labs and ct imaging to further evaluate cause  Will refer to ED now for evaluation.     31 minutes spent on the date of the encounter doing chart review, review of test results, interpretation of tests, patient visit, documentation and discussion with other provider(s)         No follow-ups on file.    Manjit Hoyt MD  [facility]    Subjective     Betty Tamayo is a 31 year old year old female who presents to clinic today for the following health issues:    Patient presents with:  Abdominal Pain: abdominal pain for the past 2 days. increasing over time. not moving brings relief    This is a 32 yo female with a history of severe central abdominal pain for 2 days. Some nausea but no vomiting.  No fever or chills.  Some diarrhea.  No dysuria, increased frequency of urination.  No hx of abdominal surgery.  'No chance I am pregnant'.      Patient Active Problem List   Diagnosis     Encounter for supervision of high risk pregnancy, , [facility] CNM     History of pre-eclampsia in prior pregnancy, currently pregnant     Nausea     UTI in pregnancy, first trimester     Vitamin D deficiency     Maternal varicella, non-immune     Medication exposure during first trimester of pregnancy     Uncomplicated asthma     Arthralgia of both knees     Snoring     Hypersomnia     Class 3 severe obesity due to excess calories with body mass index (BMI) of 40.0 to 44.9 in adult, unspecified whether serious comorbidity present (H)     PTSD (post-traumatic stress disorder)     Mood disorder (H)     Generalized anxiety disorder     Bipolar 2 disorder (H)     Multiple joint pain     Arthritis       Current Outpatient Medications   Medication     diclofenac (VOLTAREN) 1 % topical gel     No current facility-administered medications for this visit.       Past Medical History:   Diagnosis Date     Anxiety       Depression      Knee cartilage, torn, left     both knees    had cortisone injection     Right shoulder pain 12/9/14     Uncomplicated asthma        Social History   reports that she quit smoking about 4 years ago. Her smoking use included cigarettes. She smoked 0.50 packs per day. She has never used smokeless tobacco. She reports current alcohol use. She reports that she does not use drugs.    Family History   Problem Relation Age of Onset     Diabetes Mother      Cerebrovascular Disease Mother      Anxiety Disorder Mother      Depression Mother      Cerebrovascular Disease Sister      Diabetes Maternal Grandmother      Hypertension Maternal Grandmother      Sleep Apnea Brother      Parkinsonism No family hx of        Review of Systems  Constitutional, HEENT, cardiovascular, pulmonary, GI, , musculoskeletal, neuro, skin, endocrine and psych systems are negative, except as otherwise noted.      Objective    /85   Pulse 66   Temp 97.5  F (36.4  C) (Tympanic)   LMP 09/01/2021 (Approximate)   SpO2 99%   Physical Exam   GENERAL: healthy, alert and no distress  EYES: Eyes grossly normal to inspection, PERRL and conjunctivae and sclerae normal  HENT: ear canals and TM's normal, nose and mouth without ulcers or lesions  NECK: no adenopathy, no asymmetry, masses, or scars and thyroid normal to palpation  RESP: lungs clear to auscultation - no rales, rhonchi or wheezes  BREAST: normal without masses, tenderness or nipple discharge and no palpable axillary masses or adenopathy  CV: regular rate and rhythm, normal S1 S2, no S3 or S4, no murmur, click or rub, no peripheral edema and peripheral pulses strong  ABDOMEN: soft, ttp in all quadrants, rebound ttp llq, no guarding, no hepatosplenomegaly, no masses and bowel sounds normal  MS: no gross musculoskeletal defects noted, no edema  SKIN: no suspicious lesions or rashes  NEURO: Normal strength and tone, mentation intact and speech normal  PSYCH: mentation appears  normal, affect normal/bright

## 2021-09-29 ENCOUNTER — TELEPHONE (OUTPATIENT)
Dept: PSYCHIATRY | Facility: CLINIC | Age: 31
End: 2021-09-29

## 2021-09-29 ENCOUNTER — VIRTUAL VISIT (OUTPATIENT)
Dept: PSYCHIATRY | Facility: CLINIC | Age: 31
End: 2021-09-29
Attending: PSYCHIATRY & NEUROLOGY
Payer: COMMERCIAL

## 2021-09-29 DIAGNOSIS — G47.00 INSOMNIA, UNSPECIFIED TYPE: ICD-10-CM

## 2021-09-29 DIAGNOSIS — F31.81 BIPOLAR 2 DISORDER (H): Primary | ICD-10-CM

## 2021-09-29 DIAGNOSIS — F43.10 PTSD (POST-TRAUMATIC STRESS DISORDER): ICD-10-CM

## 2021-09-29 PROCEDURE — 99215 OFFICE O/P EST HI 40 MIN: CPT | Mod: 95 | Performed by: PSYCHIATRY & NEUROLOGY

## 2021-09-29 NOTE — TELEPHONE ENCOUNTER
On September 29, 2021, at 9:22 AM, writer called patient at mobile to confirm Virtual Visit. Writer unable to make contact with patient. Writer unable to leave detailed voice mail message due to voice mail box full. SUSAN Sanabria    On September 29, 2021, at 9:56 AM, writer called patient at mobile to confirm Virtual Visit. Writer unable to make contact with patient. A link was sent to both the patient's mobile phone number as well as their email address. Cm Ruth, EMT
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
## 2021-09-30 RX ORDER — LITHIUM CITRATE TETRAHYDRATE 100 %
POWDER (GRAM) MISCELLANEOUS
Status: CANCELLED | OUTPATIENT
Start: 2021-09-30

## 2021-10-05 ENCOUNTER — TELEPHONE (OUTPATIENT)
Dept: PSYCHIATRY | Facility: CLINIC | Age: 31
End: 2021-10-05

## 2021-10-05 NOTE — CONFIDENTIAL NOTE
SW left message for Betty Tamayo. Writer noted that Dr. Boyd had suggested another option for community support (mental health targeted case management). Writer hoping to discuss with Betty to see if she is interested. Provided contact number and requested call back.    BIANKA Brar, [facility]  [phone]

## 2021-10-06 ENCOUNTER — HOSPITAL ENCOUNTER (OUTPATIENT)
Dept: BEHAVIORAL HEALTH | Facility: CLINIC | Age: 31
Discharge: HOME | End: 2021-10-06
Attending: FAMILY MEDICINE | Admitting: FAMILY MEDICINE
Payer: COMMERCIAL

## 2021-10-06 DIAGNOSIS — F43.10 PTSD (POST-TRAUMATIC STRESS DISORDER): ICD-10-CM

## 2021-10-06 DIAGNOSIS — F31.81 BIPOLAR 2 DISORDER (H): ICD-10-CM

## 2021-10-06 PROCEDURE — 90791 PSYCH DIAGNOSTIC EVALUATION: CPT | Mod: TEL | Performed by: COUNSELOR

## 2021-10-06 RX ORDER — LITHIUM 8 MEQ/5ML
300 SOLUTION ORAL 3 TIMES DAILY
COMMUNITY
End: 2022-01-27

## 2021-10-06 RX ORDER — LORAZEPAM 1 MG/1
1 TABLET ORAL EVERY 6 HOURS PRN
COMMUNITY
End: 2022-01-27

## 2021-10-06 ASSESSMENT — COLUMBIA-SUICIDE SEVERITY RATING SCALE - C-SSRS
6. HAVE YOU EVER DONE ANYTHING, STARTED TO DO ANYTHING, OR PREPARED TO DO ANYTHING TO END YOUR LIFE?: NO
4. HAVE YOU HAD THESE THOUGHTS AND HAD SOME INTENTION OF ACTING ON THEM?: NO
1. IN THE PAST MONTH, HAVE YOU WISHED YOU WERE DEAD OR WISHED YOU COULD GO TO SLEEP AND NOT WAKE UP?: NO
6. HAVE YOU EVER DONE ANYTHING, STARTED TO DO ANYTHING, OR PREPARED TO DO ANYTHING TO END YOUR LIFE?: NO
3. HAVE YOU BEEN THINKING ABOUT HOW YOU MIGHT KILL YOURSELF?: YES
ATTEMPT LIFETIME: NO
TOTAL  NUMBER OF ABORTED OR SELF INTERRUPTED ATTEMPTS PAST LIFETIME: NO
2. HAVE YOU ACTUALLY HAD ANY THOUGHTS OF KILLING YOURSELF?: NO
2. HAVE YOU ACTUALLY HAD ANY THOUGHTS OF KILLING YOURSELF LIFETIME?: YES
1. IN THE PAST MONTH, HAVE YOU WISHED YOU WERE DEAD OR WISHED YOU COULD GO TO SLEEP AND NOT WAKE UP?: YES
TOTAL  NUMBER OF ABORTED OR SELF INTERRUPTED ATTEMPTS PAST 3 MONTHS: NO
4. HAVE YOU HAD THESE THOUGHTS AND HAD SOME INTENTION OF ACTING ON THEM?: YES
5. HAVE YOU STARTED TO WORK OUT OR WORKED OUT THE DETAILS OF HOW TO KILL YOURSELF? DO YOU INTEND TO CARRY OUT THIS PLAN?: YES
TOTAL  NUMBER OF INTERRUPTED ATTEMPTS LIFETIME: NO
ATTEMPT PAST THREE MONTHS: NO
TOTAL  NUMBER OF INTERRUPTED ATTEMPTS PAST 3 MONTHS: NO
5. HAVE YOU STARTED TO WORK OUT OR WORKED OUT THE DETAILS OF HOW TO KILL YOURSELF? DO YOU INTEND TO CARRY OUT THIS PLAN?: NO

## 2021-10-06 ASSESSMENT — ANXIETY QUESTIONNAIRES
6. BECOMING EASILY ANNOYED OR IRRITABLE: NEARLY EVERY DAY
4. TROUBLE RELAXING: NEARLY EVERY DAY
5. BEING SO RESTLESS THAT IT IS HARD TO SIT STILL: NEARLY EVERY DAY
GAD7 TOTAL SCORE: 21
7. FEELING AFRAID AS IF SOMETHING AWFUL MIGHT HAPPEN: NEARLY EVERY DAY
1. FEELING NERVOUS, ANXIOUS, OR ON EDGE: NEARLY EVERY DAY
2. NOT BEING ABLE TO STOP OR CONTROL WORRYING: NEARLY EVERY DAY
3. WORRYING TOO MUCH ABOUT DIFFERENT THINGS: NEARLY EVERY DAY

## 2021-10-06 ASSESSMENT — PATIENT HEALTH QUESTIONNAIRE - PHQ9: SUM OF ALL RESPONSES TO PHQ QUESTIONS 1-9: 21

## 2021-10-06 NOTE — PROGRESS NOTES
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 problems without contacting another person (relaxation technique, physical activity):      Distress Tolerance Strategies:  \"nothing at the time\"    Physical Activities: None    Focus on helpful thoughts:  none    Step 3: People and social settings that provide distraction:     Children    movie theater, zoo and park     Step 4: Remind myself of people and things that are important to me and worth living for:  Children    Step 5: When I am in crisis, I can ask these people to help me use my safety plan:     Name: Paco Cardoso Phone: [phone]        Step 6: Making the environment safe:       be around others    Step 7: Professionals or agencies I can contact during a crisis:      Suicide Prevention Lifeline: 0-882-323-ULRP (9520)    Crisis Text Line Service (available 24 hours a day, 7 days a week): Text [place] to 544348    Call  **CRISIS (866708) from a cell phone to talk to a team of professionals who can help you.    Crisis Services By [facility]: Phone Number:   [place]     [phone]   [place]    [phone]   [place]    [phone]   [place]    [phone]   [place]    [phone]   [place] [phone]   [place]     [phone]       Call 911 or go to my nearest emergency department.     I helped develop this " safety plan and agree to use it when needed.  I have been given a copy of this plan.        Today s date:  10/6/2021  Adapted from Safety Plan Template 2008 Anila Santoyo and Dakota German is reprinted with the express permission of the authors.  No portion of the Safety Plan Template may be reproduced without the express, written permission.  You can contact the authors at [email or web address] or [email or web address]

## 2021-10-06 NOTE — PATIENT INSTRUCTIONS
Welcome to the Adult Mental Health Outpatient Programs. Thank you for choosing us at [facility]!     Managing mental health symptoms while balancing life stressors can be a struggle. Our mental health programming will provide you the therapy, education, skills and support needed to improve your well-being and to live a healthy and more manageable lifestyle.     After completing the Diagnostic Assessment, you will receive a recommendation for a level of care or specialty service that is right for you. Our Outpatient Mental Health programs are all group-based and allow you to meet with peers who are trying to manage similar symptoms or life circumstances in a safe and therapeutic setting.     Program Recommendations for: Adult Day Treatment Program      You will be scheduled to join the following program: Adult Day Treatment Program     You will be in the follow group /track:     Please attend:     at:      Start date:     What will happen next?      You will receive a series of calls from our [facility] providers and/or schedulers to prepare you for your program participation.       Admission Call: Program staff will contact you after your diagnostic assessment to provide a brief check in and to complete the admission process. We will ask you about concerns that may need to be addressed right away. This could include: personal safety, insurance clarification, technology access, or another concern you may have. This call may occur days in advance or right before your first scheduled group.       Physical Health Screen Call:  A nurse will contact you either prior to admission or during your first week in the program to ask a few required physical health screening questions and discuss concerns as needed.      Provider/Scheduling Call: Program staff may also call to schedule an individual appointment with a psychiatrist or psychologist (therapist). This will depend on your needs and may be required for the  program that was recommended for you. This program requirement does NOT replace your follow up with your community provider.  However, it is important that you do NOT see your community psychiatric provider on the same day that you see the program psychiatrist. If you do, this could result in a denial from your insurance. Please let us know if you have any concerns and we will help you.      Disability or FMLA paperwork requests: Please coordinate with your community provider to complete paperwork. This will be easiest for you. Most of the time, they want the same provider to follow you during your time off. If you do not have a community provider, please schedule an appointment with one as soon as possible. The Partial Hospitalization Program provider may assist with paperwork if you have not already established care in the community. However, this is a short-term program and responsibility for paperwork must transfer to another provider when you discharge from the Partial Hospitalization Program. We do NOT have a provider to complete paperwork in Adult Day Treatment, Adult Dual Disorder Program or 55+ Program at this time.      WAIT LIST: If you are placed on the Wait List following your diagnostic assessment, you will receive a phone call within a few days to discuss a tentative start date and plan.  Please contact us at the phone number listed below at any time with additional questions or if you are choosing to be removed from the Wait List.      What if I still have questions or cannot attend as planned? :  Adult Day Treatment [phone].     We hope to be able to answer your questions during the admission call. You can also reach out to us by contacting the program directly at:  Adult Day Treatment [phone].     Sincerely,   Your Outpatient Adult Mental Health Program Team     SAFETY PLAN:    Step 1: Warning signs / cues (Thoughts, images, mood, situation, behavior) that a crisis may be  "developing:      Thoughts: \"I don't matter\", \"People would be better off without me\" and \"I'm a burden\"    Images: obsessive thoughts of death or dying , flashbacks and visions of harm: \"someone harming me\"    Thinking Processes: ruminations (can't stop thinking about my problems), racing thoughts, intrusive thoughts (bothersome, unwanted thoughts that come out of nowhere), highly critical and negative thoughts, disorganized thinking and paranoia    Mood: worsening depression, hopelessness, helplessness, intense anger, intense worry, agitation, disinhibited (not caring about things or consequences) and mood swings    Behaviors: isolating/withdrawing , can't stop crying, impulsive, reckless behaviors (acting without thinking), not taking care of myself, sleeping too much and increasing frequency and duration of dissociation    Situations: trauma        Step 2: Coping strategies - Things I can do to take my mind off of my problems without contacting another person (relaxation technique, physical activity):      Distress Tolerance Strategies:  \"nothing at the time\"    Physical Activities: None    Focus on helpful thoughts:  none    Step 3: People and social settings that provide distraction:     Children    movie theater, zoo and park     Step 4: Remind myself of people and things that are important to me and worth living for:  Children    Step 5: When I am in crisis, I can ask these people to help me use my safety plan:     Name: Paco Cardoso Phone: [phone]        Step 6: Making the environment safe:       be around others    Step 7: Professionals or agencies I can contact during a crisis:      Suicide Prevention Lifeline: 8-564-333-TALK (7741)    Crisis Text Line Service (available 24 hours a day, 7 days a week): Text [place] to 772545    Call  **CRISIS (910200) from a cell phone to talk to a team of professionals who can help you.    Crisis Services By [facility]: Phone Number:   [place]     [phone]   [place]    " [phone]   [place]    [phone]   [place]    [phone]   [place]    [phone]   [place] [phone]   [place]     [phone]       Call 911 or go to my nearest emergency department.     I helped develop this safety plan and agree to use it when needed.  I have been given a copy of this plan.        Today s date:  10/6/2021  Adapted from Safety Plan Template 2008 Anila Santoyo and Dakota German is reprinted with the express permission of the authors.  No portion of the Safety Plan Template may be reproduced without the express, written permission.  You can contact the authors at [email or web address] or [email or web address]

## 2021-10-07 ENCOUNTER — TELEPHONE (OUTPATIENT)
Dept: BEHAVIORAL HEALTH | Facility: CLINIC | Age: 31
End: 2021-10-07

## 2021-10-07 ASSESSMENT — ANXIETY QUESTIONNAIRES: GAD7 TOTAL SCORE: 21

## 2021-10-08 ENCOUNTER — TELEPHONE (OUTPATIENT)
Dept: BEHAVIORAL HEALTH | Facility: CLINIC | Age: 31
End: 2021-10-08

## 2021-10-11 ENCOUNTER — TELEPHONE (OUTPATIENT)
Dept: BEHAVIORAL HEALTH | Facility: CLINIC | Age: 31
End: 2021-10-11

## 2021-10-11 NOTE — TELEPHONE ENCOUNTER
Writer received a call from patient re: starting ADT 1B this Wednesday. She reports she will be available in the AM to complete admission.

## 2021-10-12 ENCOUNTER — BEH TREATMENT PLAN (OUTPATIENT)
Dept: BEHAVIORAL HEALTH | Facility: CLINIC | Age: 31
End: 2021-10-12
Attending: PSYCHIATRY & NEUROLOGY
Payer: COMMERCIAL

## 2021-10-12 ENCOUNTER — TELEPHONE (OUTPATIENT)
Dept: BEHAVIORAL HEALTH | Facility: CLINIC | Age: 31
End: 2021-10-12

## 2021-10-12 DIAGNOSIS — F33.1 MAJOR DEPRESSIVE DISORDER, RECURRENT EPISODE, MODERATE (H): ICD-10-CM

## 2021-10-12 NOTE — TELEPHONE ENCOUNTER
Writer called to complete admission questions for ADT tomorrow 10//13. Patient denies having other questions at this time.

## 2021-10-12 NOTE — PROGRESS NOTES
"Admission Date: 10/13/2021    Confirm patient pronouns: She/her     Why are you seeking treatment/What do you want to focus on during treatment? \"I don't know how to word it. Panic attacks. Depression. PTSD. Bipolar.\"    Identify any current concerns with potential impact to admission:     medication/medical concerns: No.      immediate safety concerns: No.     Does patient have safety plan? Yes  Note: Please copy safety plan copied into [facility] Encounter     Other (insurance/childcare/transportation/housing/planned absences/etc): No.     Patient's insurance is: [employer] .     Does patient need appointment with provider? No.     Review patient's program schedule and inform them of any variation due to late days or holidays.                                  Completed by: ESTEFANY Carlisle on 10/12/2021 at 1:09 PM    "

## 2021-10-12 NOTE — PROGRESS NOTES
"  Outpatient Mental Health Services - Adult     MY COPING PLAN FOR SAFETY     PATIENT'S NAME:    Betty Tamayo  MRN:                           [id number]     SAFETY PLAN:     Step 1: Warning signs / cues (Thoughts, images, mood, situation, behavior) that a crisis may be developing:     ? Thoughts: \"I don't matter\", \"People would be better off without me\" and \"I'm a burden\"  ? Images: obsessive thoughts of death or dying , flashbacks and visions of harm: \"someone harming me\"  ? Thinking Processes: ruminations (can't stop thinking about my problems), racing thoughts, intrusive thoughts (bothersome, unwanted thoughts that come out of nowhere), highly critical and negative thoughts, disorganized thinking and paranoia  ? Mood: worsening depression, hopelessness, helplessness, intense anger, intense worry, agitation, disinhibited (not caring about things or consequences) and mood swings  ? Behaviors: isolating/withdrawing , can't stop crying, impulsive, reckless behaviors (acting without thinking), not taking care of myself, sleeping too much and increasing frequency and duration of dissociation  ? Situations: trauma    ?    Step 2: Coping strategies - Things I can do to take my mind off of my problems without contacting another person (relaxation technique, physical activity):     ? Distress Tolerance Strategies:  \"nothing at the time\"  ? Physical Activities: None  ? Focus on helpful thoughts:  none     Step 3: People and social settings that provide distraction:                 Children               movie theater, zoo and park        Step 4: Remind myself of people and things that are important to me and worth living for:  Children     Step 5: When I am in crisis, I can ask these people to help me use my safety plan:                 Name: Paco Cardoso          Phone: [phone]                               Step 6: Making the environment safe:      ? be around others     Step 7: Professionals or agencies I can " contact during a crisis:     ? Suicide Prevention Lifeline: 4-186-356-TALK (1323)  ? Crisis Text Line Service (available 24 hours a day, 7 days a week): Text [place] to 240060  ? Call  **CRISIS (224451) from a cell phone to talk to a team of professionals who can help you.          Crisis Services By [facility]: Phone Number:   [place]     [phone]   [place]    [phone]   [place]    [phone]   [place]    [phone]   [place]    [phone]   [place] [phone]   [place]     [phone]      ? Call 911 or go to my nearest emergency department.             I helped develop this safety plan and agree to use it when needed.  I have been given a copy of this plan.          Today s date:  10/6/2021  Adapted from Safety Plan Template 2008 Anila Santoyo and Dakota German is reprinted with the express permission of the authors.  No portion of the Safety Plan Template may be reproduced without the express, written permission.  You can contact the authors at [email or web address] or [email or web address]

## 2021-10-13 ENCOUNTER — HOSPITAL ENCOUNTER (OUTPATIENT)
Dept: BEHAVIORAL HEALTH | Facility: CLINIC | Age: 31
End: 2021-10-13
Attending: PSYCHIATRY & NEUROLOGY
Payer: COMMERCIAL

## 2021-10-13 PROBLEM — F33.1 MAJOR DEPRESSIVE DISORDER, RECURRENT EPISODE, MODERATE (H): Status: ACTIVE | Noted: 2021-10-13

## 2021-10-13 PROCEDURE — 90853 GROUP PSYCHOTHERAPY: CPT | Mod: GT,95 | Performed by: COUNSELOR

## 2021-10-13 PROCEDURE — G0177 OPPS/PHP; TRAIN & EDUC SERV: HCPCS | Mod: GT,95

## 2021-10-13 NOTE — GROUP NOTE
Psychoeducation Group Note    PATIENT'S NAME: Betty Tamayo  MRN:   [id number]  :   1990  ACCT. NUMBER: [id number]  DATE OF SERVICE: 10/13/21  START TIME:  3:00 PM  END TIME:  3:50 PM  FACILITATOR: Erum Franklin RN  TOPIC: FREDRICK RN Group: Mental Health Maintenance  [facility] Adult Partial Hospitalization Program  TRACK: 1B    NUMBER OF PARTICIPANTS: 4    Summary of Group / Topics Discussed:  Mental Health Maintenance:  Stigma: In this group patients explored stigma surrounding a mental health diagnosis.  The group discussed the way stigma impacts their own life, and discussed strategies to reduce. The relationship between physical and mental health were also explored in the context of healthcare access, treatment, and support.    Patient Session Goals / Objectives:  ? Patients identified the importance of practicing emotional hygiene  ? Patients identified ways to decrease the  impact of stigma in their own life                                      Service Modality:  Video Visit     Telemedicine Visit: The patient's condition can be safely assessed and treated via synchronous audio and visual telemedicine encounter.      Reason for Telemedicine Visit: Services only offered telehealth    Originating Site (Patient Location): Patient's home    Distant Site (Provider Location): Provider Remote Setting- Home Office    Consent:  The patient/guardian has verbally consented to: the potential risks and benefits of telemedicine (video visit) versus in person care; bill my insurance or make self-payment for services provided; and responsibility for payment of non-covered services.     Patient would like the video invitation sent by:  My Chart    Mode of Communication:  Video Conference via Medical Zoom    As the provider I attest to compliance with applicable laws and regulations related to telemedicine.           Patient Participation / Response:  Fully participated with the group by sharing personal  reflections / insights and openly received / provided feedback with other participants.    Identified / Expressed personal readiness to practice skills    Treatment Plan:  Patient has a current master individualized treatment plan.  See Epic treatment plan for more information.    Erum Franklin RN

## 2021-10-13 NOTE — GROUP NOTE
Psychoeducation Group Note    PATIENT'S NAME: Betty Tamayo  MRN:   [id number]  :   1990  ACCT. NUMBER: [id number]  DATE OF SERVICE: 10/13/21  START TIME:  1:00 PM  END TIME:  1:50 PM  FACILITATOR: Jose Lui OTR/L  TOPIC: MH Life Skills Group: Resiliency Development                                    Service Modality:  Video Visit     Telemedicine Visit: The patient's condition can be safely assessed and treated via synchronous audio and visual telemedicine encounter.      Reason for Telemedicine Visit: Services only offered telehealth    Originating Site (Patient Location): Patient's home    Distant Site (Provider Location): Provider Remote Setting- Home Office    Consent:  The patient/guardian has verbally consented to: the potential risks and benefits of telemedicine (video visit) versus in person care; bill my insurance or make self-payment for services provided; and responsibility for payment of non-covered services.     Mode of Communication:  Video Conference via Medical Zoom    As the provider I attest to compliance with applicable laws and regulations related to telemedicine.       [facility] Adult Mental Health Day Treatment  TRACK: 1B    NUMBER OF PARTICIPANTS: 5    Summary of Group / Topics Discussed:  Resiliency Development:  Coping Skills: Personal Recovery Outcome Measure-Mental Health Weekly Check In: Patients were taught how to identify coping strategies and routines that they can adopt and use for management with focus on a balance between physical, emotional, social and spiritual strategies.    Patient Session Goals / Objectives:    Identified personal definitions of recovery for effectively managing both mental health and substance abuse/abuse symptoms     Improved awareness of the process of recovery and skills and strategies that support this       Established a plan for practice of these skills in their own environments    Practiced and reflected on how to generalize  taught skills to their everyday life        Patient Participation / Response:  Minimally participated, only when prompted / asked.    Demonstrated understanding of content through handouts/discussion , Verbalized understanding of content and Patient would benefit from additional opportunities to practice the content to be able to generalize it to their everyday life with increased intentionality, consistency, and efficacy in support of their psychiatric recovery    Treatment Plan:  Patient has a current master individualized treatment plan.  See Epic treatment plan for more information.    Jose Lui, OTR/L

## 2021-10-13 NOTE — GROUP NOTE
Process Group Note    PATIENT'S NAME: Betty Tamayo  MRN:   [id number]  :   1990  ACCT. NUMBER: [id number]  DATE OF SERVICE: 10/13/21  START TIME:  2:00 PM  END TIME:  2:50 PM  FACILITATOR: [facility]  TOPIC:  Process Group    Diagnoses:   296.32 (F33.1) Major Depressive Disorder, Recurrent Episode, Moderate _  300.02 (F41.1) Generalized Anxiety Disorder.  4. Other Diagnoses that is relevant to services:   Other Unspecified and Specified Bipolar and Related Disorder 296.80 (F31.9) Unspecified Bipolar and Related Disorder  309.81 (F43.10) Posttraumatic Stress Disorder (includes Posttraumatic Stress Disorder for Children 6 Years and Younger)  With dissociative symptoms.      [facility] Mental [facility] Day Treatment  TRACK: 1B    NUMBER OF PARTICIPANTS: 5                                      Service Modality:  Video Visit     Telemedicine Visit: The patient's condition can be safely assessed and treated via synchronous audio and visual telemedicine encounter.      Reason for Telemedicine Visit: Services only offered telehealth    Originating Site (Patient Location): Patient's home    Distant Site (Provider Location): Provider Remote Setting- Home Office    Consent:  The patient/guardian has verbally consented to: the potential risks and benefits of telemedicine (video visit) versus in person care; bill my insurance or make self-payment for services provided; and responsibility for payment of non-covered services.     Patient would like the video invitation sent by:  My Chart    Mode of Communication:  Video Conference via Medical Zoom    As the provider I attest to compliance with applicable laws and regulations related to telemedicine.            Data:    Session content: At the start of this group, patients were invited to check in by identifying themselves, describing their current emotional status, and identifying issues to address in this group.   Area(s) of treatment focus  "addressed in this session included Symptom Management and Personal Safety.    Betty reported feeling \"really anxious\" today.  Her goal was to attend group today    Therapeutic Interventions/Treatment Strategies:  Psychotherapist offered support, feedback and validation and reinforced use of skills.    Assessment:    Patient response:   Patient responded to session by accepting feedback, giving feedback and listening    Possible barriers to participation / learning include: and no barriers identified    Health Issues:   None reported       Substance Use Review:   Substance Use: No active concerns identified.    Mental Status/Behavioral Observations  Appearance:   Appropriate   Eye Contact:   Good   Psychomotor Behavior: Normal   Attitude:   Cooperative   Orientation:   All  Speech   Rate / Production: Normal    Volume:  Normal   Mood:    Anxious  Depressed   Affect:    Appropriate   Thought Content:   Clear  Thought Form:  Coherent  Logical     Insight:    Good     Plan:     Safety Plan: No current safety concerns identified.  Recommended that patient call 911 or go to the local ED should there be a change in any of these risk factors.     Barriers to treatment: None identified    Patient Contracts (see media tab):  None    Substance Use: Not addressed in session     Continue or Discharge: Patient will continue in Adult Day Treatment (ADT)  as planned. Patient is likely to benefit from learning and using skills as they work toward the goals identified in their treatment plan.      Josette West, [facility]  October 13, 2021    "

## 2021-10-14 ENCOUNTER — HOSPITAL ENCOUNTER (OUTPATIENT)
Dept: BEHAVIORAL HEALTH | Facility: CLINIC | Age: 31
End: 2021-10-14
Attending: PSYCHIATRY & NEUROLOGY
Payer: COMMERCIAL

## 2021-10-14 ENCOUNTER — TELEPHONE (OUTPATIENT)
Dept: BEHAVIORAL HEALTH | Facility: CLINIC | Age: 31
End: 2021-10-14

## 2021-10-14 PROCEDURE — 90853 GROUP PSYCHOTHERAPY: CPT | Mod: GT,95 | Performed by: COUNSELOR

## 2021-10-14 NOTE — TELEPHONE ENCOUNTER
"RN Review of Medical History / Physical Health Screen  Outpatient Mental Health Programs - [facility] Adult Mental Health Day Treatment    PATIENT'S NAME: Betty Tamayo  MRN:   [id number]  :   1990  ACCT. NUMBER: [id number]  CURRENT AGE:  31 year old    DATE OF DIAGNOSTIC ASSESSMENT: 10/6/21  DATE OF ADMISSION: 10 /13/21    Please see Diagnostic Assessment for additional Medical History.     General Health:   Have you had any exposure to any communicable disease in the past 2-3 weeks? no     Are you aware of safe sex practices? yes   Do you have a history of seizures?     If so, do you have a seizure plan? Known triggers?     Notify patient that we will call 911 (if virtual) or a code (if in-person), if we were to witness seizure during group. no    no  no    yes     Nutrition:    Are you on a special diet? If yes, please explain:  no   Do you have any concerns regarding your nutritional status? If yes, please explain:  no   Have you had any appetite changes in the last 3 months?  No     Have you had any weight loss or weight gain in the last 3 months?  Yes, how much? Gained about 15 pounds; d/t not being active     Do you have a history of an eating disorder? no   Do you have a history of being in an eating disorder program? no     Patient height and weight recorded by RN in epic flowsheet: no No; Unable to measure  Because of temporary in-person programmatic suspension due to COVID-19 pandemic, all pt weights and heights will be collected through patient self-report an recorded in physical health screening progress note upon admission to the program.                            Height/Weight Review:  Patient reported height:     5'6\"   Patient reports weight:  Date last checked:  277 pounds   Any referrals/needs identified?                BMI Review:  Was the patient informed of BMI? no      Findings See above         Fall Risk:   Have you had any falls in the past 3 months? no     Do you " Hospital Medicine Progress Note, Adult, Complex               Author: Cristina Kaykay Date & Time created: 7/16/2017  8:14 AM     Interval History:  CC - RF  No overnight events reported.  Sodium low again.    Review of Systems:  Review of Systems   Unable to perform ROS: medical condition       Physical Exam:  Physical Exam   Constitutional: No distress.   HENT:   Right Ear: External ear normal.   Left Ear: External ear normal.   Nose: Nose normal.   Eyes: Right eye exhibits no discharge. Left eye exhibits no discharge. No scleral icterus.   Neck:   Trach collar   Cardiovascular: Normal rate and regular rhythm.    SR   Pulmonary/Chest: No respiratory distress. He has no wheezes.   Decreased BS   Abdominal: Soft. Bowel sounds are normal. There is no rebound and no guarding.   scaphoid, palpable pain pump   Musculoskeletal:   Severely contracted limbs   Neurological:   Awake but not interactive   Skin: Skin is warm and dry. He is not diaphoretic.   Vitals reviewed.      Labs:        Invalid input(s): [id number]      Recent Labs      07/14/17   0400  07/15/17   0425  07/16/17   0425   SODIUM  126*  131*  127*   POTASSIUM  3.8  3.8  3.5*   CHLORIDE  95*  97  96   CO2  23  24  24   BUN  10  7*  6*   CREATININE  <0.30*  <0.30*  <0.30*   CALCIUM  8.0*  8.1*  7.8*     Recent Labs      07/14/17 0400  07/15/17   0425  07/16/17   0425   ALTSGPT  31  33  33   ASTSGOT  22  25  27   ALKPHOSPHAT  184*  176*  176*   TBILIRUBIN  0.4  0.3  0.2   GLUCOSE  107*  99  102*     Recent Labs      07/15/17   0425   RBC  3.62*   HEMOGLOBIN  10.5*   HEMATOCRIT  31.9*   PLATELETCT  307     Recent Labs      07/14/17 0400  07/15/17   0425  07/16/17   0425   WBC   --   4.9   --    ASTSGOT  22  25  27   ALTSGPT  31  33  33   ALKPHOSPHAT  184*  176*  176*   TBILIRUBIN  0.4  0.3  0.2             Medical Decision Making, by Problem:  KLEB/MSSA/PROTEUS TBI - cont Ancef  CUTANEOUS YEAST - cont Fluconazole  G TUBE CELLULITIS - antimicrobials as  above  S/P MSSA/KLEBSIELLA/ASPIRATION PNA W/ SEPSIS  PARAPNEUMONIC EFFUSION S/P THORACENTESIS  STATUS EPILEPTICUS - Keppra and Phenobarbital levels nontoxic  S/P VDRF - HTC 30% FiO2, Robitussin DM to loosen up secretions  S/P PERALTA - completed Hydrocortisone prior to admission  PROTUBERANT ABD - lack of muscle tone vs distension, improved w/ Simethicone, consider KUB  ANEMIA - follow H/H  HYPONATREMIA - resume NS infusion, follow Na+ levels, limit free water, may need NaCl tablets  HYPOKALEMIA - replete K+ and check Mg++  ELEVATED ALK PHOS - following  REMOTE H/O TBI - mother is caretaker  CHRONIC PAIN - Baclofen pump as outpt  CHRONIC CONSTIPATION - bowel regimen  SULFA ALLERGY            Medications reviewed and Labs reviewed  Waldrop catheter: Strict Intake and Output During Sepisis or Shock      DVT Prophylaxis: Enoxaparin (Lovenox)    Ulcer prophylaxis: Yes  Antibiotics: Treating active infection/contamination beyond 24 hours perioperative coverage           currently use any assistive devices for mobility?   no      Additional Comments/Assessment: Pt denies seizures (current/historical), dizziness, mobility concerns. No fall risk assessed; No safety concerns r/t falls.  Will make [facility] referral for IT, has psych provider, PCP    Per completion of the Medical History / Physical Health Screen, is there a recommendation to see / follow up with a primary care physician/clinic or dentist?    No.      Nely Perera RN  10/14/2021

## 2021-10-14 NOTE — PROGRESS NOTES
Adult Day Treatment Program:  Individualized Treatment Plan       Date of Plan: 10/20/21    Name: Betty Tamayo MRN: [id number]    : 1990     Program: Adult Day Treatment Program (ADT)    Clinical Track: 1B    DSM5 Diagnosis:   296.32 (F33.1) Major Depressive Disorder, Recurrent Episode, Moderate _  300.02 (F41.1) Generalized Anxiety Disorder.  309.81 (F43.10) Posttraumatic Stress Disorder (includes Posttraumatic Stress Disorder for Children 6 Years and Younger)  With dissociative symptoms.    ADT Multidisciplinary Team Members:  Dr Sd Lopez MD and/or  Dr Fran Posadas MD,    Josette West, LPCC, LADC, Eric Lui, OTR/L,  Marsha Franklin, RN  Betty Tamayo will participate in the Adult Day Treatment Program 3 days per week, 3 hours per day.   Anticipated duration/discharge: 12 weeks    Due to COVID-19, services will be delivered via telemedicine until further notice.     Program Start Date: 10/13/21  Anticipated Discharge Date: 22 (pending authorization/clinical changes)    NOTE: Complete CGI     Review Date: Does Betty Tamayo continue to meet criteria to participate in the ADT Program, 3 days per week; 3 hours per day?   10/20/21 YES    21 Staff Meeting Kelley Posadas MD   21 Staff Meeting Kelley Posadas MD   12/15/21 (60 days)              Client Strengths:  has a previous history of therapy     Client Participation in Plan:  Contributed to goals and plan     Areas of Vulnerability:  Anxiety  Depressive symptoms   Trauma/Abuse/Neglect    Long-Term Goals:  Knowledge about illness and management of symptoms   Maintenance of personal safety     Abuse Prevention Plan:  Safe, therapeutic environment   Safety coping plan as needed   Education regarding illness and skill development   Coordination with care providers     Discharge Criteria:  Satisfactory progress toward treatment goals   Improvement re: identified problems and symptoms   Ability to continue  "recovery at next level of service   Has a discharge plan in place   Has safety/coping plan in place      Areas of Treatment Focus     Why are you seeking treatment/What do you want to focus on during treatment? \" I'm having  really bad anxiety and depression and my symptoms are becoming uncontrolable \".  \"I have really bad anxiety that stops me from doing things when I get really overwhelmed\".       Area of Treatment Focus:   Personal Safety  Start Date:    10/20/21    Goal:  Target Date: 12/15/21 Status: Stopped  Betty will notify staff when needing assistance to develop or implement a coping plan to manage suicidal or self injurious urges.Use coping plan for safety, as needed.      Progress:   12/16 \"good\". No issues with personal safety      12/31/21: Betty has expressed passive suicidal ideation while in day treatment.  STOPPED    Treatment Strategies:   Assess / reassess level of potential for harm to self or others  Engage in safety planning when indicated  Facilitate increased self awareness          Area of Treatment Focus:   Symptom Stabilization and Management  Start Date:    10/20/21    Goal:  Target Date: 12/15/21 Status: Stopped  Betty will report on symptoms and identify skills to manage anxiety and depression so as to not become overwhelmed.      Progress:   12/16 \"Not doing that well\". Generally likes being in group with like minded people.Has not taken medications for 3 months. Did not notice any benefits. Troubles with self-compassion and still lacks patience and has angry outbursts at home.    12/31: Betty's depression symptoms continued to be significant and interfere with may areas of her life.  She has not been taking medications.  Betyt was discharged due to no contact with staff for over a week. STOPPED        Treatment Strategies:   Engage in safety planning when indicated  Facilitate increased self awareness  Teach adaptive coping skills and communication skills          Area of Treatment " "Focus:   Wellness and Mental Health Recovery  Start Date:    10/20/21    Goal:  Target Date: 12/15/21 Status: Stopped  Betty will improve wellness related behaviors by getting adequate sleep,exercise,balanced nutrition and stress relief to maintain good mental health.      Progress:   12/16 tries to go out for walks but still struggling with low motivation and energy level.      12/31/21: Betty struggled with sleep, exercise, and nurtrition.  STOPPED    Treatment Strategies:   Engage in safety planning when indicated  Facilitate increased self awareness  Teach adaptive coping skills and communication skills          Area of Treatment Focus:   Community Resources / Support and Discharge Planning  Start Date:    10/20/21    Goal:  Target Date: 12/15/21 Status: Stopped  Betty will establish an aftercare plan to include medical providers and social supports by discharge.      Progress:   12/16 Requested help with getting a trauma based therapist. Per nurse patient has been referred. Maybe aftercare clinic but attendance has not been very good in day treatment.      12/31/21: It is unclear whether Betty had yet established care with a therapist as staff have not been able to contact her.  She is no longer eligible to transition to the clinic due to attendance concerns.  STOPPED    Treatment Strategies:   Assist clients in establishing / strengthening support network  Assist with discharge planning  Facilitate increased self awareness       Jose Lui, OTR/L      NOTE: Required signatures are completed manually and scanned into the electronic medical record. See \"Media\" tab in epic.    The Individualized Treatment Plan Signature Page has been routed to the provider for co-sign.    I have reviewed the patient's Individualized Treatment Plan and agree with the current goals, interventions and level of care.     Fran Posadas MD  11/29/2021, 12/09/21     "

## 2021-10-14 NOTE — GROUP NOTE
Process Group Note    PATIENT'S NAME: Betty Tamayo  MRN:   [id number]  :   1990  ACCT. NUMBER: [id number]  DATE OF SERVICE: 10/14/21  START TIME:  1:00 PM  END TIME:  1:50 PM  FACILITATOR: Josette West [facility]  TOPIC:  Process Group    Diagnoses:   296.32 (F33.1) Major Depressive Disorder, Recurrent Episode, Moderate _  300.02 (F41.1) Generalized Anxiety Disorder.  4. Other Diagnoses that is relevant to services:   Other Unspecified and Specified Bipolar and Related Disorder 296.80 (F31.9) Unspecified Bipolar and Related Disorder  309.81 (F43.10) Posttraumatic Stress Disorder (includes Posttraumatic Stress Disorder for Children 6 Years and Younger)  With dissociative symptoms.      [facility] Day Treatment  TRACK: 1B    NUMBER OF PARTICIPANTS: 5                                      Service Modality:  Video Visit     Telemedicine Visit: The patient's condition can be safely assessed and treated via synchronous audio and visual telemedicine encounter.      Reason for Telemedicine Visit: Services only offered telehealth    Originating Site (Patient Location): Patient's home    Distant Site (Provider Location): Provider Remote Setting- Home Office    Consent:  The patient/guardian has verbally consented to: the potential risks and benefits of telemedicine (video visit) versus in person care; bill my insurance or make self-payment for services provided; and responsibility for payment of non-covered services.     Patient would like the video invitation sent by:  My Chart    Mode of Communication:  Video Conference via Medical Zoom    As the provider I attest to compliance with applicable laws and regulations related to telemedicine.                Data:    Session content: At the start of this group, patients were invited to check in by identifying themselves, describing their current emotional status, and identifying issues to address in this group.   Area(s) of treatment focus  "addressed in this session included Symptom Management and Personal Safety.    Betty reported feeling \"anxious\" and \"determiend\" today and in a better mood than yesterday.  Her goal for the day is to clean her house.  Patient declined additional process time but contributed to group discussion and provided feedback and support to peers.      Therapeutic Interventions/Treatment Strategies:  Psychotherapist offered support, feedback and validation and reinforced use of skills.    Assessment:    Patient response:   Patient responded to session by accepting feedback, giving feedback and listening    Possible barriers to participation / learning include: and no barriers identified    Health Issues:   None reported       Substance Use Review:   Substance Use: cannabis .     Mental Status/Behavioral Observations  Appearance:   Appropriate   Eye Contact:   Good   Psychomotor Behavior: Normal   Attitude:   Cooperative   Orientation:   All  Speech   Rate / Production: Normal    Volume:  Normal   Mood:    Depressed   Affect:    Appropriate   Thought Content:   Clear  Thought Form:  Coherent  Logical     Insight:    Good     Plan:     Safety Plan: No current safety concerns identified.  Recommended that patient call 911 or go to the local ED should there be a change in any of these risk factors.     Barriers to treatment: None identified    Patient Contracts (see media tab):  None    Substance Use: Not addressed in session     Continue or Discharge: Patient will continue in Adult Day Treatment (ADT)  as planned. Patient is likely to benefit from learning and using skills as they work toward the goals identified in their treatment plan.      Josette West, [facility]  October 14, 2021    "

## 2021-10-14 NOTE — DISCHARGE SUMMARY
"       Adult Mental Health Intensive Outpatient Discharge Summary/Instructions      Patient: Betty Tamayo MRN: [id number]   : 1990 Age: 31 year old Sex: female     Admission Date: 10/13/21  Discharge Date: 21  Diagnosis: 296.32 (F33.1) Major Depressive Disorder, Recurrent Episode, Moderate _  300.02 (F41.1) Generalized Anxiety Disorder.  309.81 (F43.10) Posttraumatic Stress Disorder (includes Posttraumatic Stress Disorder for Children 6 Years and Younger)  With dissociative symptoms.    Focus of Treatment / Progress    Personal Safety:      * Follow your safety plan     * Call crisis lines as needed:    [facility] [phone] [facility] [phone]  [facility] [phone] Crisis Connection [phone]  [facility] [phone] [facility] [phone]  [facility] [phone] National Suicide Prevention [phone]  [facility] [phone] Suicide Prevention [phone]  [facility] [phone]    Managing symptoms of:  Depression and anxiety    Community support/health:  [place] [address] ([phone]) [email or web address], [place] Crisis text line( Text [place] to 105842),  [facility]  [place], [place] ([phone])  [facility] [place], [place] ( [phone])  [facility] [address] [place] [address], [phone]    Managing Symptoms and Preventing Relapse    * Go to all of your appointments    * Take all medications as directed.      * Carry a current list if medication with you    * Do not use illicit (street) drugs.  Avoid alcohol    * Report these symptoms to your care team. These are early signs of relapse:   Thoughts of suicide   Losing more sleep   Increased confusion   Mood getting worse   Feeling more aggressive   Other:  Substance use    *Use these skills daily:  Talk to someone you trust at least one time weekly, set boundaries and say \"no\", be assertive, act " opposite of negative feelings, accept challenges with a positive attitude, exercise at least three times per week for 30 minutes,  get enough sleep, eat healthy foods, get into a good routine    Copy of summary sent to: In Epic My Chart    Follow up with psychiatrist / main caregiver: Unknown    Next visit: Unknown    Follow up with your therapist: Unknown   Next visit: Unknown    Go to group therapy and / or support groups at: [facility] and [facility] [place]([facility]) support groups    See your medical doctor about:  For an annual physical exam or any general wellness or illness as needed.    Other:  Your treatment team appreciates having the opportunity to work with you and wishes you the best.    Client Signature:__unavailable to sign due to COVID-19_____________________  Date / Time:___________    Staff Signature:___Josette West [facility] on 12/31/2021 at 3:06 PM  _____________________   Date / Time:___________

## 2021-10-14 NOTE — GROUP NOTE
Psychotherapy Group Note    PATIENT'S NAME: Betty Tamayo  MRN:   [id number]  :   1990  ACCT. NUMBER: [id number]  DATE OF SERVICE: 10/14/21  START TIME:  2:00 PM  END TIME:  2:50 PM  FACILITATOR: Josette West LPCC  TOPIC: MH EBP Group: Emotions Management  [facility] Adult Mental Health Day Treatment  TRACK: 1B    NUMBER OF PARTICIPANTS: 7                                      Service Modality:  Video Visit     Telemedicine Visit: The patient's condition can be safely assessed and treated via synchronous audio and visual telemedicine encounter.      Reason for Telemedicine Visit: Services only offered telehealth    Originating Site (Patient Location): Patient's home    Distant Site (Provider Location): Provider Remote Setting- Home Office    Consent:  The patient/guardian has verbally consented to: the potential risks and benefits of telemedicine (video visit) versus in person care; bill my insurance or make self-payment for services provided; and responsibility for payment of non-covered services.     Patient would like the video invitation sent by:  My Chart    Mode of Communication:  Video Conference via Medical Zoom    As the provider I attest to compliance with applicable laws and regulations related to telemedicine.          Summary of Group / Topics Discussed:  Emotions Management: Guilt and Shame: Patients explored and shared personal experiences associated with feelings of guilt and shame.  Group explored how these feelings develop, what they mean to each individual, and how to increase acceptance and usefulness of these feelings.  Group members assisted one another to contextualize these concepts and promote healing.     Patient Session Goals / Objectives:    Discuss and review definitions and personal views/experiences with guilt and shame    Understand the differences between guilt and shame    Explore how feelings of guilt and shame impact functioning    Understand and practice  strategies to manage difficult emotions and move towards healing    Understand and normalize difficult emotions through group discussion    Understand the utility of guilt and shame    Target  unwanted  emotions for change      Patient Participation / Response:  Fully participated with the group by sharing personal reflections / insights and openly received / provided feedback with other participants.    Demonstrated understanding of topics discussed through group discussion and participation, Expressed understanding of the relevance / importance of emotions management skills at distressing times in life, Self-aware of experiences with difficult emotions, and strategies to employ to manage them and Demonstrated knowledge of when to consider applying a variety of emotions management skills in daily life    Treatment Plan:  Patient has a current master individualized treatment plan.  See Epic treatment plan for more information.    Josette West, [facility]

## 2021-10-18 ENCOUNTER — HOSPITAL ENCOUNTER (OUTPATIENT)
Dept: BEHAVIORAL HEALTH | Facility: CLINIC | Age: 31
End: 2021-10-18
Attending: PSYCHIATRY & NEUROLOGY
Payer: COMMERCIAL

## 2021-10-18 PROCEDURE — G0177 OPPS/PHP; TRAIN & EDUC SERV: HCPCS | Mod: GT,95

## 2021-10-18 PROCEDURE — 90853 GROUP PSYCHOTHERAPY: CPT | Mod: GT,95 | Performed by: COUNSELOR

## 2021-10-18 RX ORDER — LITHIUM 8 MEQ/5ML
SOLUTION ORAL
Qty: 300 ML | Refills: 0 | Status: SHIPPED | OUTPATIENT
Start: 2021-10-18 | End: 2022-01-27

## 2021-10-18 RX ORDER — CLONAZEPAM 0.5 MG/1
0.5 TABLET ORAL DAILY PRN
Qty: 30 TABLET | Refills: 0 | Status: SHIPPED | OUTPATIENT
Start: 2021-10-18 | End: 2022-01-27

## 2021-10-18 NOTE — GROUP NOTE
Psychoeducation Group Note    PATIENT'S NAME: Betty Tamayo  MRN:   [id number]  :   1990  ACCT. NUMBER: [id number]  DATE OF SERVICE: 10/18/21  START TIME:  2:00 PM  END TIME:  2:50 PM  FACILITATOR: Jose Lui OTR/L  TOPIC: MH Life Skills Group: Resiliency Development                                    Service Modality:  Video Visit     Telemedicine Visit: The patient's condition can be safely assessed and treated via synchronous audio and visual telemedicine encounter.      Reason for Telemedicine Visit: Services only offered telehealth    Originating Site (Patient Location): Patient's home    Distant Site (Provider Location): Provider Remote Setting- Home Office    Consent:  The patient/guardian has verbally consented to: the potential risks and benefits of telemedicine (video visit) versus in person care; bill my insurance or make self-payment for services provided; and responsibility for payment of non-covered services.     Mode of Communication:  Video Conference via Medical Zoom    As the provider I attest to compliance with applicable laws and regulations related to telemedicine.       [facility] Adult Mental Health Day Treatment  TRACK: 1B    NUMBER OF PARTICIPANTS: 6    Summary of Group / Topics Discussed:  Resiliency Development: Self Compassion Journal: Patients were given the opportunity to reflect and identified the positive aspects of their life.  Patients were taught about the importance of self kindness on mental health wellbeing.  Patients were also given the opportunity to improve self efficacy, self sufficiency, quality of life and a sense of mastery and competency by identifying positive aspects of their life and to instill hope.      Patient Session Goals / Objectives:    Identified personal strengths and qualities about themselves as a way to provide hope and build self-compassion as a way to effectively manage both mental health and substance abuse/abuse symptoms      Improved awareness of positive qualities and how this contribute to the process of recovery and mental health wellbeing and resiliency      Established a plan for practice of these skills in their own environments    Practiced and reflected on how to generalize taught skills to their everyday life        Patient Participation / Response:  Fully participated with the group by sharing personal reflections / insights and openly received / provided feedback with other participants.    Demonstrated understanding of content through handout/video/discussion , Verbalized understanding of content and Patient would benefit from additional opportunities to practice the content to be able to generalize it to their everyday life with increased intentionality, consistency, and efficacy in support of their psychiatric recovery    Treatment Plan:  Patient has a current master individualized treatment plan.  See Epic treatment plan for more information.    Jose Lui, OTR/L

## 2021-10-18 NOTE — GROUP NOTE
Process Group Note    PATIENT'S NAME: Betty Tamayo  MRN:   [id number]  :   1990  ACCT. NUMBER: [id number]  DATE OF SERVICE: 10/18/21  START TIME:  1:00 PM  END TIME:  1:50 PM  FACILITATOR: Josette West [facility]  TOPIC:  Process Group    Diagnoses:   296.32 (F33.1) Major Depressive Disorder, Recurrent Episode, Moderate _  300.02 (F41.1) Generalized Anxiety Disorder.  4. Other Diagnoses that is relevant to services:   Other Unspecified and Specified Bipolar and Related Disorder 296.80 (F31.9) Unspecified Bipolar and Related Disorder  309.81 (F43.10) Posttraumatic Stress Disorder (includes Posttraumatic Stress Disorder for Children 6 Years and Younger)  With dissociative symptoms.      [facility] Mental [facility] Day Treatment  TRACK: 1B    NUMBER OF PARTICIPANTS: 4                                      Service Modality:  Video Visit     Telemedicine Visit: The patient's condition can be safely assessed and treated via synchronous audio and visual telemedicine encounter.      Reason for Telemedicine Visit: Services only offered telehealth    Originating Site (Patient Location): Patient's home    Distant Site (Provider Location): Provider Remote Setting- Home Office    Consent:  The patient/guardian has verbally consented to: the potential risks and benefits of telemedicine (video visit) versus in person care; bill my insurance or make self-payment for services provided; and responsibility for payment of non-covered services.     Patient would like the video invitation sent by:  My Chart    Mode of Communication:  Video Conference via Medical Zoom    As the provider I attest to compliance with applicable laws and regulations related to telemedicine.                Data:    Session content: At the start of this group, patients were invited to check in by identifying themselves, describing their current emotional status, and identifying issues to address in this group.   Area(s) of treatment focus  "addressed in this session included Symptom Management and Personal Safety.    Betty reported feeling \"really anxious\" and \"unmotivated\" today.  She was unable to identify a goal for the day.  Patient declined additional process time but contributed to group discussion and provided feedback and support to peers.      Therapeutic Interventions/Treatment Strategies:  Psychotherapist offered support, feedback and validation and reinforced use of skills.    Assessment:    Patient response:   Patient responded to session by accepting feedback, giving feedback and listening    Possible barriers to participation / learning include: and no barriers identified    Health Issues:   None reported       Substance Use Review:   Substance Use: cannabis .     Mental Status/Behavioral Observations  Appearance:   Appropriate   Eye Contact:   Good   Psychomotor Behavior: Normal   Attitude:   Cooperative   Orientation:   All  Speech   Rate / Production: Normal    Volume:  Normal   Mood:    Anxious  Depressed   Affect:    Appropriate   Thought Content:   Clear  Thought Form:  Coherent  Logical     Insight:    Good     Plan:     Safety Plan: No current safety concerns identified.  Recommended that patient call 911 or go to the local ED should there be a change in any of these risk factors.     Barriers to treatment: None identified    Patient Contracts (see media tab):  None    Substance Use: Not addressed in session     Continue or Discharge: Patient will continue in Adult Day Treatment (ADT)  as planned. Patient is likely to benefit from learning and using skills as they work toward the goals identified in their treatment plan.      Josette West, [facility]  October 18, 2021    "

## 2021-10-18 NOTE — PATIENT INSTRUCTIONS
**For crisis resources, please see the information at the end of this document**     Patient Education      Thank you for coming to the [facility] [place] [facility].    Lab Testing:  If you had lab testing today and your results are reassuring or normal they will be mailed to you or sent through [facility] labs within 7 days. If the lab tests need quick action we will call you with the results. The phone number we will call with results is # [phone] (home) . If this is not the best number please call our clinic and change the number.    Medication Refills:  If you need any refills please call your pharmacy and they will contact us. Our fax number for refills is [phone]. Please allow three business for refill processing. If you need to  your refill at a new pharmacy, please contact the new pharmacy directly. The new pharmacy will help you get your medications transferred.     Scheduling:  If you have any concerns about today's visit or wish to schedule another appointment please call our office during normal business hours [phone] (8-5:00 M-F)    Contact Us:  Please call [phone] during business hours (8-5:00 M-F).  If after clinic hours, or on the weekend, please call  [phone].    Financial Assistance [phone]  "[employer]ealth Billing [phone]  Central Billing Office, [facility]: [phone]  [place] Billing [phone]  Medical Records [phone]  [facility] Patient Bill of Rights [email or web address]       MENTAL HEALTH CRISIS NUMBERS:  For a medical emergency please call  911 or go to the nearest ER.     [facility]:   [facility] -[phone]   Crisis Residence [facility] Residence -[phone]   Walk-In Counseling Center [facility] -[phone]   COPE 24/7 [place] Mobile Team -[phone] (adults)/[phone] (child)  CHILD: [facility] needs assessment  team - [phone]      [facility]:   [facility] - [phone]   Walk-in counseling [facility] House - [phone]   Walk-in counseling [facility] - [phone]   Crisis Residence [facility] - [phone]  Urgent Care Adult Mental [facility]-[phone] mobile unit/ 24/7 crisis line    National Crisis Numbers:   National Suicide Prevention Lifeline: 5-220-197-TALK (161-079-3232)  Poison Control Center - 7-209-029-7316  [employer]/resources for a list of additional resources (SOS)  Trans Lifeline a hotline for transgender people 1-640.520.3637  [employer] a hotline for LGBT youth 4-226-188-4721  Crisis Text Line: For any crisis 24/7   To: 375106  see www.crisistextline.org  - IF MAKING A CALL FEELS TOO HARD, send a text!         Again thank you for choosing [facility]DI[facility] [place] and please let us know how we can best partner with you to improve you and your family's health.    You may be receiving a survey regarding this appointment. We would love to have your feedback, both positive and negative. The survey is done by an external company, so your answers are anonymous.

## 2021-10-18 NOTE — GROUP NOTE
Psychotherapy Group Note    PATIENT'S NAME: Betty Tamayo  MRN:   [id number]  :   1990  ACCT. NUMBER: [id number]  DATE OF SERVICE: 10/18/21  START TIME:  3:00 PM  END TIME:  3:50 PM  FACILITATOR: Josette West LPCC  TOPIC: MH EBP Group: Coping Skills  [facility] Adult Mental Health Day Treatment  TRACK: 1B    NUMBER OF PARTICIPANTS: 6                                      Service Modality:  Video Visit     Telemedicine Visit: The patient's condition can be safely assessed and treated via synchronous audio and visual telemedicine encounter.      Reason for Telemedicine Visit: Services only offered telehealth    Originating Site (Patient Location): Patient's home    Distant Site (Provider Location): Provider Remote Setting- Home Office    Consent:  The patient/guardian has verbally consented to: the potential risks and benefits of telemedicine (video visit) versus in person care; bill my insurance or make self-payment for services provided; and responsibility for payment of non-covered services.     Patient would like the video invitation sent by:  My Chart    Mode of Communication:  Video Conference via Medical Zoom    As the provider I attest to compliance with applicable laws and regulations related to telemedicine.          Summary of Group / Topics Discussed:  Coping Skills: Radical Acceptance: Patients learned radical acceptance as a way to tolerate heightened stress in the moment.  Patients identified situations that necessitate radical acceptance.  They focused on applying acceptance of the moment to tolerate difficult emotions and events. Patients discussed how to distinguish when this can be useful in their lives and when other skills are more relevant or helpful.    Patient Session Goals / Objectives:    Understand that some amount of pain exists for each of us in our lives    Process the difficulty of acceptance in our lives and benefits of radical acceptance to mood and  functioning.    Demonstrate understanding of when to use the radical acceptance to tolerate distress by providing examples of situations where this could be applied.    Identify barriers to applying radical acceptance to difficult situations and explore strategies to overcome them        Patient Participation / Response:  Fully participated with the group by sharing personal reflections / insights and openly received / provided feedback with other participants.    Demonstrated understanding of topics discussed through group discussion and participation, Expressed understanding of the relevance / importance of coping skills at distressing times in life and Demonstrated knowledge of when to consider using a variety of coping skills in daily life    Treatment Plan:  Patient has a current master individualized treatment plan.  See Epic treatment plan for more information.    Josette West, [facility]

## 2021-10-19 NOTE — PROGRESS NOTES
Patient Active Problem List   Diagnosis     Encounter for supervision of high risk pregnancy, , [facility] CNM     History of pre-eclampsia in prior pregnancy, currently pregnant     Nausea     UTI in pregnancy, first trimester     Vitamin D deficiency     Maternal varicella, non-immune     Medication exposure during first trimester of pregnancy     Uncomplicated asthma     Arthralgia of both knees     Snoring     Hypersomnia     Class 3 severe obesity due to excess calories with body mass index (BMI) of 40.0 to 44.9 in adult, unspecified whether serious comorbidity present (H)     PTSD (post-traumatic stress disorder)     Mood disorder (H)     Generalized anxiety disorder     Bipolar 2 disorder (H)     Multiple joint pain     Arthritis     Major depressive disorder, recurrent episode, moderate (H)       Current Outpatient Medications:      clonazePAM (KLONOPIN) 0.5 MG tablet, Take 1 tablet (0.5 mg) by mouth daily as needed (for panic attacks), Disp: 30 tablet, Rfl: 0     diclofenac (VOLTAREN) 1 % topical gel, 2 grams to small joint and 4 grams to large joints up to 4 times daily for joint pain as needed (Patient not taking: Reported on 2021), Disp: 350 g, Rfl: 3     lithium 8 MEQ/5ML solution, Take 5 mLs (300 mg) by mouth At Bedtime for 7 days, THEN 10 mLs (600 mg) At Bedtime., Disp: 300 mL, Rfl: 0     lithium 8 MEQ/5ML solution, Take 300 mg by mouth 3 times daily, Disp: , Rfl:      LORazepam (ATIVAN) 1 MG tablet, Take 1 mg by mouth every 6 hours as needed for anxiety, Disp: , Rfl:   Psychiatry staffing: case discussed  Diagnosis:  As above;  Recent start in group.

## 2021-10-20 ENCOUNTER — HOSPITAL ENCOUNTER (OUTPATIENT)
Dept: BEHAVIORAL HEALTH | Facility: CLINIC | Age: 31
End: 2021-10-20
Attending: PSYCHIATRY & NEUROLOGY
Payer: COMMERCIAL

## 2021-10-20 PROCEDURE — G0177 OPPS/PHP; TRAIN & EDUC SERV: HCPCS | Mod: GT,95

## 2021-10-20 PROCEDURE — 90853 GROUP PSYCHOTHERAPY: CPT | Mod: GT,95 | Performed by: COUNSELOR

## 2021-10-20 NOTE — GROUP NOTE
Psychoeducation Group Note    PATIENT'S NAME: Betty Tamayo  MRN:   [id number]  :   1990  ACCT. NUMBER: [id number]  DATE OF SERVICE: 10/20/21  START TIME:  1:00 PM  END TIME:  1:50 PM  FACILITATOR: Jose Lui OTR/L  TOPIC: MH Life Skills Group: Communication and Social Skills Development                                    Service Modality:  Video Visit     Telemedicine Visit: The patient's condition can be safely assessed and treated via synchronous audio and visual telemedicine encounter.      Reason for Telemedicine Visit: Services only offered telehealth    Originating Site (Patient Location): Patient's home    Distant Site (Provider Location): Provider Remote Setting- Home Office    Consent:  The patient/guardian has verbally consented to: the potential risks and benefits of telemedicine (video visit) versus in person care; bill my insurance or make self-payment for services provided; and responsibility for payment of non-covered services.     Mode of Communication:  Video Conference via Medical Zoom    As the provider I attest to compliance with applicable laws and regulations related to telemedicine.       [facility] Adult Mental Health Day Treatment  TRACK: 1B    NUMBER OF PARTICIPANTS: 6    Summary of Group / Topics Discussed:  Communication and Social Skills Development: Communication Styles: Conflict Life Pedersen (Personal Conflicts): Patients discussed and were taught about different pedersen of conflict in life and how it impacts or is impacted by mental health symptoms.  Patients gained awareness of personal conflict pedersen.  Patients were taught how to identify and take active steps to resolve these conflicts.      Patient Session Goals / Objectives:    Identified personal conflict pedersen and how these impact or are impacted by mental health symptoms        Improved awareness of important life pedersen were conflict is being experienced        Established a plan for practice of  these skills in their own environments    Practiced and reflected on how to generalize taught skills to their everyday life        Patient Participation / Response:  Fully participated with the group by sharing personal reflections / insights and openly received / provided feedback with other participants.    Demonstrated understanding of content through handout/discussion , Verbalized understanding of content and Patient would benefit from additional opportunities to practice the content to be able to generalize it to their everyday life with increased intentionality, consistency, and efficacy in support of their psychiatric recovery    Treatment Plan:  Patient has a current master individualized treatment plan.  See Epic treatment plan for more information.    Jose Lui, OTR/L

## 2021-10-20 NOTE — GROUP NOTE
Process Group Note    PATIENT'S NAME: Betty Tamayo  MRN:   [id number]  :   1990  ACCT. NUMBER: [id number]  DATE OF SERVICE: 10/20/21  START TIME:  2:00 PM  END TIME:  2:50 PM  FACILITATOR: Josette West [facility]  TOPIC:  Process Group    Diagnoses:   296.32 (F33.1) Major Depressive Disorder, Recurrent Episode, Moderate _  300.02 (F41.1) Generalized Anxiety Disorder.  4. Other Diagnoses that is relevant to services:   Other Unspecified and Specified Bipolar and Related Disorder 296.80 (F31.9) Unspecified Bipolar and Related Disorder  309.81 (F43.10) Posttraumatic Stress Disorder (includes Posttraumatic Stress Disorder for Children 6 Years and Younger)  With dissociative symptoms.      [facility] Mental [facility] Day Treatment  TRACK: 1B    NUMBER OF PARTICIPANTS: 4                                      Service Modality:  Video Visit     Telemedicine Visit: The patient's condition can be safely assessed and treated via synchronous audio and visual telemedicine encounter.      Reason for Telemedicine Visit: Services only offered telehealth    Originating Site (Patient Location): Patient's home    Distant Site (Provider Location): Provider Remote Setting- Home Office    Consent:  The patient/guardian has verbally consented to: the potential risks and benefits of telemedicine (video visit) versus in person care; bill my insurance or make self-payment for services provided; and responsibility for payment of non-covered services.     Patient would like the video invitation sent by:  My Chart    Mode of Communication:  Video Conference via Medical Zoom    As the provider I attest to compliance with applicable laws and regulations related to telemedicine.                Data:    Session content: At the start of this group, patients were invited to check in by identifying themselves, describing their current emotional status, and identifying issues to address in this group.   Area(s) of treatment focus  "addressed in this session included Symptom Management and Personal Safety.    Betty reported feeling \"anxious\" today.  Her goal is to hang out with some friends, which she is anxious about. Patient declined additional process time but contributed to group discussion and provided feedback and support to peers.      Therapeutic Interventions/Treatment Strategies:  Psychotherapist offered support, feedback and validation and reinforced use of skills.    Assessment:    Patient response:   Patient responded to session by accepting feedback, giving feedback and listening    Possible barriers to participation / learning include: and no barriers identified    Health Issues:   None reported       Substance Use Review:   Substance Use: cannabis .     Mental Status/Behavioral Observations  Appearance:   Appropriate   Eye Contact:   Good   Psychomotor Behavior: Normal   Attitude:   Cooperative   Orientation:   All  Speech   Rate / Production: Normal    Volume:  Normal   Mood:    Anxious  Depressed   Affect:    Appropriate   Thought Content:   Clear  Thought Form:  Coherent  Logical     Insight:    Good     Plan:     Safety Plan: No current safety concerns identified.  Recommended that patient call 911 or go to the local ED should there be a change in any of these risk factors.     Barriers to treatment: None identified    Patient Contracts (see media tab):  None    Substance Use: Not addressed in session     Continue or Discharge: Patient will continue in Adult Day Treatment (ADT)  as planned. Patient is likely to benefit from learning and using skills as they work toward the goals identified in their treatment plan.      Josette West, [facility]  October 20, 2021    "

## 2021-10-21 NOTE — PROGRESS NOTES
Acknowledgement of Current Treatment Plan       Patient sent a copy of treatment plan via My Chart as patient did not respond by phone on 10/19/21 to discuss the plan. Patient expressed no disagreement with plan on 10/20/21 in group.  I agree with the plan as it is written in the electronic health record. (1B)    Name:      Signature:  Betty MCKEON Roni Unable to sign due to COVID and Virtual     Dr Sd Lopez MD  Psychiatrist    Eric LAN/MAHIN LAN/MAHIN West LPC, [facility]  Psychotherapist BASIL Lockhart on 11/3/2021 at 11:27 AM

## 2021-10-25 ENCOUNTER — HOSPITAL ENCOUNTER (OUTPATIENT)
Dept: BEHAVIORAL HEALTH | Facility: CLINIC | Age: 31
End: 2021-10-25
Attending: PSYCHIATRY & NEUROLOGY
Payer: COMMERCIAL

## 2021-10-25 PROCEDURE — G0177 OPPS/PHP; TRAIN & EDUC SERV: HCPCS | Mod: GT,95

## 2021-10-25 PROCEDURE — 90853 GROUP PSYCHOTHERAPY: CPT | Mod: GT,95

## 2021-10-25 NOTE — PROGRESS NOTES
Met individually with pt to discuss safety. She received safety plan via epic this afternoon and agrees to follow it. She plans to return to group on Wed. Pt appears with brighter affect during this meeting.

## 2021-10-25 NOTE — GROUP NOTE
Process Group Note    PATIENT'S NAME: Betty Tamayo  MRN:   [id number]  :   1990  ACCT. NUMBER: [id number]  DATE OF SERVICE: 10/25/21  START TIME:  1:00 PM  END TIME:  1:50 PM  FACILITATOR: Roma Reyes LICSW  TOPIC:  Process Group    Diagnoses:  296.32 (F33.1) Major Depressive Disorder, Recurrent Episode, Moderate _  300.02 (F41.1) Generalized Anxiety Disorder.  4. Other Diagnoses that is relevant to services:   Other Unspecified and Specified Bipolar and Related Disorder 296.80 (F31.9) Unspecified Bipolar and Related Disorder  309.81 (F43.10) Posttraumatic Stress Disorder (includes Posttraumatic Stress Disorder for Children 6 Years and Younger)  With dissociative symptoms.  296.32 (F33.1) Major Depressive Disorder, Recurrent Episode, Moderate _ and With anxious distress  300.01 (F41.0) Panic Disorder  309.81 (F43.10) Posttraumatic Stress Disorder (includes Posttraumatic Stress Disorder for Children 6 Years and Younger)  Without dissociative symptoms.      [facility] Adult Mental Health Day Treatment  TRACK: 1B                              Service Modality:  Video Visit     Telemedicine Visit: The patient's condition can be safely assessed and treated via synchronous audio and visual telemedicine encounter.      Reason for Telemedicine Visit: Services only offered telehealth    Originating Site (Patient Location): Patient's home    Distant Site (Provider Location): [facility] MENTAL HEALTH & ADDICTION SERVICES    Consent:  The patient/guardian has verbally consented to: the potential risks and benefits of telemedicine (video visit) versus in person care; bill my insurance or make self-payment for services provided; and responsibility for payment of non-covered services.     Patient would like the video invitation sent by:  My Chart    Mode of Communication:  Video Conference via Medical Zoom    As the provider I attest to compliance with applicable laws and regulations related to  "telemedicine.         NUMBER OF PARTICIPANTS: 5          Data:    Session content: At the start of this group, patients were invited to check in by identifying themselves, describing their current emotional status, and identifying issues to address in this group.   Area(s) of treatment focus addressed in this session included Symptom Management and Personal Safety.  Pt reports feeling depressed, hopeless and irritable. She identifies feeling like a burden to her family. Sets goal to \"make it through the day\". Discussed coping skills and safety plan. Sent a copy of her safety plan to her via epic. She agrees to stay safe and reach out for help if needed.    Therapeutic Interventions/Treatment Strategies:  Psychotherapist offered support, feedback and validation and reinforced use of skills. Treatment modalities used include Cognitive Behavioral Therapy. Interventions include Coping Skills: Discussed use of self-soothe skills to decrease distress in the body, Assisted patient in identifying 1-2 healthy distraction skills to reduce overall distress and Assisted patient in understanding the purpose of planning / creating / participating / sharing in positive experiences.    Assessment:    Patient response:   Patient responded to session by accepting feedback, listening and accepting support    Possible barriers to participation / learning include: and no barriers identified    Health Issues:   None reported       Substance Use Review:   Substance Use: cannabis .     Mental Status/Behavioral Observations  Appearance:   Appropriate   Eye Contact:   Good   Psychomotor Behavior: Normal   Attitude:   Cooperative   Orientation:   All  Speech   Rate / Production: Normal    Volume:  Normal   Mood:    Anxious  Depressed   Affect:    Subdued  Tearful  Thought Content:   Rumination  Thought Form:  Coherent  Obsessive     Insight:    Good     Plan:     Safety Plan: No current safety concerns identified.  Recommended that patient " call 911 or go to the local ED should there be a change in any of these risk factors.     Barriers to treatment: None identified    Patient Contracts (see media tab):  None    Substance Use: Not addressed in session     Continue or Discharge: Patient will continue in Adult Day Treatment (ADT)  as planned. Patient is likely to benefit from learning and using skills as they work toward the goals identified in their treatment plan.      Roma Hahn, [facility]  October 25, 2021

## 2021-10-25 NOTE — GROUP NOTE
Psychoeducation Group Note    PATIENT'S NAME: Betty Tamayo  MRN:   [id number]  :   1990  ACCT. NUMBER: [id number]  DATE OF SERVICE: 10/25/21  START TIME:  2:00 PM  END TIME:  2:50 PM  FACILITATOR: Jose Lui OTR/L  TOPIC: MH Life Skills Group: Communication and Social Skills Development                                    Service Modality:  Video Visit     Telemedicine Visit: The patient's condition can be safely assessed and treated via synchronous audio and visual telemedicine encounter.      Reason for Telemedicine Visit: Services only offered telehealth    Originating Site (Patient Location): Patient's home    Distant Site (Provider Location): Provider Remote Setting- Home Office    Consent:  The patient/guardian has verbally consented to: the potential risks and benefits of telemedicine (video visit) versus in person care; bill my insurance or make self-payment for services provided; and responsibility for payment of non-covered services.     Mode of Communication:  Video Conference via Medical Zoom    As the provider I attest to compliance with applicable laws and regulations related to telemedicine.       [facility] Adult Mental Health Day Treatment  TRACK: 1B    NUMBER OF PARTICIPANTS: 4    Summary of Group / Topics Discussed:  Communication and Social Skills Development: Mental health Social Supports: Patients completed a social support self assessment to identify people who are supportive and to evaluate the effectiveness of their social support system.  Patients were taught and gained awareness of characteristics of an effective support and how to develop this in their lives.  Patients identified both personal strengths and opportunities for growth in this areas to improve overall communication and connection with other people.    Patient Session Goals / Objectives:    Identified strengths and opportunities for growth in developing their social support system and how this impacts  their ability to connect and communicate with other people       Improved awareness of important aspects of social support systems and how this relates to mental health recovery        Established a plan for practice of these skills in their own environments    Practiced and reflected on how to generalize taught skills to their everyday life          Patient Participation / Response:  Minimally participated, only when prompted / asked.    Demonstrated understanding of content through handout/duiscussion , Verbalized understanding of content and Patient would benefit from additional opportunities to practice the content to be able to generalize it to their everyday life with increased intentionality, consistency, and efficacy in support of their psychiatric recovery    Treatment Plan:  Patient has a current master individualized treatment plan.  See Epic treatment plan for more information.    Jose Lui, OTR/L

## 2021-10-27 ENCOUNTER — HOSPITAL ENCOUNTER (OUTPATIENT)
Dept: BEHAVIORAL HEALTH | Facility: CLINIC | Age: 31
End: 2021-10-27
Attending: PSYCHIATRY & NEUROLOGY
Payer: COMMERCIAL

## 2021-10-27 ENCOUNTER — TELEPHONE (OUTPATIENT)
Dept: BEHAVIORAL HEALTH | Facility: CLINIC | Age: 31
End: 2021-10-27

## 2021-10-27 PROCEDURE — 90853 GROUP PSYCHOTHERAPY: CPT | Mod: GT,95

## 2021-10-27 NOTE — GROUP NOTE
Process Group Note    PATIENT'S NAME: Betty Tamayo  MRN:   [id number]  :   1990  ACCT. NUMBER: [id number]  DATE OF SERVICE: 10/27/21  START TIME:  1:00 PM  END TIME:  1:50 PM  FACILITATOR: Merissa Lares LMFT  TOPIC:  Process Group    Diagnoses:  296.32 (F33.1) Major Depressive Disorder, Recurrent Episode, Moderate _  300.02 (F41.1) Generalized Anxiety Disorder.  Other Unspecified and Specified Bipolar and Related Disorder 296.80 (F31.9) Unspecified Bipolar and Related Disorder  309.81 (F43.10) Posttraumatic Stress Disorder (includes Posttraumatic Stress Disorder for Children 6 Years and Younger)  With dissociative symptoms.  296.32 (F33.1) Major Depressive Disorder, Recurrent Episode, Moderate  Panic Disorder  PTSD    [facility] Mental [facility] Day Treatment  TRACK: 1B    NUMBER OF PARTICIPANTS: 5                                      Service Modality:  Video Visit     Telemedicine Visit: The patient's condition can be safely assessed and treated via synchronous audio and visual telemedicine encounter.      Reason for Telemedicine Visit: Services only offered telehealth    Originating Site (Patient Location): Patient's home    Distant Site (Provider Location): Provider Remote Setting- Home Office    Consent:  The patient/guardian has verbally consented to: the potential risks and benefits of telemedicine (video visit) versus in person care; bill my insurance or make self-payment for services provided; and responsibility for payment of non-covered services.     Patient would like the video invitation sent by:  My Chart    Mode of Communication:  Video Conference via Medical Zoom    As the provider I attest to compliance with applicable laws and regulations related to telemedicine.            Data:    Session content: At the start of this group, patients were invited to check in by identifying themselves, describing their current emotional status, and identifying issues to address in this  group.   Area(s) of treatment focus addressed in this session included Symptom Management, Personal Safety and Community Resources/Discharge Planning.    Betty reported feeling sad and depressed today, shame and guilt. Patient identified the following goal(s) to work towards today: none, maybe cleaning the house. Patient plans to use the following skills to achieve their goal:  not sure , encouraged breaking down tasks. Patient anticipates the following barriers that may interfere with achieving their goal: not doing it, not having the energy to do it. Denies safety concerns, endorses chemical use (cannabis), and is not taking their medications as prescribed. Patient reports feeling proud of/grateful for: my family. Patient asked for the following supports from the group today: just listening. Processed with the feelings of guilt and shame related to interpersonal conflict with partner. Betty received support, encouragement and advice from the group on how to make amends, express feelings, and move forward.     Therapeutic Interventions/Treatment Strategies:  Psychotherapist offered support, feedback and validation and reinforced use of skills. Treatment modalities used include Motivational Interviewing, Cognitive Behavioral Therapy and Dialectical Behavioral Therapy. Interventions include Relationship Skills: Discussed strategies to promote healthier understanding of interpersonal relationships.    Assessment:    Patient response:   Patient responded to session by accepting feedback, listening, focusing on goals, being attentive and accepting support    Possible barriers to participation / learning include: and no barriers identified    Health Issues:   None reported       Substance Use Review:   Substance Use: No active concerns identified.    Mental Status/Behavioral Observations  Appearance:   Appropriate   Eye Contact:   Good   Psychomotor Behavior: Normal   Attitude:   Cooperative    Orientation:   All  Speech   Rate / Production: Emotional Normal    Volume:  Normal   Mood:    Depressed  Normal Sad   Affect:    Appropriate  Tearful  Thought Content:   Clear  Thought Form:  Coherent  Logical     Insight:    Good  and Fair     Plan:     Safety Plan: Committed to safety and agreed to follow previously developed safety coping plan.     No current safety concerns identified.  Recommended that patient call 911 or go to the local ED should there be a change in any of these risk factors.     Barriers to treatment: None identified    Patient Contracts (see media tab):  None    Substance Use: Not addressed in session     Continue or Discharge: Patient will continue in Adult Day Treatment (ADT)  as planned. Patient is likely to benefit from learning and using skills as they work toward the goals identified in their treatment plan.      Merissa Lares, CHARLY  October 27, 2021

## 2021-10-27 NOTE — TELEPHONE ENCOUNTER
Writer called Pt since she dropped off forth 2pm group to check in .  She did not answer the call.  Left message asking her to rejoin group and/or call the main number to let us know she is ok.  Left the program phone number for her to call back.

## 2021-11-01 ENCOUNTER — HOSPITAL ENCOUNTER (OUTPATIENT)
Dept: BEHAVIORAL HEALTH | Facility: CLINIC | Age: 31
End: 2021-11-01
Attending: PSYCHIATRY & NEUROLOGY
Payer: MEDICAID

## 2021-11-01 PROCEDURE — 90853 GROUP PSYCHOTHERAPY: CPT | Mod: GT,95 | Performed by: COUNSELOR

## 2021-11-01 PROCEDURE — G0177 OPPS/PHP; TRAIN & EDUC SERV: HCPCS | Mod: GT,95

## 2021-11-01 NOTE — GROUP NOTE
Process Group Note    PATIENT'S NAME: Betty Tamayo  MRN:   [id number]  :   1990  ACCT. NUMBER: [id number]  DATE OF SERVICE: 21  START TIME:  1:00 PM  END TIME:  1:50 PM  FACILITATOR: Josette West [facility]  TOPIC:  Process Group    Diagnoses:   296.32 (F33.1) Major Depressive Disorder, Recurrent Episode, Moderate _  300.02 (F41.1) Generalized Anxiety Disorder.  4. Other Diagnoses that is relevant to services:   Other Unspecified and Specified Bipolar and Related Disorder 296.80 (F31.9) Unspecified Bipolar and Related Disorder  309.81 (F43.10) Posttraumatic Stress Disorder (includes Posttraumatic Stress Disorder for Children 6 Years and Younger)  With dissociative symptoms.      [facility] Mental [facility] Day Treatment  TRACK: 1B    NUMBER OF PARTICIPANTS: 5                                      Service Modality:  Video Visit     Telemedicine Visit: The patient's condition can be safely assessed and treated via synchronous audio and visual telemedicine encounter.      Reason for Telemedicine Visit: Services only offered telehealth    Originating Site (Patient Location): Patient's home    Distant Site (Provider Location): Provider Remote Setting- Home Office    Consent:  The patient/guardian has verbally consented to: the potential risks and benefits of telemedicine (video visit) versus in person care; bill my insurance or make self-payment for services provided; and responsibility for payment of non-covered services.     Patient would like the video invitation sent by:  My Chart    Mode of Communication:  Video Conference via Medical Zoom    As the provider I attest to compliance with applicable laws and regulations related to telemedicine.                Data:    Session content: At the start of this group, patients were invited to check in by identifying themselves, describing their current emotional status, and identifying issues to address in this group.   Area(s) of treatment focus  "addressed in this session included Symptom Management and Personal Safety.    Betty reported feeling \"depressed\" and \"shameful\" today.  She was unable to identify a goal for the day.  Patient declined additional process time but contributed to group discussion and provided feedback and support to peers.      Therapeutic Interventions/Treatment Strategies:  Psychotherapist offered support, feedback and validation and reinforced use of skills.    Assessment:    Patient response:   Patient responded to session by accepting feedback, giving feedback and listening    Possible barriers to participation / learning include: and no barriers identified    Health Issues:   None reported       Substance Use Review:   Substance Use: No active concerns identified.    Mental Status/Behavioral Observations  Appearance:   Appropriate   Eye Contact:   Good   Psychomotor Behavior: Normal   Attitude:   Cooperative   Orientation:   All  Speech   Rate / Production: Normal    Volume:  Normal   Mood:    Depressed   Affect:    Appropriate   Thought Content:   Clear  Thought Form:  Coherent  Logical     Insight:    Good     Plan:     Safety Plan: No current safety concerns identified.  Recommended that patient call 911 or go to the local ED should there be a change in any of these risk factors.     Barriers to treatment: None identified    Patient Contracts (see media tab):  None    Substance Use: Not addressed in session     Continue or Discharge: Patient will continue in Adult Day Treatment (ADT)  as planned. Patient is likely to benefit from learning and using skills as they work toward the goals identified in their treatment plan.      Josette West, [facility]  November 1, 2021    "

## 2021-11-01 NOTE — GROUP NOTE
Psychoeducation Group Note    PATIENT'S NAME: Betty Tamayo  MRN:   [id number]  :   1990  ACCT. NUMBER: [id number]  DATE OF SERVICE: 21  START TIME:  2:00 PM  END TIME:  2:50 PM  FACILITATOR: Jose Lui OTR/L  TOPIC: MH Life Skills Group: Communication and Social Skills Development                                    Service Modality:  Video Visit     Telemedicine Visit: The patient's condition can be safely assessed and treated via synchronous audio and visual telemedicine encounter.      Reason for Telemedicine Visit: Services only offered telehealth    Originating Site (Patient Location): Patient's home    Distant Site (Provider Location): Provider Remote Setting- Home Office    Consent:  The patient/guardian has verbally consented to: the potential risks and benefits of telemedicine (video visit) versus in person care; bill my insurance or make self-payment for services provided; and responsibility for payment of non-covered services.     Mode of Communication:  Video Conference via Medical Zoom    As the provider I attest to compliance with applicable laws and regulations related to telemedicine.       [facility] Mental Health Day Treatment  TRACK: 1B    NUMBER OF PARTICIPANTS: 5    Summary of Group / Topics Discussed:  Communication and Social Skills Development: Social Supports: Social Risk Taking: Patients explored and evaluated how effective they are in different aspects of social risk taking.  Patients gained awareness of different areas in which they need/want to take risks, possible benefits of taking this risk, and action steps to take.  Patients identified both personal strengths and opportunities for growth in social risk taking to improve overall communication and connection with other people.      Patient Session Goals / Objectives:    Identified strengths and opportunities for growth in social risk taking skills and how these impact their ability to communicate  clearly with other people       Improved awareness of important aspects of social risk taking skills and how this relates to mental health recovery        Established a plan for practice of these skills in their own environments    Practiced and reflected on how to generalize taught skills to their everyday life        Patient Participation / Response:  Moderately participated, sharing some personal reflections / insights and adequately adequately received / provided feedback with other participants.    Demonstrated understanding of content through handout/video/discussion , Verbalized understanding of content and Patient would benefit from additional opportunities to practice the content to be able to generalize it to their everyday life with increased intentionality, consistency, and efficacy in support of their psychiatric recovery    Treatment Plan:  Patient has a current master individualized treatment plan.  See Epic treatment plan for more information.    Jose Lui, OTR/L

## 2021-11-01 NOTE — GROUP NOTE
Psychotherapy Group Note    PATIENT'S NAME: Betty Tamayo  MRN:   [id number]  :   1990  ACCT. NUMBER: [id number]  DATE OF SERVICE: 21  START TIME:  3:00 PM  END TIME:  3:50 PM  FACILITATOR: Josette West LPCC  TOPIC:  EBP Group: [facility] Adult Mental Health Day Treatment  TRACK: 1B    NUMBER OF PARTICIPANTS: 5                                      Service Modality:  Video Visit     Telemedicine Visit: The patient's condition can be safely assessed and treated via synchronous audio and visual telemedicine encounter.      Reason for Telemedicine Visit: Services only offered telehealth    Originating Site (Patient Location): Patient's home    Distant Site (Provider Location): Provider Remote Setting- Home Office    Consent:  The patient/guardian has verbally consented to: the potential risks and benefits of telemedicine (video visit) versus in person care; bill my insurance or make self-payment for services provided; and responsibility for payment of non-covered services.     Patient would like the video invitation sent by:  My Chart    Mode of Communication:  Video Conference via Medical Zoom    As the provider I attest to compliance with applicable laws and regulations related to telemedicine.        Summary of Group / Topics Discussed:  Specialty Topics: Grief/Transitions: Patients received an overview of the grief process.  Patients explored their relationship to loss and how that has affected their mental health symptoms.  Strategies for recognizing loss and ideas for engaging in the grief process was presented and discussed.  Patients identified needs for support and coping skills to manage loss.  The purpose of this specialty topic is to help patients identify the aspects of change due to loss that individuals experience in addition to mental health symptoms to better cope with the grief, loss, and life transitions.      Patient Session Goals /  Objectives:    Identified grief, loss, and life transitions     Discussed how grief impacts mental health symptoms and disrupts usual functioning    Identified needs for support and coping with grief and planned further action for coping        Patient Participation / Response:  Fully participated with the group by sharing personal reflections / insights and openly received / provided feedback with other participants.    Demonstrated understanding of topics discussed through group discussion and participation, Identified / Expressed readiness to act on skill suggestions discussed in topic and Verbalized understanding of ways to proactively manage illness    Treatment Plan:  Patient has a current master individualized treatment plan.  See Epic treatment plan for more information.    Josette West, [facility]

## 2021-11-03 ENCOUNTER — HOSPITAL ENCOUNTER (OUTPATIENT)
Dept: BEHAVIORAL HEALTH | Facility: CLINIC | Age: 31
End: 2021-11-03
Attending: PSYCHIATRY & NEUROLOGY
Payer: MEDICAID

## 2021-11-03 PROCEDURE — 90853 GROUP PSYCHOTHERAPY: CPT | Mod: GT,95

## 2021-11-03 PROCEDURE — G0177 OPPS/PHP; TRAIN & EDUC SERV: HCPCS | Mod: GT,95

## 2021-11-03 NOTE — GROUP NOTE
Psychoeducation Group Note    PATIENT'S NAME: Betty Tamayo  MRN:   [id number]  :   1990  ACCT. NUMBER: [id number]  DATE OF SERVICE: 21  START TIME:  1:00 PM  END TIME:  1:50 PM  FACILITATOR: Jose Lui OTR/L  TOPIC: MH Life Skills Group: Lifestyle Balance and Structure                                    Service Modality:  Video Visit     Telemedicine Visit: The patient's condition can be safely assessed and treated via synchronous audio and visual telemedicine encounter.      Reason for Telemedicine Visit: Services only offered telehealth    Originating Site (Patient Location): Patient's home    Distant Site (Provider Location): Provider Remote Setting- Home Office    Consent:  The patient/guardian has verbally consented to: the potential risks and benefits of telemedicine (video visit) versus in person care; bill my insurance or make self-payment for services provided; and responsibility for payment of non-covered services.     Mode of Communication:  Video Conference via Medical Zoom    As the provider I attest to compliance with applicable laws and regulations related to telemedicine.       [facility] Adult Mental Health Day Treatment  TRACK: 1B    NUMBER OF PARTICIPANTS: 7    Summary of Group / Topics Discussed:  Lifestyle Balance and Strucure:  Routines, Habits, Rituals, and Roles(Spiritual Wellness): Patients were assisted to identify meaningful roles that they want to promote and the impact their mental health symptoms have on this.  Patients learned, applied, and generalized skills needed to live and participate in meaningful roles as effectively and independently as possible.  Patients developed awareness of strengths and challenges in fulfilling these roles and worked on integrating specific and individualized rituals and habits into their daily life.     Patient Session Goals / Objectives:    Improved awareness and engagement in life s meaningful roles, routines, habits,  and rituals    Explored and identified current roles and challenges due to mental health symptoms     Identified ways to establish and integrate daily self care and wellness routines and habits to support mental health recovery    Practiced and reflected on how to generalize taught skills to their everyday life      Patient Participation / Response:  Fully participated with the group by sharing personal reflections / insights and openly received / provided feedback with other participants.    Demonstrated understanding of content through video/handout/discussion  , Verbalized understanding of content and Patient would benefit from additional opportunities to practice the content to be able to generalize it to their everyday life with increased intentionality, consistency, and efficacy in support of their psychiatric recovery    Treatment Plan:  Patient has a current master individualized treatment plan.  See Epic treatment plan for more information.    Jose Lui, OTR/L

## 2021-11-03 NOTE — GROUP NOTE
Process Group Note    PATIENT'S NAME: Betty Tamayo  MRN:   [id number]  :   1990  ACCT. NUMBER: [id number]  DATE OF SERVICE: 21  START TIME:  2:00 PM  END TIME:  2:50 PM  FACILITATOR: Merissa Lares LMFT  TOPIC:  Process Group    Diagnoses:    296.32 (F33.1) Major Depressive Disorder, Recurrent Episode, Moderate _  300.02 (F41.1) Generalized Anxiety Disorder.  Other Unspecified and Specified Bipolar and Related Disorder 296.80 (F31.9) Unspecified Bipolar and Related Disorder  309.81 (F43.10) Posttraumatic Stress Disorder  With dissociative symptoms.  296.32 (F33.1) Major Depressive Disorder, Recurrent Episode, Moderate  Panic Disorder  PTSD    [facility] Adult Partial Hospitalization Program  TRACK: 1B    NUMBER OF PARTICIPANTS: 7                                      Service Modality:  Video Visit     Telemedicine Visit: The patient's condition can be safely assessed and treated via synchronous audio and visual telemedicine encounter.      Reason for Telemedicine Visit: Services only offered telehealth    Originating Site (Patient Location): Patient's home    Distant Site (Provider Location): Provider Remote Setting- Home Office    Consent:  The patient/guardian has verbally consented to: the potential risks and benefits of telemedicine (video visit) versus in person care; bill my insurance or make self-payment for services provided; and responsibility for payment of non-covered services.     Patient would like the video invitation sent by:  My Chart    Mode of Communication:  Video Conference via Medical Zoom    As the provider I attest to compliance with applicable laws and regulations related to telemedicine.            Data:    Session content: At the start of this group, patients were invited to check in by identifying themselves, describing their current emotional status, and identifying issues to address in this group.   Area(s) of treatment focus addressed in this session  included Symptom Management, Personal Safety and Community Resources/Discharge Planning.    Betty reported feeling  sad, confused, depressed but also optimistic at the same time  today. Reports feeling like a lot of energy lately. Reports finding out job terminated me. Patient identified the following goal(s) to work towards today:  trying to stay positive and not have any outbursts today  reports a lot of  anger outbursts.  Patient plans to use the following skills to achieve their goal: discussed workers  rights, discussed options for emotion regulation, including TIPP skill. Patient anticipates the following barriers that may interfere with achieving their goal: my emotions. Denies safety concerns, endorses chemical use (cannabis), and is not on medications. Patient reports feeling proud of/grateful for: this group. Patient asked for the following supports from the group today: by listening.     Therapeutic Interventions/Treatment Strategies:  Psychotherapist offered support, feedback and validation and reinforced use of skills. Treatment modalities used include Motivational Interviewing, Cognitive Behavioral Therapy and Dialectical Behavioral Therapy. Interventions include Coping Skills: Discussed use of self-soothe skills to decrease distress in the body, Reviewed patients current calming practices and discussed a more formal way of practicing and accessing skills and Assisted patient in understanding the purpose of planning / creating / participating / sharing in positive experiences.    Assessment:    Patient response:   Patient responded to session by accepting feedback, giving feedback, listening, focusing on goals, being attentive and accepting support    Possible barriers to participation / learning include: and no barriers identified    Health Issues:   None reported       Substance Use Review:   Substance Use: No active concerns identified.    Mental Status/Behavioral  Observations  Appearance:   Appropriate   Eye Contact:   Good   Psychomotor Behavior: Normal   Attitude:   Cooperative  Friendly Pleasant  Orientation:   All  Speech   Rate / Production: Normal    Volume:  Normal   Mood:    Normal  Affect:    Appropriate   Thought Content:   Clear  Thought Form:  Coherent  Logical     Insight:    Good     Plan:     Safety Plan: Committed to safety and agreed to follow previously developed safety coping plan.     No current safety concerns identified.  Recommended that patient call 911 or go to the local ED should there be a change in any of these risk factors.     Barriers to treatment: None identified    Patient Contracts (see media tab):  None    Substance Use: Not addressed in session     Continue or Discharge: Patient will continue in Adult Day Treatment (ADT)  as planned. Patient is likely to benefit from learning and using skills as they work toward the goals identified in their treatment plan.      Merissa Lares, CHARLY  November 3, 2021

## 2021-11-03 NOTE — ADDENDUM NOTE
Encounter addended by: Josette West [facility] on: 11/3/2021 11:27 AM   Actions taken: Flowsheet accepted, Clinical Note Signed

## 2021-11-04 ENCOUNTER — HOSPITAL ENCOUNTER (OUTPATIENT)
Dept: BEHAVIORAL HEALTH | Facility: CLINIC | Age: 31
End: 2021-11-04
Attending: PSYCHIATRY & NEUROLOGY
Payer: MEDICAID

## 2021-11-04 PROCEDURE — 90853 GROUP PSYCHOTHERAPY: CPT | Mod: GT,95 | Performed by: COUNSELOR

## 2021-11-04 NOTE — GROUP NOTE
Psychoeducation Group Note    PATIENT'S NAME: Betty Tamayo  MRN:   [id number]  :   1990  ACCT. NUMBER: [id number]  DATE OF SERVICE: 21  START TIME:  3:00 PM  END TIME:  3:50 PM  FACILITATOR: Ra Silverman RN  TOPIC: MH RN Group: Health Maintenance                                    Service Modality:  Video Visit     Telemedicine Visit: The patient's condition can be safely assessed and treated via synchronous audio and visual telemedicine encounter.      Reason for Telemedicine Visit: Covid19    Originating Site (Patient Location): Patient's home    Distant Site (Provider Location): Provider Remote Setting- Home Office    Consent:  The patient/guardian has verbally consented to: the potential risks and benefits of telemedicine (video visit) versus in person care; bill my insurance or make self-payment for services provided; and responsibility for payment of non-covered services.     Patient would like the video invitation sent by:  My Chart    Mode of Communication:  Video Conference via Medical Zoom    As the provider I attest to compliance with applicable laws and regulations related to telemedicine.       [facility] Adult Mental Health Day Treatment  TRACK: 1b    NUMBER OF PARTICIPANTS: 5    Summary of Group / Topics Discussed:  Health Maintenance: Wellness Check-in: Patients met with group facilitator to individually review a holistic wellness check-in to assess patient mental health, exercise, nutrition, sleep, socialization, substance use, and need for service/resource referrals.       Patient Session Goals / Objectives:    Discussed various aspects of health management and self-care related to physical and mental health    Demonstrated increased self-awareness of current wellness needs    Developed health literacy skills in navigating the healthcare system and self-advocacy/communicating needs with health care team          Patient Participation / Response:  Fully participated with  the group by sharing personal reflections / insights and openly received / provided feedback with other participants.    Demonstrated understanding of topics discussed through group discussion and participation    Treatment Plan:  Patient has a current master individualized treatment plan.  See Epic treatment plan for more information.    Ra Silverman RN

## 2021-11-04 NOTE — GROUP NOTE
Psychotherapy Group Note    PATIENT'S NAME: Betty Tamayo  MRN:   [id number]  :   1990  ACCT. NUMBER: [id number]  DATE OF SERVICE: 21  START TIME:  2:00 PM  END TIME:  2:50 PM  FACILITATOR: Josette West LPCC  TOPIC:  EBP Group: Emotions Management  [facility] Adult Mental Health Day Treatment  TRACK: 1B    NUMBER OF PARTICIPANTS: 8                                      Service Modality:  Video Visit     Telemedicine Visit: The patient's condition can be safely assessed and treated via synchronous audio and visual telemedicine encounter.      Reason for Telemedicine Visit: Services only offered telehealth    Originating Site (Patient Location): Patient's home    Distant Site (Provider Location): Provider Remote Setting- Home Office    Consent:  The patient/guardian has verbally consented to: the potential risks and benefits of telemedicine (video visit) versus in person care; bill my insurance or make self-payment for services provided; and responsibility for payment of non-covered services.     Patient would like the video invitation sent by:  My Chart    Mode of Communication:  Video Conference via Medical Zoom    As the provider I attest to compliance with applicable laws and regulations related to telemedicine.        Summary of Group / Topics Discussed:  Emotions Management: Opposite to Emotion: Patients discussed past and present struggles with knowing how to make changes in their lives due to difficult emotional experiences.  Explored desires to experience and feel less anger, sadness, guilt, and fear.  Reviewed the therapeutic skill of opposite action and patients explored opportunities to use their behaviors as a tool to reduce an emotion that they want to change.     Patient Session Goals / Objectives:    Review DBT concepts and focus on patient s experiences of distress and difficult emotional experiences.    Learn how to do the opposite of what an emotion makes us want to do in  an effort to decrease an unwanted emotional experience.    Demonstrate understanding of the skill of opposite action by sharing experiences where the technique could be useful in past / present situations.      Patient Participation / Response:  Fully participated with the group by sharing personal reflections / insights and openly received / provided feedback with other participants.    Demonstrated understanding of topics discussed through group discussion and participation, Expressed understanding of the relevance / importance of emotions management skills at distressing times in life, Self-aware of experiences with difficult emotions, and strategies to employ to manage them and Demonstrated knowledge of when to consider applying a variety of emotions management skills in daily life    Treatment Plan:  Patient has a current master individualized treatment plan.  See Epic treatment plan for more information.    Josette West, [facility]

## 2021-11-04 NOTE — GROUP NOTE
Process Group Note    PATIENT'S NAME: Betty Tamayo  MRN:   [id number]  :   1990  ACCT. NUMBER: [id number]  DATE OF SERVICE: 21  START TIME:  1:00 PM  END TIME:  1:50 PM  FACILITATOR: Josette West [facility]  TOPIC:  Process Group    Diagnoses:   296.32 (F33.1) Major Depressive Disorder, Recurrent Episode, Moderate _  300.02 (F41.1) Generalized Anxiety Disorder.  4. Other Diagnoses that is relevant to services:   Other Unspecified and Specified Bipolar and Related Disorder 296.80 (F31.9) Unspecified Bipolar and Related Disorder  309.81 (F43.10) Posttraumatic Stress Disorder (includes Posttraumatic Stress Disorder for Children 6 Years and Younger)  With dissociative symptoms.      [facility] Mental [facility] Day Treatment  TRACK: 1B    NUMBER OF PARTICIPANTS: 4                                      Service Modality:  Video Visit     Telemedicine Visit: The patient's condition can be safely assessed and treated via synchronous audio and visual telemedicine encounter.      Reason for Telemedicine Visit: Services only offered telehealth    Originating Site (Patient Location): Patient's home    Distant Site (Provider Location): Provider Remote Setting- Home Office    Consent:  The patient/guardian has verbally consented to: the potential risks and benefits of telemedicine (video visit) versus in person care; bill my insurance or make self-payment for services provided; and responsibility for payment of non-covered services.     Patient would like the video invitation sent by:  My Chart    Mode of Communication:  Video Conference via Medical Zoom    As the provider I attest to compliance with applicable laws and regulations related to telemedicine.                Data:    Session content: At the start of this group, patients were invited to check in by identifying themselves, describing their current emotional status, and identifying issues to address in this group.   Area(s) of treatment focus  "addressed in this session included Symptom Management, Personal Safety.    Betty reported feeling \"anxious\" today.  Her goal for the day is to maintain a positive mindset today.  Patient declined additional process time but contributed to group discussion and provided feedback and support to peers.      Therapeutic Interventions/Treatment Strategies:  Psychotherapist offered support, feedback and validation and reinforced use of skills.    Assessment:    Patient response:   Patient responded to session by accepting feedback, giving feedback and listening    Possible barriers to participation / learning include: and no barriers identified    Health Issues:   None reported       Substance Use Review:   Substance Use: No active concerns identified.    Mental Status/Behavioral Observations  Appearance:   Appropriate   Eye Contact:   Good   Psychomotor Behavior: Normal   Attitude:   Cooperative   Orientation:   All  Speech   Rate / Production: Normal    Volume:  Normal   Mood:    Anxious  Depressed   Affect:    Appropriate   Thought Content:   Clear  Thought Form:  Coherent  Logical     Insight:    Good     Plan:     Safety Plan: No current safety concerns identified.  Recommended that patient call 911 or go to the local ED should there be a change in any of these risk factors.     Barriers to treatment: None identified    Patient Contracts (see media tab):  None    Substance Use: Not addressed in session     Continue or Discharge: Patient will continue in Adult Day Treatment (ADT)  as planned. Patient is likely to benefit from learning and using skills as they work toward the goals identified in their treatment plan.      Josette West, [facility]  November 4, 2021    "

## 2021-11-08 ENCOUNTER — TELEPHONE (OUTPATIENT)
Dept: PSYCHIATRY | Facility: CLINIC | Age: 31
End: 2021-11-08
Payer: MEDICAID

## 2021-11-08 NOTE — TELEPHONE ENCOUNTER
On November 8, 2021, at 8:23 AM, writer called patient at mobile to confirm Virtual Visit. Writer unable to make contact with patient. Writer left detailed voice message for call back, with [phone] left as callback number. Link for [employer] was sent via email to [email or web address] (no preferred invite method on file). Abigail Ivory, SUSAN    Writer called patient back at 8:48 AM at mobile to confirm virtual visit. Writer was still unable to reach patient. Abigail Ivory, EMT

## 2021-11-10 ENCOUNTER — TELEPHONE (OUTPATIENT)
Dept: BEHAVIORAL HEALTH | Facility: CLINIC | Age: 31
End: 2021-11-10
Payer: MEDICAID

## 2021-11-10 NOTE — TELEPHONE ENCOUNTER
No c/o  AVSS  + DF/PF  Dressing dry  D/c home  Discussed risks of opioids     Writer called client to explain their absence from group today.  Writer left them a voicemail asking that they return writer's call.  Writer reminded her that EC will be called on 3rd consecutive day of no call/no show..    Josette West [facility] on 11/10/2021 at 3:00 PM

## 2021-11-15 ENCOUNTER — HOSPITAL ENCOUNTER (OUTPATIENT)
Dept: BEHAVIORAL HEALTH | Facility: CLINIC | Age: 31
End: 2021-11-15
Attending: PSYCHIATRY & NEUROLOGY
Payer: MEDICAID

## 2021-11-15 PROCEDURE — 90853 GROUP PSYCHOTHERAPY: CPT | Mod: GT,95 | Performed by: COUNSELOR

## 2021-11-15 NOTE — GROUP NOTE
Process Group Note    PATIENT'S NAME: Betty Tamayo  MRN:   [id number]  :   1990  ACCT. NUMBER: [id number]  DATE OF SERVICE: 11/15/21  START TIME:  1:00 PM  END TIME:  1:50 PM  FACILITATOR: Josette West [facility]  TOPIC:  Process Group    Diagnoses:   296.32 (F33.1) Major Depressive Disorder, Recurrent Episode, Moderate _  300.02 (F41.1) Generalized Anxiety Disorder.  4. Other Diagnoses that is relevant to services:   Other Unspecified and Specified Bipolar and Related Disorder 296.80 (F31.9) Unspecified Bipolar and Related Disorder  309.81 (F43.10) Posttraumatic Stress Disorder (includes Posttraumatic Stress Disorder for Children 6 Years and Younger)  With dissociative symptoms.      [facility] Mental [facility] Day Treatment  TRACK: 1B    NUMBER OF PARTICIPANTS: 6                                      Service Modality:  Video Visit     Telemedicine Visit: The patient's condition can be safely assessed and treated via synchronous audio and visual telemedicine encounter.      Reason for Telemedicine Visit: Services only offered telehealth    Originating Site (Patient Location): Patient's home    Distant Site (Provider Location): Provider Remote Setting- Home Office    Consent:  The patient/guardian has verbally consented to: the potential risks and benefits of telemedicine (video visit) versus in person care; bill my insurance or make self-payment for services provided; and responsibility for payment of non-covered services.     Patient would like the video invitation sent by:  My Chart    Mode of Communication:  Video Conference via Medical Zoom    As the provider I attest to compliance with applicable laws and regulations related to telemedicine.                Data:    Session content: At the start of this group, patients were invited to check in by identifying themselves, describing their current emotional status, and identifying issues to address in this group.   Area(s) of treatment focus  "addressed in this session included Symptom Management and Personal Safety.    Betty reported feeling \"extremely anxious\" today.  Her goal for the day is to apply for state insurance.  Patient declined additional process time but contributed to group discussion and provided feedback and support to peers.      Therapeutic Interventions/Treatment Strategies:  Psychotherapist offered support, feedback and validation and reinforced use of skills.    Assessment:    Patient response:   Patient responded to session by accepting feedback, giving feedback and listening    Possible barriers to participation / learning include: and no barriers identified    Health Issues:   None reported       Substance Use Review:   Substance Use: cannabis .     Mental Status/Behavioral Observations  Appearance:   Appropriate   Eye Contact:   Good   Psychomotor Behavior: Normal   Attitude:   Cooperative   Orientation:   All  Speech   Rate / Production: Normal    Volume:  Normal   Mood:    Anxious  Depressed   Affect:    Appropriate   Thought Content:   Clear  Thought Form:  Coherent  Logical     Insight:    Good     Plan:     Safety Plan: No current safety concerns identified.  Recommended that patient call 911 or go to the local ED should there be a change in any of these risk factors.     Barriers to treatment: None identified    Patient Contracts (see media tab):  None    Substance Use: Not addressed in session     Continue or Discharge: Patient will continue in Adult Day Treatment (ADT)  as planned. Patient is likely to benefit from learning and using skills as they work toward the goals identified in their treatment plan.      Josette West, [facility]  November 15, 2021    "

## 2021-11-16 ENCOUNTER — TELEPHONE (OUTPATIENT)
Dept: PSYCHIATRY | Facility: CLINIC | Age: 31
End: 2021-11-16
Payer: MEDICAID

## 2021-11-16 NOTE — TELEPHONE ENCOUNTER
Writer returned a call to Amisha to obtain additional information. She works with [employer] insurance who has been paying patient disability for being out of work since April. She is calling wanting to confirm if Dr. Boyd is advising her to stay out of work or is providing her restriction with work hours. Per chart review, patient was provided with accomodation letter for work in June. Will route to provider for further clarification.

## 2021-11-16 NOTE — TELEPHONE ENCOUNTER
[facility] Call Center    Phone Message    May a detailed message be left on voicemail: yes     Reason for Call: Form or Letter   Type or form/letter needing completion: Insurance company calling to obtain records (clinic notes or any information advising pt to be out of work) in order to process disability claim. PÉREZ on file for insurance company.   Provider: Deb  Date form needed: ASAP  Once completed: Fax form to: [phone]      Action Taken: Message routed to:  Other: nursing pool    Travel Screening: Not Applicable

## 2021-11-18 NOTE — TELEPHONE ENCOUNTER
Writer called the patient to gather more information in regards to returning to work and/or accommodations needed for work. Most recent forms for her leave had a return to work date of 6/1/21. Phone rang x1 and then went to . Writer left message with reason for call and requested a c/b.

## 2021-11-18 NOTE — TELEPHONE ENCOUNTER
Amisha from [employer] [facility] [employer] [employer] [facility] insurance calling back again to clarify if Dr. Boyd is advising pt out of work or giving pt any work restrictions.   Also asking for any office notes/records from July 20th 2021-present can be faxed to [phone]

## 2021-11-22 ENCOUNTER — HOSPITAL ENCOUNTER (OUTPATIENT)
Dept: BEHAVIORAL HEALTH | Facility: CLINIC | Age: 31
End: 2021-11-22
Attending: PSYCHIATRY & NEUROLOGY
Payer: MEDICAID

## 2021-11-22 PROCEDURE — 90853 GROUP PSYCHOTHERAPY: CPT | Mod: GT,95 | Performed by: COUNSELOR

## 2021-11-22 PROCEDURE — G0177 OPPS/PHP; TRAIN & EDUC SERV: HCPCS | Mod: GT,95

## 2021-11-22 NOTE — GROUP NOTE
Psychotherapy Group Note    PATIENT'S NAME: Betty Tamayo  MRN:   [id number]  :   1990  ACCT. NUMBER: [id number]  DATE OF SERVICE: 21  START TIME:  3:00 PM  END TIME:  3:50 PM  FACILITATOR: Josette West LPCC  TOPIC: MH EBP Group: Self-Awareness  [facility] Adult Mental Health Day Treatment  TRACK: 1B    NUMBER OF PARTICIPANTS: 5                                      Service Modality:  Video Visit     Telemedicine Visit: The patient's condition can be safely assessed and treated via synchronous audio and visual telemedicine encounter.      Reason for Telemedicine Visit: Services only offered telehealth    Originating Site (Patient Location): Patient's home    Distant Site (Provider Location): Provider Remote Setting- Home Office    Consent:  The patient/guardian has verbally consented to: the potential risks and benefits of telemedicine (video visit) versus in person care; bill my insurance or make self-payment for services provided; and responsibility for payment of non-covered services.     Patient would like the video invitation sent by:  My Chart    Mode of Communication:  Video Conference via Medical Zoom    As the provider I attest to compliance with applicable laws and regulations related to telemedicine.          Summary of Group / Topics Discussed:  Self-Awareness: Values: Patients identified personal values by examining development of their current values and how their values influence their daily functioning and life choices. Patients explored the impact of their values on their thoughts, feelings, and actions. Patients discussed definition of personal values and how they develop and change over time. The goal is to help patients reconcile value conflicts and achieve balance and flexibility to improve mood and daily functioning.     Patient Session Goals / Objectives:    Examined development of values and impact of values on functioning    Identified and prioritized important  values related to current well-being     Identified strategies to change or enhance values to positively impact symptoms    Assisted patients to find ways to adapt functioning to better fit their values      Patient Participation / Response:  Fully participated with the group by sharing personal reflections / insights and openly received / provided feedback with other participants.    Demonstrated understanding of topics discussed through group discussion and participation, Demonstrated understanding of values, strengths, and challenges to learn about themselves and Identified / Expressed readiness to act intentionally, increase self-compassion, promote personal growth    Treatment Plan:  Patient has a current master individualized treatment plan.  See Epic treatment plan for more information.    Josette West, [facility]

## 2021-11-22 NOTE — GROUP NOTE
Process Group Note    PATIENT'S NAME: Betty Tamayo  MRN:   [id number]  :   1990  ACCT. NUMBER: [id number]  DATE OF SERVICE: 21  START TIME:  1:00 PM  END TIME:  1:50 PM  FACILITATOR: Josette West [facility]  TOPIC:  Process Group    Diagnoses:   296.32 (F33.1) Major Depressive Disorder, Recurrent Episode, Moderate _  300.02 (F41.1) Generalized Anxiety Disorder.  4. Other Diagnoses that is relevant to services:   Other Unspecified and Specified Bipolar and Related Disorder 296.80 (F31.9) Unspecified Bipolar and Related Disorder  309.81 (F43.10) Posttraumatic Stress Disorder (includes Posttraumatic Stress Disorder for Children 6 Years and Younger)  With dissociative symptoms.      [facility] [place] Day Treatment  TRACK: 1B    NUMBER OF PARTICIPANTS: 5                                      Service Modality:  Video Visit     Telemedicine Visit: The patient's condition can be safely assessed and treated via synchronous audio and visual telemedicine encounter.      Reason for Telemedicine Visit: Services only offered telehealth    Originating Site (Patient Location): Patient's home    Distant Site (Provider Location): Provider Remote Setting- Home Office    Consent:  The patient/guardian has verbally consented to: the potential risks and benefits of telemedicine (video visit) versus in person care; bill my insurance or make self-payment for services provided; and responsibility for payment of non-covered services.     Patient would like the video invitation sent by:  My Chart    Mode of Communication:  Video Conference via Medical Zoom    As the provider I attest to compliance with applicable laws and regulations related to telemedicine.                Data:    Session content: At the start of this group, patients were invited to check in by identifying themselves, describing their current emotional status, and identifying issues to address in this group.   Area(s) of treatment focus  "addressed in this session included Symptom Management and Personal Safety.    Betty reported feeling \"tired\" and \"overwhelmed\" and has been sick all week. Her goal for the day is to clean her house today.  Patient declined additional process time but contributed to group discussion and provided feedback and support to peers.      Therapeutic Interventions/Treatment Strategies:  Psychotherapist offered support, feedback and validation and reinforced use of skills.    Assessment:    Patient response:   Patient responded to session by accepting feedback, giving feedback and listening    Possible barriers to participation / learning include: and no barriers identified    Health Issues:   Yes: feeling sick       Substance Use Review:   Substance Use: No active concerns identified.    Mental Status/Behavioral Observations  Appearance:   Appropriate   Eye Contact:   Good   Psychomotor Behavior: Normal   Attitude:   Cooperative   Orientation:   All  Speech   Rate / Production: Normal    Volume:  Normal   Mood:    Depressed   Affect:    Appropriate   Thought Content:   Clear  Thought Form:  Coherent  Logical     Insight:    Good     Plan:     Safety Plan: No current safety concerns identified.  Recommended that patient call 911 or go to the local ED should there be a change in any of these risk factors.     Barriers to treatment: None identified    Patient Contracts (see media tab):  None    Substance Use: Not addressed in session     Continue or Discharge: Patient will continue in Adult Day Treatment (ADT)  as planned. Patient is likely to benefit from learning and using skills as they work toward the goals identified in their treatment plan.      Josette West, [facility]  November 22, 2021    "

## 2021-11-22 NOTE — GROUP NOTE
Psychoeducation Group Note    PATIENT'S NAME: Betty Tamayo  MRN:   [id number]  :   1990  ACCT. NUMBER: [id number]  DATE OF SERVICE: 21  START TIME:  2:00 PM  END TIME:  2:50 PM  FACILITATOR: Jose Lui OTR/L  TOPIC: MH Life Skills Group: Resiliency Development                                    Service Modality:  Video Visit     Telemedicine Visit: The patient's condition can be safely assessed and treated via synchronous audio and visual telemedicine encounter.      Reason for Telemedicine Visit: Services only offered telehealth    Originating Site (Patient Location): Patient's home    Distant Site (Provider Location): Provider Remote Setting- Home Office    Consent:  The patient/guardian has verbally consented to: the potential risks and benefits of telemedicine (video visit) versus in person care; bill my insurance or make self-payment for services provided; and responsibility for payment of non-covered services.     Mode of Communication:  Video Conference via Medical Zoom    As the provider I attest to compliance with applicable laws and regulations related to telemedicine.       [facility] Adult Mental Health Day Treatment  TRACK: 1B    NUMBER OF PARTICIPANTS: 5    Summary of Group / Topics Discussed:  Resiliency Development:  Coping Skills(Strategies to Improve Motivation(: Patients were taught how to identify stressors, signs of stress, coping skills, and prevention strategies for overall stress management.  Patients were given the opportunity to identify both ongoing and acute mental health symptoms and how to effectively manage these symptoms by developing an effective aftercare plan.  Patients increased awareness of community based resources.    Patient Session Goals / Objectives:    Identified how using coping skills can be used for symptom and stress management       Improved awareness of individualed symptoms and stressors and how to effectively cope     Established a  relapse prevention plan to practice these skills in their own environments    Practiced and reflected on how to generalize taught skills to their everyday life        Patient Participation / Response:  Moderately participated, sharing some personal reflections / insights and adequately adequately received / provided feedback with other participants.    Demonstrated understanding of content through video/handouts/group discussion , Verbalized understanding of content and Patient would benefit from additional opportunities to practice the content to be able to generalize it to their everyday life with increased intentionality, consistency, and efficacy in support of their psychiatric recovery    Treatment Plan:  Patient has a current master individualized treatment plan.  See Epic treatment plan for more information.    Jose Lui, OTR/L

## 2021-11-24 ENCOUNTER — HOSPITAL ENCOUNTER (OUTPATIENT)
Dept: BEHAVIORAL HEALTH | Facility: CLINIC | Age: 31
End: 2021-11-24
Attending: PSYCHIATRY & NEUROLOGY
Payer: MEDICAID

## 2021-11-24 PROCEDURE — 90853 GROUP PSYCHOTHERAPY: CPT | Mod: GT,95 | Performed by: COUNSELOR

## 2021-11-24 PROCEDURE — G0177 OPPS/PHP; TRAIN & EDUC SERV: HCPCS | Mod: GT,95

## 2021-11-24 NOTE — GROUP NOTE
Psychoeducation Group Note    PATIENT'S NAME: Betty Tamayo  MRN:   [id number]  :   1990  ACCT. NUMBER: [id number]  DATE OF SERVICE: 21  START TIME:  1:00 PM  END TIME:  1:50 PM  FACILITATOR: Jose Lui OTR/L  TOPIC: MH Life Skills Group: Resiliency Development                                    Service Modality:  Video Visit     Telemedicine Visit: The patient's condition can be safely assessed and treated via synchronous audio and visual telemedicine encounter.      Reason for Telemedicine Visit: Services only offered telehealth    Originating Site (Patient Location): Patient's home    Distant Site (Provider Location): Provider Remote Setting- Home Office    Consent:  The patient/guardian has verbally consented to: the potential risks and benefits of telemedicine (video visit) versus in person care; bill my insurance or make self-payment for services provided; and responsibility for payment of non-covered services.     Mode of Communication:  Video Conference via Medical Zoom    As the provider I attest to compliance with applicable laws and regulations related to telemedicine.       [facility] Adult Mental Health Day Treatment  TRACK: 1B    NUMBER OF PARTICIPANTS: 6    Summary of Group / Topics Discussed:  Resiliency Development:  Coping Skills(Weekly Mental Health Check In): Patients were taught how to identify stressors, signs of stress, coping skills, and prevention strategies for overall stress management.  Patients were given the opportunity to identify both ongoing and acute mental health symptoms and how to effectively manage these symptoms by developing an effective aftercare plan.  Patients increased awareness of community based resources.    Patient Session Goals / Objectives:    Identified how using coping skills can be used for symptom and stress management       Improved awareness of individualed symptoms and stressors and how to effectively cope     Established a  relapse prevention plan to practice these skills in their own environments    Practiced and reflected on how to generalize taught skills to their everyday life        Patient Participation / Response:  Fully participated with the group by sharing personal reflections / insights and openly received / provided feedback with other participants.    Demonstrated understanding of content through handout/video/group discussion , Verbalized understanding of content and Patient would benefit from additional opportunities to practice the content to be able to generalize it to their everyday life with increased intentionality, consistency, and efficacy in support of their psychiatric recovery    Treatment Plan:  Patient has a current master individualized treatment plan.  See Epic treatment plan for more information.    Jose Lui, OTR/L

## 2021-11-24 NOTE — GROUP NOTE
Process Group Note    PATIENT'S NAME: Betty Tamayo  MRN:   [id number]  :   1990  ACCT. NUMBER: [id number]  DATE OF SERVICE: 21  START TIME:  2:00 PM  END TIME:  2:50 PM  FACILITATOR: Josette West [facility]  TOPIC:  Process Group    Diagnoses:   296.32 (F33.1) Major Depressive Disorder, Recurrent Episode, Moderate _  300.02 (F41.1) Generalized Anxiety Disorder.  4. Other Diagnoses that is relevant to services:   Other Unspecified and Specified Bipolar and Related Disorder 296.80 (F31.9) Unspecified Bipolar and Related Disorder  309.81 (F43.10) Posttraumatic Stress Disorder (includes Posttraumatic Stress Disorder for Children 6 Years and Younger)  With dissociative symptoms.      [facility] Day Treatment  TRACK: 1B    NUMBER OF PARTICIPANTS: 8                                      Service Modality:  Video Visit     Telemedicine Visit: The patient's condition can be safely assessed and treated via synchronous audio and visual telemedicine encounter.      Reason for Telemedicine Visit: Services only offered telehealth    Originating Site (Patient Location): Patient's home    Distant Site (Provider Location): Provider Remote Setting- Home Office    Consent:  The patient/guardian has verbally consented to: the potential risks and benefits of telemedicine (video visit) versus in person care; bill my insurance or make self-payment for services provided; and responsibility for payment of non-covered services.     Patient would like the video invitation sent by:  My Chart    Mode of Communication:  Video Conference via Medical Zoom    As the provider I attest to compliance with applicable laws and regulations related to telemedicine.                Data:    Session content: At the start of this group, patients were invited to check in by identifying themselves, describing their current emotional status, and identifying issues to address in this group.   Area(s) of treatment focus  addressed in this session included Symptom Management, Personal Safety and Abstinence/Relapse Prevention.    Betty reported feeling exhausted today.  She went to get tested for COVID-19 yesterday, which was negative, but it was overwhelming for her to be around people for that long. Her goal today is to take her kids to the mall later today.      Therapeutic Interventions/Treatment Strategies:  Psychotherapist offered support, feedback and validation and reinforced use of skills.    Assessment:    Patient response:   Patient responded to session by accepting feedback, giving feedback and listening    Possible barriers to participation / learning include: and no barriers identified    Health Issues:   None reported       Substance Use Review:   Substance Use: No active concerns identified.    Mental Status/Behavioral Observations  Appearance:   Appropriate   Eye Contact:   Good   Psychomotor Behavior: Normal   Attitude:   Cooperative   Orientation:   All  Speech   Rate / Production: Normal    Volume:  Normal   Mood:    Depressed   Affect:    Appropriate   Thought Content:   Clear  Thought Form:  Coherent  Logical     Insight:    Good     Plan:     Safety Plan: No current safety concerns identified.  Recommended that patient call 911 or go to the local ED should there be a change in any of these risk factors.     Barriers to treatment: None identified    Patient Contracts (see media tab):  None    Substance Use: Not addressed in session     Continue or Discharge: Patient will continue in Adult Day Treatment (ADT)  as planned. Patient is likely to benefit from learning and using skills as they work toward the goals identified in their treatment plan.      Josette West, [facility]  November 24, 2021

## 2021-12-01 ENCOUNTER — HOSPITAL ENCOUNTER (OUTPATIENT)
Dept: BEHAVIORAL HEALTH | Facility: CLINIC | Age: 31
End: 2021-12-01
Attending: PSYCHIATRY & NEUROLOGY
Payer: MEDICAID

## 2021-12-01 PROCEDURE — 90853 GROUP PSYCHOTHERAPY: CPT | Mod: GT | Performed by: COUNSELOR

## 2021-12-01 NOTE — GROUP NOTE
Process Group Note    PATIENT'S NAME: Betty Tamayo  MRN:   [id number]  :   1990  ACCT. NUMBER: [id number]  DATE OF SERVICE: 21  START TIME:  1:00 PM  END TIME:  1:50 PM  FACILITATOR: Josette West [facility]  TOPIC:  Process Group    Diagnoses:   296.32 (F33.1) Major Depressive Disorder, Recurrent Episode, Moderate _  300.02 (F41.1) Generalized Anxiety Disorder.  4. Other Diagnoses that is relevant to services:   Other Unspecified and Specified Bipolar and Related Disorder 296.80 (F31.9) Unspecified Bipolar and Related Disorder  309.81 (F43.10) Posttraumatic Stress Disorder (includes Posttraumatic Stress Disorder for Children 6 Years and Younger)  With dissociative symptoms.      [facility] Day Treatment  TRACK: 1B    NUMBER OF PARTICIPANTS: 4                                      Service Modality:  Video Visit     Telemedicine Visit: The patient's condition can be safely assessed and treated via synchronous audio and visual telemedicine encounter.      Reason for Telemedicine Visit: Services only offered telehealth    Originating Site (Patient Location): Patient's home    Distant Site (Provider Location): Provider Remote Setting- Home Office    Consent:  The patient/guardian has verbally consented to: the potential risks and benefits of telemedicine (video visit) versus in person care; bill my insurance or make self-payment for services provided; and responsibility for payment of non-covered services.     Patient would like the video invitation sent by:  My Chart    Mode of Communication:  Video Conference via Medical Zoom    As the provider I attest to compliance with applicable laws and regulations related to telemedicine.                Data:    Session content: At the start of this group, patients were invited to check in by identifying themselves, describing their current emotional status, and identifying issues to address in this group.   Area(s) of treatment focus  "addressed in this session included Symptom Management, Personal Safety and Abstinence/Relapse Prevention.    Betty reported feeling \"mad, hopeless, depressed, and angry.\"  She was unable to identify any goals for the day.  She took time to share that she feels \"lost\" and doesn't know what to do anymore.  She is starting to feel like being a \"burden\" to others.      Therapeutic Interventions/Treatment Strategies:  Psychotherapist offered support, feedback and validation and reinforced use of skills.    Assessment:    Patient response:   Patient responded to session by accepting feedback, giving feedback and listening    Possible barriers to participation / learning include: and no barriers identified    Health Issues:   None reported       Substance Use Review:   Substance Use: No active concerns identified.    Mental Status/Behavioral Observations  Appearance:   Appropriate   Eye Contact:   Good   Psychomotor Behavior: Normal   Attitude:   Cooperative   Orientation:   All  Speech   Rate / Production: Normal    Volume:  Normal   Mood:    Anxious  Depressed   Affect:    Subdued   Thought Content:   Safety reports  presence of suicidal ideation passive suicidal ideation   Thought Form:  Coherent  Logical     Insight:    Good     Plan:     Safety Plan: Committed to safety and agreed to follow previously developed safety coping plan.      Barriers to treatment: None identified    Patient Contracts (see media tab):  None    Substance Use: Provided encouragement towards sobriety    Provided support and affirmation for steps taken towards sobriety      Continue or Discharge: Patient will continue in Adult Day Treatment (ADT)  as planned. Patient is likely to benefit from learning and using skills as they work toward the goals identified in their treatment plan.      Josette West, [facility]  December 1, 2021    "

## 2021-12-01 NOTE — TELEPHONE ENCOUNTER
Made second attempt to reach pt to further explore return to work. No answer. LVM letting pt know [employer] is requesting an update. Asked that pt follow-up with clinic via phone or MyChart with an update re:return to work.

## 2021-12-01 NOTE — TELEPHONE ENCOUNTER
[facility] Call Center    Phone Message    May a detailed message be left on voicemail: yes     Reason for Call: Other: Patient returning call. Unable to connect patient with a nurse. Told patient someone would call her back when they were available. Patient agreed to this plan and reports she will be available the rest of this afternoon into the evening.    Action Taken: Message routed to:  Other: P [facility] [employer]    Travel Screening: Not Applicable

## 2021-12-02 ENCOUNTER — HOSPITAL ENCOUNTER (OUTPATIENT)
Dept: BEHAVIORAL HEALTH | Facility: CLINIC | Age: 31
End: 2021-12-02
Attending: PSYCHIATRY & NEUROLOGY
Payer: MEDICAID

## 2021-12-02 PROCEDURE — G0177 OPPS/PHP; TRAIN & EDUC SERV: HCPCS | Mod: GT

## 2021-12-02 PROCEDURE — 90853 GROUP PSYCHOTHERAPY: CPT | Mod: GT | Performed by: COUNSELOR

## 2021-12-02 NOTE — GROUP NOTE
Psychoeducation Group Note    PATIENT'S NAME: Betty Tamayo  MRN:   [id number]  :   1990  ACCT. NUMBER: [id number]  DATE OF SERVICE: 21  START TIME:  2:00 PM  END TIME:  2:50 PM  FACILITATOR: Jose Lui OTR/L  TOPIC: MH Life Skills Group: Resiliency Development                                    Service Modality:  Video Visit     Telemedicine Visit: The patient's condition can be safely assessed and treated via synchronous audio and visual telemedicine encounter.      Reason for Telemedicine Visit: Services only offered telehealth    Originating Site (Patient Location): Patient's home    Distant Site (Provider Location): Provider Remote Setting- Home Office    Consent:  The patient/guardian has verbally consented to: the potential risks and benefits of telemedicine (video visit) versus in person care; bill my insurance or make self-payment for services provided; and responsibility for payment of non-covered services.     Mode of Communication:  Video Conference via Medical Zoom    As the provider I attest to compliance with applicable laws and regulations related to telemedicine.       [facility] Adult Mental Health Day Treatment  TRACK: 1B    NUMBER OF PARTICIPANTS: 5    Summary of Group / Topics Discussed:  Resiliency Development:  Coping Skills: Personal Recovery Outcome Measure: Patients were taught how to identify coping strategies and routines that they can adopt and use for management with focus on a balance between physical, emotional, social and spiritual strategies.    Patient Session Goals / Objectives:    Identified personal definitions of recovery for effectively managing both mental health and substance abuse/abuse symptoms     Improved awareness of the process of recovery and skills and strategies that support this       Established a plan for practice of these skills in their own environments    Practiced and reflected on how to generalize taught skills to their  everyday life      Patient Participation / Response:  Fully participated with the group by sharing personal reflections / insights and openly received / provided feedback with other participants.    Demonstrated understanding of content through handouts/group discussion , Verbalized understanding of content and Patient would benefit from additional opportunities to practice the content to be able to generalize it to their everyday life with increased intentionality, consistency, and efficacy in support of their psychiatric recovery    Treatment Plan:  Patient has a current master individualized treatment plan.  See Epic treatment plan for more information.    Jose Lui, OTR/L

## 2021-12-02 NOTE — GROUP NOTE
Psychotherapy Group Note    PATIENT'S NAME: Betty Tamayo  MRN:   [id number]  :   1990  ACCT. NUMBER: [id number]  DATE OF SERVICE: 21  START TIME:  3:00 PM  END TIME:  3:50 PM  FACILITATOR: Josette West [facility]  TOPIC:  EBP Group: Emotions Management  [facility] Adult Mental Health Day Treatment  TRACK: 1B    NUMBER OF PARTICIPANTS: 5                                      Service Modality:  Video Visit     Telemedicine Visit: The patient's condition can be safely assessed and treated via synchronous audio and visual telemedicine encounter.      Reason for Telemedicine Visit: Services only offered telehealth    Originating Site (Patient Location): Patient's home    Distant Site (Provider Location): Provider Remote Setting- Home Office    Consent:  The patient/guardian has verbally consented to: the potential risks and benefits of telemedicine (video visit) versus in person care; bill my insurance or make self-payment for services provided; and responsibility for payment of non-covered services.     Patient would like the video invitation sent by:  My Chart    Mode of Communication:  Video Conference via Medical Zoom    As the provider I attest to compliance with applicable laws and regulations related to telemedicine.          Summary of Group / Topics Discussed:  Emotions Management: Understanding Emotions: Patients discussed the purpose of emotions and function they serve in our lives.  Reviewed core emotions, why they happen (triggers), and how they occur. The group assisted one anothers' understanding that: emotional experiences are important; difficult emotions have a place in our lives; and the differences between various emotions.    Patient Session Goals / Objectives:    Demonstrate understanding of types various emotions    Identify and discuss specific emotions and when they occur; understand triggers    Discuss barriers to emotional regulation      Patient Participation /  Response:  Fully participated with the group by sharing personal reflections / insights and openly received / provided feedback with other participants.    Demonstrated understanding of topics discussed through group discussion and participation, Expressed understanding of the relevance / importance of emotions management skills at distressing times in life, Self-aware of experiences with difficult emotions, and strategies to employ to manage them and Demonstrated knowledge of when to consider applying a variety of emotions management skills in daily life    Treatment Plan:  Patient has a current master individualized treatment plan.  See Epic treatment plan for more information.    [facility] West, [facility]

## 2021-12-02 NOTE — GROUP NOTE
Process Group Note    PATIENT'S NAME: Betty Tamayo  MRN:   [id number]  :   1990  ACCT. NUMBER: [id number]  DATE OF SERVICE: 21  START TIME:  1:00 PM  END TIME:  1:50 PM  FACILITATOR: Josette West [facility]  TOPIC:  Process Group    Diagnoses:   296.32 (F33.1) Major Depressive Disorder, Recurrent Episode, Moderate _  300.02 (F41.1) Generalized Anxiety Disorder.  4. Other Diagnoses that is relevant to services:   Other Unspecified and Specified Bipolar and Related Disorder 296.80 (F31.9) Unspecified Bipolar and Related Disorder  309.81 (F43.10) Posttraumatic Stress Disorder (includes Posttraumatic Stress Disorder for Children 6 Years and Younger)  With dissociative symptoms.      [facility] Mental [facility] Day Treatment  TRACK: 1B    NUMBER OF PARTICIPANTS: 4                                      Service Modality:  Video Visit     Telemedicine Visit: The patient's condition can be safely assessed and treated via synchronous audio and visual telemedicine encounter.      Reason for Telemedicine Visit: Services only offered telehealth    Originating Site (Patient Location): Patient's home    Distant Site (Provider Location): Provider Remote Setting- Home Office    Consent:  The patient/guardian has verbally consented to: the potential risks and benefits of telemedicine (video visit) versus in person care; bill my insurance or make self-payment for services provided; and responsibility for payment of non-covered services.     Patient would like the video invitation sent by:  My Chart    Mode of Communication:  Video Conference via Medical Zoom    As the provider I attest to compliance with applicable laws and regulations related to telemedicine.                Data:    Session content: At the start of this group, patients were invited to check in by identifying themselves, describing their current emotional status, and identifying issues to address in this group.   Area(s) of treatment focus  "addressed in this session included Symptom Management and Personal Safety.    Betty reported feeling \"depressed\" and \"overwhelmed\" today because she had to take her kids to get COVID-19 tests.  She reported she got into an argument with her fiance a few days ago and they have been avoiding each other since. Her goal for the day is to \"stay positive\" today.  Patient declined additional process time but contributed to group discussion and provided feedback and support to peers.      Therapeutic Interventions/Treatment Strategies:  Psychotherapist offered support, feedback and validation and reinforced use of skills.    Assessment:    Patient response:   Patient responded to session by accepting feedback, giving feedback and listening    Possible barriers to participation / learning include: and no barriers identified    Health Issues:   None reported       Substance Use Review:   Substance Use: No active concerns identified.    Mental Status/Behavioral Observations  Appearance:   Appropriate   Eye Contact:   Good   Psychomotor Behavior: Normal   Attitude:   Cooperative   Orientation:   All  Speech   Rate / Production: Normal    Volume:  Normal   Mood:    Depressed   Affect:    Constricted   Thought Content:   Clear  Thought Form:  Coherent  Logical     Insight:    Good     Plan:     Safety Plan: No current safety concerns identified.  Recommended that patient call 911 or go to the local ED should there be a change in any of these risk factors.     Barriers to treatment: None identified    Patient Contracts (see media tab):  None    Substance Use: Not addressed in session     Continue or Discharge: Patient will continue in Adult Day Treatment (ADT)  as planned. Patient is likely to benefit from learning and using skills as they work toward the goals identified in their treatment plan.      Josette West, [facility]  December 2, 2021    "

## 2021-12-02 NOTE — TELEPHONE ENCOUNTER
Returned pt's phone call. Phone rang one time and went to . [facility] asking for pt to return writer's phone call.

## 2021-12-06 ENCOUNTER — TELEPHONE (OUTPATIENT)
Dept: PSYCHIATRY | Facility: CLINIC | Age: 31
End: 2021-12-06
Payer: MEDICAID

## 2021-12-06 NOTE — TELEPHONE ENCOUNTER
Spoke with pt. Pt was on leave, but job decided to let pt go. Pt is still using um - on long term disability for the next 3-4 months.     Requests the following information be sent to [facility]:  1. Progress note from last visit with Dr. Boyd showing that PHP was the provider's treatment recommendation.   2. Contact number for staff in the Day Program so that [facility] staff can get in touch if needed.     Once the phone number for day treatment is confirmed, writer will fax the requested information to [facility]. PÉREZ is on file.

## 2021-12-06 NOTE — TELEPHONE ENCOUNTER
Made additional attempt to reach pt re:disability claim. Phone rang once and went to . [facility] requesting a return phone call.

## 2021-12-06 NOTE — TELEPHONE ENCOUNTER
[facility] Call Center    Phone Message    May a detailed message be left on voicemail: yes     Reason for Call: Other: Call back      Pt calling looking to follow up with someone about a care plan and paperwork that needs to be filled out. Requesting a call back to discuss further.     Action Taken: Message routed to:  Other: nursing pool    Travel Screening: Not Applicable

## 2021-12-07 ENCOUNTER — TELEPHONE (OUTPATIENT)
Dept: PSYCHIATRY | Facility: CLINIC | Age: 31
End: 2021-12-07
Payer: MEDICAID

## 2021-12-07 NOTE — TELEPHONE ENCOUNTER
Writer received another all from Amisha at [facility] needing a response from provider. Shared she has tried to reach out several times to get providers opinion on if patient is able to work or not. She confirmed receiving records and shared they do not indicate that patient is not able to work. Updated her that patient was last seen on 9/29 and provider has not had a chance to assess her since. Amisha is needing a yes or no answer and that can also be provider is unsure if patient is able to work or not at this time. Writer agreed to pass this along to provider.     Amisha can be reached at [phone]

## 2021-12-08 ENCOUNTER — HOSPITAL ENCOUNTER (OUTPATIENT)
Dept: BEHAVIORAL HEALTH | Facility: CLINIC | Age: 31
End: 2021-12-08
Attending: PSYCHIATRY & NEUROLOGY
Payer: MEDICAID

## 2021-12-08 PROCEDURE — 90853 GROUP PSYCHOTHERAPY: CPT | Mod: GT | Performed by: COUNSELOR

## 2021-12-08 NOTE — GROUP NOTE
Process Group Note    PATIENT'S NAME: Betty Tamayo  MRN:   [id number]  :   1990  ACCT. NUMBER: [id number]  DATE OF SERVICE: 21  START TIME:  1:00 PM  END TIME:  1:50 PM  FACILITATOR: Josette West [facility]  TOPIC:  Process Group    Diagnoses:   296.32 (F33.1) Major Depressive Disorder, Recurrent Episode, Moderate _  300.02 (F41.1) Generalized Anxiety Disorder.  4. Other Diagnoses that is relevant to services:   Other Unspecified and Specified Bipolar and Related Disorder 296.80 (F31.9) Unspecified Bipolar and Related Disorder  309.81 (F43.10) Posttraumatic Stress Disorder (includes Posttraumatic Stress Disorder for Children 6 Years and Younger)  With dissociative symptoms.      [facility] Mental [facility] Day Treatment  TRACK: 1B    NUMBER OF PARTICIPANTS: 7                                      Service Modality:  Video Visit     Telemedicine Visit: The patient's condition can be safely assessed and treated via synchronous audio and visual telemedicine encounter.      Reason for Telemedicine Visit: Services only offered telehealth    Originating Site (Patient Location): Patient's home    Distant Site (Provider Location): Provider Remote Setting- Home Office    Consent:  The patient/guardian has verbally consented to: the potential risks and benefits of telemedicine (video visit) versus in person care; bill my insurance or make self-payment for services provided; and responsibility for payment of non-covered services.     Patient would like the video invitation sent by:  My Chart    Mode of Communication:  Video Conference via Medical Zoom    As the provider I attest to compliance with applicable laws and regulations related to telemedicine.                Data:    Session content: At the start of this group, patients were invited to check in by identifying themselves, describing their current emotional status, and identifying issues to address in this group.   Area(s) of treatment focus  "addressed in this session included Symptom Management and Personal Safety.    Betty reported feeling \"sick and tired\" today.  She reported that this week has been \"tough\" and she had a few \"meltdowns\" on Monday.  She reported that she found something online about borderline and think she may meet criteria for it and wants to talk to her psychiatrist about it.  Patient declined additional process time but contributed to group discussion and provided feedback and support to peers.      Therapeutic Interventions/Treatment Strategies:  Psychotherapist offered support, feedback and validation and reinforced use of skills.    Assessment:    Patient response:   Patient responded to session by accepting feedback, giving feedback and listening    Possible barriers to participation / learning include: and no barriers identified    Health Issues:   None reported       Substance Use Review:   Substance Use: No active concerns identified.    Mental Status/Behavioral Observations  Appearance:   Appropriate   Eye Contact:   Good   Psychomotor Behavior: Normal   Attitude:   Cooperative   Orientation:   All  Speech   Rate / Production: Normal    Volume:  Normal   Mood:    Depressed   Affect:    Subdued   Thought Content:   Clear  Thought Form:  Coherent  Logical     Insight:    Good     Plan:     Safety Plan: No current safety concerns identified.  Recommended that patient call 911 or go to the local ED should there be a change in any of these risk factors.     Barriers to treatment: None identified    Patient Contracts (see media tab):  None    Substance Use: Not addressed in session     Continue or Discharge: Patient will continue in Adult Day Treatment (ADT)  as planned. Patient is likely to benefit from learning and using skills as they work toward the goals identified in their treatment plan.      Josette West, [facility]  December 8, 2021    "

## 2021-12-09 NOTE — TELEPHONE ENCOUNTER
Called Amisha with [employer] and left message including a statement that Betty is not able to work at the moment, and also left my pager number so that she is able to contact me if any further information is needed.

## 2021-12-09 NOTE — TELEPHONE ENCOUNTER
Jordan Boyd MD  You; Yamilet Lebron, RN 2 days ago         Tim Woodard, I'm not sure, but maybe it's just to support her staying on the short term disability. I will call Amisha tomorrow. Thank you so much to both of you, hopefully this will close out this issue!    Message text

## 2021-12-13 ENCOUNTER — HOSPITAL ENCOUNTER (OUTPATIENT)
Dept: BEHAVIORAL HEALTH | Facility: CLINIC | Age: 31
End: 2021-12-13
Attending: PSYCHIATRY & NEUROLOGY
Payer: MEDICAID

## 2021-12-13 PROCEDURE — 90853 GROUP PSYCHOTHERAPY: CPT | Mod: GT | Performed by: COUNSELOR

## 2021-12-13 PROCEDURE — G0177 OPPS/PHP; TRAIN & EDUC SERV: HCPCS | Mod: GT

## 2021-12-13 NOTE — GROUP NOTE
Psychoeducation Group Note    PATIENT'S NAME: Betty Tamayo  MRN:   [id number]  :   1990  ACCT. NUMBER: [id number]  DATE OF SERVICE: 21  START TIME:  2:00 PM  END TIME:  2:50 PM  FACILITATOR: Jose Lui OTR/L  TOPIC: MH Life Skills Group: Resiliency Development                                    Service Modality:  Video Visit     Telemedicine Visit: The patient's condition can be safely assessed and treated via synchronous audio and visual telemedicine encounter.      Reason for Telemedicine Visit: Services only offered telehealth    Originating Site (Patient Location): Patient's home    Distant Site (Provider Location): Provider Remote Setting- Home Office    Consent:  The patient/guardian has verbally consented to: the potential risks and benefits of telemedicine (video visit) versus in person care; bill my insurance or make self-payment for services provided; and responsibility for payment of non-covered services.     Mode of Communication:  Video Conference via Medical Zoom    As the provider I attest to compliance with applicable laws and regulations related to telemedicine.       [facility] Adult Mental Health Day Treatment  TRACK: 1B    NUMBER OF PARTICIPANTS: 6    Summary of Group / Topics Discussed:  Resiliency Development:  Coping Skills( Stage of coping with stress): Patients were taught how to identify stressors, signs of stress, coping skills, and prevention strategies for overall stress management.  Patients were given the opportunity to identify both ongoing and acute mental health symptoms and how to effectively manage these symptoms by developing an effective aftercare plan.  Patients increased awareness of community based resources.    Patient Session Goals / Objectives:    Identified how using coping skills can be used for symptom and stress management       Improved awareness of individualed symptoms and stressors and how to effectively cope     Established a  relapse prevention plan to practice these skills in their own environments    Practiced and reflected on how to generalize taught skills to their everyday life        Patient Participation / Response:  Fully participated with the group by sharing personal reflections / insights and openly received / provided feedback with other participants.    Demonstrated understanding of content through video,handouts.discussion , Verbalized understanding of content and Patient would benefit from additional opportunities to practice the content to be able to generalize it to their everyday life with increased intentionality, consistency, and efficacy in support of their psychiatric recovery    Treatment Plan:  Patient has a current master individualized treatment plan.  See Epic treatment plan for more information.    Jose Lui, OTR/L

## 2021-12-13 NOTE — GROUP NOTE
Process Group Note    PATIENT'S NAME: Betty Tamayo  MRN:   [id number]  :   1990  ACCT. NUMBER: [id number]  DATE OF SERVICE: 21  START TIME:  1:00 PM  END TIME:  1:50 PM  FACILITATOR: Josette West [facility]  TOPIC:  Process Group    Diagnoses:   296.32 (F33.1) Major Depressive Disorder, Recurrent Episode, Moderate _  300.02 (F41.1) Generalized Anxiety Disorder.  4. Other Diagnoses that is relevant to services:   Other Unspecified and Specified Bipolar and Related Disorder 296.80 (F31.9) Unspecified Bipolar and Related Disorder  309.81 (F43.10) Posttraumatic Stress Disorder (includes Posttraumatic Stress Disorder for Children 6 Years and Younger)  With dissociative symptoms.      [facility] Mental [facility] Day Treatment  TRACK: 1B    NUMBER OF PARTICIPANTS: 5                                      Service Modality:  Video Visit     Telemedicine Visit: The patient's condition can be safely assessed and treated via synchronous audio and visual telemedicine encounter.      Reason for Telemedicine Visit: Services only offered telehealth    Originating Site (Patient Location): Patient's home    Distant Site (Provider Location): Provider Remote Setting- Home Office    Consent:  The patient/guardian has verbally consented to: the potential risks and benefits of telemedicine (video visit) versus in person care; bill my insurance or make self-payment for services provided; and responsibility for payment of non-covered services.     Patient would like the video invitation sent by:  My Chart    Mode of Communication:  Video Conference via Medical Zoom    As the provider I attest to compliance with applicable laws and regulations related to telemedicine.                Data:    Session content: At the start of this group, patients were invited to check in by identifying themselves, describing their current emotional status, and identifying issues to address in this group.   Area(s) of treatment focus  "addressed in this session included Symptom Management and Personal Safety.    Betty reported feeling \"okay\" today.  She reported she has been reading a book on BPD.  She reported her car got towed this morning and she was surprised with how well she handled it.  Patient declined additional process time but contributed to group discussion and provided feedback and support to peers.      Therapeutic Interventions/Treatment Strategies:  Psychotherapist offered support, feedback and validation and reinforced use of skills.    Assessment:    Patient response:   Patient responded to session by accepting feedback, giving feedback and listening    Possible barriers to participation / learning include: and no barriers identified    Health Issues:   None reported       Substance Use Review:   Substance Use: cannabis .     Mental Status/Behavioral Observations  Appearance:   Appropriate   Eye Contact:   Good   Psychomotor Behavior: Normal   Attitude:   Cooperative   Orientation:   All  Speech   Rate / Production: Normal    Volume:  Normal   Mood:    Depressed   Affect:    Appropriate   Thought Content:   Clear  Thought Form:  Coherent  Logical     Insight:    Good     Plan:     Safety Plan: No current safety concerns identified.  Recommended that patient call 911 or go to the local ED should there be a change in any of these risk factors.     Barriers to treatment: None identified    Patient Contracts (see media tab):  None    Substance Use: Not addressed in session     Continue or Discharge: Patient will continue in Adult Day Treatment (ADT)  as planned. Patient is likely to benefit from learning and using skills as they work toward the goals identified in their treatment plan.      Josette West, [facility]  December 13, 2021    "

## 2021-12-13 NOTE — GROUP NOTE
Psychoeducation Group Note    PATIENT'S NAME: Betty Tamayo  MRN:   [id number]  :   1990  ACCT. NUMBER: [id number]  DATE OF SERVICE: 21  START TIME:  3:00 PM  END TIME:  3:50 PM  FACILITATOR: Nely Perera RN  TOPIC: MH RN Group: Medication Education and Management                                    Service Modality:  Video Visit     Telemedicine Visit: The patient's condition can be safely assessed and treated via synchronous audio and visual telemedicine encounter.      Reason for Telemedicine Visit:  covid19    Originating Site (Patient Location): Patient's home    Distant Site (Provider Location): Provider Remote Setting- Home Office    Consent:  The patient/guardian has verbally consented to: the potential risks and benefits of telemedicine (video visit) versus in person care; bill my insurance or make self-payment for services provided; and responsibility for payment of non-covered services.     Patient would like the video invitation sent by:  My Chart    Mode of Communication:  Video Conference via Medical Zoom    As the provider I attest to compliance with applicable laws and regulations related to telemedicine.          [facility] Mental Health Day Treatment  TRACK: 1B    NUMBER OF PARTICIPANTS: 5    Summary of Group / Topics Discussed:  Medication Educations and Management:  Medication Jeopardy: Patients provided education regarding medication safety, antidepressants, side effects, neuroleptics, expected medication outcomes, knowledge of diagnosis, symptoms, and symptom management through an engaging jeopardy-style format.     Patient Session Goals / Objectives:    ? Participated in team-based Jeopardy game  ? Identified strategies for safe use, handling, and disposal of medications  ? Discussed basic aspects of medication safety, side effects, adverse outcomes and contraindications      Patient Participation / Response:  Fully participated with the group by sharing  personal reflections / insights and openly received / provided feedback with other participants.     Demonstrated understanding of topics discussed through group discussion and participation and Identified / Expressed personal readiness to practice skills    Treatment Plan:  Patient has a current master individualized treatment plan.  See Epic treatment plan for more information.    Nely Perera RN

## 2021-12-16 ENCOUNTER — HOSPITAL ENCOUNTER (OUTPATIENT)
Dept: BEHAVIORAL HEALTH | Facility: CLINIC | Age: 31
End: 2021-12-16
Attending: PSYCHIATRY & NEUROLOGY
Payer: MEDICAID

## 2021-12-16 PROCEDURE — 90853 GROUP PSYCHOTHERAPY: CPT | Mod: GT | Performed by: COUNSELOR

## 2021-12-16 NOTE — PROGRESS NOTES
Acknowledgement of Current Treatment Plan       I have reviewed my treatment plan(60 day review) with my therapist on 12/16/21.   I agree with the plan as it is written in the electronic health record. (1B)    Name:      Signature:  Betty MCKEON Roni Unable to sign due to COVID and Virtual     Fran Posadas MD  Psychiatrist/Medical Director Fran Posadas MD on 12/17/2021 at 1:30 PM   BASIL Bradford, [facility]  Psychotherapist BASIL Lockhart on 12/17/2021 at 11:25 AM     Eric Sania OTR/L Eric Sania OTR/L

## 2021-12-16 NOTE — GROUP NOTE
Psychoeducation Group Note    PATIENT'S NAME: Betty Tamayo  MRN:   [id number]  :   1990  ACCT. NUMBER: [id number]  DATE OF SERVICE: 21  START TIME:  2:00 PM  END TIME:  2:50 PM  FACILITATOR: Jose Lui OTR/L  TOPIC: MH Life Skills Group: Resiliency Development                                    Service Modality:  Video Visit     Telemedicine Visit: The patient's condition can be safely assessed and treated via synchronous audio and visual telemedicine encounter.      Reason for Telemedicine Visit: Services only offered telehealth    Originating Site (Patient Location): Patient's home    Distant Site (Provider Location): Provider Remote Setting- Home Office    Consent:  The patient/guardian has verbally consented to: the potential risks and benefits of telemedicine (video visit) versus in person care; bill my insurance or make self-payment for services provided; and responsibility for payment of non-covered services.     Mode of Communication:  Video Conference via Medical Zoom    As the provider I attest to compliance with applicable laws and regulations related to telemedicine.       [facility] Adult Mental Health Day Treatment  TRACK: 1B    NUMBER OF PARTICIPANTS: 2 (No billing today)    Summary of Group / Topics Discussed:  Resiliency Development:  Coping Skills(Weekly Mental Health Check-In): Patients were taught how to identify stressors, signs of stress, coping skills, and prevention strategies for overall stress management.  Patients were given the opportunity to identify both ongoing and acute mental health symptoms and how to effectively manage these symptoms by developing an effective aftercare plan.  Patients increased awareness of community based resources.    Patient Session Goals / Objectives:    Identified how using coping skills can be used for symptom and stress management       Improved awareness of individualed symptoms and stressors and how to effectively cope      Established a relapse prevention plan to practice these skills in their own environments    Practiced and reflected on how to generalize taught skills to their everyday life        Patient Participation / Response:  Fully participated with the group by sharing personal reflections / insights and openly received / provided feedback with other participants.    Demonstrated understanding of content through handout/group discussion , Verbalized understanding of content and Patient would benefit from additional opportunities to practice the content to be able to generalize it to their everyday life with increased intentionality, consistency, and efficacy in support of their psychiatric recovery    Treatment Plan:  Patient has a current master individualized treatment plan.  See Epic treatment plan for more information.    Jose Lui, OTR/L

## 2021-12-16 NOTE — GROUP NOTE
Port flushed per protocol, good blood return noted   Offers no complaints Process Group Note    PATIENT'S NAME: Betty Tamayo  MRN:   [id number]  :   1990  ACCT. NUMBER: [id number]  DATE OF SERVICE: 21  START TIME:  1:00 PM  END TIME:  1:50 PM  FACILITATOR: Josette West [facility]  TOPIC:  Process Group    Diagnoses:   296.32 (F33.1) Major Depressive Disorder, Recurrent Episode, Moderate _  300.02 (F41.1) Generalized Anxiety Disorder.  4. Other Diagnoses that is relevant to services:   Other Unspecified and Specified Bipolar and Related Disorder 296.80 (F31.9) Unspecified Bipolar and Related Disorder  309.81 (F43.10) Posttraumatic Stress Disorder (includes Posttraumatic Stress Disorder for Children 6 Years and Younger)  With dissociative symptoms.      [facility] Day Treatment  TRACK: 1B    NUMBER OF PARTICIPANTS: 6                                      Service Modality:  Video Visit     Telemedicine Visit: The patient's condition can be safely assessed and treated via synchronous audio and visual telemedicine encounter.      Reason for Telemedicine Visit: Services only offered telehealth    Originating Site (Patient Location): Patient's home    Distant Site (Provider Location): Provider Remote Setting- Home Office    Consent:  The patient/guardian has verbally consented to: the potential risks and benefits of telemedicine (video visit) versus in person care; bill my insurance or make self-payment for services provided; and responsibility for payment of non-covered services.     Patient would like the video invitation sent by:  My Chart    Mode of Communication:  Video Conference via Medical Zoom    As the provider I attest to compliance with applicable laws and regulations related to telemedicine.                Data:    Session content: At the start of this group, patients were invited to check in by identifying themselves, describing their current emotional status, and identifying issues to address in this group.   Area(s) of treatment focus  "addressed in this session included Symptom Management and Personal Safety.    Betty reported feeling \"exhausted\" today.  She reported that there was event that happened yesterday that triggered her PTSD.  Her goal today is to wrap [place] presents.  Patient declined additional process time but contributed to group discussion and provided feedback and support to peers.      Therapeutic Interventions/Treatment Strategies:  Psychotherapist offered support, feedback and validation and reinforced use of skills.    Assessment:    Patient response:   Patient responded to session by accepting feedback, giving feedback and listening    Possible barriers to participation / learning include: and no barriers identified    Health Issues:   None reported       Substance Use Review:   Substance Use: cannabis .     Mental Status/Behavioral Observations  Appearance:   Appropriate   Eye Contact:   Good   Psychomotor Behavior: Normal   Attitude:   Cooperative   Orientation:   All  Speech   Rate / Production: Normal    Volume:  Normal   Mood:    Depressed   Affect:    Flat   Thought Content:   Clear  Thought Form:  Coherent  Logical     Insight:    Good     Plan:     Safety Plan: No current safety concerns identified.  Recommended that patient call 911 or go to the local ED should there be a change in any of these risk factors.     Barriers to treatment: None identified    Patient Contracts (see media tab):  None    Substance Use: Not addressed in session     Continue or Discharge: Patient will continue in Adult Day Treatment (ADT)  as planned. Patient is likely to benefit from learning and using skills as they work toward the goals identified in their treatment plan.      Josette West, [facility]  December 16, 2021    "

## 2021-12-17 NOTE — ADDENDUM NOTE
Encounter addended by: Josette West [facility] on: 12/17/2021 11:25 AM   Actions taken: Flowsheet accepted, Clinical Note Signed

## 2021-12-20 ENCOUNTER — HOSPITAL ENCOUNTER (OUTPATIENT)
Dept: BEHAVIORAL HEALTH | Facility: CLINIC | Age: 31
End: 2021-12-20
Attending: PSYCHIATRY & NEUROLOGY
Payer: MEDICAID

## 2021-12-20 PROCEDURE — 90853 GROUP PSYCHOTHERAPY: CPT | Mod: GT | Performed by: COUNSELOR

## 2021-12-20 NOTE — GROUP NOTE
Process Group Note    PATIENT'S NAME: Betty Tamayo  MRN:   [id number]  :   1990  ACCT. NUMBER: [id number]  DATE OF SERVICE: 21  START TIME:  1:00 PM  END TIME:  1:50 PM  FACILITATOR: Josette West [facility]  TOPIC:  Process Group    Diagnoses:   296.32 (F33.1) Major Depressive Disorder, Recurrent Episode, Moderate _  300.02 (F41.1) Generalized Anxiety Disorder.  4. Other Diagnoses that is relevant to services:   Other Unspecified and Specified Bipolar and Related Disorder 296.80 (F31.9) Unspecified Bipolar and Related Disorder  309.81 (F43.10) Posttraumatic Stress Disorder (includes Posttraumatic Stress Disorder for Children 6 Years and Younger)  With dissociative symptoms.      [facility] Day Treatment  TRACK: 1B    NUMBER OF PARTICIPANTS: 4                                      Service Modality:  Video Visit     Telemedicine Visit: The patient's condition can be safely assessed and treated via synchronous audio and visual telemedicine encounter.      Reason for Telemedicine Visit: Services only offered telehealth    Originating Site (Patient Location): Patient's home    Distant Site (Provider Location): Provider Remote Setting- Home Office    Consent:  The patient/guardian has verbally consented to: the potential risks and benefits of telemedicine (video visit) versus in person care; bill my insurance or make self-payment for services provided; and responsibility for payment of non-covered services.     Patient would like the video invitation sent by:  My Chart    Mode of Communication:  Video Conference via Medical Zoom    As the provider I attest to compliance with applicable laws and regulations related to telemedicine.                Data:    Session content: At the start of this group, patients were invited to check in by identifying themselves, describing their current emotional status, and identifying issues to address in this group.   Area(s) of treatment focus  "addressed in this session included Symptom Management and Personal Safety.    Betty reported feeling \"angry, disappointed, and sad\" today.  She reported there was an incident yesterday and her son got injured and she's upset with herself for not taking to the doctor sooner.  His goal for the day is  his son's ear drops and finish 2 Spiderman movies so they can see the new one tomorrow.  She took process time to discuss her worried/fears about her kids.  She shared that she has a really hard time trusting other people to take care of them and isn't sure how to let go of some of her fears.    Therapeutic Interventions/Treatment Strategies:  Psychotherapist offered support, feedback and validation and reinforced use of skills. Treatment modalities used include Motivational Interviewing, Cognitive Behavioral Therapy and Dialectical Behavioral Therapy. Interventions include Cognitive Restructuring:  Assisted patient in formulating new neutral/positive alternatives to challenge less helpful / ineffective thoughts and Facilitated recognition of the connection between negative thoughts and negative core beliefs.    Assessment:    Patient response:   Patient responded to session by accepting feedback, giving feedback and listening    Possible barriers to participation / learning include: and no barriers identified    Health Issues:   None reported       Substance Use Review:   Substance Use: Substance use has decreased    Mental Status/Behavioral Observations  Appearance:   Appropriate   Eye Contact:   Good   Psychomotor Behavior: Normal   Attitude:   Cooperative   Orientation:   All  Speech   Rate / Production: Normal    Volume:  Normal   Mood:    Depressed   Affect:    Appropriate   Thought Content:   Clear  Thought Form:  Coherent  Logical     Insight:    Good     Plan:     Safety Plan: No current safety concerns identified.  Recommended that patient call 911 or go to the local ED should there be a change in any of " these risk factors.     Barriers to treatment: None identified    Patient Contracts (see media tab):  None    Substance Use: Not addressed in session     Continue or Discharge: Patient will continue in Adult Day Treatment (ADT)  as planned. Patient is likely to benefit from learning and using skills as they work toward the goals identified in their treatment plan.      Josette West, [facility]  December 20, 2021

## 2021-12-21 ENCOUNTER — TELEPHONE (OUTPATIENT)
Dept: BEHAVIORAL HEALTH | Facility: CLINIC | Age: 31
End: 2021-12-21
Payer: MEDICAID

## 2021-12-21 NOTE — TELEPHONE ENCOUNTER
Writer called Pt today to discuss insurance plan coverage.  Writer left a vm message requesting a return phone call.

## 2021-12-22 ENCOUNTER — TELEPHONE (OUTPATIENT)
Dept: BEHAVIORAL HEALTH | Facility: CLINIC | Age: 31
End: 2021-12-22
Payer: MEDICAID

## 2021-12-22 NOTE — TELEPHONE ENCOUNTER
Writer called Pt today since did not return call from yesterday and did not attend programming today.      Pt left a vm message asking Pt to call back to follow up regarding insurance status as well as to call in absence from program today. Requested her to call back today if possible for both reasons.  Left program phone number, my direct dial number and Josette's direct dial number.

## 2021-12-28 ENCOUNTER — TELEPHONE (OUTPATIENT)
Dept: BEHAVIORAL HEALTH | Facility: CLINIC | Age: 31
End: 2021-12-28
Payer: MEDICAID

## 2021-12-29 ENCOUNTER — TELEPHONE (OUTPATIENT)
Dept: BEHAVIORAL HEALTH | Facility: CLINIC | Age: 31
End: 2021-12-29
Payer: MEDICAID

## 2021-12-29 NOTE — TELEPHONE ENCOUNTER
Writer allison NASSAR explaining that if she does not contact writer by the end of the day today, a welfare check will be conducted as staff have not been able to contact her or her emergency contact for over a week.    Josette West [facility] on 12/29/2021 at 1:54 PM

## 2021-12-30 ENCOUNTER — TELEPHONE (OUTPATIENT)
Dept: BEHAVIORAL HEALTH | Facility: CLINIC | Age: 31
End: 2021-12-30
Payer: MEDICAID

## 2021-12-30 NOTE — TELEPHONE ENCOUNTER
Writer left a VM for Betty explaining that she will be discharged from ADT if she does not contact writer by tomorrow.    Josette West, [facility] on 12/30/2021 at 4:01 PM

## 2021-12-30 NOTE — TELEPHONE ENCOUNTER
Writer attempted to call Pt and a listed Emergency Contact in the chart this morning prior to the Welfare Check.  No answer on either phone.  Did not leave a vm message this time.

## 2022-01-27 ENCOUNTER — VIRTUAL VISIT (OUTPATIENT)
Dept: PSYCHIATRY | Facility: CLINIC | Age: 32
End: 2022-01-27
Attending: PSYCHIATRY & NEUROLOGY
Payer: MEDICAID

## 2022-01-27 ENCOUNTER — TELEPHONE (OUTPATIENT)
Dept: PSYCHIATRY | Facility: CLINIC | Age: 32
End: 2022-01-27
Payer: MEDICAID

## 2022-01-27 DIAGNOSIS — F31.81 BIPOLAR 2 DISORDER (H): ICD-10-CM

## 2022-01-27 DIAGNOSIS — G47.00 INSOMNIA, UNSPECIFIED TYPE: ICD-10-CM

## 2022-01-27 DIAGNOSIS — F43.10 POSTTRAUMATIC STRESS DISORDER: Primary | ICD-10-CM

## 2022-01-27 PROCEDURE — 99215 OFFICE O/P EST HI 40 MIN: CPT | Mod: 95 | Performed by: PSYCHIATRY & NEUROLOGY

## 2022-01-27 ASSESSMENT — ANXIETY QUESTIONNAIRES
GAD7 TOTAL SCORE: 21
7. FEELING AFRAID AS IF SOMETHING AWFUL MIGHT HAPPEN: NEARLY EVERY DAY
2. NOT BEING ABLE TO STOP OR CONTROL WORRYING: NEARLY EVERY DAY
3. WORRYING TOO MUCH ABOUT DIFFERENT THINGS: NEARLY EVERY DAY
1. FEELING NERVOUS, ANXIOUS, OR ON EDGE: NEARLY EVERY DAY
GAD7 TOTAL SCORE: 21
GAD7 TOTAL SCORE: 21
6. BECOMING EASILY ANNOYED OR IRRITABLE: NEARLY EVERY DAY
7. FEELING AFRAID AS IF SOMETHING AWFUL MIGHT HAPPEN: NEARLY EVERY DAY
5. BEING SO RESTLESS THAT IT IS HARD TO SIT STILL: NEARLY EVERY DAY
4. TROUBLE RELAXING: NEARLY EVERY DAY

## 2022-01-27 NOTE — PATIENT INSTRUCTIONS
Treatment Plan Today:     1) Medications- will discuss with Dr Boyd and get back by end of tomorrow    2) Follow-up appt with Dr Boyd- clinic will call you    3) Crisis numbers are below       ------------------------------------------------------------------------    Thank you for coming to the [facility]    Lab Testing:  If you had lab testing today and your results are reassuring or normal they will be mailed to you or sent through [employer] within 7 days. If the lab tests need quick action we will call you with the results. The phone number we will call with results is # [phone] (home) . If this is not the best number please call our clinic and change the number.    Medication Refills:  If you need any refills please call your pharmacy and they will contact us. Our fax number for refills is [phone]. Please allow three business for refill processing. If you need to  your refill at a new pharmacy, please contact the new pharmacy directly. The new pharmacy will help you get your medications transferred.     Scheduling:  If you have any concerns about today's visit or wish to schedule another appointment please call our office during normal business hours [phone] (8-5:00 M-F)    Contact Us:  Please call [phone] during business hours (8-5:00 M-F).  If after clinic hours, or on the weekend, please call  [phone].    Financial Assistance [phone]  [facility] Billing [phone]  Central Billing Office, [facility]: [phone]  [place] Billing [phone]  Medical Records [phone]  [place] Patient Bill of Rights [email or web address]       MENTAL HEALTH CRISIS NUMBERS:  For a medical emergency please call  911 or go to the nearest ER.     [facility] ([facility]) [facility] ER  [phone]    [facility]:   [facility] -[phone]   Crisis Residence [facility]  -[phone]   Walk-[facility] Counseling Center [facility] -[phone]   COPE 24/7 [facility] Mobile Team -[phone] (adults)/[phone] (child)  CHILD: [facility] needs assessment team - [phone]      [facility]:   [facility] - [phone]   Walk-in counseling [facility] - [phone]   Walk-in counseling [facility] - [phone]   Crisis Residence [facility] Residence - [phone]  Urgent Care Adult Mental [facility]-[phone] mobile unit/ 24/7 crisis line    National Crisis Numbers:   National Suicide Prevention Lifeline: 6-448-818-TALK (808-164-4767)  Poison Control Center - 4-925-931-1324  [employer]/resources for a list of additional resources (SOS)  Trans Lifeline a hotline for transgender people 1-271.858.9537  [employer] a hotline for LGBT youth 1-665-039-2367  Crisis Text Line: For any crisis 24/7   To: 141375  see www.crisistextline.org  - IF MAKING A CALL FEELS TOO HARD, send a text!       Again thank you for choosing [facility] and please let us know how we can best partner with you to improve you and your family's health.    You may be receiving a survey regarding this appointment. We would love to have your feedback, both positive and negative. The survey is done by an external company, so your answers are anonymous.
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
## 2022-01-27 NOTE — TELEPHONE ENCOUNTER
On January 27, 2022, at 9:54 AM, writer called patient at mobile to confirm Virtual Visit. Writer unable to make contact with patient. Writer left detailed voice message for call back. [phone] left as call back number. Cm Ruth, EMT

## 2022-01-28 ASSESSMENT — PATIENT HEALTH QUESTIONNAIRE - PHQ9: SUM OF ALL RESPONSES TO PHQ QUESTIONS 1-9: 23

## 2022-01-28 ASSESSMENT — ANXIETY QUESTIONNAIRES: GAD7 TOTAL SCORE: 21

## 2022-01-31 ENCOUNTER — TELEPHONE (OUTPATIENT)
Dept: PSYCHIATRY | Facility: CLINIC | Age: 32
End: 2022-01-31
Payer: MEDICAID

## 2022-01-31 NOTE — TELEPHONE ENCOUNTER
Writer called and left a voicemail for Betty regarding DBT resources in the community. Writer also sent client a [employer] message with resources. Writer provided contact info in case client has questions or needs additional support.    TERESA Arredondo.  Social Work Intern  [facility]

## 2022-01-31 NOTE — TELEPHONE ENCOUNTER
----- Message from Jaye Lay MD sent at 1/27/2022  9:05 PM CST -----  Regarding: please help if you can  Hello!  I saw this pt for Wolfgang today.  Pt is interested in DBT and ind therapy.  She has done both in the past, dropped away and wants to get back.  When you have time, please contact her and provide some resources.  Thanks so, so much!  Pati

## 2022-02-17 PROBLEM — O09.91 ENCOUNTER FOR SUPERVISION OF HIGH RISK PREGNANCY IN FIRST TRIMESTER, ANTEPARTUM: Status: ACTIVE | Noted: 2017-10-15

## 2022-02-17 PROBLEM — Z34.81 ENCOUNTER FOR SUPERVISION OF OTHER NORMAL PREGNANCY IN FIRST TRIMESTER: Status: RESOLVED | Noted: 2017-10-12 | Resolved: 2017-10-15

## 2022-02-17 PROBLEM — O09.299 HISTORY OF PRE-ECLAMPSIA IN PRIOR PREGNANCY, CURRENTLY PREGNANT: Status: ACTIVE | Noted: 2017-10-15

## 2022-03-10 ENCOUNTER — TELEPHONE (OUTPATIENT)
Dept: PSYCHIATRY | Facility: CLINIC | Age: 32
End: 2022-03-10
Payer: COMMERCIAL

## 2022-03-10 NOTE — TELEPHONE ENCOUNTER
Writer received incoming message from Dr Lay who saw this pt one time on behalf of Dr Boyd. Thus this message was sent to Dr Boyd.Mirna Camilo LPN    5 faxed pages, including signed PÉREZ, was received from [facility] requesting completion by Dr Lay. The forms are being held in Psych until writer can talk to Dr Lay. A message was sent to the provider.Mirna Camilo LPN     - - -

## 2022-03-21 NOTE — TELEPHONE ENCOUNTER
Completed, signed forms were returned to this writer and faxed to [facility] attglenna Simmons MD at [phone]. The original copies were faxed to scanning and are being held in Psych until scanning is completed.Mirna Camilo LPN

## 2022-04-28 ENCOUNTER — TELEPHONE (OUTPATIENT)
Dept: BEHAVIORAL HEALTH | Facility: CLINIC | Age: 32
End: 2022-04-28
Payer: COMMERCIAL

## 2022-04-28 NOTE — TELEPHONE ENCOUNTER
Left a VM to remind pt about their Monday 5/02/22 video appt at 12 pm.     Writer mentioned, pt will need to connect virtual/video appt via My Chart and finish e-check in question 15-30 min prior to appt time and to also fill/complete the General Consent.

## 2022-05-02 ENCOUNTER — VIRTUAL VISIT (OUTPATIENT)
Dept: PSYCHOLOGY | Facility: CLINIC | Age: 32
End: 2022-05-02
Attending: PSYCHIATRY & NEUROLOGY
Payer: COMMERCIAL

## 2022-05-02 DIAGNOSIS — F41.1 GENERALIZED ANXIETY DISORDER: Primary | ICD-10-CM

## 2022-05-02 DIAGNOSIS — F33.1 MAJOR DEPRESSIVE DISORDER, RECURRENT EPISODE, MODERATE (H): ICD-10-CM

## 2022-05-02 DIAGNOSIS — F43.10 PTSD (POST-TRAUMATIC STRESS DISORDER): ICD-10-CM

## 2022-05-02 PROCEDURE — 90791 PSYCH DIAGNOSTIC EVALUATION: CPT | Mod: 95 | Performed by: SOCIAL WORKER

## 2022-05-02 ASSESSMENT — PATIENT HEALTH QUESTIONNAIRE - PHQ9
SUM OF ALL RESPONSES TO PHQ QUESTIONS 1-9: 20
SUM OF ALL RESPONSES TO PHQ QUESTIONS 1-9: 20
10. IF YOU CHECKED OFF ANY PROBLEMS, HOW DIFFICULT HAVE THESE PROBLEMS MADE IT FOR YOU TO DO YOUR WORK, TAKE CARE OF THINGS AT HOME, OR GET ALONG WITH OTHER PEOPLE: EXTREMELY DIFFICULT

## 2022-05-02 ASSESSMENT — ANXIETY QUESTIONNAIRES
GAD7 TOTAL SCORE: 19
2. NOT BEING ABLE TO STOP OR CONTROL WORRYING: NEARLY EVERY DAY
3. WORRYING TOO MUCH ABOUT DIFFERENT THINGS: NEARLY EVERY DAY
4. TROUBLE RELAXING: NEARLY EVERY DAY
6. BECOMING EASILY ANNOYED OR IRRITABLE: NEARLY EVERY DAY
1. FEELING NERVOUS, ANXIOUS, OR ON EDGE: NEARLY EVERY DAY
GAD7 TOTAL SCORE: 19
7. FEELING AFRAID AS IF SOMETHING AWFUL MIGHT HAPPEN: NEARLY EVERY DAY
5. BEING SO RESTLESS THAT IT IS HARD TO SIT STILL: SEVERAL DAYS
GAD7 TOTAL SCORE: 19
7. FEELING AFRAID AS IF SOMETHING AWFUL MIGHT HAPPEN: NEARLY EVERY DAY

## 2022-05-02 ASSESSMENT — COLUMBIA-SUICIDE SEVERITY RATING SCALE - C-SSRS
2. HAVE YOU ACTUALLY HAD ANY THOUGHTS OF KILLING YOURSELF?: NO
1. HAVE YOU WISHED YOU WERE DEAD OR WISHED YOU COULD GO TO SLEEP AND NOT WAKE UP?: YES
TOTAL  NUMBER OF ABORTED OR SELF INTERRUPTED ATTEMPTS LIFETIME: NO
ATTEMPT LIFETIME: NO
TOTAL  NUMBER OF INTERRUPTED ATTEMPTS LIFETIME: NO
6. HAVE YOU EVER DONE ANYTHING, STARTED TO DO ANYTHING, OR PREPARED TO DO ANYTHING TO END YOUR LIFE?: NO
1. IN THE PAST MONTH, HAVE YOU WISHED YOU WERE DEAD OR WISHED YOU COULD GO TO SLEEP AND NOT WAKE UP?: NO
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
## 2022-05-03 ASSESSMENT — PATIENT HEALTH QUESTIONNAIRE - PHQ9: SUM OF ALL RESPONSES TO PHQ QUESTIONS 1-9: 20

## 2022-05-03 ASSESSMENT — ANXIETY QUESTIONNAIRES: GAD7 TOTAL SCORE: 19

## 2022-05-13 ENCOUNTER — VIRTUAL VISIT (OUTPATIENT)
Dept: PSYCHOLOGY | Facility: CLINIC | Age: 32
End: 2022-05-13
Payer: COMMERCIAL

## 2022-05-13 DIAGNOSIS — F33.1 MAJOR DEPRESSIVE DISORDER, RECURRENT EPISODE, MODERATE (H): Primary | ICD-10-CM

## 2022-05-13 DIAGNOSIS — F41.1 GENERALIZED ANXIETY DISORDER: ICD-10-CM

## 2022-05-13 DIAGNOSIS — F43.10 PTSD (POST-TRAUMATIC STRESS DISORDER): ICD-10-CM

## 2022-05-13 PROCEDURE — 90834 PSYTX W PT 45 MINUTES: CPT | Mod: 95 | Performed by: SOCIAL WORKER
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
## 2022-05-13 NOTE — PROGRESS NOTES
[facility]                                     Progress Note    Patient Name: Betty Tamayo  Date: 2022         Service Type: Individual      Session Start Time: 9am  Session End Time: 9:45am     Session Length: 45 minutes    Session #: 2    Attendees: Client attended alone    Service Modality:  Video Visit:      Provider verified identity through the following two step process.  Patient provided:  Patient     Telemedicine Visit: The patient's condition can be safely assessed and treated via synchronous audio and visual telemedicine encounter.      Reason for Telemedicine Visit: Services only offered telehealth    Originating Site (Patient Location): Patient's home    Distant Site (Provider Location): [facility] [place] [facility]    Consent:  The patient/guardian has verbally consented to: the potential risks and benefits of telemedicine (video visit) versus in person care; bill my insurance or make self-payment for services provided; and responsibility for payment of non-covered services.     Patient would like the video invitation sent by:  My Chart    Mode of Communication:  Video Conference via Amwell    As the provider I attest to compliance with applicable laws and regulations related to telemedicine.    DATA  Interactive Complexity: No  Crisis: Yes, visit entailed Crisis Management / Stabilization requiring urgent assessment and history of the crisis state, mental status exam and disposition        Progress Since Last Session (Related to Symptoms / Goals / Homework):   Symptoms: Worsening acute emotion dysregulation today; reports she started having intense panic attacks in which she and her partner began arguing which then escalated things into an intense crisis; client reports she felt like she was watching herself in a movie while she was screaming for help, has difficult describing what she was experiencing; was in  distress/dysregulated in session but was able to engage in regulation/grounding exercises; reports she has been experiencing SI off and on since yesterday    Homework: Achieved / completed to satisfaction      Episode of Care Goals: Minimal progress - CONTEMPLATION (Considering change and yet undecided); Intervened by assessing the negative and positive thinking (ambivalence) about behavior change     Current / Ongoing Stressors and Concerns:   Client in crisis/dysregulated. Described dissociative experience last night amidst argument with her partner. Reports she has been thinking about suicide but didn't act on thoughts because her children need her. Reports she doesn't know how to calm down and was very tearful.     Therapist and client discussed safety plan/panic attack plan and therapist recommended client go to [facility] at [facility] today, she agreed and said her fiance will be able to bring her there at 11am.    Plan for panic attack:    Focus on calming down body, you're brain cannot problem solve until your body is calm    - Use cold or an ice pack on face, while slowing down and holding breath  - Continue slowing down your breath while tensing and relaxing muscles  * Do these things in a calm safe space     Treatment Objective(s) Addressed in This Session:   Safety assessing/planning     Intervention:   Safety assessing; treatment/crisis planning   Grounding strategies    Assessments completed prior to visit:  The following assessments were completed by patient for this visit:  PHQ9:   PHQ-9 SCORE 1/6/2021 1/21/2021 2/10/2021 2/18/2021 8/2/2021 1/27/2022 5/2/2022   PHQ-9 Total Score MyChart - 18 (Moderately severe depression) - 17 (Moderately severe depression) 26 (Severe depression) 23 (Severe depression) 20 (Severe depression)   PHQ-9 Total Score 12 18 16 17 - - 20   Some encounter information is confidential and restricted. Go to Review Flowsheets activity to see all data.     GAD7:   NIXON-7 SCORE 1/6/2021  1/21/2021 2/10/2021 2/18/2021 8/2/2021 1/27/2022 5/2/2022   Total Score - 21 (severe anxiety) - 21 (severe anxiety) 21 (severe anxiety) 21 (severe anxiety) 19 (severe anxiety)   Total Score 18 21 18 21 - - 19   Some encounter information is confidential and restricted. Go to Review Flowsheets activity to see all data.         ASSESSMENT: Current Emotional / Mental Status (status of significant symptoms):   Risk status (Self / Other harm or suicidal ideation)   Patient denies current fears or concerns for personal safety.   Patient denies current or recent suicidal ideation or behaviors.   Patient denies current or recent homicidal ideation or behaviors.   Patient denies current or recent self injurious behavior or ideation.   Patient denies other safety concerns.   Patient reports there has been no change in risk factors since their last session.     Patient reports there has been no change in protective factors since their last session.     A safety and risk management plan has been developed including: patient will voluntarily present to ED at [facility] today     Appearance:   Appropriate    Eye Contact:   Fair    Psychomotor Behavior: Normal    Attitude:   Cooperative    Orientation:   All   Speech    Rate / Production: Pressured     Volume:  Normal    Mood:    Anxious    Affect:    Labile    Thought Content:  Clear    Thought Form:  Coherent    Insight:    Fair      Medication Review:   No changes to current psychiatric medication(s)     Medication Compliance:   Yes     Changes in Health Issues:   None reported     Chemical Use Review:   Substance Use: Chemical use reviewed, no active concerns identified      Tobacco Use: No current tobacco use.      Diagnosis:  1. Major depressive disorder, recurrent episode, moderate (H)    2. Generalized anxiety disorder    3. PTSD (post-traumatic stress disorder)        Collateral Reports Completed:   Routed note to PCP    PLAN: (Patient Tasks / Therapist Tasks /  Other)  Client will voluntarily present to [facility] at 11 am today        BIANKA Almanza, LICSW   5/13/2022                                                         ______________________________________________________________________      Plan for panic attack:    Focus on calming down body, you're brain cannot problem solve until your body is calm    - Use cold or an ice pack on face, while slowing down and holding breath  - Continue slowing down your breath while tensing and relaxing muscles  * Do these things in a calm safe space

## 2022-05-18 ENCOUNTER — FCC EXTENDED DOCUMENTATION (OUTPATIENT)
Dept: BEHAVIORAL HEALTH | Facility: CLINIC | Age: 32
End: 2022-05-18
Payer: COMMERCIAL

## 2022-05-18 DIAGNOSIS — F41.1 GENERALIZED ANXIETY DISORDER: ICD-10-CM

## 2022-05-18 DIAGNOSIS — F43.10 PTSD (POST-TRAUMATIC STRESS DISORDER): Primary | ICD-10-CM

## 2022-05-18 DIAGNOSIS — F33.1 MAJOR DEPRESSIVE DISORDER, RECURRENT EPISODE, MODERATE (H): ICD-10-CM

## 2022-05-18 NOTE — PROGRESS NOTES
Case Consultation Record       Client Name: Betty Tamayo   Date:  5/18/2022    Diagnosis: PTSD, MDD, NIXON    Therapist: BIANKA Almanza, TERRA       Team Members Present:  Heather Lopez, Sariah Lal, Jessica Romero; Larry Spencer, Shana Jennings, Alonso Euceda, Rc Pulido    Purpose:   Treatment Planning    Recommendations:  Give Zannarini assessment; recommend Adherent DBT programming as client presenting with risk factors and emotion dysregulation outside of what can be adequately managed in outpatient level of care.      BIANKA Almanza, TERRA  May 18, 2022

## 2022-05-27 ENCOUNTER — VIRTUAL VISIT (OUTPATIENT)
Dept: PSYCHOLOGY | Facility: CLINIC | Age: 32
End: 2022-05-27
Payer: COMMERCIAL

## 2022-05-27 DIAGNOSIS — F41.1 GENERALIZED ANXIETY DISORDER: ICD-10-CM

## 2022-05-27 DIAGNOSIS — F33.1 MAJOR DEPRESSIVE DISORDER, RECURRENT EPISODE, MODERATE (H): Primary | ICD-10-CM

## 2022-05-27 DIAGNOSIS — F43.10 PTSD (POST-TRAUMATIC STRESS DISORDER): ICD-10-CM

## 2022-05-27 PROCEDURE — 90834 PSYTX W PT 45 MINUTES: CPT | Mod: 95 | Performed by: SOCIAL WORKER

## 2022-05-27 NOTE — PROGRESS NOTES
[facility]                                     Progress Note    Patient Name: Betty Tamayo  Date: 2022         Service Type: Individual      Session Start Time: 10am  Session End Time: 10:45am     Session Length: 45 minutes    Session #: 3    Attendees: Client attended alone    Service Modality:  Video Visit:      Provider verified identity through the following two step process.  Patient provided:  Patient     Telemedicine Visit: The patient's condition can be safely assessed and treated via synchronous audio and visual telemedicine encounter.      Reason for Telemedicine Visit: Services only offered telehealth    Originating Site (Patient Location): Patient's home    Distant Site (Provider Location): [facility] [place] [facility]    Consent:  The patient/guardian has verbally consented to: the potential risks and benefits of telemedicine (video visit) versus in person care; bill my insurance or make self-payment for services provided; and responsibility for payment of non-covered services.     Patient would like the video invitation sent by:  My Chart    Mode of Communication:  Video Conference via Amwell    As the provider I attest to compliance with applicable laws and regulations related to telemedicine.    DATA  Interactive Complexity: No  Crisis: No        Progress Since Last Session (Related to Symptoms / Goals / Homework):   Symptoms: Client presents more regulated today, reports she still experienced emotion dysregulation and panic attack since last session    Homework: Achieved / completed to satisfaction      Episode of Care Goals: Minimal progress - CONTEMPLATION (Considering change and yet undecided); Intervened by assessing the negative and positive thinking (ambivalence) about behavior change     Current / Ongoing Stressors and Concerns:   Client did not go the Empath after last session, reports she felt better after  appointment and relaxed at home. Client and therapist discussed strategy for identifying activation and rating its intensity. Discussed stopping and disengaging strategies to practice.       Plan for panic attack:    Focus on calming down body, you're brain cannot problem solve until your body is calm    - Use cold or an ice pack on face, while slowing down and holding breath  - Continue slowing down your breath while tensing and relaxing muscles  * Do these things in a calm safe space     Treatment Objective(s) Addressed in This Session:   Safety assessing/planning     Intervention:   Safety assessing; treatment/crisis planning   Grounding strategies    Assessments completed prior to visit:  The following assessments were completed by patient for this visit:  PHQ9:   PHQ-9 SCORE 1/6/2021 1/21/2021 2/10/2021 2/18/2021 8/2/2021 1/27/2022 5/2/2022   PHQ-9 Total Score MyChart - 18 (Moderately severe depression) - 17 (Moderately severe depression) 26 (Severe depression) 23 (Severe depression) 20 (Severe depression)   PHQ-9 Total Score 12 18 16 17 - - 20   Some encounter information is confidential and restricted. Go to Review [employer] activity to see all data.     GAD7:   NIXON-7 SCORE 1/6/2021 1/21/2021 2/10/2021 2/18/2021 8/2/2021 1/27/2022 5/2/2022   Total Score - 21 (severe anxiety) - 21 (severe anxiety) 21 (severe anxiety) 21 (severe anxiety) 19 (severe anxiety)   Total Score 18 21 18 21 - - 19   Some encounter information is confidential and restricted. Go to Review ThumbAd activity to see all data.         ASSESSMENT: Current Emotional / Mental Status (status of significant symptoms):   Risk status (Self / Other harm or suicidal ideation)   Patient denies current fears or concerns for personal safety.   Patient denies current or recent suicidal ideation or behaviors.   Patient denies current or recent homicidal ideation or behaviors.   Patient denies current or recent self injurious behavior or  ideation.   Patient denies other safety concerns.   Patient reports there has been no change in risk factors since their last session.     Patient reports there has been no change in protective factors since their last session.     A safety and risk management plan has been developed including: patient will voluntarily present to ED at [facility] today     Appearance:   Appropriate    Eye Contact:   Fair    Psychomotor Behavior: Normal    Attitude:   Cooperative    Orientation:   All   Speech    Rate / Production: Pressured     Volume:  Normal    Mood:    Anxious    Affect:    Labile    Thought Content:  Clear    Thought Form:  Coherent    Insight:    Fair      Medication Review:   No changes to current psychiatric medication(s)     Medication Compliance:   Yes     Changes in Health Issues:   None reported     Chemical Use Review:   Substance Use: Chemical use reviewed, no active concerns identified      Tobacco Use: No current tobacco use.      Diagnosis:  1. Major depressive disorder, recurrent episode, moderate (H)    2. Generalized anxiety disorder    3. PTSD (post-traumatic stress disorder)        Collateral Reports Completed:   Routed note to PCP    PLAN: (Patient Tasks / Therapist Tasks / Other)  Client will practice emotion observing and disengaging strategy discussed today daily as needed before next session        BIANKA Almanza, [facility]   5/27/2022                                                         ______________________________________________________________________                                                Individual Treatment Plan    Patient's Name: Betty Tamayo  YOB: 1990    Date of Creation: 5/27/2022  Date Treatment Plan Last Reviewed/Revised:     DSM5 Diagnoses: 296.32 (F33.1) Major Depressive Disorder, Recurrent Episode, Moderate _, 300.02 (F41.1) Generalized Anxiety Disorder or 309.81 (F43.10) Posttraumatic Stress Disorder (includes Posttraumatic Stress  Disorder for Children 6 Years and Younger)  With dissociative symptoms  Psychosocial / Contextual Factors: interpersonal relationship stress  PROMIS (reviewed every 90 days):     Referral / Collaboration:  Referral to another professional/service is not indicated at this time..    Anticipated number of session for this episode of care: 50  Anticipation frequency of session: Weekly  Anticipated Duration of each session: 38-52 minutes  Treatment plan will be reviewed in 90 days or when goals have been changed.       MeasurableTreatment Goal(s) related to diagnosis / functional impairment(s)  Goal 1: Client will report increased ability to regulate emotions.      Objective #A (Client Action)    Status: New - Date: 5/27/2022      Client will learn & utilize at least 1-2 emotion regulation skills per week including observing and describing emotions; learning functions of emotions; check the facts; opposite action; self soothe; distract; IMPROVE the moment; and skills to decrease emotional vulnerability.      Intervention(s)  Therapist will teach assertiveness and interpersonal effectiveness skills.    Goal 2: Client will experience decreased symptoms of PTSD and increased sense of self and individuation         Objective #A (Client Action)        Client will identify grounding strategies for managing hyper and hypo arousal symptoms.    Client will identify trauma triggers    Client will process trauma in a safe and adaptive way    Client will demonstrate increased ability to regulate emotions and increased interpersonal effectiveness           Status: New - Date: 5/27/2022      Intervention(s)    Therapist will teach DBT strategies for grounding and regulating emotions; will create a safe space to process trauma and help client establish sense of safety and sense of self.        Patient has reviewed and agreed to the above plan.      BIANKA Almanza, [facility]  May 27, 2022      Plan for panic attack:    Focus on calming  down body, you're brain cannot problem solve until your body is calm    - Use cold or an ice pack on face, while slowing down and holding breath  - Continue slowing down your breath while tensing and relaxing muscles  * Do these things in a calm safe space

## 2022-06-12 ENCOUNTER — HEALTH MAINTENANCE LETTER (OUTPATIENT)
Age: 32
End: 2022-06-12

## 2022-06-14 ENCOUNTER — VIRTUAL VISIT (OUTPATIENT)
Dept: PSYCHOLOGY | Facility: CLINIC | Age: 32
End: 2022-06-14
Payer: COMMERCIAL

## 2022-06-14 DIAGNOSIS — F41.1 GENERALIZED ANXIETY DISORDER: ICD-10-CM

## 2022-06-14 DIAGNOSIS — F33.1 MAJOR DEPRESSIVE DISORDER, RECURRENT EPISODE, MODERATE (H): Primary | ICD-10-CM

## 2022-06-14 DIAGNOSIS — F43.10 PTSD (POST-TRAUMATIC STRESS DISORDER): ICD-10-CM

## 2022-06-14 PROCEDURE — 90834 PSYTX W PT 45 MINUTES: CPT | Mod: 95 | Performed by: SOCIAL WORKER

## 2022-06-14 ASSESSMENT — ANXIETY QUESTIONNAIRES
3. WORRYING TOO MUCH ABOUT DIFFERENT THINGS: NEARLY EVERY DAY
4. TROUBLE RELAXING: NEARLY EVERY DAY
6. BECOMING EASILY ANNOYED OR IRRITABLE: NEARLY EVERY DAY
8. IF YOU CHECKED OFF ANY PROBLEMS, HOW DIFFICULT HAVE THESE MADE IT FOR YOU TO DO YOUR WORK, TAKE CARE OF THINGS AT HOME, OR GET ALONG WITH OTHER PEOPLE?: EXTREMELY DIFFICULT
3. WORRYING TOO MUCH ABOUT DIFFERENT THINGS: NEARLY EVERY DAY
7. FEELING AFRAID AS IF SOMETHING AWFUL MIGHT HAPPEN: NEARLY EVERY DAY
GAD7 TOTAL SCORE: 21
GAD7 TOTAL SCORE: 21
7. FEELING AFRAID AS IF SOMETHING AWFUL MIGHT HAPPEN: NEARLY EVERY DAY
5. BEING SO RESTLESS THAT IT IS HARD TO SIT STILL: NEARLY EVERY DAY
GAD7 TOTAL SCORE: 21
7. FEELING AFRAID AS IF SOMETHING AWFUL MIGHT HAPPEN: NEARLY EVERY DAY
GAD7 TOTAL SCORE: 21
8. IF YOU CHECKED OFF ANY PROBLEMS, HOW DIFFICULT HAVE THESE MADE IT FOR YOU TO DO YOUR WORK, TAKE CARE OF THINGS AT HOME, OR GET ALONG WITH OTHER PEOPLE?: EXTREMELY DIFFICULT
1. FEELING NERVOUS, ANXIOUS, OR ON EDGE: NEARLY EVERY DAY
4. TROUBLE RELAXING: NEARLY EVERY DAY
7. FEELING AFRAID AS IF SOMETHING AWFUL MIGHT HAPPEN: NEARLY EVERY DAY
1. FEELING NERVOUS, ANXIOUS, OR ON EDGE: NEARLY EVERY DAY
5. BEING SO RESTLESS THAT IT IS HARD TO SIT STILL: NEARLY EVERY DAY
GAD7 TOTAL SCORE: 21
6. BECOMING EASILY ANNOYED OR IRRITABLE: NEARLY EVERY DAY
GAD7 TOTAL SCORE: 21
2. NOT BEING ABLE TO STOP OR CONTROL WORRYING: NEARLY EVERY DAY
2. NOT BEING ABLE TO STOP OR CONTROL WORRYING: NEARLY EVERY DAY

## 2022-06-14 ASSESSMENT — PATIENT HEALTH QUESTIONNAIRE - PHQ9
10. IF YOU CHECKED OFF ANY PROBLEMS, HOW DIFFICULT HAVE THESE PROBLEMS MADE IT FOR YOU TO DO YOUR WORK, TAKE CARE OF THINGS AT HOME, OR GET ALONG WITH OTHER PEOPLE: EXTREMELY DIFFICULT
SUM OF ALL RESPONSES TO PHQ QUESTIONS 1-9: 21
SUM OF ALL RESPONSES TO PHQ QUESTIONS 1-9: 21

## 2022-06-14 NOTE — PROGRESS NOTES
[facility]                                     Progress Note    Patient Name: Betty Tamayo  Date: 2022         Service Type: Individual      Session Start Time: 12pm  Session End Time: 12:45pm     Session Length: 45 minutes    Session #: 4    Attendees: Client attended alone    Service Modality:  Video Visit:      Provider verified identity through the following two step process.  Patient provided:  Patient     Telemedicine Visit: The patient's condition can be safely assessed and treated via synchronous audio and visual telemedicine encounter.      Reason for Telemedicine Visit: Services only offered telehealth    Originating Site (Patient Location): Patient's home    Distant Site (Provider Location): [facility] [place] [facility]    Consent:  The patient/guardian has verbally consented to: the potential risks and benefits of telemedicine (video visit) versus in person care; bill my insurance or make self-payment for services provided; and responsibility for payment of non-covered services.     Patient would like the video invitation sent by:  My Chart    Mode of Communication:  Video Conference via Amwell    As the provider I attest to compliance with applicable laws and regulations related to telemedicine.    DATA  Interactive Complexity: No  Crisis: No        Progress Since Last Session (Related to Symptoms / Goals / Homework):   Symptoms: Client presents more regulated today, reports she still experienced emotion dysregulation and high intensity emotions over the weekend    Homework: Achieved / completed to satisfaction      Episode of Care Goals: Minimal progress - PREPARATION (Decided to change - considering how); Intervened by negotiating a change plan and determining options / strategies for behavior change, identifying triggers, exploring social supports, and working towards setting a date to begin behavior change     Current /  Ongoing Stressors and Concerns:   Client discussed emotions related going to visit family in her childhood hometown. Practiced square breathing for regulation in session, client noted it helped decrease anxiety.       Plan for panic attack:    Focus on calming down body, you're brain cannot problem solve until your body is calm    - Use cold or an ice pack on face, while slowing down and holding breath  - Continue slowing down your breath while tensing and relaxing muscles  * Do these things in a calm safe space     Treatment Objective(s) Addressed in This Session:   Safety assessing/planning     Intervention:   Safety assessing; treatment/crisis planning   Grounding strategies    Assessments completed prior to visit:  The following assessments were completed by patient for this visit:  PHQ9:   PHQ-9 SCORE 1/21/2021 2/10/2021 2/18/2021 8/2/2021 1/27/2022 5/2/2022 6/14/2022   PHQ-9 Total Score MyChart 18 (Moderately severe depression) - 17 (Moderately severe depression) 26 (Severe depression) 23 (Severe depression) 20 (Severe depression) 21 (Severe depression)   PHQ-9 Total Score 18 16 17 - - 20 21   Some encounter information is confidential and restricted. Go to Review Flowsheets activity to see all data.     GAD7:   NIXON-7 SCORE 2/10/2021 2/18/2021 8/2/2021 1/27/2022 5/2/2022 6/14/2022 6/14/2022   Total Score - 21 (severe anxiety) 21 (severe anxiety) 21 (severe anxiety) 19 (severe anxiety) - 21 (severe anxiety)   Total Score 18 21 - - 19 21 21   Some encounter information is confidential and restricted. Go to Review Flowsheets activity to see all data.         ASSESSMENT: Current Emotional / Mental Status (status of significant symptoms):   Risk status (Self / Other harm or suicidal ideation)   Patient denies current fears or concerns for personal safety.   Patient denies current or recent suicidal ideation or behaviors.   Patient denies current or recent homicidal ideation or behaviors.   Patient denies current or  recent self injurious behavior or ideation.   Patient denies other safety concerns.   Patient reports there has been no change in risk factors since their last session.     Patient reports there has been no change in protective factors since their last session.     A safety and risk management plan has been developed including: patient will voluntarily present to ED at [facility] today     Appearance:   Appropriate    Eye Contact:   Fair    Psychomotor Behavior: Normal    Attitude:   Cooperative    Orientation:   All   Speech    Rate / Production: Pressured     Volume:  Normal    Mood:    Anxious    Affect:    Labile    Thought Content:  Clear    Thought Form:  Coherent    Insight:    Fair      Medication Review:   No changes to current psychiatric medication(s)     Medication Compliance:   Yes     Changes in Health Issues:   None reported     Chemical Use Review:   Substance Use: Chemical use reviewed, no active concerns identified      Tobacco Use: No current tobacco use.      Diagnosis:  1. Major depressive disorder, recurrent episode, moderate (H)    2. Generalized anxiety disorder    3. PTSD (post-traumatic stress disorder)        Collateral Reports Completed:   Routed note to PCP    PLAN: (Patient Tasks / Therapist Tasks / Other)  Client will practice emotion observing and describing emotions; practice square breathing 5 minutes daily        BIANKA Almanza, [facility]   6/14/2022                                                         ______________________________________________________________________                                                Individual Treatment Plan    Patient's Name: Betty Tamayo  YOB: 1990    Date of Creation: 5/27/2022  Date Treatment Plan Last Reviewed/Revised:     DSM5 Diagnoses: 296.32 (F33.1) Major Depressive Disorder, Recurrent Episode, Moderate _, 300.02 (F41.1) Generalized Anxiety Disorder or 309.81 (F43.10) Posttraumatic Stress Disorder  (includes Posttraumatic Stress Disorder for Children 6 Years and Younger)  With dissociative symptoms  Psychosocial / Contextual Factors: interpersonal relationship stress  PROMIS (reviewed every 90 days):     Referral / Collaboration:  Referral to another professional/service is not indicated at this time..    Anticipated number of session for this episode of care: 50  Anticipation frequency of session: Weekly  Anticipated Duration of each session: 38-52 minutes  Treatment plan will be reviewed in 90 days or when goals have been changed.       MeasurableTreatment Goal(s) related to diagnosis / functional impairment(s)  Goal 1: Client will report increased ability to regulate emotions.      Objective #A (Client Action)    Status: New - Date: 5/27/2022      Client will learn & utilize at least 1-2 emotion regulation skills per week including observing and describing emotions; learning functions of emotions; check the facts; opposite action; self soothe; distract; IMPROVE the moment; and skills to decrease emotional vulnerability.      Intervention(s)  Therapist will teach assertiveness and interpersonal effectiveness skills.    Goal 2: Client will experience decreased symptoms of PTSD and increased sense of self and individuation         Objective #A (Client Action)        Client will identify grounding strategies for managing hyper and hypo arousal symptoms.    Client will identify trauma triggers    Client will process trauma in a safe and adaptive way    Client will demonstrate increased ability to regulate emotions and increased interpersonal effectiveness           Status: New - Date: 5/27/2022      Intervention(s)    Therapist will teach DBT strategies for grounding and regulating emotions; will create a safe space to process trauma and help client establish sense of safety and sense of self.        Patient has reviewed and agreed to the above plan.      BIANKA Almanza, [facility]  May 27, 2022      Plan for  panic attack:    Focus on calming down body, you're brain cannot problem solve until your body is calm    - Use cold or an ice pack on face, while slowing down and holding breath  - Continue slowing down your breath while tensing and relaxing muscles  * Do these things in a calm safe space

## 2022-06-21 ENCOUNTER — VIRTUAL VISIT (OUTPATIENT)
Dept: PSYCHOLOGY | Facility: CLINIC | Age: 32
End: 2022-06-21
Payer: COMMERCIAL

## 2022-06-21 DIAGNOSIS — F33.1 MAJOR DEPRESSIVE DISORDER, RECURRENT EPISODE, MODERATE (H): Primary | ICD-10-CM

## 2022-06-21 DIAGNOSIS — F43.10 PTSD (POST-TRAUMATIC STRESS DISORDER): ICD-10-CM

## 2022-06-21 DIAGNOSIS — F41.1 GENERALIZED ANXIETY DISORDER: ICD-10-CM

## 2022-06-21 PROCEDURE — 90834 PSYTX W PT 45 MINUTES: CPT | Mod: 95 | Performed by: SOCIAL WORKER

## 2022-06-21 ASSESSMENT — PATIENT HEALTH QUESTIONNAIRE - PHQ9
SUM OF ALL RESPONSES TO PHQ QUESTIONS 1-9: 18
SUM OF ALL RESPONSES TO PHQ QUESTIONS 1-9: 18
10. IF YOU CHECKED OFF ANY PROBLEMS, HOW DIFFICULT HAVE THESE PROBLEMS MADE IT FOR YOU TO DO YOUR WORK, TAKE CARE OF THINGS AT HOME, OR GET ALONG WITH OTHER PEOPLE: EXTREMELY DIFFICULT

## 2022-06-21 NOTE — PROGRESS NOTES
[facility]                                     Progress Note    Patient Name: Betty Tamayo  Date: 2022         Service Type: Individual      Session Start Time: 10am  Session End Time: 10:45am     Session Length: 45 minutes    Session #: 5    Attendees: Client attended alone    Service Modality:  Video Visit:      Provider verified identity through the following two step process.  Patient provided:  Patient     Telemedicine Visit: The patient's condition can be safely assessed and treated via synchronous audio and visual telemedicine encounter.      Reason for Telemedicine Visit: Services only offered telehealth    Originating Site (Patient Location): Patient's home    Distant Site (Provider Location): [facility] [place] [facility]    Consent:  The patient/guardian has verbally consented to: the potential risks and benefits of telemedicine (video visit) versus in person care; bill my insurance or make self-payment for services provided; and responsibility for payment of non-covered services.     Patient would like the video invitation sent by:  My Chart    Mode of Communication:  Video Conference via Amwell    As the provider I attest to compliance with applicable laws and regulations related to telemedicine.    DATA  Interactive Complexity: No  Crisis: No        Progress Since Last Session (Related to Symptoms / Goals / Homework):   Symptoms: Client presents more regulated today, reports she still experienced emotion dysregulation and high intensity emotions    Homework: Achieved / completed to satisfaction      Episode of Care Goals: Minimal progress - PREPARATION (Decided to change - considering how); Intervened by negotiating a change plan and determining options / strategies for behavior change, identifying triggers, exploring social supports, and working towards setting a date to begin behavior change     Current / Ongoing Stressors and  Concerns:   Client discussed emotions related to her relationship. Reports she has been feeling irritable and gets easily disappointed in herself when she gets dysregulated.     Plan for panic attack:    Focus on calming down body, you're brain cannot problem solve until your body is calm    - Use cold or an ice pack on face, while slowing down and holding breath  - Continue slowing down your breath while tensing and relaxing muscles  * Do these things in a calm safe space     Treatment Objective(s) Addressed in This Session:   Safety assessing/planning     Intervention:   Safety assessing; treatment/crisis planning   Grounding strategies    Assessments completed prior to visit:  The following assessments were completed by patient for this visit:  PHQ9:   PHQ-9 SCORE 2/10/2021 2/18/2021 8/2/2021 1/27/2022 5/2/2022 6/14/2022 6/21/2022   PHQ-9 Total Score MyChart - 17 (Moderately severe depression) 26 (Severe depression) 23 (Severe depression) 20 (Severe depression) 21 (Severe depression) 18 (Moderately severe depression)   PHQ-9 Total Score 16 17 - - 20 21 18   Some encounter information is confidential and restricted. Go to Review Flowsheets activity to see all data.     GAD7:   NIXON-7 SCORE 2/10/2021 2/18/2021 8/2/2021 1/27/2022 5/2/2022 6/14/2022 6/14/2022   Total Score - 21 (severe anxiety) 21 (severe anxiety) 21 (severe anxiety) 19 (severe anxiety) - 21 (severe anxiety)   Total Score 18 21 - - 19 21 21   Some encounter information is confidential and restricted. Go to Review Flowsheets activity to see all data.         ASSESSMENT: Current Emotional / Mental Status (status of significant symptoms):   Risk status (Self / Other harm or suicidal ideation)   Patient denies current fears or concerns for personal safety.   Patient denies current or recent suicidal ideation or behaviors.   Patient denies current or recent homicidal ideation or behaviors.   Patient denies current or recent self injurious behavior or  "ideation.   Patient denies other safety concerns.   Patient reports there has been no change in risk factors since their last session.     Patient reports there has been no change in protective factors since their last session.     A safety and risk management plan has been developed including: patient will voluntarily present to ED at [facility] today     Appearance:   Appropriate    Eye Contact:   Fair    Psychomotor Behavior: Normal    Attitude:   Cooperative    Orientation:   All   Speech    Rate / Production: Pressured     Volume:  Normal    Mood:    Anxious    Affect:    Labile    Thought Content:  Clear    Thought Form:  Coherent    Insight:    Fair      Medication Review:   No changes to current psychiatric medication(s)     Medication Compliance:   Yes     Changes in Health Issues:   None reported     Chemical Use Review:   Substance Use: Chemical use reviewed, no active concerns identified      Tobacco Use: No current tobacco use.      Diagnosis:  1. Major depressive disorder, recurrent episode, moderate (H)    2. Generalized anxiety disorder    3. PTSD (post-traumatic stress disorder)        Collateral Reports Completed:   Routed note to PCP    PLAN: (Patient Tasks / Therapist Tasks / Other)  Client will practice emotion observing and describing emotions; practice square breathing 5 minutes daily; practice taking a \"needs inventory\" when you wake up in the morning        BIANKA Almanza, [facility]   6/21/2022                                                         ______________________________________________________________________                                                Individual Treatment Plan    Patient's Name: Betty Tamayo  YOB: 1990    Date of Creation: 5/27/2022  Date Treatment Plan Last Reviewed/Revised:     DSM5 Diagnoses: 296.32 (F33.1) Major Depressive Disorder, Recurrent Episode, Moderate _, 300.02 (F41.1) Generalized Anxiety Disorder or 309.81 (F43.10) " Posttraumatic Stress Disorder (includes Posttraumatic Stress Disorder for Children 6 Years and Younger)  With dissociative symptoms  Psychosocial / Contextual Factors: interpersonal relationship stress  PROMIS (reviewed every 90 days):     Referral / Collaboration:  Referral to another professional/service is not indicated at this time..    Anticipated number of session for this episode of care: 50  Anticipation frequency of session: Weekly  Anticipated Duration of each session: 38-52 minutes  Treatment plan will be reviewed in 90 days or when goals have been changed.       MeasurableTreatment Goal(s) related to diagnosis / functional impairment(s)  Goal 1: Client will report increased ability to regulate emotions.      Objective #A (Client Action)    Status: New - Date: 5/27/2022      Client will learn & utilize at least 1-2 emotion regulation skills per week including observing and describing emotions; learning functions of emotions; check the facts; opposite action; self soothe; distract; IMPROVE the moment; and skills to decrease emotional vulnerability.      Intervention(s)  Therapist will teach assertiveness and interpersonal effectiveness skills.    Goal 2: Client will experience decreased symptoms of PTSD and increased sense of self and individuation         Objective #A (Client Action)        Client will identify grounding strategies for managing hyper and hypo arousal symptoms.    Client will identify trauma triggers    Client will process trauma in a safe and adaptive way    Client will demonstrate increased ability to regulate emotions and increased interpersonal effectiveness           Status: New - Date: 5/27/2022      Intervention(s)    Therapist will teach DBT strategies for grounding and regulating emotions; will create a safe space to process trauma and help client establish sense of safety and sense of self.        Patient has reviewed and agreed to the above plan.      BIAKNA Almanza,  [facility]  May 27, 2022      Plan for panic attack:    Focus on calming down body, you're brain cannot problem solve until your body is calm    - Use cold or an ice pack on face, while slowing down and holding breath  - Continue slowing down your breath while tensing and relaxing muscles  * Do these things in a calm safe space   IV discontinued, cath removed intact

## 2022-06-28 ENCOUNTER — VIRTUAL VISIT (OUTPATIENT)
Dept: PSYCHOLOGY | Facility: CLINIC | Age: 32
End: 2022-06-28
Payer: COMMERCIAL

## 2022-06-28 DIAGNOSIS — F33.1 MAJOR DEPRESSIVE DISORDER, RECURRENT EPISODE, MODERATE (H): Primary | ICD-10-CM

## 2022-06-28 DIAGNOSIS — F41.1 GENERALIZED ANXIETY DISORDER: ICD-10-CM

## 2022-06-28 DIAGNOSIS — F43.10 PTSD (POST-TRAUMATIC STRESS DISORDER): ICD-10-CM

## 2022-06-28 PROCEDURE — 90834 PSYTX W PT 45 MINUTES: CPT | Mod: 95 | Performed by: SOCIAL WORKER

## 2022-06-28 ASSESSMENT — ANXIETY QUESTIONNAIRES
4. TROUBLE RELAXING: NEARLY EVERY DAY
3. WORRYING TOO MUCH ABOUT DIFFERENT THINGS: NEARLY EVERY DAY
7. FEELING AFRAID AS IF SOMETHING AWFUL MIGHT HAPPEN: NEARLY EVERY DAY
8. IF YOU CHECKED OFF ANY PROBLEMS, HOW DIFFICULT HAVE THESE MADE IT FOR YOU TO DO YOUR WORK, TAKE CARE OF THINGS AT HOME, OR GET ALONG WITH OTHER PEOPLE?: EXTREMELY DIFFICULT
7. FEELING AFRAID AS IF SOMETHING AWFUL MIGHT HAPPEN: NEARLY EVERY DAY
6. BECOMING EASILY ANNOYED OR IRRITABLE: NEARLY EVERY DAY
GAD7 TOTAL SCORE: 21
1. FEELING NERVOUS, ANXIOUS, OR ON EDGE: NEARLY EVERY DAY
GAD7 TOTAL SCORE: 21
2. NOT BEING ABLE TO STOP OR CONTROL WORRYING: NEARLY EVERY DAY
GAD7 TOTAL SCORE: 21
5. BEING SO RESTLESS THAT IT IS HARD TO SIT STILL: NEARLY EVERY DAY

## 2022-06-28 NOTE — PROGRESS NOTES
[facility]                                     Progress Note    Patient Name: Betty Tamayo  Date: 2022         Service Type: Individual      Session Start Time: 10am  Session End Time: 10:45am     Session Length: 45 minutes    Session #: 6    Attendees: Client attended alone    Service Modality:  Video Visit:      Provider verified identity through the following two step process.  Patient provided:  Patient     Telemedicine Visit: The patient's condition can be safely assessed and treated via synchronous audio and visual telemedicine encounter.      Reason for Telemedicine Visit: Services only offered telehealth    Originating Site (Patient Location): Patient's home    Distant Site (Provider Location): [facility] [place] [facility]    Consent:  The patient/guardian has verbally consented to: the potential risks and benefits of telemedicine (video visit) versus in person care; bill my insurance or make self-payment for services provided; and responsibility for payment of non-covered services.     Patient would like the video invitation sent by:  My Chart    Mode of Communication:  Video Conference via Amwell    As the provider I attest to compliance with applicable laws and regulations related to telemedicine.    DATA  Interactive Complexity: No  Crisis: No        Progress Since Last Session (Related to Symptoms / Goals / Homework):   Symptoms: intense feelings of guilt and shame; some dysregulation    Homework: Achieved / completed to satisfaction      Episode of Care Goals: Minimal progress - PREPARATION (Decided to change - considering how); Intervened by negotiating a change plan and determining options / strategies for behavior change, identifying triggers, exploring social supports, and working towards setting a date to begin behavior change     Current / Ongoing Stressors and Concerns:   Client discussed emotions related to her  relationship and finances. Reports she has been feeling irritable and gets easily disappointed in herself when she gets dysregulated. Also discussed opposite action strategies for guilt and shame.    Plan for panic attack:    Focus on calming down body, you're brain cannot problem solve until your body is calm    - Use cold or an ice pack on face, while slowing down and holding breath  - Continue slowing down your breath while tensing and relaxing muscles  * Do these things in a calm safe space     Treatment Objective(s) Addressed in This Session:   Safety assessing/planning     Intervention:   Safety assessing; treatment/crisis planning   Grounding strategies    Assessments completed prior to visit:  The following assessments were completed by patient for this visit:  PHQ9:   PHQ-9 SCORE 2/18/2021 8/2/2021 1/27/2022 5/2/2022 6/14/2022 6/21/2022 6/28/2022   PHQ-9 Total Score MyChart 17 (Moderately severe depression) 26 (Severe depression) 23 (Severe depression) 20 (Severe depression) 21 (Severe depression) 18 (Moderately severe depression) 20 (Severe depression)   PHQ-9 Total Score 17 - - 20 21 18 20   Some encounter information is confidential and restricted. Go to Review Flowsheets activity to see all data.     GAD7:   NIXON-7 SCORE 2/18/2021 8/2/2021 1/27/2022 5/2/2022 6/14/2022 6/14/2022 6/28/2022   Total Score 21 (severe anxiety) 21 (severe anxiety) 21 (severe anxiety) 19 (severe anxiety) - 21 (severe anxiety) 21 (severe anxiety)   Total Score 21 - - 19 21 21 21   Some encounter information is confidential and restricted. Go to Review Flowsheets activity to see all data.         ASSESSMENT: Current Emotional / Mental Status (status of significant symptoms):   Risk status (Self / Other harm or suicidal ideation)   Patient denies current fears or concerns for personal safety.   Patient denies current or recent suicidal ideation or behaviors.   Patient denies current or recent homicidal ideation or  "behaviors.   Patient denies current or recent self injurious behavior or ideation.   Patient denies other safety concerns.   Patient reports there has been no change in risk factors since their last session.     Patient reports there has been no change in protective factors since their last session.     A safety and risk management plan has been developed including: patient will voluntarily present [facility] ED [facility] today     Appearance:   Appropriate    Eye Contact:   Fair    Psychomotor Behavior: Normal    Attitude:   Cooperative    Orientation:   All   Speech    Rate / Production: Pressured     Volume:  Normal    Mood:    Anxious    Affect:    Labile    Thought Content:  Clear    Thought Form:  Coherent    Insight:    Fair      Medication Review:   No changes to current psychiatric medication(s)     Medication Compliance:   Yes     Changes in Health Issues:   None reported     Chemical Use Review:   Substance Use: Chemical use reviewed, no active concerns identified      Tobacco Use: No current tobacco use.      Diagnosis:  1. Major depressive disorder, recurrent episode, moderate (H)    2. Generalized anxiety disorder    3. PTSD (post-traumatic stress disorder)        Collateral Reports Completed:   Routed note to PCP    PLAN: (Patient Tasks / Therapist Tasks / Other)  Client will practice emotion observing and describing emotions; practice square breathing 5 minutes daily; practice taking a \"needs inventory\" when you wake up in the morning; practice opposite action for shame        BIANKA [facility]manza, [facility]   6/28/2022                                                         ______________________________________________________________________                                                Individual Treatment Plan    Patient's Name: Betty Tamayo  YOB: 1990    Date of Creation: 5/27/2022  Date Treatment Plan Last Reviewed/Revised:     DSM5 Diagnoses: 296.32 (F33.1) Major " Depressive Disorder, Recurrent Episode, Moderate _, 300.02 (F41.1) Generalized Anxiety Disorder or 309.81 (F43.10) Posttraumatic Stress Disorder (includes Posttraumatic Stress Disorder for Children 6 Years and Younger)  With dissociative symptoms  Psychosocial / Contextual Factors: interpersonal relationship stress  PROMIS (reviewed every 90 days):     Referral / Collaboration:  Referral to another professional/service is not indicated at this time..    Anticipated number of session for this episode of care: 50  Anticipation frequency of session: Weekly  Anticipated Duration of each session: 38-52 minutes  Treatment plan will be reviewed in 90 days or when goals have been changed.       MeasurableTreatment Goal(s) related to diagnosis / functional impairment(s)  Goal 1: Client will report increased ability to regulate emotions.      Objective #A (Client Action)    Status: New - Date: 5/27/2022      Client will learn & utilize at least 1-2 emotion regulation skills per week including observing and describing emotions; learning functions of emotions; check the facts; opposite action; self soothe; distract; IMPROVE the moment; and skills to decrease emotional vulnerability.      Intervention(s)  Therapist will teach assertiveness and interpersonal effectiveness skills.    Goal 2: Client will experience decreased symptoms of PTSD and increased sense of self and individuation         Objective #A (Client Action)        Client will identify grounding strategies for managing hyper and hypo arousal symptoms.    Client will identify trauma triggers    Client will process trauma in a safe and adaptive way    Client will demonstrate increased ability to regulate emotions and increased interpersonal effectiveness           Status: New - Date: 5/27/2022      Intervention(s)    Therapist will teach DBT strategies for grounding and regulating emotions; will create a safe space to process trauma and help client establish sense of  safety and sense of self.        Patient has reviewed and agreed to the above plan.      BIANKA Almanza, [facility]  May 27, 2022      Plan for panic attack:    Focus on calming down body, you're brain cannot problem solve until your body is calm    - Use cold or an ice pack on face, while slowing down and holding breath  - Continue slowing down your breath while tensing and relaxing muscles  * Do these things in a calm safe space

## 2022-07-05 ENCOUNTER — VIRTUAL VISIT (OUTPATIENT)
Dept: PSYCHOLOGY | Facility: CLINIC | Age: 32
End: 2022-07-05
Payer: COMMERCIAL

## 2022-07-05 DIAGNOSIS — F33.1 MAJOR DEPRESSIVE DISORDER, RECURRENT EPISODE, MODERATE (H): Primary | ICD-10-CM

## 2022-07-05 DIAGNOSIS — F41.1 GENERALIZED ANXIETY DISORDER: ICD-10-CM

## 2022-07-05 DIAGNOSIS — F43.10 PTSD (POST-TRAUMATIC STRESS DISORDER): ICD-10-CM

## 2022-07-05 PROCEDURE — 90837 PSYTX W PT 60 MINUTES: CPT | Mod: 95 | Performed by: SOCIAL WORKER

## 2022-07-05 NOTE — PROGRESS NOTES
[facility]                                     Progress Note    Patient Name: Betty Tamayo  Date: 2022         Service Type: Individual      Session Start Time: 12pm  Session End Time: 12:55pm     Session Length: 55 minutes    Session #: 7    Attendees: Client attended alone    Service Modality:  Video Visit:      Provider verified identity through the following two step process.  Patient provided:  Patient     Telemedicine Visit: The patient's condition can be safely assessed and treated via synchronous audio and visual telemedicine encounter.      Reason for Telemedicine Visit: Services only offered telehealth    Originating Site (Patient Location): Patient's home    Distant Site (Provider Location): [facility] [place] [facility]    Consent:  The patient/guardian has verbally consented to: the potential risks and benefits of telemedicine (video visit) versus in person care; bill my insurance or make self-payment for services provided; and responsibility for payment of non-covered services.     Patient would like the video invitation sent by:  My Chart    Mode of Communication:  Video Conference via [employer]    As the provider I attest to compliance with applicable laws and regulations related to telemedicine.    DATA  Interactive Complexity: No  Crisis: No        Progress Since Last Session (Related to Symptoms / Goals / Homework):   Symptoms: emotional dysregulation    Homework: Achieved / completed to satisfaction      Episode of Care Goals: Minimal progress - PREPARATION (Decided to change - considering how); Intervened by negotiating a change plan and determining options / strategies for behavior change, identifying triggers, exploring social supports, and working towards setting a date to begin behavior change     Current / Ongoing Stressors and Concerns:   Client reports she has been feeling irritable and gets easily disappointed in  herself when she gets dysregulated. Presents to session dysregulated. Therapist and client completed crisis/safety plan in session.     Plan for panic attack:    Focus on calming down body, you're brain cannot problem solve until your body is calm    - Use cold or an ice pack on face, while slowing down and holding breath  - Continue slowing down your breath while tensing and relaxing muscles  * Do these things in a calm safe space     Treatment Objective(s) Addressed in This Session:   Safety assessing/planning     Intervention:   Safety assessing; treatment/crisis planning   Grounding strategies    Assessments completed prior to visit:  The following assessments were completed by patient for this visit:  PHQ9:   PHQ-9 SCORE 2/18/2021 8/2/2021 1/27/2022 5/2/2022 6/14/2022 6/21/2022 6/28/2022   PHQ-9 Total Score MyChart 17 (Moderately severe depression) 26 (Severe depression) 23 (Severe depression) 20 (Severe depression) 21 (Severe depression) 18 (Moderately severe depression) 20 (Severe depression)   PHQ-9 Total Score 17 - - 20 21 18 20   Some encounter information is confidential and restricted. Go to Review [employer] activity to see all data.     GAD7:   NIXON-7 SCORE 2/18/2021 8/2/2021 1/27/2022 5/2/2022 6/14/2022 6/14/2022 6/28/2022   Total Score 21 (severe anxiety) 21 (severe anxiety) 21 (severe anxiety) 19 (severe anxiety) - 21 (severe anxiety) 21 (severe anxiety)   Total Score 21 - - 19 21 21 21   Some encounter information is confidential and restricted. Go to Review [employer] activity to see all data.         ASSESSMENT: Current Emotional / Mental Status (status of significant symptoms):   Risk status (Self / Other harm or suicidal ideation)   Patient denies current fears or concerns for personal safety.   Patient denies current or recent suicidal ideation or behaviors.- Does report feeling hopeless and helpless   Patient denies current or recent homicidal ideation or behaviors.   Patient denies current or  recent self injurious behavior or ideation.   Patient denies other safety concerns.   Patient reports there has been no change in risk factors since their last session.     Patient reports there has been no change in protective factors since their last session.     A safety and risk management plan has been developed including: Patient consented to co-developed safety plan.  Safety and risk management plan was completed.  Patient agreed to use safety plan should any safety concerns arise.  A copy was given to the patient.     Appearance:   Appropriate    Eye Contact:   Fair    Psychomotor Behavior: Normal    Attitude:   Cooperative    Orientation:   All   Speech    Rate / Production: Pressured     Volume:  Normal    Mood:    Irritable  Agitated   Affect:    Labile    Thought Content:  Clear    Thought Form:  Coherent    Insight:    Fair      Medication Review:   No changes to current psychiatric medication(s)     Medication Compliance:   Yes     Changes in Health Issues:   None reported     Chemical Use Review:   Substance Use: Chemical use reviewed, no active concerns identified      Tobacco Use: No current tobacco use.      Diagnosis:  1. Major depressive disorder, recurrent episode, moderate (H)    2. Generalized anxiety disorder    3. PTSD (post-traumatic stress disorder)        Collateral Reports Completed:   Routed note to PCP    PLAN: (Patient Tasks / Therapist Tasks / Other)  Client will utilize crisis plan co created in session today        BIANKA Almanza, [facility]   7/5/2022                                                         ______________________________________________________________________                                                Individual Treatment Plan    Patient's Name: Betty Tamayo  YOB: 1990    Date of Creation: 5/27/2022  Date Treatment Plan Last Reviewed/Revised:     DSM5 Diagnoses: 296.32 (F33.1) Major Depressive Disorder, Recurrent Episode, Moderate _, 300.02  (F41.1) Generalized Anxiety Disorder or 309.81 (F43.10) Posttraumatic Stress Disorder (includes Posttraumatic Stress Disorder for Children 6 Years and Younger)  With dissociative symptoms  Psychosocial / Contextual Factors: interpersonal relationship stress  PROMIS (reviewed every 90 days):     Referral / Collaboration:  Referral to another professional/service is not indicated at this time..    Anticipated number of session for this episode of care: 50  Anticipation frequency of session: Weekly  Anticipated Duration of each session: 38-52 minutes  Treatment plan will be reviewed in 90 days or when goals have been changed.       MeasurableTreatment Goal(s) related to diagnosis / functional impairment(s)  Goal 1: Client will report increased ability to regulate emotions.      Objective #A (Client Action)    Status: New - Date: 5/27/2022      Client will learn & utilize at least 1-2 emotion regulation skills per week including observing and describing emotions; learning functions of emotions; check the facts; opposite action; self soothe; distract; IMPROVE the moment; and skills to decrease emotional vulnerability.      Intervention(s)  Therapist will teach assertiveness and interpersonal effectiveness skills.    Goal 2: Client will experience decreased symptoms of PTSD and increased sense of self and individuation         Objective #A (Client Action)        Client will identify grounding strategies for managing hyper and hypo arousal symptoms.    Client will identify trauma triggers    Client will process trauma in a safe and adaptive way    Client will demonstrate increased ability to regulate emotions and increased interpersonal effectiveness           Status: New - Date: 5/27/2022      Intervention(s)    Therapist will teach DBT strategies for grounding and regulating emotions; will create a safe space to process trauma and help client establish sense of safety and sense of self.        Patient has reviewed and  "agreed to the above plan.      BIANKA Almanza, [facility]  May 27, 2022      Plan for panic attack:    Focus on calming down body, you're brain cannot problem solve until your body is calm    - Use cold or an ice pack on face, while slowing down and holding breath  - Continue slowing down your breath while tensing and relaxing muscles  * Do these things in a calm safe space        [facility]     SAFETY PLAN:  Step 1: Warning signs / cues (Thoughts, images, mood, situation, behavior) that a crisis may be developing:    Thoughts: \"I don't matter\", \"I'm a burden\", \"I can't do this anymore\" and \"I just want this to end\"    Images: obsessive thoughts of death or dying: picturing bad things happening or picturing dying by getting shot in a shooting. Might picture getting in a car accident. Pictures children choking or getting hurt.    Thinking Processes: ruminations (can't stop thinking about my problems): ., racing thoughts and intrusive thoughts (bothersome, unwanted thoughts that come out of nowhere): .    Mood: worsening depression, hopelessness, helplessness, intense anger, intense worry, agitation and mood swings    Behaviors: isolating/withdrawing , using drugs, using alcohol, can't stop crying, impulsive, reckless behaviors (acting without thinking): ., aggression, not taking care of myself and not taking care of my responsibilities    Situations: anniversary of deaths and birthdays of loved ones who pass, relationship problems and trauma    Step 2: Coping strategies - Things I can do to take my mind off of my problems without contacting another person (relaxation technique, physical activity):    Distress Tolerance Strategies:  listen to positive and upbeat music: ., change body temperature (ice pack/cold water)  and games on phone    Physical Activities: go for a walk and yoga    Focus on helpful thoughts:  \"I will get through this\" and \"It " "always passes\"  Step 3: People and social settings that provide distraction:   Name: Friend- Abigail    Name: Sister- Everardo   Name: Pasquale's mom- Kelsy     park and swimming, walking trail   Step 4: Remind myself of people and things that are important to me and worth living for:  My kids: Paco, Vane, Gilberto. My family.  Step 5: When I am in crisis, I can ask these people to help me use my safety plan:   Name: Sister- Eva    Step 6: Making the environment safe:     be around others  Step 7: Professionals or agencies I can contact during a crisis:    Suicide Prevention Lifeline: 2-222-229-TALK (9662)    Crisis Text Line Service (available 24 hours a day, 7 days a week): Text [place] to 261157  Local Crisis Services: Recommend client go to [facility] for emergency services    Call 911 or go to my nearest emergency department.   I helped develop this safety plan and agree to use it when needed.  I have been given a copy of this plan.      Client signature _________________________________________________________________  Today s date:  7/5/2022  Completed by Provider Name/ Credentials:  BIANKA Almanza, [facility]  7/5/2022  Adapted from Safety Plan Template 2008 Anila Santoyo and Dakota German is reprinted with the express permission of the authors.  No portion of the Safety Plan Template may be reproduced without the express, written permission.  You can contact the authors at [email or web address] or [email or web address].          "

## 2022-07-05 NOTE — PATIENT INSTRUCTIONS
"[facility]                                       Betty Tamayo     SAFETY PLAN:  Step 1: Warning signs / cues (Thoughts, images, mood, situation, behavior) that a crisis may be developing:  Thoughts: \"I don't matter\", \"I'm a burden\", \"I can't do this anymore\" and \"I just want this to end\"  Images: obsessive thoughts of death or dying: picturing bad things happening or picturing dying by getting shot in a shooting. Might picture getting in a car accident. Pictures children choking or getting hurt.  Thinking Processes: ruminations (can't stop thinking about my problems): ., racing thoughts and intrusive thoughts (bothersome, unwanted thoughts that come out of nowhere): .  Mood: worsening depression, hopelessness, helplessness, intense anger, intense worry, agitation and mood swings  Behaviors: isolating/withdrawing , using drugs, using alcohol, can't stop crying, impulsive, reckless behaviors (acting without thinking): ., aggression, not taking care of myself and not taking care of my responsibilities  Situations: anniversary of deaths and birthdays of loved ones who pass, relationship problems and trauma    Step 2: Coping strategies - Things I can do to take my mind off of my problems without contacting another person (relaxation technique, physical activity):  Distress Tolerance Strategies:  listen to positive and upbeat music: ., change body temperature (ice pack/cold water)  and games on phone  Physical Activities: go for a walk and yoga  Focus on helpful thoughts:  \"I will get through this\" and \"It always passes\"  Step 3: People and social settings that provide distraction:   Name: Friend- Abigail    Name: Sister- Everardo   Name: Pasquale's mom- Kelsy   park and swimming, walking trail   Step 4: Remind myself of people and things that are important to me and worth living for:  My kids: Paco, Vane, Gilberto. My family.  Step 5: When I am in crisis, I can ask these people to help me use my " safety plan:   Name: Katharine Velasquez    Step 6: Making the environment safe:   be around others  Step 7: Professionals or agencies I can contact during a crisis:  Suicide Prevention Lifeline: 4-076-382-GKIM (3427)  Crisis Text Line Service (available 24 hours a day, 7 days a week): Text [place] to 343969  Local Crisis Services: Recommend client go to [facility] for emergency services    Call 911 or go to my nearest emergency department.   I helped develop this safety plan and agree to use it when needed.  I have been given a copy of this plan.      Client signature _________________________________________________________________  Today s date:  7/5/2022  Completed by Provider Name/ Credentials:  BIANKA Almanza, [facility]  7/5/2022  Adapted from Safety Plan Template 2008 Anila Santoyo and Dakota German is reprinted with the express permission of the authors.  No portion of the Safety Plan Template may be reproduced without the express, written permission.  You can contact the authors at [email or web address] or [email or web address].

## 2022-07-12 ENCOUNTER — VIRTUAL VISIT (OUTPATIENT)
Dept: PSYCHOLOGY | Facility: CLINIC | Age: 32
End: 2022-07-12
Payer: COMMERCIAL

## 2022-07-12 DIAGNOSIS — F33.1 MAJOR DEPRESSIVE DISORDER, RECURRENT EPISODE, MODERATE (H): Primary | ICD-10-CM

## 2022-07-12 DIAGNOSIS — F41.1 GENERALIZED ANXIETY DISORDER: ICD-10-CM

## 2022-07-12 DIAGNOSIS — F43.10 PTSD (POST-TRAUMATIC STRESS DISORDER): ICD-10-CM

## 2022-07-12 PROCEDURE — 90834 PSYTX W PT 45 MINUTES: CPT | Mod: 95 | Performed by: SOCIAL WORKER
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
## 2022-07-19 ENCOUNTER — VIRTUAL VISIT (OUTPATIENT)
Dept: PSYCHOLOGY | Facility: CLINIC | Age: 32
End: 2022-07-19
Payer: COMMERCIAL

## 2022-07-19 DIAGNOSIS — F41.1 GENERALIZED ANXIETY DISORDER: ICD-10-CM

## 2022-07-19 DIAGNOSIS — F33.1 MAJOR DEPRESSIVE DISORDER, RECURRENT EPISODE, MODERATE (H): Primary | ICD-10-CM

## 2022-07-19 DIAGNOSIS — F43.10 PTSD (POST-TRAUMATIC STRESS DISORDER): ICD-10-CM

## 2022-07-19 PROCEDURE — 90834 PSYTX W PT 45 MINUTES: CPT | Mod: 95 | Performed by: SOCIAL WORKER

## 2022-07-19 NOTE — PROGRESS NOTES
[facility]                                     Progress Note    Patient Name: Betty Tamayo  Date: 2022         Service Type: Individual      Session Start Time: 12pm  Session End Time: 12:45pm     Session Length: 45 minutes    Session #: 9    Attendees: Client attended alone    Service Modality:  Video Visit:      Provider verified identity through the following two step process.  Patient provided:  Patient     Telemedicine Visit: The patient's condition can be safely assessed and treated via synchronous audio and visual telemedicine encounter.      Reason for Telemedicine Visit: Services only offered telehealth    Originating Site (Patient Location): Patient's home    Distant Site (Provider Location): [facility] [place] [facility]    Consent:  The patient/guardian has verbally consented to: the potential risks and benefits of telemedicine (video visit) versus in person care; bill my insurance or make self-payment for services provided; and responsibility for payment of non-covered services.     Patient would like the video invitation sent by:  My Chart    Mode of Communication:  Video Conference via Amwell    As the provider I attest to compliance with applicable laws and regulations related to telemedicine.    DATA  Interactive Complexity: No  Crisis: No        Progress Since Last Session (Related to Symptoms / Goals / Homework):   Symptoms: emotional dysregulation, anxious    Homework: Achieved / completed to satisfaction      Episode of Care Goals: Minimal progress - PREPARATION (Decided to change - considering how); Intervened by negotiating a change plan and determining options / strategies for behavior change, identifying triggers, exploring social supports, and working towards setting a date to begin behavior change     Current / Ongoing Stressors and Concerns:   Client reports she has been considering trying medication again and  therapist recommended psychiatry. Also discussed behavioral activation planning schedule for client to start this week.     Plan for panic attack:    Focus on calming down body, you're brain cannot problem solve until your body is calm    - Use cold or an ice pack on face, while slowing down and holding breath  - Continue slowing down your breath while tensing and relaxing muscles  * Do these things in a calm safe space     Treatment Objective(s) Addressed in This Session:   Safety assessing/planning     Intervention:   Safety assessing; treatment/crisis planning   Grounding strategies, TIP skills using PMR    Assessments completed prior to visit:  The following assessments were completed by patient for this visit:  PHQ9:   PHQ-9 SCORE 2/18/2021 8/2/2021 1/27/2022 5/2/2022 6/14/2022 6/21/2022 6/28/2022   PHQ-9 Total Score MyChart 17 (Moderately severe depression) 26 (Severe depression) 23 (Severe depression) 20 (Severe depression) 21 (Severe depression) 18 (Moderately severe depression) 20 (Severe depression)   PHQ-9 Total Score 17 - - 20 21 18 20   Some encounter information is confidential and restricted. Go to Review [employer] activity to see all data.     GAD7:   NIXON-7 SCORE 2/18/2021 8/2/2021 1/27/2022 5/2/2022 6/14/2022 6/14/2022 6/28/2022   Total Score 21 (severe anxiety) 21 (severe anxiety) 21 (severe anxiety) 19 (severe anxiety) - 21 (severe anxiety) 21 (severe anxiety)   Total Score 21 - - 19 21 21 21   Some encounter information is confidential and restricted. Go to Review [employer] activity to see all data.         ASSESSMENT: Current Emotional / Mental Status (status of significant symptoms):   Risk status (Self / Other harm or suicidal ideation)   Patient denies current fears or concerns for personal safety.   Patient denies current or recent suicidal ideation or behaviors.- Does report feeling hopeless and helpless   Patient denies current or recent homicidal ideation or behaviors.   Patient denies  current or recent self injurious behavior or ideation.   Patient denies other safety concerns.   Patient reports there has been no change in risk factors since their last session.     Patient reports there has been no change in protective factors since their last session.     A safety and risk management plan has been developed including: Patient consented to co-developed safety plan.  Safety and risk management plan was completed.  Patient agreed to use safety plan should any safety concerns arise.  A copy was given to the patient.     Appearance:   Appropriate    Eye Contact:   Fair    Psychomotor Behavior: Normal    Attitude:   Cooperative    Orientation:   All   Speech    Rate / Production: Pressured     Volume:  Normal    Mood:    Irritable  Agitated   Affect:    Labile    Thought Content:  Clear    Thought Form:  Coherent    Insight:    Fair      Medication Review:   No changes to current psychiatric medication(s)     Medication Compliance:   Yes     Changes in Health Issues:   None reported     Chemical Use Review:   Substance Use: Chemical use reviewed, no active concerns identified      Tobacco Use: No current tobacco use.      Diagnosis:  1. Major depressive disorder, recurrent episode, moderate (H)    2. Generalized anxiety disorder    3. PTSD (post-traumatic stress disorder)        Collateral Reports Completed:   Routed note to PCP    PLAN: (Patient Tasks / Therapist Tasks / Other)  Client will utilize crisis plan as needed; use daily BA schedule daily before next appointment        BIANKA Almanza, [facility]   7/19/2022                                                         ______________________________________________________________________                                                Individual Treatment Plan    Patient's Name: Betty Tamayo  YOB: 1990    Date of Creation: 5/27/2022  Date Treatment Plan Last Reviewed/Revised:     DSM5 Diagnoses: 296.32 (F33.1) Major Depressive  Disorder, Recurrent Episode, Moderate _, 300.02 (F41.1) Generalized Anxiety Disorder or 309.81 (F43.10) Posttraumatic Stress Disorder (includes Posttraumatic Stress Disorder for Children 6 Years and Younger)  With dissociative symptoms  Psychosocial / Contextual Factors: interpersonal relationship stress  PROMIS (reviewed every 90 days):     Referral / Collaboration:  Referral to another professional/service is not indicated at this time..    Anticipated number of session for this episode of care: 50  Anticipation frequency of session: Weekly  Anticipated Duration of each session: 38-52 minutes  Treatment plan will be reviewed in 90 days or when goals have been changed.       MeasurableTreatment Goal(s) related to diagnosis / functional impairment(s)  Goal 1: Client will report increased ability to regulate emotions.      Objective #A (Client Action)    Status: New - Date: 5/27/2022      Client will learn & utilize at least 1-2 emotion regulation skills per week including observing and describing emotions; learning functions of emotions; check the facts; opposite action; self soothe; distract; IMPROVE the moment; and skills to decrease emotional vulnerability.      Intervention(s)  Therapist will teach assertiveness and interpersonal effectiveness skills.    Goal 2: Client will experience decreased symptoms of PTSD and increased sense of self and individuation         Objective #A (Client Action)        Client will identify grounding strategies for managing hyper and hypo arousal symptoms.    Client will identify trauma triggers    Client will process trauma in a safe and adaptive way    Client will demonstrate increased ability to regulate emotions and increased interpersonal effectiveness           Status: New - Date: 5/27/2022      Intervention(s)    Therapist will teach DBT strategies for grounding and regulating emotions; will create a safe space to process trauma and help client establish sense of safety and  "sense of self.        Patient has reviewed and agreed to the above plan.      BIANKA Almanza, LICSW  May 27, 2022        [facility]                                       Betty Tamayo     SAFETY PLAN:  Step 1: Warning signs / cues (Thoughts, images, mood, situation, behavior) that a crisis may be developing:    Thoughts: \"I don't matter\", \"I'm a burden\", \"I can't do this anymore\" and \"I just want this to end\"    Images: obsessive thoughts of death or dying: picturing bad things happening or picturing dying by getting shot in a shooting. Might picture getting in a car accident. Pictures children choking or getting hurt.    Thinking Processes: ruminations (can't stop thinking about my problems): ., racing thoughts and intrusive thoughts (bothersome, unwanted thoughts that come out of nowhere): .    Mood: worsening depression, hopelessness, helplessness, intense anger, intense worry, agitation and mood swings    Behaviors: isolating/withdrawing , using drugs, using alcohol, can't stop crying, impulsive, reckless behaviors (acting without thinking): ., aggression, not taking care of myself and not taking care of my responsibilities    Situations: anniversary of deaths and birthdays of loved ones who pass, relationship problems and trauma    Step 2: Coping strategies - Things I can do to take my mind off of my problems without contacting another person (relaxation technique, physical activity):    Distress Tolerance Strategies:  listen to positive and upbeat music: ., change body temperature (ice pack/cold water)  and games on phone    Physical Activities: go for a walk and yoga    Focus on helpful thoughts:  \"I will get through this\" and \"It always passes\"  Step 3: People and social settings that provide distraction:   Name: Friend- Abigail    Name: Sister- Everardo   Name: Pasquale's mom- Kelsy     park and swimming, walking trail   Step 4: Remind myself of people and things that are important to me " and worth living for:  My kids: Vane Antonio, Gilberto. My family.  Step 5: When I am in crisis, I can ask these people to help me use my safety plan:   Name: SisterRosanna Velasquez    Step 6: Making the environment safe:     be around others  Step 7: Professionals or agencies I can contact during a crisis:    Suicide Prevention Lifeline: 2-520-498-TALK (3442)    Crisis Text Line Service (available 24 hours a day, 7 days a week): Text [place] to 261202    Local Crisis Services: Recommend client go to [facility] for emergency services    Call 911 or go to my nearest emergency department.   I helped develop this safety plan and agree to use it when needed.  I have been given a copy of this plan.      Client signature _________________________________________________________________  Today s date:  7/5/2022  Completed by Provider Name/ Credentials:  BIANKA Almanza, [facility]  7/5/2022  Adapted from Safety Plan Template 2008 Anila Santoyo and Dakota German is reprinted with the express permission of the authors.  No portion of the Safety Plan Template may be reproduced without the express, written permission.  You can contact the authors at [email or web address] or [email or web address].

## 2022-07-26 ENCOUNTER — TELEPHONE (OUTPATIENT)
Dept: PSYCHOLOGY | Facility: CLINIC | Age: 32
End: 2022-07-26

## 2022-07-26 NOTE — TELEPHONE ENCOUNTER
Client did not join scheduled video appointment. Therapist called and left message with information to reschedule.       BIANKA Almanza, LICSW  7/26/2022

## 2022-08-02 ENCOUNTER — VIRTUAL VISIT (OUTPATIENT)
Dept: PSYCHOLOGY | Facility: CLINIC | Age: 32
End: 2022-08-02
Payer: COMMERCIAL

## 2022-08-02 DIAGNOSIS — F33.1 MAJOR DEPRESSIVE DISORDER, RECURRENT EPISODE, MODERATE (H): Primary | ICD-10-CM

## 2022-08-02 DIAGNOSIS — F43.10 PTSD (POST-TRAUMATIC STRESS DISORDER): ICD-10-CM

## 2022-08-02 DIAGNOSIS — F41.1 GENERALIZED ANXIETY DISORDER: ICD-10-CM

## 2022-08-02 PROCEDURE — 90834 PSYTX W PT 45 MINUTES: CPT | Mod: 95 | Performed by: SOCIAL WORKER

## 2022-08-02 ASSESSMENT — ANXIETY QUESTIONNAIRES
6. BECOMING EASILY ANNOYED OR IRRITABLE: NEARLY EVERY DAY
GAD7 TOTAL SCORE: 21
2. NOT BEING ABLE TO STOP OR CONTROL WORRYING: NEARLY EVERY DAY
4. TROUBLE RELAXING: NEARLY EVERY DAY
3. WORRYING TOO MUCH ABOUT DIFFERENT THINGS: NEARLY EVERY DAY
2. NOT BEING ABLE TO STOP OR CONTROL WORRYING: NEARLY EVERY DAY
GAD7 TOTAL SCORE: 21
8. IF YOU CHECKED OFF ANY PROBLEMS, HOW DIFFICULT HAVE THESE MADE IT FOR YOU TO DO YOUR WORK, TAKE CARE OF THINGS AT HOME, OR GET ALONG WITH OTHER PEOPLE?: EXTREMELY DIFFICULT
8. IF YOU CHECKED OFF ANY PROBLEMS, HOW DIFFICULT HAVE THESE MADE IT FOR YOU TO DO YOUR WORK, TAKE CARE OF THINGS AT HOME, OR GET ALONG WITH OTHER PEOPLE?: EXTREMELY DIFFICULT
7. FEELING AFRAID AS IF SOMETHING AWFUL MIGHT HAPPEN: NEARLY EVERY DAY
7. FEELING AFRAID AS IF SOMETHING AWFUL MIGHT HAPPEN: NEARLY EVERY DAY
GAD7 TOTAL SCORE: 21
GAD7 TOTAL SCORE: 21
IF YOU CHECKED OFF ANY PROBLEMS ON THIS QUESTIONNAIRE, HOW DIFFICULT HAVE THESE PROBLEMS MADE IT FOR YOU TO DO YOUR WORK, TAKE CARE OF THINGS AT HOME, OR GET ALONG WITH OTHER PEOPLE: EXTREMELY DIFFICULT
1. FEELING NERVOUS, ANXIOUS, OR ON EDGE: NEARLY EVERY DAY
7. FEELING AFRAID AS IF SOMETHING AWFUL MIGHT HAPPEN: NEARLY EVERY DAY
5. BEING SO RESTLESS THAT IT IS HARD TO SIT STILL: NEARLY EVERY DAY
GAD7 TOTAL SCORE: 21
IF YOU CHECKED OFF ANY PROBLEMS ON THIS QUESTIONNAIRE, HOW DIFFICULT HAVE THESE PROBLEMS MADE IT FOR YOU TO DO YOUR WORK, TAKE CARE OF THINGS AT HOME, OR GET ALONG WITH OTHER PEOPLE: EXTREMELY DIFFICULT
1. FEELING NERVOUS, ANXIOUS, OR ON EDGE: NEARLY EVERY DAY
GAD7 TOTAL SCORE: 21
6. BECOMING EASILY ANNOYED OR IRRITABLE: NEARLY EVERY DAY
4. TROUBLE RELAXING: NEARLY EVERY DAY
5. BEING SO RESTLESS THAT IT IS HARD TO SIT STILL: NEARLY EVERY DAY
7. FEELING AFRAID AS IF SOMETHING AWFUL MIGHT HAPPEN: NEARLY EVERY DAY
3. WORRYING TOO MUCH ABOUT DIFFERENT THINGS: NEARLY EVERY DAY

## 2022-08-02 ASSESSMENT — PATIENT HEALTH QUESTIONNAIRE - PHQ9
10. IF YOU CHECKED OFF ANY PROBLEMS, HOW DIFFICULT HAVE THESE PROBLEMS MADE IT FOR YOU TO DO YOUR WORK, TAKE CARE OF THINGS AT HOME, OR GET ALONG WITH OTHER PEOPLE: EXTREMELY DIFFICULT
SUM OF ALL RESPONSES TO PHQ QUESTIONS 1-9: 20
SUM OF ALL RESPONSES TO PHQ QUESTIONS 1-9: 20

## 2022-08-02 NOTE — PROGRESS NOTES
[facility]                                     Progress Note    Patient Name: Betty Tamayo  Date:        2022         Service Type: Individual      Session Start Time: 12pm  Session End Time: 12:45pm     Session Length: 45 minutes    Session #: 10    Attendees: Client attended alone    Service Modality:  Video Visit:      Provider verified identity through the following two step process.  Patient provided:  Patient     Telemedicine Visit: The patient's condition can be safely assessed and treated via synchronous audio and visual telemedicine encounter.      Reason for Telemedicine Visit: Services only offered telehealth    Originating Site (Patient Location): Patient's home    Distant Site (Provider Location): [facility]    Consent:  The patient/guardian has verbally consented to: the potential risks and benefits of telemedicine (video visit) versus in person care; bill my insurance or make self-payment for services provided; and responsibility for payment of non-covered services.     Patient would like the video invitation sent by:  My Chart    Mode of Communication:  Video Conference via Amwell    As the provider I attest to compliance with applicable laws and regulations related to telemedicine.    DATA  Interactive Complexity: No  Crisis: No        Progress Since Last Session (Related to Symptoms / Goals / Homework):   Symptoms: emotional dysregulation, anxious    Homework: Achieved / completed to satisfaction      Episode of Care Goals: Minimal progress - PREPARATION (Decided to change - considering how); Intervened by negotiating a change plan and determining options / strategies for behavior change, identifying triggers, exploring social supports, and working towards setting a date to begin behavior change     Current / Ongoing Stressors and Concerns:   Client reports she has been feeling overwhelmed by the idea of  getting doctors appointments scheduled. Reports this anxiety keeps her from taking steps towards progress. Discussed her fear of being judged.    Plan for panic attack:    Focus on calming down body, you're brain cannot problem solve until your body is calm    - Use cold or an ice pack on face, while slowing down and holding breath  - Continue slowing down your breath while tensing and relaxing muscles  * Do these things in a calm safe space     Treatment Objective(s) Addressed in This Session:   Safety assessing/planning     Intervention:   Safety assessing; treatment/crisis planning   Grounding strategies, TIP skills using PMR    Assessments completed prior to visit:  The following assessments were completed by patient for this visit:  PHQ9:   PHQ-9 SCORE 8/2/2021 1/27/2022 5/2/2022 6/14/2022 6/21/2022 6/28/2022 8/2/2022   PHQ-9 Total Score MyChart 26 (Severe depression) 23 (Severe depression) 20 (Severe depression) 21 (Severe depression) 18 (Moderately severe depression) 20 (Severe depression) 20 (Severe depression)   PHQ-9 Total Score - - 20 21 18 20 20   Some encounter information is confidential and restricted. Go to Review FlowsKidizen activity to see all data.     GAD7:   NIXON-7 SCORE 1/27/2022 5/2/2022 6/14/2022 6/14/2022 6/28/2022 8/2/2022 8/2/2022   Total Score 21 (severe anxiety) 19 (severe anxiety) - 21 (severe anxiety) 21 (severe anxiety) - 21 (severe anxiety)   Total Score - 19 21 21 21 21 21   Some encounter information is confidential and restricted. Go to Review Smart Balloon activity to see all data.         ASSESSMENT: Current Emotional / Mental Status (status of significant symptoms):   Risk status (Self / Other harm or suicidal ideation)   Patient denies current fears or concerns for personal safety.   Patient denies current or recent suicidal ideation or behaviors.- Does report feeling hopeless and helpless   Patient denies current or recent homicidal ideation or behaviors.   Patient denies current  or recent self injurious behavior or ideation.   Patient denies other safety concerns.   Patient reports there has been no change in risk factors since their last session.     Patient reports there has been no change in protective factors since their last session.     A safety and risk management plan has been developed including: Patient consented to co-developed safety plan.  Safety and risk management plan was completed.  Patient agreed to use safety plan should any safety concerns arise.  A copy was given to the patient.     Appearance:   Appropriate    Eye Contact:   Fair    Psychomotor Behavior: Normal    Attitude:   Cooperative    Orientation:   All   Speech    Rate / Production: Pressured     Volume:  Normal    Mood:    Irritable  Agitated   Affect:    Labile    Thought Content:  Clear    Thought Form:  Coherent    Insight:    Fair      Medication Review:   No changes to current psychiatric medication(s)     Medication Compliance:   Yes     Changes in Health Issues:   None reported     Chemical Use Review:   Substance Use: Chemical use reviewed, no active concerns identified      Tobacco Use: No current tobacco use.      Diagnosis:  1. Major depressive disorder, recurrent episode, moderate (H)    2. Generalized anxiety disorder    3. PTSD (post-traumatic stress disorder)        Collateral Reports Completed:   Routed note to PCP    PLAN: (Patient Tasks / Therapist Tasks / Other)  Client will utilize crisis plan as needed; use daily BA schedule daily before next appointment        BIANKA Almanza, [facility] 8/2/2022                                                         ______________________________________________________________________                                                Individual Treatment Plan    Patient's Name: Betty Tamayo  YOB: 1990    Date of Creation: 5/27/2022  Date Treatment Plan Last Reviewed/Revised:     DSM5 Diagnoses: 296.32 (F33.1) Major Depressive Disorder,  Recurrent Episode, Moderate _, 300.02 (F41.1) Generalized Anxiety Disorder or 309.81 (F43.10) Posttraumatic Stress Disorder (includes Posttraumatic Stress Disorder for Children 6 Years and Younger)  With dissociative symptoms  Psychosocial / Contextual Factors: interpersonal relationship stress  PROMIS (reviewed every 90 days):     Referral / Collaboration:  Referral to another professional/service is not indicated at this time..    Anticipated number of session for this episode of care: 50  Anticipation frequency of session: Weekly  Anticipated Duration of each session: 38-52 minutes  Treatment plan will be reviewed in 90 days or when goals have been changed.       MeasurableTreatment Goal(s) related to diagnosis / functional impairment(s)  Goal 1: Client will report increased ability to regulate emotions.      Objective #A (Client Action)    Status: New - Date: 5/27/2022      Client will learn & utilize at least 1-2 emotion regulation skills per week including observing and describing emotions; learning functions of emotions; check the facts; opposite action; self soothe; distract; IMPROVE the moment; and skills to decrease emotional vulnerability.      Intervention(s)  Therapist will teach assertiveness and interpersonal effectiveness skills.    Goal 2: Client will experience decreased symptoms of PTSD and increased sense of self and individuation         Objective #A (Client Action)        Client will identify grounding strategies for managing hyper and hypo arousal symptoms.    Client will identify trauma triggers    Client will process trauma in a safe and adaptive way    Client will demonstrate increased ability to regulate emotions and increased interpersonal effectiveness           Status: New - Date: 5/27/2022      Intervention(s)    Therapist will teach DBT strategies for grounding and regulating emotions; will create a safe space to process trauma and help client establish sense of safety and sense of  "self.        Patient has reviewed and agreed to the above plan.      BIANKA Almanza, LICSW  May 27, 2022        [facility]                                       Betty Tamayo     SAFETY PLAN:  Step 1: Warning signs / cues (Thoughts, images, mood, situation, behavior) that a crisis may be developing:    Thoughts: \"I don't matter\", \"I'm a burden\", \"I can't do this anymore\" and \"I just want this to end\"    Images: obsessive thoughts of death or dying: picturing bad things happening or picturing dying by getting shot in a shooting. Might picture getting in a car accident. Pictures children choking or getting hurt.    Thinking Processes: ruminations (can't stop thinking about my problems): ., racing thoughts and intrusive thoughts (bothersome, unwanted thoughts that come out of nowhere): .    Mood: worsening depression, hopelessness, helplessness, intense anger, intense worry, agitation and mood swings    Behaviors: isolating/withdrawing , using drugs, using alcohol, can't stop crying, impulsive, reckless behaviors (acting without thinking): ., aggression, not taking care of myself and not taking care of my responsibilities    Situations: anniversary of deaths and birthdays of loved ones who pass, relationship problems and trauma    Step 2: Coping strategies - Things I can do to take my mind off of my problems without contacting another person (relaxation technique, physical activity):    Distress Tolerance Strategies:  listen to positive and upbeat music: ., change body temperature (ice pack/cold water)  and games on phone    Physical Activities: go for a walk and yoga    Focus on helpful thoughts:  \"I will get through this\" and \"It always passes\"  Step 3: People and social settings that provide distraction:   Name: Friend- Abigail    Name: Sister- Everardo   Name: Pasquale's mom- Kelsy     park and swimming, walking trail   Step 4: Remind myself of people and things that are important to me and worth " living for:  My kids: Vane Antonio, Gilberto. My family.  Step 5: When I am in crisis, I can ask these people to help me use my safety plan:   Name: SisterRosanna Velasquez    Step 6: Making the environment safe:     be around others  Step 7: Professionals or agencies I can contact during a crisis:    Suicide Prevention Lifeline: 5-595-805-TALK (9053)    Crisis Text Line Service (available 24 hours a day, 7 days a week): Text [place] to 120627    Local Crisis Services: Recommend client go to [facility] for emergency services    Call 911 or go to my nearest emergency department.   I helped develop this safety plan and agree to use it when needed.  I have been given a copy of this plan.      Client signature _________________________________________________________________  Today s date:  7/5/2022  Completed by Provider Name/ Credentials:  BIANKA Almanza, [facility]  7/5/2022  Adapted from Safety Plan Template 2008 Anila Santoyo and Dakota German is reprinted with the express permission of the authors.  No portion of the Safety Plan Template may be reproduced without the express, written permission.  You can contact the authors at [email or web address] or [email or web address].

## 2022-08-09 ENCOUNTER — VIRTUAL VISIT (OUTPATIENT)
Dept: PSYCHOLOGY | Facility: CLINIC | Age: 32
End: 2022-08-09
Payer: COMMERCIAL

## 2022-08-09 DIAGNOSIS — F33.1 MAJOR DEPRESSIVE DISORDER, RECURRENT EPISODE, MODERATE (H): Primary | ICD-10-CM

## 2022-08-09 DIAGNOSIS — F43.10 PTSD (POST-TRAUMATIC STRESS DISORDER): ICD-10-CM

## 2022-08-09 DIAGNOSIS — F41.1 GENERALIZED ANXIETY DISORDER: ICD-10-CM

## 2022-08-09 PROCEDURE — 90834 PSYTX W PT 45 MINUTES: CPT | Mod: 95 | Performed by: SOCIAL WORKER

## 2022-08-09 ASSESSMENT — PATIENT HEALTH QUESTIONNAIRE - PHQ9
SUM OF ALL RESPONSES TO PHQ QUESTIONS 1-9: 12
10. IF YOU CHECKED OFF ANY PROBLEMS, HOW DIFFICULT HAVE THESE PROBLEMS MADE IT FOR YOU TO DO YOUR WORK, TAKE CARE OF THINGS AT HOME, OR GET ALONG WITH OTHER PEOPLE: VERY DIFFICULT
SUM OF ALL RESPONSES TO PHQ QUESTIONS 1-9: 12

## 2022-08-12 ENCOUNTER — VIRTUAL VISIT (OUTPATIENT)
Dept: PSYCHOLOGY | Facility: CLINIC | Age: 32
End: 2022-08-12
Payer: COMMERCIAL

## 2022-08-12 DIAGNOSIS — F41.1 GENERALIZED ANXIETY DISORDER: ICD-10-CM

## 2022-08-12 DIAGNOSIS — F43.10 PTSD (POST-TRAUMATIC STRESS DISORDER): ICD-10-CM

## 2022-08-12 DIAGNOSIS — F33.1 MAJOR DEPRESSIVE DISORDER, RECURRENT EPISODE, MODERATE (H): Primary | ICD-10-CM

## 2022-08-12 PROCEDURE — 90837 PSYTX W PT 60 MINUTES: CPT | Mod: 95 | Performed by: MARRIAGE & FAMILY THERAPIST
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
## 2022-08-16 ENCOUNTER — VIRTUAL VISIT (OUTPATIENT)
Dept: PSYCHOLOGY | Facility: CLINIC | Age: 32
End: 2022-08-16
Payer: COMMERCIAL

## 2022-08-16 DIAGNOSIS — F43.10 PTSD (POST-TRAUMATIC STRESS DISORDER): ICD-10-CM

## 2022-08-16 DIAGNOSIS — F41.1 GENERALIZED ANXIETY DISORDER: ICD-10-CM

## 2022-08-16 DIAGNOSIS — F33.1 MAJOR DEPRESSIVE DISORDER, RECURRENT EPISODE, MODERATE (H): Primary | ICD-10-CM

## 2022-08-16 PROCEDURE — 90834 PSYTX W PT 45 MINUTES: CPT | Mod: 95 | Performed by: SOCIAL WORKER

## 2022-08-16 NOTE — PROGRESS NOTES
[facility]                                     Progress Note    Patient Name: Betty Tamayo  Date:        2022         Service Type: Individual      Session Start Time: 12pm  Session End Time: 12:45pm     Session Length: 45 minutes    Session #: 12    Attendees: Client attended alone    Service Modality:  Video Visit:      Provider verified identity through the following two step process.  Patient provided:  Patient     Telemedicine Visit: The patient's condition can be safely assessed and treated via synchronous audio and visual telemedicine encounter.      Reason for Telemedicine Visit: Services only offered telehealth    Originating Site (Patient Location): Patient's home    Distant Site (Provider Location): [facility] [place] [facility]    Consent:  The patient/guardian has verbally consented to: the potential risks and benefits of telemedicine (video visit) versus in person care; bill my insurance or make self-payment for services provided; and responsibility for payment of non-covered services.     Patient would like the video invitation sent by:  My Chart    Mode of Communication:  Video Conference via Amwell    As the provider I attest to compliance with applicable laws and regulations related to telemedicine.    DATA  Interactive Complexity: No  Crisis: No        Progress Since Last Session (Related to Symptoms / Goals / Homework):   Symptoms: emotional dysregulation but some improvement over the past week    Homework: Achieved / completed to satisfaction      Episode of Care Goals: Minimal progress - PREPARATION (Decided to change - considering how); Intervened by negotiating a change plan and determining options / strategies for behavior change, identifying triggers, exploring social supports, and working towards setting a date to begin behavior change     Current / Ongoing Stressors and Concerns:   Client reports she has had a good  week. She stopped smoking, has reduced caloric beverages and focusing on drinking more water and more movement during her day. She reports two experiences of agitation/dysregulation and reports both times she was aware of it and more easily able to calm down using STOP skill.     Plan for panic attack:    Focus on calming down body, you're brain cannot problem solve until your body is calm    - Use cold or an ice pack on face, while slowing down and holding breath  - Continue slowing down your breath while tensing and relaxing muscles  * Do these things in a calm safe space     Treatment Objective(s) Addressed in This Session:   Safety assessing/planning     Intervention:   Safety assessing; treatment/crisis planning   DBT: Tracking emotions; STOP skills; Grounding strategies, TIP skills using PMR    Assessments completed prior to visit:  The following assessments were completed by patient for this visit:  PHQ9:   PHQ-9 SCORE 1/27/2022 5/2/2022 6/14/2022 6/21/2022 6/28/2022 8/2/2022 8/9/2022   PHQ-9 Total Score MyChart 23 (Severe depression) 20 (Severe depression) 21 (Severe depression) 18 (Moderately severe depression) 20 (Severe depression) 20 (Severe depression) 12 (Moderate depression)   PHQ-9 Total Score - 20 21 18 20 20 12   Some encounter information is confidential and restricted. Go to Review [employer] activity to see all data.     GAD7:   NIXON-7 SCORE 1/27/2022 5/2/2022 6/14/2022 6/14/2022 6/28/2022 8/2/2022 8/2/2022   Total Score 21 (severe anxiety) 19 (severe anxiety) - 21 (severe anxiety) 21 (severe anxiety) - 21 (severe anxiety)   Total Score - 19 21 21 21 21 21   Some encounter information is confidential and restricted. Go to Review [employer] activity to see all data.         ASSESSMENT: Current Emotional / Mental Status (status of significant symptoms):   Risk status (Self / Other harm or suicidal ideation)   Patient denies current fears or concerns for personal safety.   Patient denies current or  recent suicidal ideation or behaviors.- Does report feeling hopeless and helpless   Patient denies current or recent homicidal ideation or behaviors.   Patient denies current or recent self injurious behavior or ideation.   Patient denies other safety concerns.   Patient reports there has been no change in risk factors since their last session.     Patient reports there has been no change in protective factors since their last session.     A safety and risk management plan has been developed including: Patient consented to co-developed safety plan.  Safety and risk management plan was completed.  Patient agreed to use safety plan should any safety concerns arise.  A copy was given to the patient.     Appearance:   Appropriate    Eye Contact:   Fair    Psychomotor Behavior: Normal    Attitude:   Cooperative    Orientation:   All   Speech    Rate / Production: Pressured     Volume:  Normal    Mood:    Irritable  Agitated   Affect:    Labile    Thought Content:  Clear    Thought Form:  Coherent    Insight:    Fair      Medication Review:   No changes to current psychiatric medication(s)     Medication Compliance:   Yes     Changes in Health Issues:   None reported     Chemical Use Review:   Substance Use: Chemical use reviewed, no active concerns identified      Tobacco Use: No current tobacco use.      Diagnosis:  1. Major depressive disorder, recurrent episode, moderate (H)    2. Generalized anxiety disorder    3. PTSD (post-traumatic stress disorder)        Collateral Reports Completed:   Routed note to PCP    PLAN: (Patient Tasks / Therapist Tasks / Other)  Client will utilize crisis plan as needed; use daily BA schedule daily before next appointment; track emotions and dysregulation daily        BIANKA Almanza, LICSW     8/16/2022                                                         ______________________________________________________________________                                                 Individual Treatment Plan    Patient's Name: Betty Tamayo  YOB: 1990    Date of Creation: 5/27/2022  Date Treatment Plan Last Reviewed/Revised:     DSM5 Diagnoses: 296.32 (F33.1) Major Depressive Disorder, Recurrent Episode, Moderate _, 300.02 (F41.1) Generalized Anxiety Disorder or 309.81 (F43.10) Posttraumatic Stress Disorder (includes Posttraumatic Stress Disorder for Children 6 Years and Younger)  With dissociative symptoms  Psychosocial / Contextual Factors: interpersonal relationship stress  PROMIS (reviewed every 90 days):     Referral / Collaboration:  Referral to another professional/service is not indicated at this time..    Anticipated number of session for this episode of care: 50  Anticipation frequency of session: Weekly  Anticipated Duration of each session: 38-52 minutes  Treatment plan will be reviewed in 90 days or when goals have been changed.       MeasurableTreatment Goal(s) related to diagnosis / functional impairment(s)  Goal 1: Client will report increased ability to regulate emotions.      Objective #A (Client Action)    Status: New - Date: 5/27/2022      Client will learn & utilize at least 1-2 emotion regulation skills per week including observing and describing emotions; learning functions of emotions; check the facts; opposite action; self soothe; distract; IMPROVE the moment; and skills to decrease emotional vulnerability.      Intervention(s)  Therapist will teach assertiveness and interpersonal effectiveness skills.    Goal 2: Client will experience decreased symptoms of PTSD and increased sense of self and individuation         Objective #A (Client Action)        Client will identify grounding strategies for managing hyper and hypo arousal symptoms.    Client will identify trauma triggers    Client will process trauma in a safe and adaptive way    Client will demonstrate increased ability to regulate emotions and increased interpersonal  "effectiveness           Status: New - Date: 5/27/2022      Intervention(s)    Therapist will teach DBT strategies for grounding and regulating emotions; will create a safe space to process trauma and help client establish sense of safety and sense of self.        Patient has reviewed and agreed to the above plan.      BIANKA Almanza, [facility]  May 27, 2022        [facility]                                       Betty M Roni     SAFETY PLAN:  Step 1: Warning signs / cues (Thoughts, images, mood, situation, behavior) that a crisis may be developing:    Thoughts: \"I don't matter\", \"I'm a burden\", \"I can't do this anymore\" and \"I just want this to end\"    Images: obsessive thoughts of death or dying: picturing bad things happening or picturing dying by getting shot in a shooting. Might picture getting in a car accident. Pictures children choking or getting hurt.    Thinking Processes: ruminations (can't stop thinking about my problems): ., racing thoughts and intrusive thoughts (bothersome, unwanted thoughts that come out of nowhere): .    Mood: worsening depression, hopelessness, helplessness, intense anger, intense worry, agitation and mood swings    Behaviors: isolating/withdrawing , using drugs, using alcohol, can't stop crying, impulsive, reckless behaviors (acting without thinking): ., aggression, not taking care of myself and not taking care of my responsibilities    Situations: anniversary of deaths and birthdays of loved ones who pass, relationship problems and trauma    Step 2: Coping strategies - Things I can do to take my mind off of my problems without contacting another person (relaxation technique, physical activity):    Distress Tolerance Strategies:  listen to positive and upbeat music: ., change body temperature (ice pack/cold water)  and games on phone    Physical Activities: go for a walk and yoga    Focus on helpful thoughts:  \"I will get through this\" and \"It always " "passes\"  Step 3: People and social settings that provide distraction:   Name: Friend- Abigail    Name: Sister- Everardo   Name: Pasquale's mom- Kelsy     park and swimming, walking trail   Step 4: Remind myself of people and things that are important to me and worth living for:  My kids: Paco, Vane, Gilberto. My family.  Step 5: When I am in crisis, I can ask these people to help me use my safety plan:   Name: Sister- Eva    Step 6: Making the environment safe:     be around others  Step 7: Professionals or agencies I can contact during a crisis:    Suicide Prevention Lifeline: 5-996-919-TALK (4150)    Crisis Text Line Service (available 24 hours a day, 7 days a week): Text [place] to 491609    Local Crisis Services: Recommend client go to [facility] for emergency services    Call 911 or go to my nearest emergency department.   I helped develop this safety plan and agree to use it when needed.  I have been given a copy of this plan.      Client signature _________________________________________________________________  Today s date:  7/5/2022  Completed by Provider Name/ Credentials:  BIANKA Almanza, [facility]  7/5/2022  Adapted from Safety Plan Template 2008 Anila Santoyo and Dakota German is reprinted with the express permission of the authors.  No portion of the Safety Plan Template may be reproduced without the express, written permission.  You can contact the authors at [email or web address] or [email or web address].  "

## 2022-08-17 ENCOUNTER — VIRTUAL VISIT (OUTPATIENT)
Dept: PSYCHOLOGY | Facility: CLINIC | Age: 32
End: 2022-08-17
Payer: COMMERCIAL

## 2022-08-17 DIAGNOSIS — F31.81 BIPOLAR II DISORDER (H): Primary | ICD-10-CM

## 2022-08-17 DIAGNOSIS — F41.1 GENERALIZED ANXIETY DISORDER: ICD-10-CM

## 2022-08-17 DIAGNOSIS — F43.10 PTSD (POST-TRAUMATIC STRESS DISORDER): ICD-10-CM

## 2022-08-17 PROCEDURE — 90837 PSYTX W PT 60 MINUTES: CPT | Mod: 95 | Performed by: MARRIAGE & FAMILY THERAPIST
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
## 2022-08-24 ENCOUNTER — VIRTUAL VISIT (OUTPATIENT)
Dept: PSYCHOLOGY | Facility: CLINIC | Age: 32
End: 2022-08-24
Payer: COMMERCIAL

## 2022-08-24 DIAGNOSIS — F41.1 GENERALIZED ANXIETY DISORDER: ICD-10-CM

## 2022-08-24 DIAGNOSIS — F43.10 PTSD (POST-TRAUMATIC STRESS DISORDER): Primary | ICD-10-CM

## 2022-08-24 DIAGNOSIS — F33.1 MAJOR DEPRESSIVE DISORDER, RECURRENT EPISODE, MODERATE (H): ICD-10-CM

## 2022-08-24 PROCEDURE — 90834 PSYTX W PT 45 MINUTES: CPT | Mod: 95 | Performed by: SOCIAL WORKER

## 2022-08-24 NOTE — PROGRESS NOTES
[facility]                                     Progress Note    Patient Name: Betty Tamayo  Date:        2022         Service Type: Individual      Session Start Time: 11am  Session End Time: 11:45am     Session Length: 45 minutes    Session #: 13    Attendees: Client attended alone    Service Modality:  Video Visit:      Provider verified identity through the following two step process.  Patient provided:  Patient     Telemedicine Visit: The patient's condition can be safely assessed and treated via synchronous audio and visual telemedicine encounter.      Reason for Telemedicine Visit: Services only offered telehealth    Originating Site (Patient Location): Patient's home    Distant Site (Provider Location): [facility] [place] [facility]    Consent:  The patient/guardian has verbally consented to: the potential risks and benefits of telemedicine (video visit) versus in person care; bill my insurance or make self-payment for services provided; and responsibility for payment of non-covered services.     Patient would like the video invitation sent by:  My Chart    Mode of Communication:  Video Conference via Amwell    As the provider I attest to compliance with applicable laws and regulations related to telemedicine.    DATA  Interactive Complexity: No  Crisis: No        Progress Since Last Session (Related to Symptoms / Goals / Homework):   Symptoms: some emotion dysregulation, felt disappointed and stuck this week    Homework: Achieved / completed to satisfaction      Episode of Care Goals: Minimal progress - PREPARATION (Decided to change - considering how); Intervened by negotiating a change plan and determining options / strategies for behavior change, identifying triggers, exploring social supports, and working towards setting a date to begin behavior change     Current / Ongoing Stressors and Concerns:   Client reports she has had a  tough few days. She felt disappointed and defeated that she hadn't lost as much weight as she thought she did. Reports she also realized she forgot to take her medication. Discussed refocusing on PLEASE skills and practicing STOP skill.    Plan for panic attack:    Focus on calming down body, you're brain cannot problem solve until your body is calm    - Use cold or an ice pack on face, while slowing down and holding breath  - Continue slowing down your breath while tensing and relaxing muscles  * Do these things in a calm safe space     Treatment Objective(s) Addressed in This Session:   Safety assessing/planning     Intervention:   Safety assessing; treatment/crisis planning   DBT: Tracking emotions; STOP skills; Grounding strategies, TIP skills using PMR    Assessments completed prior to visit:  The following assessments were completed by patient for this visit:  PHQ9:   PHQ-9 SCORE 1/27/2022 5/2/2022 6/14/2022 6/21/2022 6/28/2022 8/2/2022 8/9/2022   PHQ-9 Total Score MyChart 23 (Severe depression) 20 (Severe depression) 21 (Severe depression) 18 (Moderately severe depression) 20 (Severe depression) 20 (Severe depression) 12 (Moderate depression)   PHQ-9 Total Score - 20 21 18 20 20 12   Some encounter information is confidential and restricted. Go to Review [employer] activity to see all data.     GAD7:   NIXON-7 SCORE 1/27/2022 5/2/2022 6/14/2022 6/14/2022 6/28/2022 8/2/2022 8/2/2022   Total Score 21 (severe anxiety) 19 (severe anxiety) - 21 (severe anxiety) 21 (severe anxiety) - 21 (severe anxiety)   Total Score - 19 21 21 21 21 21   Some encounter information is confidential and restricted. Go to Review [employer]she[employer] activity to see all data.         ASSESSMENT: Current Emotional / Mental Status (status of significant symptoms):   Risk status (Self / Other harm or suicidal ideation)   Patient denies current fears or concerns for personal safety.   Patient denies current or recent suicidal ideation or behaviors.-  Does report feeling hopeless and helpless   Patient denies current or recent homicidal ideation or behaviors.   Patient denies current or recent self injurious behavior or ideation.   Patient denies other safety concerns.   Patient reports there has been no change in risk factors since their last session.     Patient reports there has been no change in protective factors since their last session.     A safety and risk management plan has been developed including: Patient consented to co-developed safety plan.  Safety and risk management plan was completed.  Patient agreed to use safety plan should any safety concerns arise.  A copy was given to the patient.     Appearance:   Appropriate    Eye Contact:   Fair    Psychomotor Behavior: Normal    Attitude:   Cooperative    Orientation:   All   Speech    Rate / Production: Pressured     Volume:  Normal    Mood:    Irritable  Agitated   Affect:    Labile    Thought Content:  Clear    Thought Form:  Coherent    Insight:    Fair      Medication Review:   No changes to current psychiatric medication(s)     Medication Compliance:   Yes     Changes in Health Issues:   None reported     Chemical Use Review:   Substance Use: Chemical use reviewed, no active concerns identified      Tobacco Use: No current tobacco use.      Diagnosis:  1. PTSD (post-traumatic stress disorder)    2. Generalized anxiety disorder    3. Major depressive disorder, recurrent episode, moderate (H)        Collateral Reports Completed:   Routed note to PCP    PLAN: (Patient Tasks / Therapist Tasks / Other)  Client will utilize crisis plan as needed; use daily BA schedule daily before next appointment; track emotions and dysregulation daily        BIANKA Almanza, LICSW     8/24/2022                                                         ______________________________________________________________________                                                Individual Treatment Plan    Patient's Name:  Betty Tamayo  YOB: 1990    Date of Creation: 5/27/2022  Date Treatment Plan Last Reviewed/Revised:     DSM5 Diagnoses: 296.32 (F33.1) Major Depressive Disorder, Recurrent Episode, Moderate _, 300.02 (F41.1) Generalized Anxiety Disorder or 309.81 (F43.10) Posttraumatic Stress Disorder (includes Posttraumatic Stress Disorder for Children 6 Years and Younger)  With dissociative symptoms  Psychosocial / Contextual Factors: interpersonal relationship stress  PROMIS (reviewed every 90 days):     Referral / Collaboration:  Referral to another professional/service is not indicated at this time..    Anticipated number of session for this episode of care: 50  Anticipation frequency of session: Weekly  Anticipated Duration of each session: 38-52 minutes  Treatment plan will be reviewed in 90 days or when goals have been changed.       MeasurableTreatment Goal(s) related to diagnosis / functional impairment(s)  Goal 1: Client will report increased ability to regulate emotions.      Objective #A (Client Action)    Status: New - Date: 5/27/2022      Client will learn & utilize at least 1-2 emotion regulation skills per week including observing and describing emotions; learning functions of emotions; check the facts; opposite action; self soothe; distract; IMPROVE the moment; and skills to decrease emotional vulnerability.      Intervention(s)  Therapist will teach assertiveness and interpersonal effectiveness skills.    Goal 2: Client will experience decreased symptoms of PTSD and increased sense of self and individuation         Objective #A (Client Action)        Client will identify grounding strategies for managing hyper and hypo arousal symptoms.    Client will identify trauma triggers    Client will process trauma in a safe and adaptive way    Client will demonstrate increased ability to regulate emotions and increased interpersonal effectiveness           Status: New -  "Date: 5/27/2022      Intervention(s)    Therapist will teach DBT strategies for grounding and regulating emotions; will create a safe space to process trauma and help client establish sense of safety and sense of self.        Patient has reviewed and agreed to the above plan.      BIANKA Almanza, [facility]  May 27, 2022        [facility]                                       Betty Tamayo     SAFETY PLAN:  Step 1: Warning signs / cues (Thoughts, images, mood, situation, behavior) that a crisis may be developing:    Thoughts: \"I don't matter\", \"I'm a burden\", \"I can't do this anymore\" and \"I just want this to end\"    Images: obsessive thoughts of death or dying: picturing bad things happening or picturing dying by getting shot in a shooting. Might picture getting in a car accident. Pictures children choking or getting hurt.    Thinking Processes: ruminations (can't stop thinking about my problems): ., racing thoughts and intrusive thoughts (bothersome, unwanted thoughts that come out of nowhere): .    Mood: worsening depression, hopelessness, helplessness, intense anger, intense worry, agitation and mood swings    Behaviors: isolating/withdrawing , using drugs, using alcohol, can't stop crying, impulsive, reckless behaviors (acting without thinking): ., aggression, not taking care of myself and not taking care of my responsibilities    Situations: anniversary of deaths and birthdays of loved ones who pass, relationship problems and trauma    Step 2: Coping strategies - Things I can do to take my mind off of my problems without contacting another person (relaxation technique, physical activity):    Distress Tolerance Strategies:  listen to positive and upbeat music: ., change body temperature (ice pack/cold water)  and games on phone    Physical Activities: go for a walk and yoga    Focus on helpful thoughts:  \"I will get through this\" and \"It always passes\"  Step 3: People and social settings " that provide distraction:   Name: Friend- Abigail    Name: SisterRosanna Mcgee   Name: Pasquale's mom- Kelsy     park and swimming, walking trail   Step 4: Remind myself of people and things that are important to me and worth living for:  My kids: Paco, Vane, Gilberto. My family.  Step 5: When I am in crisis, I can ask these people to help me use my safety plan:   Name: SisterRosanna Velasquez    Step 6: Making the environment safe:     be around others  Step 7: Professionals or agencies I can contact during a crisis:    Suicide Prevention Lifeline: 7-043-137-TALK (8310)    Crisis Text Line Service (available 24 hours a day, 7 days a week): Text [place] to 165685    Local Crisis Services: Recommend client go to [facility] for emergency services    Call 911 or go to my nearest emergency department.   I helped develop this safety plan and agree to use it when needed.  I have been given a copy of this plan.      Client signature _________________________________________________________________  Today s date:  7/5/2022  Completed by Provider Name/ Credentials:  BIANKA Almanza, [facility]  7/5/2022  Adapted from Safety Plan Template 2008 Anila Santoyo and Dakota German is reprinted with the express permission of the authors.  No portion of the Safety Plan Template may be reproduced without the express, written permission.  You can contact the authors at [email or web address] or [email or web address].

## 2022-08-30 ENCOUNTER — VIRTUAL VISIT (OUTPATIENT)
Dept: PSYCHOLOGY | Facility: CLINIC | Age: 32
End: 2022-08-30
Payer: COMMERCIAL

## 2022-08-30 DIAGNOSIS — F43.10 PTSD (POST-TRAUMATIC STRESS DISORDER): Primary | ICD-10-CM

## 2022-08-30 DIAGNOSIS — F41.1 GENERALIZED ANXIETY DISORDER: ICD-10-CM

## 2022-08-30 DIAGNOSIS — F33.1 MAJOR DEPRESSIVE DISORDER, RECURRENT EPISODE, MODERATE (H): ICD-10-CM

## 2022-08-30 PROCEDURE — 90834 PSYTX W PT 45 MINUTES: CPT | Mod: 95 | Performed by: SOCIAL WORKER

## 2022-08-30 NOTE — PROGRESS NOTES
[facility]                                     Progress Note    Patient Name: Betty Tamayo  Date:        2022         Service Type: Individual      Session Start Time: 11am  Session End Time: 11:45am     Session Length: 45 minutes    Session #: 14    Attendees: Client attended alone    Service Modality:  Video Visit:      Provider verified identity through the following two step process.  Patient provided:  Patient     Telemedicine Visit: The patient's condition can be safely assessed and treated via synchronous audio and visual telemedicine encounter.      Reason for Telemedicine Visit: Services only offered telehealth    Originating Site (Patient Location): Patient's home    Distant Site (Provider Location): [facility] [place] [facility]    Consent:  The patient/guardian has verbally consented to: the potential risks and benefits of telemedicine (video visit) versus in person care; bill my insurance or make self-payment for services provided; and responsibility for payment of non-covered services.     Patient would like the video invitation sent by:  My Chart    Mode of Communication:  Video Conference via Amwell    As the provider I attest to compliance with applicable laws and regulations related to telemedicine.    DATA  Interactive Complexity: No  Crisis: No        Progress Since Last Session (Related to Symptoms / Goals / Homework):   Symptoms: some emotion dysregulation, feeling stuck    Homework: Achieved / completed to satisfaction      Episode of Care Goals: Minimal progress - PREPARATION (Decided to change - considering how); Intervened by negotiating a change plan and determining options / strategies for behavior change, identifying triggers, exploring social supports, and working towards setting a date to begin behavior change     Current / Ongoing Stressors and Concerns:   Client reports she has  Been slightly better but still  feeling ups and owns this week. Discussed refocusing on PLEASE skills and practicing STOP skill. Also further discussed recommendation that client participate in adherent DBT program.     Plan for panic attack:    Focus on calming down body, you're brain cannot problem solve until your body is calm    - Use cold or an ice pack on face, while slowing down and holding breath  - Continue slowing down your breath while tensing and relaxing muscles  * Do these things in a calm safe space     Treatment Objective(s) Addressed in This Session:   Safety assessing/planning     Intervention:   Safety assessing; treatment/crisis planning   DBT: Tracking emotions; STOP skills; Grounding strategies, TIP skills using PMR   Recommend DBT adherant program    Assessments completed prior to visit:  The following assessments were completed by patient for this visit:  PHQ9:   PHQ-9 SCORE 1/27/2022 5/2/2022 6/14/2022 6/21/2022 6/28/2022 8/2/2022 8/9/2022   PHQ-9 Total Score MyChart 23 (Severe depression) 20 (Severe depression) 21 (Severe depression) 18 (Moderately severe depression) 20 (Severe depression) 20 (Severe depression) 12 (Moderate depression)   PHQ-9 Total Score - 20 21 18 20 20 12   Some encounter information is confidential and restricted. Go to Review [employer] activity to see all data.     GAD7:   NIXON-7 SCORE 1/27/2022 5/2/2022 6/14/2022 6/14/2022 6/28/2022 8/2/2022 8/2/2022   Total Score 21 (severe anxiety) 19 (severe anxiety) - 21 (severe anxiety) 21 (severe anxiety) - 21 (severe anxiety)   Total Score - 19 21 21 21 21 21   Some encounter information is confidential and restricted. Go to Review [employer]she[employer] activity to see all data.         ASSESSMENT: Current Emotional / Mental Status (status of significant symptoms):   Risk status (Self / Other harm or suicidal ideation)   Patient denies current fears or concerns for personal safety.   Patient denies current or recent suicidal ideation or behaviors.- Does report feeling  hopeless and helpless   Patient denies current or recent homicidal ideation or behaviors.   Patient denies current or recent self injurious behavior or ideation.   Patient denies other safety concerns.   Patient reports there has been no change in risk factors since their last session.     Patient reports there has been no change in protective factors since their last session.     A safety and risk management plan has been developed including: Patient consented to co-developed safety plan.  Safety and risk management plan was completed.  Patient agreed to use safety plan should any safety concerns arise.  A copy was given to the patient.     Appearance:   Appropriate    Eye Contact:   Fair    Psychomotor Behavior: Normal    Attitude:   Cooperative    Orientation:   All   Speech    Rate / Production: Pressured     Volume:  Normal    Mood:    Irritable  Agitated   Affect:    Labile    Thought Content:  Clear    Thought Form:  Coherent    Insight:    Fair      Medication Review:   No changes to current psychiatric medication(s)     Medication Compliance:   Yes     Changes in Health Issues:   None reported     Chemical Use Review:   Substance Use: Chemical use reviewed, no active concerns identified      Tobacco Use: No current tobacco use.      Diagnosis:  1. PTSD (post-traumatic stress disorder)    2. Generalized anxiety disorder    3. Major depressive disorder, recurrent episode, moderate (H)        Collateral Reports Completed:   Routed note to PCP    PLAN: (Patient Tasks / Therapist Tasks / Other)  Client will utilize crisis plan as needed; use daily BA schedule daily before next appointment; track emotions and dysregulation daily; client to call and schedule intake for DBT program        BIANKA Almanza, LICSW     8/30/2022                                                         ______________________________________________________________________                                                Individual  Treatment Plan    Patient's Name: Betty Tamayo  YOB: 1990    Date of Creation: 5/27/2022  Date Treatment Plan Last Reviewed/Revised: 8/30/2022    DSM5 Diagnoses: 296.32 (F33.1) Major Depressive Disorder, Recurrent Episode, Moderate _, 300.02 (F41.1) Generalized Anxiety Disorder or 309.81 (F43.10) Posttraumatic Stress Disorder (includes Posttraumatic Stress Disorder for Children 6 Years and Younger)  With dissociative symptoms  Psychosocial / Contextual Factors: interpersonal relationship stress  PROMIS (reviewed every 90 days):     Referral / Collaboration:  Referral to another professional/service is not indicated at this time..    Anticipated number of session for this episode of care: 50  Anticipation frequency of session: Weekly  Anticipated Duration of each session: 38-52 minutes  Treatment plan will be reviewed in 90 days or when goals have been changed.       MeasurableTreatment Goal(s) related to diagnosis / functional impairment(s)  Goal 1: Client will report increased ability to regulate emotions.      Objective #A (Client Action)    Status: New - Date: 5/27/2022, continue 8/30/2022        Client will learn & utilize at least 1-2 emotion regulation skills per week including observing and describing emotions; learning functions of emotions; check the facts; opposite action; self soothe; distract; IMPROVE the moment; and skills to decrease emotional vulnerability.      Intervention(s)  Therapist will teach assertiveness and interpersonal effectiveness skills.    Goal 2: Client will experience decreased symptoms of PTSD and increased sense of self and individuation         Objective #A (Client Action)        Client will identify grounding strategies for managing hyper and hypo arousal symptoms.    Client will identify trauma triggers    Client will process trauma in a safe and adaptive way    Client will demonstrate increased ability to regulate emotions and increased interpersonal  "effectiveness           Status: New - Date: 5/27/2022, continue 8/30/2022      Intervention(s)    Therapist will teach DBT strategies for grounding and regulating emotions; will create a safe space to process trauma and help client establish sense of safety and sense of self.        Patient has reviewed and agreed to the above plan.      BIANKA Almanza, [facility]  May 27, 2022, 8/30/2022        [facility]                                       Betty Tamayo     SAFETY PLAN:  Step 1: Warning signs / cues (Thoughts, images, mood, situation, behavior) that a crisis may be developing:    Thoughts: \"I don't matter\", \"I'm a burden\", \"I can't do this anymore\" and \"I just want this to end\"    Images: obsessive thoughts of death or dying: picturing bad things happening or picturing dying by getting shot in a shooting. Might picture getting in a car accident. Pictures children choking or getting hurt.    Thinking Processes: ruminations (can't stop thinking about my problems): ., racing thoughts and intrusive thoughts (bothersome, unwanted thoughts that come out of nowhere): .    Mood: worsening depression, hopelessness, helplessness, intense anger, intense worry, agitation and mood swings    Behaviors: isolating/withdrawing , using drugs, using alcohol, can't stop crying, impulsive, reckless behaviors (acting without thinking): ., aggression, not taking care of myself and not taking care of my responsibilities    Situations: anniversary of deaths and birthdays of loved ones who pass, relationship problems and trauma    Step 2: Coping strategies - Things I can do to take my mind off of my problems without contacting another person (relaxation technique, physical activity):    Distress Tolerance Strategies:  listen to positive and upbeat music: ., change body temperature (ice pack/cold water)  and games on phone    Physical Activities: go for a walk and yoga    Focus on helpful thoughts:  \"I will get through " "this\" and \"It always passes\"  Step 3: People and social settings that provide distraction:   Name: Friend- Abigail    Name: Sister- Everardo   Name: Pasquale's mom- Kelsy     park and swimming, walking trail   Step 4: Remind myself of people and things that are important to me and worth living for:  My kids: Paco, Vane, Gilberto. My family.  Step 5: When I am in crisis, I can ask these people to help me use my safety plan:   Name: SisterRosanna Velasquez    Step 6: Making the environment safe:     be around others  Step 7: Professionals or agencies I can contact during a crisis:    Suicide Prevention Lifeline: 5-485-486-TALK (7883)    Crisis Text Line Service (available 24 hours a day, 7 days a week): Text [place] to 325669    Local Crisis Services: Recommend client go to [facility] for emergency services    Call 911 or go to my nearest emergency department.   I helped develop this safety plan and agree to use it when needed.  I have been given a copy of this plan.      Client signature _________________________________________________________________  Today s date:  7/5/2022  Completed by Provider Name/ Credentials:  BIANKA Almanza, [facility]  7/5/2022  Adapted from Safety Plan Template 2008 Anila Santoyo and Dakota German is reprinted with the express permission of the authors.  No portion of the Safety Plan Template may be reproduced without the express, written permission.  You can contact the authors at [email or web address] or [email or web address].  "

## 2022-08-31 ENCOUNTER — VIRTUAL VISIT (OUTPATIENT)
Dept: PSYCHOLOGY | Facility: CLINIC | Age: 32
End: 2022-08-31
Payer: COMMERCIAL

## 2022-08-31 DIAGNOSIS — F31.81 BIPOLAR II DISORDER (H): ICD-10-CM

## 2022-08-31 DIAGNOSIS — F41.1 GENERALIZED ANXIETY DISORDER: ICD-10-CM

## 2022-08-31 DIAGNOSIS — F43.10 PTSD (POST-TRAUMATIC STRESS DISORDER): Primary | ICD-10-CM

## 2022-08-31 DIAGNOSIS — F33.1 MAJOR DEPRESSIVE DISORDER, RECURRENT EPISODE, MODERATE (H): ICD-10-CM

## 2022-08-31 PROCEDURE — 90837 PSYTX W PT 60 MINUTES: CPT | Mod: 95 | Performed by: MARRIAGE & FAMILY THERAPIST
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
## 2022-09-06 ENCOUNTER — VIRTUAL VISIT (OUTPATIENT)
Dept: PSYCHOLOGY | Facility: CLINIC | Age: 32
End: 2022-09-06
Payer: COMMERCIAL

## 2022-09-06 DIAGNOSIS — F33.1 MAJOR DEPRESSIVE DISORDER, RECURRENT EPISODE, MODERATE (H): ICD-10-CM

## 2022-09-06 DIAGNOSIS — F43.10 PTSD (POST-TRAUMATIC STRESS DISORDER): Primary | ICD-10-CM

## 2022-09-06 DIAGNOSIS — F41.1 GENERALIZED ANXIETY DISORDER: ICD-10-CM

## 2022-09-06 PROCEDURE — 90834 PSYTX W PT 45 MINUTES: CPT | Mod: 95 | Performed by: SOCIAL WORKER

## 2022-09-06 ASSESSMENT — ANXIETY QUESTIONNAIRES
IF YOU CHECKED OFF ANY PROBLEMS ON THIS QUESTIONNAIRE, HOW DIFFICULT HAVE THESE PROBLEMS MADE IT FOR YOU TO DO YOUR WORK, TAKE CARE OF THINGS AT HOME, OR GET ALONG WITH OTHER PEOPLE: EXTREMELY DIFFICULT
4. TROUBLE RELAXING: NEARLY EVERY DAY
GAD7 TOTAL SCORE: 21
6. BECOMING EASILY ANNOYED OR IRRITABLE: NEARLY EVERY DAY
1. FEELING NERVOUS, ANXIOUS, OR ON EDGE: NEARLY EVERY DAY
8. IF YOU CHECKED OFF ANY PROBLEMS, HOW DIFFICULT HAVE THESE MADE IT FOR YOU TO DO YOUR WORK, TAKE CARE OF THINGS AT HOME, OR GET ALONG WITH OTHER PEOPLE?: EXTREMELY DIFFICULT
5. BEING SO RESTLESS THAT IT IS HARD TO SIT STILL: NEARLY EVERY DAY
2. NOT BEING ABLE TO STOP OR CONTROL WORRYING: NEARLY EVERY DAY
7. FEELING AFRAID AS IF SOMETHING AWFUL MIGHT HAPPEN: NEARLY EVERY DAY
GAD7 TOTAL SCORE: 21
7. FEELING AFRAID AS IF SOMETHING AWFUL MIGHT HAPPEN: NEARLY EVERY DAY
GAD7 TOTAL SCORE: 21
3. WORRYING TOO MUCH ABOUT DIFFERENT THINGS: NEARLY EVERY DAY

## 2022-09-06 NOTE — PROGRESS NOTES
[facility]                                     Progress Note    Patient Name: Betty Tamayo  Date:        2022         Service Type: Individual      Session Start Time: 1pm  Session End Time: 1:45pm     Session Length: 45 minutes    Session #: 15    Attendees: Client attended alone    Service Modality:  Video Visit:      Provider verified identity through the following two step process.  Patient provided:  Patient     Telemedicine Visit: The patient's condition can be safely assessed and treated via synchronous audio and visual telemedicine encounter.      Reason for Telemedicine Visit: Services only offered telehealth    Originating Site (Patient Location): Patient's home    Distant Site (Provider Location): [facility] [place] [facility]    Consent:  The patient/guardian has verbally consented to: the potential risks and benefits of telemedicine (video visit) versus in person care; bill my insurance or make self-payment for services provided; and responsibility for payment of non-covered services.     Patient would like the video invitation sent by:  My Chart    Mode of Communication:  Video Conference via Amwell    As the provider I attest to compliance with applicable laws and regulations related to telemedicine.    DATA  Interactive Complexity: No  Crisis: No        Progress Since Last Session (Related to Symptoms / Goals / Homework):   Symptoms: some emotion dysregulation, grief and sadness    Homework: Achieved / completed to satisfaction      Episode of Care Goals: Minimal progress - PREPARATION (Decided to change - considering how); Intervened by negotiating a change plan and determining options / strategies for behavior change, identifying triggers, exploring social supports, and working towards setting a date to begin behavior change     Current / Ongoing Stressors and Concerns:   Client reports she has been feeling triggered after 3  "different people she knows passed away on Saturday. Reports she has intrusive thoughts about death and fear of death, discussed having experienced multiple traumatic losses throughout her life. Discussed \"safe place\" imagery exercise for grounding practice.     Plan for panic attack:    Focus on calming down body, you're brain cannot problem solve until your body is calm    - Use cold or an ice pack on face, while slowing down and holding breath  - Continue slowing down your breath while tensing and relaxing muscles  * Do these things in a calm safe space     Treatment Objective(s) Addressed in This Session:   Safety assessing/planning     Intervention:   Safety assessing; treatment/crisis planning   DBT: Tracking emotions; STOP skills; Grounding strategies, TIP skills using PMR   Recommend DBT adherant program    Assessments completed prior to visit:  The following assessments were completed by patient for this visit:  PHQ9:   PHQ-9 SCORE 1/27/2022 5/2/2022 6/14/2022 6/21/2022 6/28/2022 8/2/2022 8/9/2022   PHQ-9 Total Score MyChart 23 (Severe depression) 20 (Severe depression) 21 (Severe depression) 18 (Moderately severe depression) 20 (Severe depression) 20 (Severe depression) 12 (Moderate depression)   PHQ-9 Total Score - 20 21 18 20 20 12   Some encounter information is confidential and restricted. Go to Review SafeRent activity to see all data.     GAD7:   NIXON-7 SCORE 6/14/2022 6/14/2022 6/28/2022 8/2/2022 8/2/2022 9/6/2022 9/6/2022   Total Score - 21 (severe anxiety) 21 (severe anxiety) - 21 (severe anxiety) - 21 (severe anxiety)   Total Score 21 21 21 21 21 21 21   Some encounter information is confidential and restricted. Go to Review [employer] activity to see all data.         ASSESSMENT: Current Emotional / Mental Status (status of significant symptoms):   Risk status (Self / Other harm or suicidal ideation)   Patient denies current fears or concerns for personal safety.   Patient denies current or " recent suicidal ideation or behaviors.- Does report feeling hopeless and helpless   Patient denies current or recent homicidal ideation or behaviors.   Patient denies current or recent self injurious behavior or ideation.   Patient denies other safety concerns.   Patient reports there has been no change in risk factors since their last session.     Patient reports there has been no change in protective factors since their last session.     A safety and risk management plan has been developed including: Patient consented to co-developed safety plan.  Safety and risk management plan was completed.  Patient agreed to use safety plan should any safety concerns arise.  A copy was given to the patient.     Appearance:   Appropriate    Eye Contact:   Fair    Psychomotor Behavior: Normal    Attitude:   Cooperative    Orientation:   All   Speech    Rate / Production: Pressured     Volume:  Normal    Mood:    Irritable  Agitated   Affect:    Labile    Thought Content:  Clear    Thought Form:  Coherent    Insight:    Fair      Medication Review:   No changes to current psychiatric medication(s)     Medication Compliance:   Yes     Changes in Health Issues:   None reported     Chemical Use Review:   Substance Use: Chemical use reviewed, no active concerns identified      Tobacco Use: No current tobacco use.      Diagnosis:  1. PTSD (post-traumatic stress disorder)    2. Generalized anxiety disorder    3. Major depressive disorder, recurrent episode, moderate (H)        Collateral Reports Completed:   Routed note to PCP    PLAN: (Patient Tasks / Therapist Tasks / Other)  Client will utilize crisis plan as needed; use daily BA schedule daily before next appointment; track emotions and dysregulation daily; client to call and schedule intake for DBT program        BIANKA Almanza, LICSW     9/6/2022                                                          ______________________________________________________________________                                                Individual Treatment Plan    Patient's Name: Betty Tamayo  YOB: 1990    Date of Creation: 5/27/2022  Date Treatment Plan Last Reviewed/Revised: 8/30/2022    DSM5 Diagnoses: 296.32 (F33.1) Major Depressive Disorder, Recurrent Episode, Moderate _, 300.02 (F41.1) Generalized Anxiety Disorder or 309.81 (F43.10) Posttraumatic Stress Disorder (includes Posttraumatic Stress Disorder for Children 6 Years and Younger)  With dissociative symptoms  Psychosocial / Contextual Factors: interpersonal relationship stress  PROMIS (reviewed every 90 days):     Referral / Collaboration:  Referral to another professional/service is not indicated at this time..    Anticipated number of session for this episode of care: 50  Anticipation frequency of session: Weekly  Anticipated Duration of each session: 38-52 minutes  Treatment plan will be reviewed in 90 days or when goals have been changed.       MeasurableTreatment Goal(s) related to diagnosis / functional impairment(s)  Goal 1: Client will report increased ability to regulate emotions.      Objective #A (Client Action)    Status: New - Date: 5/27/2022, continue 8/30/2022        Client will learn & utilize at least 1-2 emotion regulation skills per week including observing and describing emotions; learning functions of emotions; check the facts; opposite action; self soothe; distract; IMPROVE the moment; and skills to decrease emotional vulnerability.      Intervention(s)  Therapist will teach assertiveness and interpersonal effectiveness skills.    Goal 2: Client will experience decreased symptoms of PTSD and increased sense of self and individuation         Objective #A (Client Action)        Client will identify grounding strategies for managing hyper and hypo arousal symptoms.    Client will identify trauma triggers    Client will process  "trauma in a safe and adaptive way    Client will demonstrate increased ability to regulate emotions and increased interpersonal effectiveness           Status: New - Date: 5/27/2022, continue 8/30/2022      Intervention(s)    Therapist will teach DBT strategies for grounding and regulating emotions; will create a safe space to process trauma and help client establish sense of safety and sense of self.        Patient has reviewed and agreed to the above plan.      BIANKA Almanza, [facility]  May 27, 2022, 8/30/2022        [facility]                                       Betty Tamayo     SAFETY PLAN:  Step 1: Warning signs / cues (Thoughts, images, mood, situation, behavior) that a crisis may be developing:    Thoughts: \"I don't matter\", \"I'm a burden\", \"I can't do this anymore\" and \"I just want this to end\"    Images: obsessive thoughts of death or dying: picturing bad things happening or picturing dying by getting shot in a shooting. Might picture getting in a car accident. Pictures children choking or getting hurt.    Thinking Processes: ruminations (can't stop thinking about my problems): ., racing thoughts and intrusive thoughts (bothersome, unwanted thoughts that come out of nowhere): .    Mood: worsening depression, hopelessness, helplessness, intense anger, intense worry, agitation and mood swings    Behaviors: isolating/withdrawing , using drugs, using alcohol, can't stop crying, impulsive, reckless behaviors (acting without thinking): ., aggression, not taking care of myself and not taking care of my responsibilities    Situations: anniversary of deaths and birthdays of loved ones who pass, relationship problems and trauma    Step 2: Coping strategies - Things I can do to take my mind off of my problems without contacting another person (relaxation technique, physical activity):    Distress Tolerance Strategies:  listen to positive and upbeat music: ., change body temperature (ice " "pack/cold water)  and games on phone    Physical Activities: go for a walk and yoga    Focus on helpful thoughts:  \"I will get through this\" and \"It always passes\"  Step 3: People and social settings that provide distraction:   Name: Friend- Abigail    Name: SisterRosanna Mcgee   Name: Pasquale's mom- Kelsy     park and swimming, walking trail   Step 4: Remind myself of people and things that are important to me and worth living for:  My kids: Paco, Vane, Joleo. My family.  Step 5: When I am in crisis, I can ask these people to help me use my safety plan:   Name: SisterRosanna Velasquez    Step 6: Making the environment safe:     be around others  Step 7: Professionals or agencies I can contact during a crisis:    Suicide Prevention Lifeline: 4-356-873-KVNG (0814)    Crisis Text Line Service (available 24 hours a day, 7 days a week): Text [place] to 365634    Local Crisis Services: Recommend client go to [facility] for emergency services    Call 911 or go to my nearest emergency department.   I helped develop this safety plan and agree to use it when needed.  I have been given a copy of this plan.      Client signature _________________________________________________________________  Today s date:  7/5/2022  Completed by Provider Name/ Credentials:  BIANKA Almanza, [facility]  7/5/2022  Adapted from Safety Plan Template 2008 Anila German is reprinted with the express permission of the authors.  No portion of the Safety Plan Template may be reproduced without the express, written permission.  You can contact the authors at [email or web address] or [email or web address].  "

## 2022-09-13 ENCOUNTER — FCC EXTENDED DOCUMENTATION (OUTPATIENT)
Dept: PSYCHOLOGY | Facility: CLINIC | Age: 32
End: 2022-09-13

## 2022-09-27 ENCOUNTER — VIRTUAL VISIT (OUTPATIENT)
Dept: PSYCHOLOGY | Facility: CLINIC | Age: 32
End: 2022-09-27
Payer: COMMERCIAL

## 2022-09-27 DIAGNOSIS — F33.1 MAJOR DEPRESSIVE DISORDER, RECURRENT EPISODE, MODERATE (H): ICD-10-CM

## 2022-09-27 DIAGNOSIS — F41.1 GENERALIZED ANXIETY DISORDER: ICD-10-CM

## 2022-09-27 DIAGNOSIS — F43.10 PTSD (POST-TRAUMATIC STRESS DISORDER): Primary | ICD-10-CM

## 2022-09-27 PROCEDURE — 90834 PSYTX W PT 45 MINUTES: CPT | Mod: 95 | Performed by: SOCIAL WORKER

## 2022-09-27 ASSESSMENT — ANXIETY QUESTIONNAIRES
7. FEELING AFRAID AS IF SOMETHING AWFUL MIGHT HAPPEN: NEARLY EVERY DAY
IF YOU CHECKED OFF ANY PROBLEMS ON THIS QUESTIONNAIRE, HOW DIFFICULT HAVE THESE PROBLEMS MADE IT FOR YOU TO DO YOUR WORK, TAKE CARE OF THINGS AT HOME, OR GET ALONG WITH OTHER PEOPLE: EXTREMELY DIFFICULT
5. BEING SO RESTLESS THAT IT IS HARD TO SIT STILL: NEARLY EVERY DAY
1. FEELING NERVOUS, ANXIOUS, OR ON EDGE: NEARLY EVERY DAY
3. WORRYING TOO MUCH ABOUT DIFFERENT THINGS: NEARLY EVERY DAY
GAD7 TOTAL SCORE: 21
GAD7 TOTAL SCORE: 21
6. BECOMING EASILY ANNOYED OR IRRITABLE: NEARLY EVERY DAY
4. TROUBLE RELAXING: NEARLY EVERY DAY
8. IF YOU CHECKED OFF ANY PROBLEMS, HOW DIFFICULT HAVE THESE MADE IT FOR YOU TO DO YOUR WORK, TAKE CARE OF THINGS AT HOME, OR GET ALONG WITH OTHER PEOPLE?: EXTREMELY DIFFICULT
GAD7 TOTAL SCORE: 21
2. NOT BEING ABLE TO STOP OR CONTROL WORRYING: NEARLY EVERY DAY
7. FEELING AFRAID AS IF SOMETHING AWFUL MIGHT HAPPEN: NEARLY EVERY DAY

## 2022-09-27 ASSESSMENT — PATIENT HEALTH QUESTIONNAIRE - PHQ9
10. IF YOU CHECKED OFF ANY PROBLEMS, HOW DIFFICULT HAVE THESE PROBLEMS MADE IT FOR YOU TO DO YOUR WORK, TAKE CARE OF THINGS AT HOME, OR GET ALONG WITH OTHER PEOPLE: VERY DIFFICULT
SUM OF ALL RESPONSES TO PHQ QUESTIONS 1-9: 14
SUM OF ALL RESPONSES TO PHQ QUESTIONS 1-9: 14

## 2022-09-27 NOTE — PROGRESS NOTES
[facility]                                     Progress Note    Patient Name: Betty Tamayo  Date:        2022         Service Type: Individual      Session Start Time: 11am  Session End Time: 11:45am     Session Length: 45 minutes    Session #: 16    Attendees: Client attended alone    Service Modality:  Video Visit:      Provider verified identity through the following two step process.  Patient provided:  Patient     Telemedicine Visit: The patient's condition can be safely assessed and treated via synchronous audio and visual telemedicine encounter.      Reason for Telemedicine Visit: Services only offered telehealth    Originating Site (Patient Location): Patient's home    Distant Site (Provider Location): [facility] [place] [facility]    Consent:  The patient/guardian has verbally consented to: the potential risks and benefits of telemedicine (video visit) versus in person care; bill my insurance or make self-payment for services provided; and responsibility for payment of non-covered services.     Patient would like the video invitation sent by:  My Chart    Mode of Communication:  Video Conference via Amwell    As the provider I attest to compliance with applicable laws and regulations related to telemedicine.    DATA  Interactive Complexity: No  Crisis: No        Progress Since Last Session (Related to Symptoms / Goals / Homework):   Symptoms: worsening- reports she had an episode of high emotion dysregulation last week and drove around while feeling suicidal    Homework: Achieved / completed to satisfaction      Episode of Care Goals: Minimal progress - PREPARATION (Decided to change - considering how); Intervened by negotiating a change plan and determining options / strategies for behavior change, identifying triggers, exploring social supports, and working towards setting a date to begin behavior change     Current / Ongoing  Stressors and Concerns:   Client and therapist discussed recent experience of driving around while dysregulated and feeling suicidal. Assessed for current safety; client denies current SI, plan or intent to end her life and notes her kids as her primary reason for living. Reviewed and revised safety plan, helped client create a script for what to say when she needs to go to the ED, her fear was being invalidated by ED workers and not know what to say.     Plan for panic attack:    Focus on calming down body, you're brain cannot problem solve until your body is calm    - Use cold or an ice pack on face, while slowing down and holding breath  - Continue slowing down your breath while tensing and relaxing muscles  * Do these things in a calm safe space     Treatment Objective(s) Addressed in This Session:   Safety assessing/planning     Intervention:   Safety assessing; treatment/crisis planning   DBT: Tracking emotions; STOP skills; Grounding strategies, TIP skills using PMR   Recommend DBT adherant program    Assessments completed prior to visit:  The following assessments were completed by patient for this visit:  PHQ9:   PHQ-9 SCORE 5/2/2022 6/14/2022 6/21/2022 6/28/2022 8/2/2022 8/9/2022 9/27/2022   PHQ-9 Total Score MyChart 20 (Severe depression) 21 (Severe depression) 18 (Moderately severe depression) 20 (Severe depression) 20 (Severe depression) 12 (Moderate depression) 14 (Moderate depression)   PHQ-9 Total Score 20 21 18 20 20 12 14   Some encounter information is confidential and restricted. Go to Review CSR activity to see all data.     GAD7:   NIXON-7 SCORE 6/14/2022 6/28/2022 8/2/2022 8/2/2022 9/6/2022 9/6/2022 9/27/2022   Total Score 21 (severe anxiety) 21 (severe anxiety) - 21 (severe anxiety) - 21 (severe anxiety) 21 (severe anxiety)   Total Score 21 21 21 21 21 21 21   Some encounter information is confidential and restricted. Go to Review [employer] activity to see all data.         ASSESSMENT:  Current Emotional / Mental Status (status of significant symptoms):   Risk status (Self / Other harm or suicidal ideation)   Patient denies current fears or concerns for personal safety.   Patient denies current or recent suicidal ideation or behaviors.- Does report feeling hopeless and helpless   Patient denies current or recent homicidal ideation or behaviors.   Patient denies current or recent self injurious behavior or ideation.   Patient denies other safety concerns.   Patient reports there has been no change in risk factors since their last session.     Patient reports there has been no change in protective factors since their last session.     A safety and risk management plan has been developed including: Patient consented to co-developed safety plan.  Safety and risk management plan was completed.  Patient agreed to use safety plan should any safety concerns arise.  A copy was given to the patient.     Appearance:   Appropriate    Eye Contact:   Fair    Psychomotor Behavior: Normal    Attitude:   Cooperative    Orientation:   All   Speech    Rate / Production: Pressured     Volume:  Normal    Mood:    Irritable  Agitated   Affect:    Labile    Thought Content:  Clear    Thought Form:  Coherent    Insight:    Fair      Medication Review:   No changes to current psychiatric medication(s)     Medication Compliance:   Yes     Changes in Health Issues:   None reported     Chemical Use Review:   Substance Use: Chemical use reviewed, no active concerns identified      Tobacco Use: No current tobacco use.      Diagnosis:  1. PTSD (post-traumatic stress disorder)    2. Generalized anxiety disorder    3. Major depressive disorder, recurrent episode, moderate (H)        Collateral Reports Completed:   Routed note to PCP    PLAN: (Patient Tasks / Therapist Tasks / Other)  Client will utilize crisis plan as needed; use daily BA schedule daily before next appointment; track emotions and dysregulation daily; client to  call and schedule intake for DBT program- has the number ready to call after session today; use safety plan as needed, client voiced agreement to [facility] for [facility] health crisis        BIANKA Almanza, [facility]     9/27/2022                                                         ______________________________________________________________________                                                Individual Treatment Plan    Patient's Name: Betty Tamayo  YOB: 1990    Date of Creation: 5/27/2022  Date Treatment Plan Last Reviewed/Revised: 8/30/2022    DSM5 Diagnoses: 296.32 (F33.1) Major Depressive Disorder, Recurrent Episode, Moderate _, 300.02 (F41.1) Generalized Anxiety Disorder or 309.81 (F43.10) Posttraumatic Stress Disorder (includes Posttraumatic Stress Disorder for Children 6 Years and Younger)  With dissociative symptoms  Psychosocial / Contextual Factors: interpersonal relationship stress  PROMIS (reviewed every 90 days):     Referral / Collaboration:  Referral to another professional/service is not indicated at this time..    Anticipated number of session for this episode of care: 50  Anticipation frequency of session: Weekly  Anticipated Duration of each session: 38-52 minutes  Treatment plan will be reviewed in 90 days or when goals have been changed.       MeasurableTreatment Goal(s) related to diagnosis / functional impairment(s)  Goal 1: Client will report increased ability to regulate emotions.      Objective #A (Client Action)    Status: New - Date: 5/27/2022, continue 8/30/2022        Client will learn & utilize at least 1-2 emotion regulation skills per week including observing and describing emotions; learning functions of emotions; check the facts; opposite action; self soothe; distract; IMPROVE the moment; and skills to decrease emotional vulnerability.      Intervention(s)  Therapist will teach assertiveness and interpersonal effectiveness skills.    Goal  "2: Client will experience decreased symptoms of PTSD and increased sense of self and individuation         Objective #A (Client Action)        Client will identify grounding strategies for managing hyper and hypo arousal symptoms.    Client will identify trauma triggers    Client will process trauma in a safe and adaptive way    Client will demonstrate increased ability to regulate emotions and increased interpersonal effectiveness           Status: New - Date: 5/27/2022, continue 8/30/2022      Intervention(s)    Therapist will teach DBT strategies for grounding and regulating emotions; will create a safe space to process trauma and help client establish sense of safety and sense of self.        Patient has reviewed and agreed to the above plan.      BIANKA Almanza, [facility]  May 27, 2022, 8/30/2022        [facility]                                       Betty Tamayo     SAFETY PLAN:  Step 1: Warning signs / cues (Thoughts, images, mood, situation, behavior) that a crisis may be developing:    Thoughts: \"I don't matter\", \"I'm a burden\", \"I can't do this anymore\" and \"I just want this to end\"    Images: obsessive thoughts of death or dying: picturing bad things happening or picturing dying by getting shot in a shooting. Might picture getting in a car accident. Pictures children choking or getting hurt.    Thinking Processes: ruminations (can't stop thinking about my problems): ., racing thoughts and intrusive thoughts (bothersome, unwanted thoughts that come out of nowhere): .    Mood: worsening depression, hopelessness, helplessness, intense anger, intense worry, agitation and mood swings    Behaviors: isolating/withdrawing , using drugs, using alcohol, can't stop crying, impulsive, reckless behaviors (acting without thinking): ., aggression, not taking care of myself and not taking care of my responsibilities    Situations: anniversary of deaths and birthdays of loved ones who pass, " "relationship problems and trauma    Step 2: Coping strategies - Things I can do to take my mind off of my problems without contacting another person (relaxation technique, physical activity):    Distress Tolerance Strategies:  listen to positive and upbeat music: ., change body temperature (ice pack/cold water)  and games on phone    Physical Activities: go for a walk and yoga    Focus on helpful thoughts:  \"I will get through this\" and \"It always passes\"  Step 3: People and social settings that provide distraction:   Name: Friend- Abigail    Name: SisterRosanna Mcgee   Name: Pasquale's mom- Kelsy     park and swimming, walking trail   Step 4: Remind myself of people and things that are important to me and worth living for:  My kids: Paco, Vane, Gilberto. My family.  Step 5: When I am in crisis, I can ask these people to help me use my safety plan:   Name: SisterRosanna Velasquez    Step 6: Making the environment safe:     be around others  Step 7: Professionals or agencies I can contact during a crisis:    Suicide Prevention Lifeline: 7-884-265-JJYA (5137)    Crisis Text Line Service (available 24 hours a day, 7 days a week): Text [place] to 890851    Local Crisis Services: Recommend client go to [facility] for emergency services    Call 911 or go to my nearest emergency department.   I helped develop this safety plan and agree to use it when needed.  I have been given a copy of this plan.      Client signature _________________________________________________________________  Today s date:  7/5/2022  Completed by Provider Name/ Credentials:  BIANKA [facility]man[facility], [facility]  7/5/2022 REVIEWED/REVISED 9/27/2022  Adapted from Safety Plan Template 2008 Anila Santoyo and Dakota German is reprinted with the express permission of the authors.  No portion of the Safety Plan Template may be reproduced without the express, written permission.  You can contact the authors at [email or web address] or " [email or web address].

## 2022-10-03 ENCOUNTER — VIRTUAL VISIT (OUTPATIENT)
Dept: PSYCHOLOGY | Facility: CLINIC | Age: 32
End: 2022-10-03
Payer: COMMERCIAL

## 2022-10-03 DIAGNOSIS — F33.1 MAJOR DEPRESSIVE DISORDER, RECURRENT EPISODE, MODERATE (H): ICD-10-CM

## 2022-10-03 DIAGNOSIS — F41.1 GENERALIZED ANXIETY DISORDER: ICD-10-CM

## 2022-10-03 DIAGNOSIS — F43.10 PTSD (POST-TRAUMATIC STRESS DISORDER): Primary | ICD-10-CM

## 2022-10-03 PROCEDURE — 90834 PSYTX W PT 45 MINUTES: CPT | Mod: 95 | Performed by: SOCIAL WORKER

## 2022-10-03 NOTE — PROGRESS NOTES
[facility]                                     Progress Note    Patient Name: Betty Tamayo  Date:        10/3/2022         Service Type: Individual      Session Start Time: 11am  Session End Time: 11:45am     Session Length: 45 minutes    Session #: 17    Attendees: Client attended alone    Service Modality:  Video Visit:      Provider verified identity through the following two step process.  Patient provided:  Patient     Telemedicine Visit: The patient's condition can be safely assessed and treated via synchronous audio and visual telemedicine encounter.      Reason for Telemedicine Visit: Services only offered telehealth    Originating Site (Patient Location): Patient's home    Distant Site (Provider Location): [facility] [place] [facility]    Consent:  The patient/guardian has verbally consented to: the potential risks and benefits of telemedicine (video visit) versus in person care; bill my insurance or make self-payment for services provided; and responsibility for payment of non-covered services.     Patient would like the video invitation sent by:  My Chart    Mode of Communication:  Video Conference via Amwell    As the provider I attest to compliance with applicable laws and regulations related to telemedicine.    DATA  Interactive Complexity: No  Crisis: No        Progress Since Last Session (Related to Symptoms / Goals / Homework):   Symptoms: Stable, no current crisis or dysregulation today    Homework: Achieved / completed to satisfaction      Episode of Care Goals: Minimal progress - PREPARATION (Decided to change - considering how); Intervened by negotiating a change plan and determining options / strategies for behavior change, identifying triggers, exploring social supports, and working towards setting a date to begin behavior change     Current / Ongoing Stressors and Concerns:   Client reports feeling a bit better this week,  focusing on restarting structure/routine she is working on. Reports no SI and is working on talking with her boyfriend about how to support her through a crisis. Discussed COPE ahead skill for client to practice. Reports she did call [facility] and is on a waitlist for DBT.    Plan for panic attack:    Focus on calming down body, you're brain cannot problem solve until your body is calm    - Use cold or an ice pack on face, while slowing down and holding breath  - Continue slowing down your breath while tensing and relaxing muscles  * Do these things in a calm safe space     Treatment Objective(s) Addressed in This Session:   Safety assessing/planning     Intervention:   Safety assessing; treatment/crisis planning   DBT: Tracking emotions; STOP skills; Grounding strategies, TIP skills using PMR; COPE ahead   Recommend DBT adherant program    Assessments completed prior to visit:  The following assessments were completed by patient for this visit:  PHQ9:   PHQ-9 SCORE 5/2/2022 6/14/2022 6/21/2022 6/28/2022 8/2/2022 8/9/2022 9/27/2022   PHQ-9 Total Score MyChart 20 (Severe depression) 21 (Severe depression) 18 (Moderately severe depression) 20 (Severe depression) 20 (Severe depression) 12 (Moderate depression) 14 (Moderate depression)   PHQ-9 Total Score 20 21 18 20 20 12 14   Some encounter information is confidential and restricted. Go to Review [employer] activity to see all data.     GAD7:   NIXON-7 SCORE 6/14/2022 6/28/2022 8/2/2022 8/2/2022 9/6/2022 9/6/2022 9/27/2022   Total Score 21 (severe anxiety) 21 (severe anxiety) - 21 (severe anxiety) - 21 (severe anxiety) 21 (severe anxiety)   Total Score 21 21 21 21 21 21 21   Some encounter information is confidential and restricted. Go to Review [employer]she[employer] activity to see all data.         ASSESSMENT: Current Emotional / Mental Status (status of significant symptoms):   Risk status (Self / Other harm or suicidal ideation)   Patient denies current fears or concerns for  personal safety.   Patient denies current or recent suicidal ideation or behaviors.- Does report feeling hopeless and helpless   Patient denies current or recent homicidal ideation or behaviors.   Patient denies current or recent self injurious behavior or ideation.   Patient denies other safety concerns.   Patient reports there has been no change in risk factors since their last session.     Patient reports there has been no change in protective factors since their last session.     A safety and risk management plan has been developed including: Patient consented to co-developed safety plan.  Safety and risk management plan was completed.  Patient agreed to use safety plan should any safety concerns arise.  A copy was given to the patient.     Appearance:   Appropriate    Eye Contact:   Fair    Psychomotor Behavior: Normal    Attitude:   Cooperative    Orientation:   All   Speech    Rate / Production: Pressured     Volume:  Normal    Mood:    Irritable  Agitated   Affect:    Labile    Thought Content:  Clear    Thought Form:  Coherent    Insight:    Fair      Medication Review:   No changes to current psychiatric medication(s)     Medication Compliance:   Yes     Changes in Health Issues:   None reported     Chemical Use Review:   Substance Use: Chemical use reviewed, no active concerns identified      Tobacco Use: No current tobacco use.      Diagnosis:  1. PTSD (post-traumatic stress disorder)    2. Generalized anxiety disorder    3. Major depressive disorder, recurrent episode, moderate (H)        Collateral Reports Completed:   Routed note to PCP    PLAN: (Patient Tasks / Therapist Tasks / Other)  Client will utilize crisis plan as needed; use daily BA schedule daily before next appointment; track emotions and dysregulation daily; client to call and schedule intake for DBT program- has the number ready to call after session today; use safety plan as needed, client practice COPE ahead skill        Fiordaliza Hernandez  BIANKA, LICSW     10/3/2022                                                         ______________________________________________________________________                                                Individual Treatment Plan    Patient's Name: Betty Tamayo  YOB: 1990    Date of Creation: 5/27/2022  Date Treatment Plan Last Reviewed/Revised: 8/30/2022    DSM5 Diagnoses: 296.32 (F33.1) Major Depressive Disorder, Recurrent Episode, Moderate _, 300.02 (F41.1) Generalized Anxiety Disorder or 309.81 (F43.10) Posttraumatic Stress Disorder (includes Posttraumatic Stress Disorder for Children 6 Years and Younger)  With dissociative symptoms  Psychosocial / Contextual Factors: interpersonal relationship stress  PROMIS (reviewed every 90 days):     Referral / Collaboration:  Referral to another professional/service is not indicated at this time..    Anticipated number of session for this episode of care: 50  Anticipation frequency of session: Weekly  Anticipated Duration of each session: 38-52 minutes  Treatment plan will be reviewed in 90 days or when goals have been changed.       MeasurableTreatment Goal(s) related to diagnosis / functional impairment(s)  Goal 1: Client will report increased ability to regulate emotions.      Objective #A (Client Action)    Status: New - Date: 5/27/2022, continue 8/30/2022        Client will learn & utilize at least 1-2 emotion regulation skills per week including observing and describing emotions; learning functions of emotions; check the facts; opposite action; self soothe; distract; IMPROVE the moment; and skills to decrease emotional vulnerability.      Intervention(s)  Therapist will teach assertiveness and interpersonal effectiveness skills.    Goal 2: Client will experience decreased symptoms of PTSD and increased sense of self and individuation         Objective #A (Client Action)        Client will identify grounding strategies for managing hyper and hypo arousal  "symptoms.    Client will identify trauma triggers    Client will process trauma in a safe and adaptive way    Client will demonstrate increased ability to regulate emotions and increased interpersonal effectiveness           Status: New - Date: 5/27/2022, continue 8/30/2022      Intervention(s)    Therapist will teach DBT strategies for grounding and regulating emotions; will create a safe space to process trauma and help client establish sense of safety and sense of self.        Patient has reviewed and agreed to the above plan.      BIANKA Almanza, [facility]  May 27, 2022, 8/30/2022        [facility]                                       Betty Tamayo     SAFETY PLAN:  Step 1: Warning signs / cues (Thoughts, images, mood, situation, behavior) that a crisis may be developing:    Thoughts: \"I don't matter\", \"I'm a burden\", \"I can't do this anymore\" and \"I just want this to end\"    Images: obsessive thoughts of death or dying: picturing bad things happening or picturing dying by getting shot in a shooting. Might picture getting in a car accident. Pictures children choking or getting hurt.    Thinking Processes: ruminations (can't stop thinking about my problems): ., racing thoughts and intrusive thoughts (bothersome, unwanted thoughts that come out of nowhere): .    Mood: worsening depression, hopelessness, helplessness, intense anger, intense worry, agitation and mood swings    Behaviors: isolating/withdrawing , using drugs, using alcohol, can't stop crying, impulsive, reckless behaviors (acting without thinking): ., aggression, not taking care of myself and not taking care of my responsibilities    Situations: anniversary of deaths and birthdays of loved ones who pass, relationship problems and trauma    Step 2: Coping strategies - Things I can do to take my mind off of my problems without contacting another person (relaxation technique, physical activity):    Distress Tolerance Strategies:  " "listen to positive and upbeat music: ., change body temperature (ice pack/cold water)  and games on phone    Physical Activities: go for a walk and yoga    Focus on helpful thoughts:  \"I will get through this\" and \"It always passes\"  Step 3: People and social settings that provide distraction:   Name: Friend- Abigail    Name: SisterRosanna Mcgee   Name: Pasquale's mom- Kelsy     park and swimming, walking trail   Step 4: Remind myself of people and things that are important to me and worth living for:  My kids: Paco, Vane, Joleo. My family.  Step 5: When I am in crisis, I can ask these people to help me use my safety plan:   Name: SisterRosanna Velasquez    Step 6: Making the environment safe:     be around others  Step 7: Professionals or agencies I can contact during a crisis:    Suicide Prevention Lifeline: 8-602-874-TALK (2753)    Crisis Text Line Service (available 24 hours a day, 7 days a week): Text [place] to 938725    Local Crisis Services: Recommend client go to [facility] for emergency services    Call 911 or go to my nearest emergency department.   I helped develop this safety plan and agree to use it when needed.  I have been given a copy of this plan.      Client signature _________________________________________________________________  Today s date:  7/5/2022  Completed by Provider Name/ Credentials:  BIANKA Almanza, [facility]  7/5/2022 REVIEWED/REVISED 9/27/2022  Adapted from Safety Plan Template 2008 Anila Santoyo and Dakota German is reprinted with the express permission of the authors.  No portion of the Safety Plan Template may be reproduced without the express, written permission.  You can contact the authors at [email or web address] or [email or web address].  "

## 2022-10-12 ENCOUNTER — FCC EXTENDED DOCUMENTATION (OUTPATIENT)
Dept: PSYCHOLOGY | Facility: CLINIC | Age: 32
End: 2022-10-12

## 2022-10-12 NOTE — PROGRESS NOTES
Client did not join scheduled video appointment. Therapist called and left message with information to reschedule.       BIANKA Almanza, LICSW  10/12/2022

## 2022-10-18 ENCOUNTER — VIRTUAL VISIT (OUTPATIENT)
Dept: PSYCHOLOGY | Facility: CLINIC | Age: 32
End: 2022-10-18
Payer: COMMERCIAL

## 2022-10-18 DIAGNOSIS — F43.10 PTSD (POST-TRAUMATIC STRESS DISORDER): Primary | ICD-10-CM

## 2022-10-18 DIAGNOSIS — F41.1 GENERALIZED ANXIETY DISORDER: ICD-10-CM

## 2022-10-18 DIAGNOSIS — F33.1 MAJOR DEPRESSIVE DISORDER, RECURRENT EPISODE, MODERATE (H): ICD-10-CM

## 2022-10-18 PROCEDURE — 90834 PSYTX W PT 45 MINUTES: CPT | Mod: 95 | Performed by: SOCIAL WORKER

## 2022-10-18 NOTE — PROGRESS NOTES
[facility]                                     Progress Note    Patient Name: Betty Tamayo  Date:        10/18/2022         Service Type: Individual      Session Start Time: 12pm  Session End Time: 12:45pm     Session Length: 45 minutes    Session #: 18    Attendees: Client attended alone    Service Modality:  Video Visit:      Provider verified identity through the following two step process.  Patient provided:  Patient     Telemedicine Visit: The patient's condition can be safely assessed and treated via synchronous audio and visual telemedicine encounter.      Reason for Telemedicine Visit: Services only offered telehealth    Originating Site (Patient Location): Patient's home    Distant Site (Provider Location): [facility] [place] [facility]    Consent:  The patient/guardian has verbally consented to: the potential risks and benefits of telemedicine (video visit) versus in person care; bill my insurance or make self-payment for services provided; and responsibility for payment of non-covered services.     Patient would like the video invitation sent by:  My Chart    Mode of Communication:  Video Conference via Amwell    As the provider I attest to compliance with applicable laws and regulations related to telemedicine.    DATA  Interactive Complexity: No  Crisis: No        Progress Since Last Session (Related to Symptoms / Goals / Homework):   Symptoms: Stable, no current crisis but has had some panic attacks since last session    Homework: Achieved / completed to satisfaction      Episode of Care Goals: Minimal progress - PREPARATION (Decided to change - considering how); Intervened by negotiating a change plan and determining options / strategies for behavior change, identifying triggers, exploring social supports, and working towards setting a date to begin behavior change     Current / Ongoing Stressors and Concerns:   Client reports  feeling anxious and having more panic attacks recently. Reviewed mindfulness skills to use for grounding. Also encouraging client to call another clinic to see if she can start DBT sooner.     Plan for panic attack:    Focus on calming down body, you're brain cannot problem solve until your body is calm    - Use cold or an ice pack on face, while slowing down and holding breath  - Continue slowing down your breath while tensing and relaxing muscles  * Do these things in a calm safe space     Treatment Objective(s) Addressed in This Session:   Safety assessing/planning     Intervention:   Safety assessing; treatment/crisis planning   DBT: Tracking emotions; STOP skills; Grounding strategies, TIP skills using PMR; COPE ahead   Recommend DBT adherant program    Assessments completed prior to visit:  The following assessments were completed by patient for this visit:  PHQ9:   PHQ-9 SCORE 5/2/2022 6/14/2022 6/21/2022 6/28/2022 8/2/2022 8/9/2022 9/27/2022   PHQ-9 Total Score MyChart 20 (Severe depression) 21 (Severe depression) 18 (Moderately severe depression) 20 (Severe depression) 20 (Severe depression) 12 (Moderate depression) 14 (Moderate depression)   PHQ-9 Total Score 20 21 18 20 20 12 14   Some encounter information is confidential and restricted. Go to Review [employer] activity to see all data.     GAD7:   NIXON-7 SCORE 6/14/2022 6/28/2022 8/2/2022 8/2/2022 9/6/2022 9/6/2022 9/27/2022   Total Score 21 (severe anxiety) 21 (severe anxiety) - 21 (severe anxiety) - 21 (severe anxiety) 21 (severe anxiety)   Total Score 21 21 21 21 21 21 21   Some encounter information is confidential and restricted. Go to Review Flowsheets activity to see all data.         ASSESSMENT: Current Emotional / Mental Status (status of significant symptoms):   Risk status (Self / Other harm or suicidal ideation)   Patient denies current fears or concerns for personal safety.   Patient denies current or recent suicidal ideation or behaviors.-  Does report feeling hopeless and helpless   Patient denies current or recent homicidal ideation or behaviors.   Patient denies current or recent self injurious behavior or ideation.   Patient denies other safety concerns.   Patient reports there has been no change in risk factors since their last session.     Patient reports there has been no change in protective factors since their last session.     A safety and risk management plan has been developed including: Patient consented to co-developed safety plan.  Safety and risk management plan was completed.  Patient agreed to use safety plan should any safety concerns arise.  A copy was given to the patient.     Appearance:   Appropriate    Eye Contact:   Fair    Psychomotor Behavior: Normal    Attitude:   Cooperative    Orientation:   All   Speech    Rate / Production: Pressured     Volume:  Normal    Mood:    Irritable  Agitated   Affect:    Labile    Thought Content:  Clear    Thought Form:  Coherent    Insight:    Fair      Medication Review:   No changes to current psychiatric medication(s)     Medication Compliance:   Yes     Changes in Health Issues:   None reported     Chemical Use Review:   Substance Use: Chemical use reviewed, no active concerns identified      Tobacco Use: No current tobacco use.      Diagnosis:  1. PTSD (post-traumatic stress disorder)    2. Generalized anxiety disorder    3. Major depressive disorder, recurrent episode, moderate (H)        Collateral Reports Completed:   Routed note to PCP    PLAN: (Patient Tasks / Therapist Tasks / Other)  Client will utilize crisis plan as needed; use daily BA schedule daily before next appointment; track emotions and dysregulation daily; client to call and schedule intake for DBT program- has the number ready to call after session today; use safety plan as needed, client practice COPE ahead skill        BIANKA Almanza, LICSW     10/18/2022                                                          ______________________________________________________________________                                                Individual Treatment Plan    Patient's Name: Betty Tamayo  YOB: 1990    Date of Creation: 5/27/2022  Date Treatment Plan Last Reviewed/Revised: 8/30/2022    DSM5 Diagnoses: 296.32 (F33.1) Major Depressive Disorder, Recurrent Episode, Moderate _, 300.02 (F41.1) Generalized Anxiety Disorder or 309.81 (F43.10) Posttraumatic Stress Disorder (includes Posttraumatic Stress Disorder for Children 6 Years and Younger)  With dissociative symptoms  Psychosocial / Contextual Factors: interpersonal relationship stress  PROMIS (reviewed every 90 days):     Referral / Collaboration:  Referral to another professional/service is not indicated at this time..    Anticipated number of session for this episode of care: 50  Anticipation frequency of session: Weekly  Anticipated Duration of each session: 38-52 minutes  Treatment plan will be reviewed in 90 days or when goals have been changed.       MeasurableTreatment Goal(s) related to diagnosis / functional impairment(s)  Goal 1: Client will report increased ability to regulate emotions.      Objective #A (Client Action)    Status: New - Date: 5/27/2022, continue 8/30/2022        Client will learn & utilize at least 1-2 emotion regulation skills per week including observing and describing emotions; learning functions of emotions; check the facts; opposite action; self soothe; distract; IMPROVE the moment; and skills to decrease emotional vulnerability.      Intervention(s)  Therapist will teach assertiveness and interpersonal effectiveness skills.    Goal 2: Client will experience decreased symptoms of PTSD and increased sense of self and individuation         Objective #A (Client Action)        Client will identify grounding strategies for managing hyper and hypo arousal symptoms.    Client will identify trauma triggers    Client will process  "trauma in a safe and adaptive way    Client will demonstrate increased ability to regulate emotions and increased interpersonal effectiveness           Status: New - Date: 5/27/2022, continue 8/30/2022      Intervention(s)    Therapist will teach DBT strategies for grounding and regulating emotions; will create a safe space to process trauma and help client establish sense of safety and sense of self.        Patient has reviewed and agreed to the above plan.      BIANKA Almanza, [facility]  May 27, 2022, 8/30/2022        [facility]                                       Betty Tamayo     SAFETY PLAN:  Step 1: Warning signs / cues (Thoughts, images, mood, situation, behavior) that a crisis may be developing:    Thoughts: \"I don't matter\", \"I'm a burden\", \"I can't do this anymore\" and \"I just want this to end\"    Images: obsessive thoughts of death or dying: picturing bad things happening or picturing dying by getting shot in a shooting. Might picture getting in a car accident. Pictures children choking or getting hurt.    Thinking Processes: ruminations (can't stop thinking about my problems): ., racing thoughts and intrusive thoughts (bothersome, unwanted thoughts that come out of nowhere): .    Mood: worsening depression, hopelessness, helplessness, intense anger, intense worry, agitation and mood swings    Behaviors: isolating/withdrawing , using drugs, using alcohol, can't stop crying, impulsive, reckless behaviors (acting without thinking): ., aggression, not taking care of myself and not taking care of my responsibilities    Situations: anniversary of deaths and birthdays of loved ones who pass, relationship problems and trauma    Step 2: Coping strategies - Things I can do to take my mind off of my problems without contacting another person (relaxation technique, physical activity):    Distress Tolerance Strategies:  listen to positive and upbeat music: ., change body temperature (ice " "pack/cold water)  and games on phone    Physical Activities: go for a walk and yoga    Focus on helpful thoughts:  \"I will get through this\" and \"It always passes\"  Step 3: People and social settings that provide distraction:   Name: Friend- Abigail    Name: SisterRosanna Mcgee   Name: Pasquale's mom- Kelsy     park and swimming, walking trail   Step 4: Remind myself of people and things that are important to me and worth living for:  My kids: Paco, Vane, Joleo. My family.  Step 5: When I am in crisis, I can ask these people to help me use my safety plan:   Name: SisterRosanna Velasquez    Step 6: Making the environment safe:     be around others  Step 7: Professionals or agencies I can contact during a crisis:    Suicide Prevention Lifeline: 4-259-791-SEER (7477)    Crisis Text Line Service (available 24 hours a day, 7 days a week): Text [place] to 640965    Local Crisis Services: Recommend client go to [facility] for emergency services    Call 911 or go to my nearest emergency department.   I helped develop this safety plan and agree to use it when needed.  I have been given a copy of this plan.      Client signature _________________________________________________________________  Today s date:  7/5/2022  Completed by Provider Name/ Credentials:  BIANKA Almanza, [facility]  7/5/2022 REVIEWED/REVISED 9/27/2022  Adapted from Safety Plan Template 2008 Anila Santoyo and Dakota German is reprinted with the express permission of the authors.  No portion of the Safety Plan Template may be reproduced without the express, written permission.  You can contact the authors at [email or web address] or [email or web address].    "

## 2022-10-23 ENCOUNTER — HEALTH MAINTENANCE LETTER (OUTPATIENT)
Age: 32
End: 2022-10-23

## 2022-10-25 ENCOUNTER — VIRTUAL VISIT (OUTPATIENT)
Dept: PSYCHOLOGY | Facility: CLINIC | Age: 32
End: 2022-10-25
Payer: COMMERCIAL

## 2022-10-25 DIAGNOSIS — F33.1 MAJOR DEPRESSIVE DISORDER, RECURRENT EPISODE, MODERATE (H): ICD-10-CM

## 2022-10-25 DIAGNOSIS — F41.1 GENERALIZED ANXIETY DISORDER: ICD-10-CM

## 2022-10-25 DIAGNOSIS — F43.10 PTSD (POST-TRAUMATIC STRESS DISORDER): Primary | ICD-10-CM

## 2022-10-25 PROCEDURE — 90834 PSYTX W PT 45 MINUTES: CPT | Mod: 95 | Performed by: SOCIAL WORKER

## 2022-10-25 NOTE — PROGRESS NOTES
[facility]                                     Progress Note    Patient Name: Betty Tamayo  Date:        10/25/2022         Service Type: Individual      Session Start Time: 1pm  Session End Time: 1:45pm     Session Length: 45 minutes    Session #: 19    Attendees: Client attended alone    Service Modality:  Video Visit:      Provider verified identity through the following two step process.  Patient provided:  Patient     Telemedicine Visit: The patient's condition can be safely assessed and treated via synchronous audio and visual telemedicine encounter.      Reason for Telemedicine Visit: Services only offered telehealth    Originating Site (Patient Location): Patient's home    Distant Site (Provider Location): [facility] [place] [facility]    Consent:  The patient/guardian has verbally consented to: the potential risks and benefits of telemedicine (video visit) versus in person care; bill my insurance or make self-payment for services provided; and responsibility for payment of non-covered services.     Patient would like the video invitation sent by:  My Chart    Mode of Communication:  Video Conference via Amwell    As the provider I attest to compliance with applicable laws and regulations related to telemedicine.    DATA  Interactive Complexity: No  Crisis: No        Progress Since Last Session (Related to Symptoms / Goals / Homework):   Symptoms: Stable, no current crisis but emotion dysregulation    Homework: Achieved / completed to satisfaction      Episode of Care Goals: Minimal progress - PREPARATION (Decided to change - considering how); Intervened by negotiating a change plan and determining options / strategies for behavior change, identifying triggers, exploring social supports, and working towards setting a date to begin behavior change     Current / Ongoing Stressors and Concerns:   Client and therapist reviewed mindfulness  skills to use for grounding. Also encouraging client to call another clinic to see if she can start DBT; she didn't do this last week but committed to doing that today.    Plan for panic attack:    Focus on calming down body, you're brain cannot problem solve until your body is calm    - Use cold or an ice pack on face, while slowing down and holding breath  - Continue slowing down your breath while tensing and relaxing muscles  * Do these things in a calm safe space     Treatment Objective(s) Addressed in This Session:   Safety assessing/planning     Intervention:   Safety assessing; treatment/crisis planning   DBT: Tracking emotions; STOP skills; Grounding strategies, TIP skills using PMR; COPE ahead   Recommend DBT adherant program    Assessments completed prior to visit:  The following assessments were completed by patient for this visit:  PHQ9:   PHQ-9 SCORE 5/2/2022 6/14/2022 6/21/2022 6/28/2022 8/2/2022 8/9/2022 9/27/2022   PHQ-9 Total Score MyChart 20 (Severe depression) 21 (Severe depression) 18 (Moderately severe depression) 20 (Severe depression) 20 (Severe depression) 12 (Moderate depression) 14 (Moderate depression)   PHQ-9 Total Score 20 21 18 20 20 12 14   Some encounter information is confidential and restricted. Go to Review [employer] activity to see all data.     GAD7:   NIXON-7 SCORE 6/14/2022 6/28/2022 8/2/2022 8/2/2022 9/6/2022 9/6/2022 9/27/2022   Total Score 21 (severe anxiety) 21 (severe anxiety) - 21 (severe anxiety) - 21 (severe anxiety) 21 (severe anxiety)   Total Score 21 21 21 21 21 21 21   Some encounter information is confidential and restricted. Go to Review Flowsheets activity to see all data.         ASSESSMENT: Current Emotional / Mental Status (status of significant symptoms):   Risk status (Self / Other harm or suicidal ideation)   Patient denies current fears or concerns for personal safety.   Patient denies current or recent suicidal ideation or behaviors.- Does report feeling  hopeless and helpless   Patient denies current or recent homicidal ideation or behaviors.   Patient denies current or recent self injurious behavior or ideation.   Patient denies other safety concerns.   Patient reports there has been no change in risk factors since their last session.     Patient reports there has been no change in protective factors since their last session.     A safety and risk management plan has been developed including: Patient consented to co-developed safety plan.  Safety and risk management plan was completed.  Patient agreed to use safety plan should any safety concerns arise.  A copy was given to the patient.     Appearance:   Appropriate    Eye Contact:   Fair    Psychomotor Behavior: Normal    Attitude:   Cooperative    Orientation:   All   Speech    Rate / Production: Pressured     Volume:  Normal    Mood:    Irritable  Agitated   Affect:    Labile    Thought Content:  Clear    Thought Form:  Coherent    Insight:    Fair      Medication Review:   No changes to current psychiatric medication(s)     Medication Compliance:   Yes     Changes in Health Issues:   None reported     Chemical Use Review:   Substance Use: Chemical use reviewed, no active concerns identified      Tobacco Use: No current tobacco use.      Diagnosis:  1. PTSD (post-traumatic stress disorder)    2. Generalized anxiety disorder    3. Major depressive disorder, recurrent episode, moderate (H)        Collateral Reports Completed:   Routed note to PCP    PLAN: (Patient Tasks / Therapist Tasks / Other)  Client will utilize crisis plan as needed; use daily BA schedule daily before next appointment; track emotions and dysregulation daily; client to call and schedule intake for DBT program- has the number ready to call after session today; use safety plan as needed, client practice COPE ahead skill        BIANKA Almanza, LICSW     10/25/2022                                                          ______________________________________________________________________                                                Individual Treatment Plan    Patient's Name: Betty Tamayo  YOB: 1990    Date of Creation: 5/27/2022  Date Treatment Plan Last Reviewed/Revised: 8/30/2022    DSM5 Diagnoses: 296.32 (F33.1) Major Depressive Disorder, Recurrent Episode, Moderate _, 300.02 (F41.1) Generalized Anxiety Disorder or 309.81 (F43.10) Posttraumatic Stress Disorder (includes Posttraumatic Stress Disorder for Children 6 Years and Younger)  With dissociative symptoms  Psychosocial / Contextual Factors: interpersonal relationship stress  PROMIS (reviewed every 90 days):     Referral / Collaboration:  Referral to another professional/service is not indicated at this time..    Anticipated number of session for this episode of care: 50  Anticipation frequency of session: Weekly  Anticipated Duration of each session: 38-52 minutes  Treatment plan will be reviewed in 90 days or when goals have been changed.       MeasurableTreatment Goal(s) related to diagnosis / functional impairment(s)  Goal 1: Client will report increased ability to regulate emotions.      Objective #A (Client Action)    Status: New - Date: 5/27/2022, continue 8/30/2022        Client will learn & utilize at least 1-2 emotion regulation skills per week including observing and describing emotions; learning functions of emotions; check the facts; opposite action; self soothe; distract; IMPROVE the moment; and skills to decrease emotional vulnerability.      Intervention(s)  Therapist will teach assertiveness and interpersonal effectiveness skills.    Goal 2: Client will experience decreased symptoms of PTSD and increased sense of self and individuation         Objective #A (Client Action)        Client will identify grounding strategies for managing hyper and hypo arousal symptoms.    Client will identify trauma triggers    Client will process  "trauma in a safe and adaptive way    Client will demonstrate increased ability to regulate emotions and increased interpersonal effectiveness           Status: New - Date: 5/27/2022, continue 8/30/2022      Intervention(s)    Therapist will teach DBT strategies for grounding and regulating emotions; will create a safe space to process trauma and help client establish sense of safety and sense of self.        Patient has reviewed and agreed to the above plan.      BIANKA Almanza, [facility]  May 27, 2022, 8/30/2022        [facility]                                       Betty Tamayo     SAFETY PLAN:  Step 1: Warning signs / cues (Thoughts, images, mood, situation, behavior) that a crisis may be developing:    Thoughts: \"I don't matter\", \"I'm a burden\", \"I can't do this anymore\" and \"I just want this to end\"    Images: obsessive thoughts of death or dying: picturing bad things happening or picturing dying by getting shot in a shooting. Might picture getting in a car accident. Pictures children choking or getting hurt.    Thinking Processes: ruminations (can't stop thinking about my problems): ., racing thoughts and intrusive thoughts (bothersome, unwanted thoughts that come out of nowhere): .    Mood: worsening depression, hopelessness, helplessness, intense anger, intense worry, agitation and mood swings    Behaviors: isolating/withdrawing , using drugs, using alcohol, can't stop crying, impulsive, reckless behaviors (acting without thinking): ., aggression, not taking care of myself and not taking care of my responsibilities    Situations: anniversary of deaths and birthdays of loved ones who pass, relationship problems and trauma    Step 2: Coping strategies - Things I can do to take my mind off of my problems without contacting another person (relaxation technique, physical activity):    Distress Tolerance Strategies:  listen to positive and upbeat music: ., change body temperature (ice " "pack/cold water)  and games on phone    Physical Activities: go for a walk and yoga    Focus on helpful thoughts:  \"I will get through this\" and \"It always passes\"  Step 3: People and social settings that provide distraction:   Name: Friend- Abigail    Name: SisterRosanna Mcgee   Name: Pasquale's mom- Kelsy     park and swimming, walking trail   Step 4: Remind myself of people and things that are important to me and worth living for:  My kids: Paco, Vane, Joleo. My family.  Step 5: When I am in crisis, I can ask these people to help me use my safety plan:   Name: SisterRosanna Velasquez    Step 6: Making the environment safe:     be around others  Step 7: Professionals or agencies I can contact during a crisis:    Suicide Prevention Lifeline: 0-774-462-ZPJN (2672)    Crisis Text Line Service (available 24 hours a day, 7 days a week): Text [place] to 099103    Local Crisis Services: Recommend client go to [facility] for emergency services    Call 911 or go to my nearest emergency department.   I helped develop this safety plan and agree to use it when needed.  I have been given a copy of this plan.      Client signature _________________________________________________________________  Today s date:  7/5/2022  Completed by Provider Name/ Credentials:  BIANKA Almanza, [facility]  7/5/2022 REVIEWED/REVISED 9/27/2022  Adapted from Safety Plan Template 2008 Anila Santoyo and Dakota German is reprinted with the express permission of the authors.  No portion of the Safety Plan Template may be reproduced without the express, written permission.  You can contact the authors at [email or web address] or [email or web address].  "

## 2022-11-17 ENCOUNTER — FCC EXTENDED DOCUMENTATION (OUTPATIENT)
Dept: PSYCHOLOGY | Facility: CLINIC | Age: 32
End: 2022-11-17

## 2022-11-17 NOTE — PROGRESS NOTES
Client did not join scheduled video appointment. Client may reschedule by calling intake or through [facility].     BIANKA Almanza, LICSW  11/17/2022

## 2023-06-18 ENCOUNTER — HEALTH MAINTENANCE LETTER (OUTPATIENT)
Age: 33
End: 2023-06-18

## 2024-08-11 ENCOUNTER — HEALTH MAINTENANCE LETTER (OUTPATIENT)
Age: 34
End: 2024-08-11

## 2024-12-20 NOTE — PROGRESS NOTES
Physical Therapy Initial Examination/Evaluation  April 9, 2018    Betty Tamayo  is a 27 year old  female referred to physical therapy by Dr. Robertson for treatment of her left knee.  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 She feels she always has had knees that were a bit problematic, but left has never been this bad.  She had similar problems with her right knee last summer.    DOI/onset about 1 month  Mechanism of injury insidious onset    Prior treatment nothing for the left knee. She did get a patellar brace/sleeve, but has found it to be uncomfortable. Right knee was aspirated and received a steroid injection last summer.     Chief Complaint:   Left knee pain, located retro and lateral patella.  Symptoms are daily but vary in intensity based on activity.    Symptoms have been getting progressively worse since onset.    Current pain 6/10.  Pain at best 4/10.  Pain at worst 9/10.    Symptoms aggravated by patellofemoral loading activities of stairs, sitting, squatting, kneeling.    Symptoms improved with moderated activity.     Social history:  Patient is doing cardio and gym work outs for weight reduction.    Occupation: patient  with [employer].  Job duties:  Prolonged standing, prolonged sitting, lifting, squatting, kneeling.    Patient having difficulty with ADLs: work, household chores, health workouts..    Patient's goals are reduce pain, improve tolerance of ADL's; work, household chores, fitness workouts.    Patient reports general health as good. EHR was reviewed for health history, medication, imaging, surgery.     Outcome measure:   KOS was not completed today  Return to MD:  5/25/18.        [facility] Initial Evaluation  Subjective:  HPI                    Objective:    Gait:    Gait Type:  Normal   Assistive Devices:  None      Flexibility/Screens:       Lower Extremity:  Decreased left lower extremity flexibility:Quadriceps and Gastroc    Decreased  venodynes right lower extremity flexibility:  Quadriceps and Gastroc                                                      Knee Evaluation:  ROM:    AROM    Hyperextension:  Left:  0    Right: 0  Extension:  Left: 0    Right:  0  Flexion: Left: 135    Right: 135  PROM    Hyperextension: Left: 4    Right:  4  Extension: Left: 0    Right:  0  Flexion: Left: 140    Right:  140      Strength:     Extension:  Left: 4+/5    Pain:+      Right: 5/5    Pain:+/-  Flexion:  Left: 5/5   Pain:      Right: 5/5   Pain:    Quad Set Left: Fair    Pain:   Quad Set Right: Good    Pain:  Ligament Testing:  Normal                Special Tests:   Left knee positive for the following special tests:  Patellar Compression    Palpation:  Palpation of knee: lateral tilt of patella.  Left knee tenderness present at:  Patellar Lateral    Edema:  Normal      Functional Testing:  : mild/mod deficit of single leg balance/proximal coordination.                Poor core/lower abdominal strength (2/5)  General     ROS    Assessment/Plan:    Patient is a 27 year old female with left side knee complaints.    Patient has the following significant findings with corresponding treatment plan.                Diagnosis 1:  Patellofemoral pain left knee    Pain -  hot/cold therapy, splint/taping/bracing/orthotics, self management, education and home program  Decreased ROM/flexibility - therapeutic exercise  Decreased strength - therapeutic exercise  Impaired balance - neuro re-education  Decreased function - therapeutic activities    Therapy Evaluation Codes:   1) History comprised of:   Personal factors that impact the plan of care:      None.    Comorbidity factors that impact the plan of care are:      None.     Medications impacting care: None.  2) Examination of Body Systems comprised of:   Body structures and functions that impact the plan of care:      Knee.   Activity limitations that impact the plan of care are:      Sitting, Sports, Squatting/kneeling and  Stairs.  3) Clinical presentation characteristics are:   Stable/Uncomplicated.  4) Decision-Making    Low complexity using standardized patient assessment instrument and/or measureable assessment of functional outcome.  Cumulative Therapy Evaluation is: Low complexity.    Previous and current functional limitations:  (See Goal Flow Sheet for this information)    Short term and Long term goals: (See Goal Flow Sheet for this information)     Communication ability:  Patient appears to be able to clearly communicate and understand verbal and written communication and follow directions correctly.  Treatment Explanation - The following has been discussed with the patient:   RX ordered/plan of care  Anticipated outcomes  Possible risks and side effects  This patient would benefit from PT intervention to resume normal activities.   Rehab potential is good.    Frequency:  1 X week, once daily  Duration:  for 6 weeks  Discharge Plan:  Achieve all LTG.  Independent in home treatment program.  Reach maximal therapeutic benefit.    Please refer to the daily flowsheet for treatment today, total treatment time and time spent performing 1:1 timed codes.

## 2025-08-17 ENCOUNTER — HEALTH MAINTENANCE LETTER (OUTPATIENT)
Age: 35
End: 2025-08-17